# Patient Record
Sex: MALE | Race: OTHER | NOT HISPANIC OR LATINO
[De-identification: names, ages, dates, MRNs, and addresses within clinical notes are randomized per-mention and may not be internally consistent; named-entity substitution may affect disease eponyms.]

---

## 2017-02-08 PROBLEM — Z00.00 ENCOUNTER FOR PREVENTIVE HEALTH EXAMINATION: Status: ACTIVE | Noted: 2017-02-08

## 2017-02-23 ENCOUNTER — APPOINTMENT (OUTPATIENT)
Dept: PULMONOLOGY | Facility: CLINIC | Age: 62
End: 2017-02-23

## 2017-02-23 VITALS
SYSTOLIC BLOOD PRESSURE: 118 MMHG | DIASTOLIC BLOOD PRESSURE: 80 MMHG | BODY MASS INDEX: 20.49 KG/M2 | OXYGEN SATURATION: 96 % | WEIGHT: 123 LBS | HEIGHT: 65 IN | HEART RATE: 76 BPM

## 2017-02-24 ENCOUNTER — RECORD ABSTRACTING (OUTPATIENT)
Age: 62
End: 2017-02-24

## 2017-02-25 LAB
ALBUMIN SERPL ELPH-MCNC: 4.6 G/DL
ALP BLD-CCNC: 70 U/L
ALT SERPL-CCNC: 21 U/L
ANION GAP SERPL CALC-SCNC: 12 MMOL/L
AST SERPL-CCNC: 18 U/L
BASOPHILS # BLD AUTO: 0.04 K/UL
BASOPHILS NFR BLD AUTO: 0.4 %
BILIRUB SERPL-MCNC: 0.3 MG/DL
BUN SERPL-MCNC: 16 MG/DL
CALCIUM SERPL-MCNC: 9.8 MG/DL
CHLORIDE SERPL-SCNC: 104 MMOL/L
CHOLEST SERPL-MCNC: 244 MG/DL
CHOLEST/HDLC SERPL: 4.3 RATIO
CO2 SERPL-SCNC: 25 MMOL/L
CREAT SERPL-MCNC: 0.69 MG/DL
EOSINOPHIL # BLD AUTO: 0.14 K/UL
EOSINOPHIL NFR BLD AUTO: 1.6 %
GLUCOSE SERPL-MCNC: 80 MG/DL
HCT VFR BLD CALC: 46.6 %
HDLC SERPL-MCNC: 57 MG/DL
HGB BLD-MCNC: 15.4 G/DL
IMM GRANULOCYTES NFR BLD AUTO: 0.1 %
LDLC SERPL CALC-MCNC: 162 MG/DL
LYMPHOCYTES # BLD AUTO: 3.07 K/UL
LYMPHOCYTES NFR BLD AUTO: 34.1 %
MAN DIFF?: NORMAL
MCHC RBC-ENTMCNC: 31.8 PG
MCHC RBC-ENTMCNC: 33 GM/DL
MCV RBC AUTO: 96.3 FL
MONOCYTES # BLD AUTO: 0.85 K/UL
MONOCYTES NFR BLD AUTO: 9.4 %
NEUTROPHILS # BLD AUTO: 4.89 K/UL
NEUTROPHILS NFR BLD AUTO: 54.4 %
PLATELET # BLD AUTO: 191 K/UL
POTASSIUM SERPL-SCNC: 4.7 MMOL/L
PROT SERPL-MCNC: 7 G/DL
RBC # BLD: 4.84 M/UL
RBC # FLD: 13.2 %
SODIUM SERPL-SCNC: 141 MMOL/L
TRIGL SERPL-MCNC: 126 MG/DL
WBC # FLD AUTO: 9 K/UL

## 2017-02-27 ENCOUNTER — FORM ENCOUNTER (OUTPATIENT)
Age: 62
End: 2017-02-27

## 2017-02-28 ENCOUNTER — OUTPATIENT (OUTPATIENT)
Dept: OUTPATIENT SERVICES | Facility: HOSPITAL | Age: 62
LOS: 1 days | End: 2017-02-28
Payer: COMMERCIAL

## 2017-02-28 ENCOUNTER — APPOINTMENT (OUTPATIENT)
Dept: CT IMAGING | Facility: CLINIC | Age: 62
End: 2017-02-28

## 2017-02-28 DIAGNOSIS — Z00.8 ENCOUNTER FOR OTHER GENERAL EXAMINATION: ICD-10-CM

## 2017-02-28 PROCEDURE — 71250 CT THORAX DX C-: CPT

## 2017-12-01 ENCOUNTER — APPOINTMENT (OUTPATIENT)
Dept: PULMONOLOGY | Facility: CLINIC | Age: 62
End: 2017-12-01
Payer: COMMERCIAL

## 2017-12-01 VITALS
HEIGHT: 65 IN | DIASTOLIC BLOOD PRESSURE: 90 MMHG | BODY MASS INDEX: 20.33 KG/M2 | WEIGHT: 122 LBS | SYSTOLIC BLOOD PRESSURE: 114 MMHG | TEMPERATURE: 97.4 F | OXYGEN SATURATION: 98 % | HEART RATE: 77 BPM

## 2017-12-01 DIAGNOSIS — R07.9 CHEST PAIN, UNSPECIFIED: ICD-10-CM

## 2017-12-01 DIAGNOSIS — R05 COUGH: ICD-10-CM

## 2017-12-01 DIAGNOSIS — R04.2 HEMOPTYSIS: ICD-10-CM

## 2017-12-01 PROCEDURE — 36415 COLL VENOUS BLD VENIPUNCTURE: CPT

## 2017-12-01 PROCEDURE — 99215 OFFICE O/P EST HI 40 MIN: CPT | Mod: 25

## 2017-12-02 LAB
ALBUMIN SERPL ELPH-MCNC: 4.2 G/DL
ALP BLD-CCNC: 67 U/L
ALT SERPL-CCNC: 24 U/L
ANION GAP SERPL CALC-SCNC: 14 MMOL/L
AST SERPL-CCNC: 26 U/L
BASOPHILS # BLD AUTO: 0.04 K/UL
BASOPHILS NFR BLD AUTO: 0.4 %
BILIRUB SERPL-MCNC: 0.5 MG/DL
BUN SERPL-MCNC: 24 MG/DL
CALCIUM SERPL-MCNC: 9.5 MG/DL
CHLORIDE SERPL-SCNC: 104 MMOL/L
CO2 SERPL-SCNC: 22 MMOL/L
CREAT SERPL-MCNC: 0.7 MG/DL
EOSINOPHIL # BLD AUTO: 0.07 K/UL
EOSINOPHIL NFR BLD AUTO: 0.7 %
GLUCOSE SERPL-MCNC: 76 MG/DL
HCT VFR BLD CALC: 45.2 %
HGB BLD-MCNC: 15.3 G/DL
IMM GRANULOCYTES NFR BLD AUTO: 0.2 %
LYMPHOCYTES # BLD AUTO: 2.24 K/UL
LYMPHOCYTES NFR BLD AUTO: 23 %
MAN DIFF?: NORMAL
MCHC RBC-ENTMCNC: 32.8 PG
MCHC RBC-ENTMCNC: 33.8 GM/DL
MCV RBC AUTO: 96.8 FL
MONOCYTES # BLD AUTO: 0.82 K/UL
MONOCYTES NFR BLD AUTO: 8.4 %
NEUTROPHILS # BLD AUTO: 6.57 K/UL
NEUTROPHILS NFR BLD AUTO: 67.3 %
PLATELET # BLD AUTO: 204 K/UL
POTASSIUM SERPL-SCNC: 4.6 MMOL/L
PROT SERPL-MCNC: 6.6 G/DL
RBC # BLD: 4.67 M/UL
RBC # FLD: 14.2 %
SODIUM SERPL-SCNC: 140 MMOL/L
WBC # FLD AUTO: 9.76 K/UL

## 2017-12-21 ENCOUNTER — CHART COPY (OUTPATIENT)
Age: 62
End: 2017-12-21

## 2019-07-11 ENCOUNTER — NON-APPOINTMENT (OUTPATIENT)
Age: 64
End: 2019-07-11

## 2019-07-11 ENCOUNTER — APPOINTMENT (OUTPATIENT)
Dept: OPHTHALMOLOGY | Facility: CLINIC | Age: 64
End: 2019-07-11
Payer: COMMERCIAL

## 2019-07-11 PROBLEM — Z00.00 ENCOUNTER FOR PREVENTIVE HEALTH EXAMINATION: Noted: 2019-07-11

## 2019-07-11 PROCEDURE — 92004 COMPRE OPH EXAM NEW PT 1/>: CPT

## 2019-09-01 ENCOUNTER — INPATIENT (INPATIENT)
Facility: HOSPITAL | Age: 64
LOS: 14 days | Discharge: HOME | DRG: 826 | End: 2019-09-16
Attending: STUDENT IN AN ORGANIZED HEALTH CARE EDUCATION/TRAINING PROGRAM | Admitting: STUDENT IN AN ORGANIZED HEALTH CARE EDUCATION/TRAINING PROGRAM
Payer: COMMERCIAL

## 2019-09-01 VITALS
OXYGEN SATURATION: 94 % | SYSTOLIC BLOOD PRESSURE: 116 MMHG | RESPIRATION RATE: 18 BRPM | HEIGHT: 67 IN | WEIGHT: 125 LBS | DIASTOLIC BLOOD PRESSURE: 84 MMHG | TEMPERATURE: 98 F | HEART RATE: 98 BPM

## 2019-09-01 DIAGNOSIS — G93.89 OTHER SPECIFIED DISORDERS OF BRAIN: ICD-10-CM

## 2019-09-01 LAB
ALBUMIN SERPL ELPH-MCNC: 4.1 G/DL — SIGNIFICANT CHANGE UP (ref 3.3–5)
ALP SERPL-CCNC: 61 U/L — SIGNIFICANT CHANGE UP (ref 40–120)
ALT FLD-CCNC: 22 U/L — SIGNIFICANT CHANGE UP (ref 10–45)
ANION GAP SERPL CALC-SCNC: 11 MMOL/L — SIGNIFICANT CHANGE UP (ref 5–17)
AST SERPL-CCNC: 19 U/L — SIGNIFICANT CHANGE UP (ref 10–40)
BASOPHILS # BLD AUTO: 0.1 K/UL — SIGNIFICANT CHANGE UP (ref 0–0.2)
BASOPHILS NFR BLD AUTO: 0.7 % — SIGNIFICANT CHANGE UP (ref 0–2)
BILIRUB SERPL-MCNC: 0.7 MG/DL — SIGNIFICANT CHANGE UP (ref 0.2–1.2)
BUN SERPL-MCNC: 13 MG/DL — SIGNIFICANT CHANGE UP (ref 7–23)
CALCIUM SERPL-MCNC: 9.7 MG/DL — SIGNIFICANT CHANGE UP (ref 8.4–10.5)
CHLORIDE SERPL-SCNC: 106 MMOL/L — SIGNIFICANT CHANGE UP (ref 96–108)
CO2 SERPL-SCNC: 24 MMOL/L — SIGNIFICANT CHANGE UP (ref 22–31)
CREAT SERPL-MCNC: 0.66 MG/DL — SIGNIFICANT CHANGE UP (ref 0.5–1.3)
EOSINOPHIL # BLD AUTO: 0.1 K/UL — SIGNIFICANT CHANGE UP (ref 0–0.5)
EOSINOPHIL NFR BLD AUTO: 1.4 % — SIGNIFICANT CHANGE UP (ref 0–6)
GLUCOSE SERPL-MCNC: 101 MG/DL — HIGH (ref 70–99)
HCT VFR BLD CALC: 47 % — SIGNIFICANT CHANGE UP (ref 39–50)
HGB BLD-MCNC: 14.9 G/DL — SIGNIFICANT CHANGE UP (ref 13–17)
LYMPHOCYTES # BLD AUTO: 1.8 K/UL — SIGNIFICANT CHANGE UP (ref 1–3.3)
LYMPHOCYTES # BLD AUTO: 23.8 % — SIGNIFICANT CHANGE UP (ref 13–44)
MAGNESIUM SERPL-MCNC: 1.8 MG/DL — SIGNIFICANT CHANGE UP (ref 1.6–2.6)
MCHC RBC-ENTMCNC: 30.7 PG — SIGNIFICANT CHANGE UP (ref 27–34)
MCHC RBC-ENTMCNC: 31.8 GM/DL — LOW (ref 32–36)
MCV RBC AUTO: 96.4 FL — SIGNIFICANT CHANGE UP (ref 80–100)
MONOCYTES # BLD AUTO: 0.6 K/UL — SIGNIFICANT CHANGE UP (ref 0–0.9)
MONOCYTES NFR BLD AUTO: 8.3 % — SIGNIFICANT CHANGE UP (ref 2–14)
NEUTROPHILS # BLD AUTO: 5 K/UL — SIGNIFICANT CHANGE UP (ref 1.8–7.4)
NEUTROPHILS NFR BLD AUTO: 65.8 % — SIGNIFICANT CHANGE UP (ref 43–77)
PHOSPHATE SERPL-MCNC: 2.8 MG/DL — SIGNIFICANT CHANGE UP (ref 2.5–4.5)
PLATELET # BLD AUTO: 148 K/UL — LOW (ref 150–400)
POTASSIUM SERPL-MCNC: 4.2 MMOL/L — SIGNIFICANT CHANGE UP (ref 3.5–5.3)
POTASSIUM SERPL-SCNC: 4.2 MMOL/L — SIGNIFICANT CHANGE UP (ref 3.5–5.3)
PROT SERPL-MCNC: 6.5 G/DL — SIGNIFICANT CHANGE UP (ref 6–8.3)
RBC # BLD: 4.87 M/UL — SIGNIFICANT CHANGE UP (ref 4.2–5.8)
RBC # FLD: 11.5 % — SIGNIFICANT CHANGE UP (ref 10.3–14.5)
SODIUM SERPL-SCNC: 141 MMOL/L — SIGNIFICANT CHANGE UP (ref 135–145)
WBC # BLD: 7.6 K/UL — SIGNIFICANT CHANGE UP (ref 3.8–10.5)
WBC # FLD AUTO: 7.6 K/UL — SIGNIFICANT CHANGE UP (ref 3.8–10.5)

## 2019-09-01 PROCEDURE — 71260 CT THORAX DX C+: CPT | Mod: 26

## 2019-09-01 PROCEDURE — 99284 EMERGENCY DEPT VISIT MOD MDM: CPT

## 2019-09-01 PROCEDURE — 74177 CT ABD & PELVIS W/CONTRAST: CPT | Mod: 26

## 2019-09-01 PROCEDURE — 70496 CT ANGIOGRAPHY HEAD: CPT | Mod: 26

## 2019-09-01 PROCEDURE — 70498 CT ANGIOGRAPHY NECK: CPT | Mod: 26

## 2019-09-01 PROCEDURE — 93010 ELECTROCARDIOGRAM REPORT: CPT | Mod: NC

## 2019-09-01 RX ORDER — LEVETIRACETAM 250 MG/1
750 TABLET, FILM COATED ORAL
Refills: 0 | Status: DISCONTINUED | OUTPATIENT
Start: 2019-09-01 | End: 2019-09-05

## 2019-09-01 RX ORDER — LEVETIRACETAM 250 MG/1
1000 TABLET, FILM COATED ORAL ONCE
Refills: 0 | Status: COMPLETED | OUTPATIENT
Start: 2019-09-01 | End: 2019-09-01

## 2019-09-01 RX ORDER — SODIUM CHLORIDE 9 MG/ML
1000 INJECTION INTRAMUSCULAR; INTRAVENOUS; SUBCUTANEOUS ONCE
Refills: 0 | Status: COMPLETED | OUTPATIENT
Start: 2019-09-01 | End: 2019-09-01

## 2019-09-01 RX ORDER — SODIUM CHLORIDE 9 MG/ML
1000 INJECTION INTRAMUSCULAR; INTRAVENOUS; SUBCUTANEOUS
Refills: 0 | Status: DISCONTINUED | OUTPATIENT
Start: 2019-09-01 | End: 2019-09-05

## 2019-09-01 RX ORDER — ACETAMINOPHEN 500 MG
650 TABLET ORAL EVERY 6 HOURS
Refills: 0 | Status: DISCONTINUED | OUTPATIENT
Start: 2019-09-01 | End: 2019-09-05

## 2019-09-01 RX ADMIN — SODIUM CHLORIDE 500 MILLILITER(S): 9 INJECTION INTRAMUSCULAR; INTRAVENOUS; SUBCUTANEOUS at 16:06

## 2019-09-01 RX ADMIN — LEVETIRACETAM 400 MILLIGRAM(S): 250 TABLET, FILM COATED ORAL at 15:13

## 2019-09-01 NOTE — ED ADULT TRIAGE NOTE - CHIEF COMPLAINT QUOTE
returned from [place] 08/31/19 c/o ams x 2wks, sp fall yesterday  possible seizures blank staring per family

## 2019-09-01 NOTE — ED ADULT NURSE REASSESSMENT NOTE - NS ED NURSE REASSESS COMMENT FT1
Pt received bed assignment. Patient at CT, family aware. Report given to RNFarzana HARRISON. Pt stable for transport. Chart given to charge desk. Will cont to monitor.

## 2019-09-01 NOTE — ED PROVIDER NOTE - ATTENDING CONTRIBUTION TO CARE
64M, no sig pmh, presents with intermittent episodes of AMS over last few weeks. Pt with "staring" episodes, last 5-10 seconds, pt unresponsive during. Sx began s/p pt's father passing away, pt went ot [place] for  3.5 weeks ago. Also reports generalized weakness, poor appetite. Of note, pt also had fall while in [place], fell backwards onto elbows, witnessed, did not strike head, denies residual pain. Denies cp, sob, abd pian, n/v/d, numbness, weakness.    PE: Flattened affect but pt in NAD, NCAT, MMM, Trachea midline, Normal conjunctiva, lungs CTAB, S1/S2 RRR, Normal perfusion, 2+ radial pulses bilat, Abdomen Soft, NTND, No rebound/guarding, No LE edema, No deformity of extremities, No rashes,  No focal motor or sensory deficits. CN II-XII intact. Visual fields intact. EOMI, PERRLA. Facial sensation equal bilat. Smile and eye closure equal bilat. Hearing intact bilat. Palate elevation equal, tongue protrusion midline. Lateral head rotation equal bilat. 5/5 strength UE and LE bilat. Sensation grossly intact. No pronator drift. Normal finger to nose. Negative Romberg. Normal gait.     Neuro exam unremarkable. Pt with flattened affect, depressed mood. Sx most likely 2/2 emotional distress, however, consider acute cns pathology/seizure activity, anemia, metabolic disturbance. Obtain labs, ct head, ekg, re-eval. - Brown Collins MD

## 2019-09-01 NOTE — CONSULT NOTE ADULT - SUBJECTIVE AND OBJECTIVE BOX
63yo male presenting to ED for episodes of AMS. Daughter reports that pt has episodes of unresponsiveness "staring " lasting 5-10s,  delayed responses which began 2 weeks ago. Pt father passed away several weeks ago, went to in [place] x 3.5 weeks for , returned yesterday. + generalized weakness, poor appetite, quit smoking since death of father.  Pt fell lifting 25lbs, landed on elbows , no strike to head. does no complain of any pain      Imaging:HCT: 3.4 x 3.3 cm cystic mass w/in L frontal lobe w/ edema and midline shift.    Exam:  AOx3, FC, PERRL, EOMI, mild R facial   5/5 throughout, no drift  SILT  no clonus    --Anticoagulation: no    =====================  PAST MEDICAL HISTORY   No pertinent past medical history    PAST SURGICAL HISTORY   No significant past surgical history        MEDICATIONS:  Antibiotics:    Neuro:    Other:      SOCIAL HISTORY:   Occupation:   Marital Status:     FAMILY HISTORY:      ROS: Negative except per HPI    LABS:                          14.9   7.6   )-----------( 148      ( 01 Sep 2019 09:49 )             47.0     -    141  |  106  |  13  ----------------------------<  101<H>  4.2   |  24  |  0.66    Ca    9.7      01 Sep 2019 09:49  Phos  2.8     -  Mg     1.8     -    TPro  6.5  /  Alb  4.1  /  TBili  0.7  /  DBili  x   /  AST  19  /  ALT  22  /  AlkPhos  61

## 2019-09-01 NOTE — ED ADULT NURSE NOTE - OBJECTIVE STATEMENT
64 year old male patient presents ambulatory to ED with daughter for AMS x 2 weeks. Per patient's daughter, patient returned from [place] last night and had episode of unsteady gait while carrying luggage and fell down, witnessed by nephew. Patient was in [place] x 3 weeks, and during trip 64 year old male patient presents ambulatory to ED with daughter for AMS x 2 weeks. Per patient's daughter, patient returned from [place] last night and had episode of unsteady gait while carrying luggage and fell down, witnessed by nephew. No LOC at the time and patient denies pain or discomfort from fall. Patient was in [place] x 3 weeks, and during trip patient became "slow to respond" and had episodes of "blank stares." Patient currently A&Ox3, with some slow responses, but patient also hard of hearing. Patient reporting intermittent HAs and dizziness, denies currently. Patient denies current CP, SOB, palpitations, abd pain, n/v/d, fever, chills. Patient ambulatory independently to bathroom with steady gait. Patient and family aware of plan of care, VSS.

## 2019-09-01 NOTE — H&P ADULT - ASSESSMENT
65 y/o male  w/ no PMH pw staring episodes and “delayed responsiveness” for 2 weeks. . Long time smoker recently quit after fathers death 2 weeks ago. w/  3.4 x 3.3 cm cystic mass w/in L frontal lobe w/ edema and midline shift on ct head  ? mets  ? infectious  onc eval  id eval  neuro eval  -Hold steroids until after MRI  -CT contrast C/A/P  -Q4 neurochecks  -MRI brain   -Keppra prophylaxis for now  discussed w/ daughter / pt   iv fluids

## 2019-09-01 NOTE — ED PROVIDER NOTE - OBJECTIVE STATEMENT
63yo male  presenting to ED for episodes of AMS. Daughter reports that pt has episodes of unresponsiveness "staring " lasting 5-10s,  delayed responses which began 2 weeks ago. + generalized weakness, poor appetite. Pt in [place] for 3.5 weeks, returned yesterday. Pt fell lifting 25lbs, landed on elbows , no strike to head. does no complain of any pain.  Denies LOC, muscle jerking, tremor, CP, SOB, n/v/d/c, 65yo male  presenting to ED for episodes of AMS. Daughter reports that pt has episodes of unresponsiveness "staring " lasting 5-10s,  delayed responses which began 2 weeks ago. + generalized weakness, poor appetite, quit smoking since death of father. Pt father passed away several weeks ago, went to in [place] x 3.5 weeks for , returned yesterday. Pt fell lifting 25lbs, landed on elbows , no strike to head. does no complain of any pain.  Denies LOC, muscle jerking, tremor, CP, SOB, n/v/d/c, 63yo male presenting to ED for episodes of AMS. Daughter reports that pt has episodes of unresponsiveness "staring " lasting 5-10s,  delayed responses which began 2 weeks ago. Pt father passed away several weeks ago, went to in [place] x 3.5 weeks for , returned yesterday. + generalized weakness, poor appetite, quit smoking since death of father.  Pt fell lifting 25lbs, landed on elbows , no strike to head. does no complain of any pain.  Denies LOC, muscle jerking, tremor, CP, SOB, n/v/d/c,

## 2019-09-01 NOTE — ED PROVIDER NOTE - CLINICAL SUMMARY MEDICAL DECISION MAKING FREE TEXT BOX
Impression: R/O seizure vs depression  Plan: CT head, Labs Impression: 65yo male presenting with AMS. Now found to have cystic mass within left frontal lobe  Plan: CT head, Labs, metastatic work up.

## 2019-09-01 NOTE — H&P ADULT - NSHPLABSRESULTS_GEN_ALL_CORE
14.9   7.6   )-----------( 148      ( 01 Sep 2019 09:49 )             47.0       09-01    141  |  106  |  13  ----------------------------<  101<H>  4.2   |  24  |  0.66    Ca    9.7      01 Sep 2019 09:49  Phos  2.8     09-01  Mg     1.8     09-01    TPro  6.5  /  Alb  4.1  /  TBili  0.7  /  DBili  x   /  AST  19  /  ALT  22  /  AlkPhos  61  09-01                      Lactate Trend            CAPILLARY BLOOD GLUCOSE

## 2019-09-01 NOTE — CONSULT NOTE ADULT - ASSESSMENT
Gabrielle Andrews  64M w/ no PMH pw staring episodes and “delayed responsiveness” for 2 weeks. No n/v, headache, vision changes, speech problems. No hx of cancer. Long time smoker recently quit after fathers death 2 weeks ago. HCT: 3.4 x 3.3 cm cystic mass w/in L frontal lobe w/ edema and midline shift. Exam: slight R lower facial otherwise intact. No AC.   -medicine for Mets work up  -Hold steroids until after MRI  -CT contrast C/A/P  -Q4 neurochecks  -MRI wwo brain, please also do fMRI   -Keppra

## 2019-09-01 NOTE — H&P ADULT - HISTORY OF PRESENT ILLNESS
63yo male presenting to ED for episodes of AMS.   Daughter reports that pt has episodes of unresponsiveness "staring " for several seconds   delayed responses which began 2 weeks ago. Pt father passed away several weeks ago, went to in [place] for , returned yesterday. + generalized weakness, poor appetite, quit smoking since death of father  .  Pt fell lifting 25lbs, landed on elbows , no head trauma . does not complain of any pain.  Denies LOC, muscle jerking, tremor, CP, SOB, n/v/d/c,

## 2019-09-02 LAB
HCV AB S/CO SERPL IA: 0.17 S/CO — SIGNIFICANT CHANGE UP (ref 0–0.99)
HCV AB SERPL-IMP: SIGNIFICANT CHANGE UP

## 2019-09-02 PROCEDURE — 70553 MRI BRAIN STEM W/O & W/DYE: CPT | Mod: 26

## 2019-09-02 PROCEDURE — 99254 IP/OBS CNSLTJ NEW/EST MOD 60: CPT | Mod: GC

## 2019-09-02 PROCEDURE — 99223 1ST HOSP IP/OBS HIGH 75: CPT

## 2019-09-02 RX ORDER — DEXAMETHASONE 0.5 MG/5ML
4 ELIXIR ORAL EVERY 6 HOURS
Refills: 0 | Status: DISCONTINUED | OUTPATIENT
Start: 2019-09-02 | End: 2019-09-05

## 2019-09-02 RX ADMIN — Medication 4 MILLIGRAM(S): at 17:42

## 2019-09-02 RX ADMIN — LEVETIRACETAM 750 MILLIGRAM(S): 250 TABLET, FILM COATED ORAL at 04:33

## 2019-09-02 RX ADMIN — LEVETIRACETAM 750 MILLIGRAM(S): 250 TABLET, FILM COATED ORAL at 17:43

## 2019-09-02 RX ADMIN — Medication 4 MILLIGRAM(S): at 23:04

## 2019-09-02 RX ADMIN — SODIUM CHLORIDE 100 MILLILITER(S): 9 INJECTION INTRAMUSCULAR; INTRAVENOUS; SUBCUTANEOUS at 05:07

## 2019-09-02 NOTE — CONSULT NOTE ADULT - SUBJECTIVE AND OBJECTIVE BOX
Admitting Diagnosis:  Other conditions of brain (G93.89): OTHER SPECIFIED DISORDERS OF BRAIN      HPI:  This is a 64y year old Male with the below past medical history who presents with the chief complaint of ams and staring.  Had been in [place] for his father's .  about 1 month of flat affect, felt to be in mourning.  Has not been eating well and weak.  Ct head showed left frontal mass so admitted.  No convulsions.  No HA.  main finding per family is personality change.          Past Medical History:  No pertinent past medical history ([id number])      Past Surgical History:  No significant past surgical history ([id number])      Social History:  No toxic habits    Family History:  FAMILY HISTORY:  No pertinent family history in first degree relatives      Allergies:  No Known Allergies      ROS:  Constitutional: Patient offers no complaints of fevers or significant weight loss  Ears, Nose, Mouth and Throat: The patient presents with no abnormalities of the head, ears, eyes, nose or throat  Skin: Patient offers no concerns of new rashes or lesions  Respiratory: The patient presents with no abnormalities of the respiratory tract  Cardiovascular: The patient presents with no cardiac abnormalities  Gastrointestinal: The patient presents with no abnormalities of the GI system  Genitourinary: The patient presents with no dysuria, hematuria or frequent urination  Neurological: See HPI  Endocrine: Patient offers no complaints of excessive thirst, urination, or heat/cold intolerance    Advanced care planning reviewed and noted in the chart.    Medications:  acetaminophen   Tablet .. 650 milliGRAM(s) Oral every 6 hours PRN  levETIRAcetam 750 milliGRAM(s) Oral two times a day  sodium chloride 0.9%. 1000 milliLiter(s) IV Continuous <Continuous>      Labs:  CBC Full  -  ( 01 Sep 2019 09:49 )  WBC Count : 7.6 K/uL  RBC Count : 4.87 M/uL  Hemoglobin : 14.9 g/dL  Hematocrit : 47.0 %  Platelet Count - Automated : 148 K/uL  Mean Cell Volume : 96.4 fl  Mean Cell Hemoglobin : 30.7 pg  Mean Cell Hemoglobin Concentration : 31.8 gm/dL  Auto Neutrophil # : 5.0 K/uL  Auto Lymphocyte # : 1.8 K/uL  Auto Monocyte # : 0.6 K/uL  Auto Eosinophil # : 0.1 K/uL  Auto Basophil # : 0.1 K/uL  Auto Neutrophil % : 65.8 %  Auto Lymphocyte % : 23.8 %  Auto Monocyte % : 8.3 %  Auto Eosinophil % : 1.4 %  Auto Basophil % : 0.7 %        141  |  106  |  13  ----------------------------<  101<H>  4.2   |  24  |  0.66    Ca    9.7      01 Sep 2019 09:49  Phos  2.8       Mg     1.8         TPro  6.5  /  Alb  4.1  /  TBili  0.7  /  DBili  x   /  AST  19  /  ALT  22  /  AlkPhos  61      CAPILLARY BLOOD GLUCOSE        LIVER FUNCTIONS - ( 01 Sep 2019 09:49 )  Alb: 4.1 g/dL / Pro: 6.5 g/dL / ALK PHOS: 61 U/L / ALT: 22 U/L / AST: 19 U/L / GGT: x                 Vitals:  Vital Signs Last 24 Hrs  T(C): 36.3 (02 Sep 2019 07:45), Max: 36.3 (01 Sep 2019 15:11)  T(F): 97.3 (02 Sep 2019 07:45), Max: 97.4 (01 Sep 2019 15:11)  HR: 50 (02 Sep 2019 07:50) (50 - 59)  BP: 148/82 (02 Sep 2019 07:45) (106/71 - 165/82)  BP(mean): --  RR: 18 (02 Sep 2019 07:45) (16 - 18)  SpO2: 99% (02 Sep 2019 07:45) (95% - 99%)    NEUROLOGICAL EXAM:    Mental status: Awake, alert, and in no apparent distress. Oriented to person, place.  said . knew president roni.   Language function is normal. Recent memory, digit span and concentration were normal.    Cranial Nerves: Pupils were equal, round, reactive to light. Extraocular movements were intact. Visual field were full. Fundoscopic exam was deferred. Facial sensation was intact to light touch. There was no facial asymmetry. The palate was upgoing symmetrically and tongue was midline. Hearing acuity was intact to finger rub AU. Shoulder shrug was full bilaterally    Motor exam: Bulk and tone were normal. Strength was 5/5 in all four extremities. Fine finger movements were symmetric and normal. There was no pronator drift    Reflexes: 2+ in the bilateral upper extremities. 2+ in the bilateral lower extremities. Toes were downgoing bilaterally.     Sensation: Intact to light touch, temperature, vibration and proprioception.     Coordination: Finger-nose-finger and heel-to-shin was without dysmetria.     Gait: deferred    Imaging:      EXAM:  CT ANGIO BRAIN (W)AW IC                          EXAM:  CT ANGIO NECK (W)AW IC                          EXAM:  CT BRAIN                            PROCEDURE DATE:  2019            INTERPRETATION:  CLINICAL INFORMATION: Intermittent altered mental   status. Imbalance.     TECHNIQUE: Noncontrast axial CT images were acquired through the head.   Contrast enhanced axial CT images were acquired from the aortic arch to   the vertex of the calvarium, during the angiographic phase. Additional   post-contrast axial CT images through the head were obtained.   Three-dimensional maximum intensity projection reformats were generated   from the angiographic images.  70 cc of Omnipaque-300 mg/ml were   administered intravenously, without immediate complication.    COMPARISON: None.    FINDINGS:     CT BRAIN:    A 3.4 x 3.3 cm cystic mass within the left frontal lobe with large area   of surrounding edema and causing effacement of the bilateral lateral   ventricles and rightward midline shift. There appears to be a solid   component of the lesion anteriorly. There is effacement of the   interpeduncular cistern. No hydrocephalus.    No areas of abnormal enhancement are identified.    The visualized paranasal sinuses are clear. The mastoid air cells and   middle ear cavities are clear.    The soft tissues of the scalp are unremarkable. The calvarium is intact.    CT ANGIOGRAPHY NECK:    Three-vessel aortic arch. Atherosclerotic calcifications of the aorta   without evidence of dissection or hemodynamically significant stenosis.   The origins of the great vessels are unremarkable.     The common carotid arteries are normal in caliber without significant   stenosis.     The carotid bifurcations are unremarkable. The internal carotid arteries   are normal in caliber without significant stenosis.     Co-dominant vertebral arteries. The vertebral arteries are normal in   caliber without significant stenosis.     The visualized neck and upper chest are unremarkable.    Visualized osseous structures are unremarkable.    CT ANGIOGRAPHY BRAIN:    There is no evidence for significant stenosis, major vessel occlusion, or   aneurysm about the [place] of [place].    There is no evidence for significant stenosis, major vessel occlusion, or   aneurysm involving the vertebrobasilar system.    No enlarged vascular lesions or clusters of abnormal vessels are noted to   suggest an arterial venous malformation.    Visualized portions of the superficial and deep venous systems are   unremarkable.      IMPRESSION:     CT brain: Predominantly cystic mass within the left frontal lobe with   large area of surrounding edema causing leftward midline shift. There   appears to be a hyperdense solid component of the mass anteriorly which   showsvascularity. There is effacement of the interpeduncular cistern. No   hydrocephalus.    CT angiography neck: No evidence of hemodynamically significant stenosis   by NASCET criteria. No evidence of vascular dissection.    CT angiography brain: No major vessel occlusion or proximal stenosis by   NASCET criteria. No evidence of aneurysm or other vascular malformation.                MAIRLYN WALKER M.D., RADIOLOGIST RESIDENT  This document has been electronically signed.  ISRAEL DORANTES M.D., ATTENDING RADIOLOGIST  This document has been electronically signed. Sep  1 2019  2:03PM

## 2019-09-02 NOTE — PROGRESS NOTE ADULT - SUBJECTIVE AND OBJECTIVE BOX
CHIEF COMPLAINT:Patient is a 64y old  Male who presents with a chief complaint of   	        PAST MEDICAL & SURGICAL HISTORY:  No pertinent past medical history  No significant past surgical history          REVIEW OF SYSTEMS:  flat affect  no cp or sob  no vomiting  dec appetite  no chills  no fevers    Medications:  MEDICATIONS  (STANDING):  levETIRAcetam 750 milliGRAM(s) Oral two times a day  sodium chloride 0.9%. 1000 milliLiter(s) (100 mL/Hr) IV Continuous <Continuous>    MEDICATIONS  (PRN):  acetaminophen   Tablet .. 650 milliGRAM(s) Oral every 6 hours PRN Moderate Pain (4 - 6)    	    PHYSICAL EXAM:  T(C): 36.3 (09-02-19 @ 07:45), Max: 36.3 (09-01-19 @ 15:11)  HR: 50 (09-02-19 @ 07:50) (50 - 59)  BP: 148/82 (09-02-19 @ 07:45) (106/71 - 165/82)  RR: 18 (09-02-19 @ 07:45) (16 - 18)  SpO2: 99% (09-02-19 @ 07:45) (95% - 99%)  Wt(kg): --  I&O's Summary    01 Sep 2019 07:01  -  02 Sep 2019 07:00  --------------------------------------------------------  IN: 1436 mL / OUT: 0 mL / NET: 1436 mL    02 Sep 2019 07:01  -  02 Sep 2019 11:32  --------------------------------------------------------  IN: 120 mL / OUT: 0 mL / NET: 120 mL        Appearance: Normal	  HEENT:   Normal oral mucosa, PERRL, EOMI	  Lymphatic: No lymphadenopathy  Cardiovascular: Normal S1 S2, No JVD, No murmurs, No edema  Respiratory: Lungs clear to auscultation	  Psychiatry: A & O x 3,   Gastrointestinal:  Soft, Non-tender, + BS	  Skin: No rashes, No ecchymoses, No cyanosis	  Neurologic: Non-focal/motor equal b/l sensory intact  Extremities: Normal range of motion, No clubbing, cyanosis or edema  Vascular: Peripheral pulses palpable 2+ bilaterally    TELEMETRY: 	    ECG:  	  RADIOLOGY:  OTHER: 	  	  LABS:	 	    CARDIAC MARKERS:                                14.9   7.6   )-----------( 148      ( 01 Sep 2019 09:49 )             47.0     09-01    141  |  106  |  13  ----------------------------<  101<H>  4.2   |  24  |  0.66    Ca    9.7      01 Sep 2019 09:49  Phos  2.8     09-01  Mg     1.8     09-01    TPro  6.5  /  Alb  4.1  /  TBili  0.7  /  DBili  x   /  AST  19  /  ALT  22  /  AlkPhos  61  09-01    proBNP:   Lipid Profile:   HgA1c:   TSH:

## 2019-09-02 NOTE — CONSULT NOTE ADULT - ASSESSMENT
65 y/o man with change in MS.  Brain lesion noted on imaging.  Preliminary results of CT c/ap are negative.  Will follow up on final results as well as on MRI of brain.  will check ldh, spep, ipep.  will need tissue bx  NSGY following  Case d/w Dr. Wilburn

## 2019-09-02 NOTE — PROGRESS NOTE ADULT - SUBJECTIVE AND OBJECTIVE BOX
Patient seen and examined at bedside.    --Anticoagulation--    T(C): 36.3 (09-02-19 @ 07:45), Max: 36.3 (09-01-19 @ 17:35)  HR: 50 (09-02-19 @ 07:50) (50 - 59)  BP: 148/82 (09-02-19 @ 07:45) (106/71 - 165/82)  RR: 18 (09-02-19 @ 07:45) (18 - 18)  SpO2: 99% (09-02-19 @ 07:45) (95% - 99%)  Wt(kg): --    Exam:  AOx3, FC, PERRL, EOMI, mild R facial   5/5 throughout, no drift  SILT  no clonus    --Anticoagulation: no

## 2019-09-02 NOTE — CONSULT NOTE ADULT - SUBJECTIVE AND OBJECTIVE BOX
Patient is a 64y old  Male who presents with a chief complaint of     HPI:  63yo male presenting to ED for episodes of AMS.   Daughter reports that pt has episodes of unresponsiveness "staring " for several seconds   delayed responses which began 2 weeks ago. Pt father passed away several weeks ago, went to in [place] for , returned yesterday. + generalized weakness, poor appetite, quit smoking since death of father  .  Pt fell lifting 25lbs, landed on elbows , no head trauma . does not complain of any pain.  Denies LOC, muscle jerking, tremor, CP, SOB, n/v/d/c, (01 Sep 2019 16:29)    Above reviewed.   ID consulted for workup and antibiotic management     PAST MEDICAL & SURGICAL HISTORY:  No pertinent past medical history  No significant past surgical history      Allergies  No Known Allergies        ANTIMICROBIALS:      MEDICATIONS  (STANDING):        OTHER MEDS: MEDICATIONS  (STANDING):  acetaminophen   Tablet .. 650 every 6 hours PRN  levETIRAcetam 750 two times a day      SOCIAL HISTORY:       FAMILY HISTORY:  No pertinent family history in first degree relatives      REVIEW OF SYSTEMS  [  ] ROS unobtainable because:    [ X ] All other systems negative except as noted below:	    Constitutional:  [ ] fever [ ] chills  [ ] weight loss  [ X] weakness  Skin:  [ ] rash [ ] phlebitis	  Eyes: [ ] icterus [ ] pain  [ ] discharge	  ENMT: [ ] sore throat  [ ] thrush [ ] ulcers [ ] exudates  Respiratory: [ ] dyspnea [ ] hemoptysis [ ] cough [ ] sputum	  Cardiovascular:  [ ] chest pain [ ] palpitations [ ] edema	  Gastrointestinal:  [ ] nausea [ ] vomiting [ ] diarrhea [ ] constipation [X ] decreased appetite	  Genitourinary:  [ ] dysuria [ ] frequency [ ] hematuria [ ] discharge [ ] flank pain  [ ] incontinence  Musculoskeletal:  [ ] myalgias [ ] arthralgias [ ] arthritis  [ ] back pain  Neurological:  [ ] headache [ ] seizures  [X ] confusion/altered mental status  Psychiatric:  [ ] anxiety [ ] depression	  Hematology/Lymphatics:  [ ] lymphadenopathy  Endocrine:  [ ] adrenal [ ] thyroid  Allergic/Immunologic:	 [ ] transplant [ ] seasonal    Vital Signs Last 24 Hrs  T(F): 97.3 (19 @ 07:45), Max: 98.1 (19 @ 08:39)    Vital Signs Last 24 Hrs  HR: 54 (19 @ 04:52) (52 - 98)  BP: 148/82 (19 @ 07:45) (106/71 - 165/82)  RR: 18 (19 @ 07:45)  SpO2: 99% (19 @ 07:45) (94% - 99%)  Wt(kg): --    PHYSICAL EXAM:  General: non-toxic  HEAD/EYES: anicteric, PERRL  ENT:  supple  Cardiovascular:   S1, S2  Respiratory:  clear bilaterally  GI:  soft, non-tender, normal bowel sounds  :  no CVA tenderness   Musculoskeletal:  no synovitis  Neurologic:  grossly non-focal  Skin:  no rash  Lymph: no lymphadenopathy  Psychiatric:  appropriate affect  Vascular:  no phlebitis          WBC Count: 7.6 K/uL ( @ 09:49)                            14.9   7.6   )-----------( 148      ( 01 Sep 2019 09:49 )             47.0           141  |  106  |  13  ----------------------------<  101<H>  4.2   |  24  |  0.66    Ca    9.7      01 Sep 2019 09:49  Phos  2.8       Mg     1.8         TPro  6.5  /  Alb  4.1  /  TBili  0.7  /  DBili  x   /  AST  19  /  ALT  22  /  AlkPhos  61        Creatinine Trend: 0.66<--        MICROBIOLOGY:      RADIOLOGY:  CT Head No Cont (19 @ 10:27)   IMPRESSION:     CT brain: Predominantly cystic mass within the left frontal lobe with   large area of surrounding edema causing leftward midline shift. There   appears to be a hyperdense solid component of the mass anteriorly which   shows vascularity. There is effacement of the interpeduncular cistern. No   hydrocephalus.    CT angiography neck: No evidence of hemodynamically significant stenosis   by NASCET criteria. No evidence of vascular dissection.    CT angiography brain: No major vessel occlusion or proximal stenosis by   NASCET criteria. No evidence of aneurysm or other vascular malformation.    < end of copied text > Patient is a 64y old  Male who presents with a chief complaint of     HPI:  63yo male presenting to ED for episodes of AMS.   Daughter reports that pt has episodes of unresponsiveness "staring " for several seconds   delayed responses which began 2 weeks ago. Pt father passed away several weeks ago, went to in [place] for , returned yesterday. + generalized weakness, poor appetite, quit smoking since death of father  .  Pt fell lifting 25lbs, landed on elbows , no head trauma . does not complain of any pain.  Denies LOC, muscle jerking, tremor, CP, SOB, n/v/d/c, (01 Sep 2019 16:29)    Above reviewed. Patient father  from colon ca at end of july and went to [facility] to grieve with family. There he started feeling generalized weakness and decreased PO intake. Was taking longer to answer question. When he returned last week family noticed the same issues and he fell once. He denies f/chill/HA/blurry vision or changes in vision/CP/SOB/N/V/D/C/urinary complaints   ID consulted for workup and antibiotic management     PAST MEDICAL & SURGICAL HISTORY:  No pertinent past medical history  No significant past surgical history      Allergies  No Known Allergies        ANTIMICROBIALS:      MEDICATIONS  (STANDING):        OTHER MEDS: MEDICATIONS  (STANDING):  acetaminophen   Tablet .. 650 every 6 hours PRN  levETIRAcetam 750 two times a day      SOCIAL HISTORY:     Smoked for 50 years   denies etoh or drug use     FAMILY HISTORY:  Father had colon and prostate ca      REVIEW OF SYSTEMS  [  ] ROS unobtainable because:    [ X ] All other systems negative except as noted below:	    Constitutional:  [ ] fever [ ] chills  [ ] weight loss  [ X] weakness  Skin:  [ ] rash [ ] phlebitis	  Eyes: [ ] icterus [ ] pain  [ ] discharge	  ENMT: [ ] sore throat  [ ] thrush [ ] ulcers [ ] exudates  Respiratory: [ ] dyspnea [ ] hemoptysis [ ] cough [ ] sputum	  Cardiovascular:  [ ] chest pain [ ] palpitations [ ] edema	  Gastrointestinal:  [ ] nausea [ ] vomiting [ ] diarrhea [ ] constipation [X ] decreased appetite	  Genitourinary:  [ ] dysuria [ ] frequency [ ] hematuria [ ] discharge [ ] flank pain  [ ] incontinence  Musculoskeletal:  [ ] myalgias [ ] arthralgias [ ] arthritis  [ ] back pain  Neurological:  [ ] headache [ ] seizures  [X ] confusion/altered mental status  Psychiatric:  [ ] anxiety [ ] depression	  Hematology/Lymphatics:  [ ] lymphadenopathy  Endocrine:  [ ] adrenal [ ] thyroid  Allergic/Immunologic:	 [ ] transplant [ ] seasonal    Vital Signs Last 24 Hrs  T(F): 97.3 (19 @ 07:45), Max: 98.1 (19 @ 08:39)    Vital Signs Last 24 Hrs  HR: 54 (19 @ 04:52) (52 - 98)  BP: 148/82 (19 @ 07:45) (106/71 - 165/82)  RR: 18 (19 @ 07:45)  SpO2: 99% (19 @ 07:45) (94% - 99%)  Wt(kg): --    PHYSICAL EXAM:  General: non-toxic  HEAD/EYES: anicteric, PERRL  ENT:  supple, no oral lesions  Cardiovascular:   S1, S2  Respiratory:  decreased breath sounds bilaterally  GI:  soft, non-tender, normal bowel sounds  :  no CVA tenderness   Musculoskeletal:  no synovitis  Neurologic:  alert and oriented x3 but slow speech, small facial droop rest of CN nerve exam intact   Skin:  no rash  Lymph: no lymphadenopathy  Psychiatric:  flat affect  Vascular:  no phlebitis          WBC Count: 7.6 K/uL ( @ 09:49)                            14.9   7.6   )-----------( 148      ( 01 Sep 2019 09:49 )             47.0           141  |  106  |  13  ----------------------------<  101<H>  4.2   |  24  |  0.66    Ca    9.7      01 Sep 2019 09:49  Phos  2.8       Mg     1.8         TPro  6.5  /  Alb  4.1  /  TBili  0.7  /  DBili  x   /  AST  19  /  ALT  22  /  AlkPhos  61        Creatinine Trend: 0.66<--        MICROBIOLOGY:      RADIOLOGY:  CT Head No Cont (19 @ 10:27)   IMPRESSION:     CT brain: Predominantly cystic mass within the left frontal lobe with   large area of surrounding edema causing leftward midline shift. There   appears to be a hyperdense solid component of the mass anteriorly which   shows vascularity. There is effacement of the interpeduncular cistern. No   hydrocephalus.    CT angiography neck: No evidence of hemodynamically significant stenosis   by NASCET criteria. No evidence of vascular dissection.    CT angiography brain: No major vessel occlusion or proximal stenosis by   NASCET criteria. No evidence of aneurysm or other vascular malformation.    < end of copied text >      < from: CT Chest w/ IV Cont (19 @ 16:45) >  IMPRESSION:     No lesion or adenopathy in the chest. No adenopathy in the abdomen or   pelvis.     Indeterminate 1.2 cm hypoattenuating right hepatic lobe lesion.    Indeterminate bilateral renal cystic lesions, which may reflect   hemorrhagic/proteinaceous cysts. Further evaluation with abdominal   ultrasound is recommended.    1.6 cm sclerotic right acetabular lesion.    < end of copied text > Patient is a 64y old  Male who presents with a chief complaint of     HPI:  63yo male presenting to ED for episodes of AMS.   Daughter reports that pt has episodes of unresponsiveness "staring " for several seconds   delayed responses which began 2 weeks ago. Pt father passed away several weeks ago, went to in [place] for , returned yesterday. + generalized weakness, poor appetite, quit smoking since death of father  .  Pt fell lifting 25lbs, landed on elbows , no head trauma . does not complain of any pain.  Denies LOC, muscle jerking, tremor, CP, SOB, n/v/d/c, (01 Sep 2019 16:29)    Above reviewed. Patient father  from colon ca at end of july and went to [facility] to grieve with family. There he started feeling generalized weakness and decreased PO intake. Was taking longer to answer question. When he returned last week family noticed the same issues and he fell once. He denies f/chill/HA/blurry vision or changes in vision/CP/SOB/N/V/D/C/urinary complaints   ID consulted for workup and antibiotic management     PAST MEDICAL & SURGICAL HISTORY:  No pertinent past medical history  No significant past surgical history    Allergies  No Known Allergies    ANTIMICROBIALS:  Off    OTHER MEDS: MEDICATIONS  (STANDING):  acetaminophen   Tablet .. 650 every 6 hours PRN  levETIRAcetam 750 two times a day    SOCIAL HISTORY:     Smoked for 50 years   denies etoh or drug use     FAMILY HISTORY:  Father had colon and prostate ca    REVIEW OF SYSTEMS  [  ] ROS unobtainable because:    [ X ] All other systems negative except as noted below:	    Constitutional:  [ ] fever [ ] chills  [ ] weight loss  [ X] weakness  Skin:  [ ] rash [ ] phlebitis	  Eyes: [ ] icterus [ ] pain  [ ] discharge	  ENMT: [ ] sore throat  [ ] thrush [ ] ulcers [ ] exudates  Respiratory: [ ] dyspnea [ ] hemoptysis [ ] cough [ ] sputum	  Cardiovascular:  [ ] chest pain [ ] palpitations [ ] edema	  Gastrointestinal:  [ ] nausea [ ] vomiting [ ] diarrhea [ ] constipation [X ] decreased appetite	  Genitourinary:  [ ] dysuria [ ] frequency [ ] hematuria [ ] discharge [ ] flank pain  [ ] incontinence  Musculoskeletal:  [ ] myalgias [ ] arthralgias [ ] arthritis  [ ] back pain  Neurological:  [ ] headache [ ] seizures  [X ] confusion/altered mental status  Psychiatric:  [ ] anxiety [ ] depression	  Hematology/Lymphatics:  [ ] lymphadenopathy  Endocrine:  [ ] adrenal [ ] thyroid  Allergic/Immunologic:	 [ ] transplant [ ] seasonal    Vital Signs Last 24 Hrs  T(F): 97.3 (19 @ 07:45), Max: 98.1 (19 @ 08:39)    Vital Signs Last 24 Hrs  HR: 54 (19 @ 04:52) (52 - 98)  BP: 148/82 (19 @ 07:45) (106/71 - 165/82)  RR: 18 (19 @ 07:45)  SpO2: 99% (19 @ 07:45) (94% - 99%)    PHYSICAL EXAM:  General: non-toxic  HEAD/EYES: anicteric, PERRL  ENT:  supple, no oral lesions  Cardiovascular:   S1, S2  Respiratory:  decreased breath sounds bilaterally  GI:  soft, non-tender, normal bowel sounds  :  no CVA tenderness   Musculoskeletal:  no synovitis  Neurologic:  alert and oriented x3 but slow speech, small facial droop rest of CN nerve exam intact   Skin:  no rash  Lymph: no lymphadenopathy  Psychiatric:  flat affect  Vascular:  no phlebitis    Labs:    WBC Count: 7.6 K/uL ( @ 09:49)                          14.9   7.6   )-----------( 148      ( 01 Sep 2019 09:49 )             47.0         141  |  106  |  13  ----------------------------<  101<H>  4.2   |  24  |  0.66    Ca    9.7      01 Sep 2019 09:49  Phos  2.8       Mg     1.8         TPro  6.5  /  Alb  4.1  /  TBili  0.7  /  DBili  x   /  AST  19  /  ALT  22  /  AlkPhos  61      Creatinine Trend: 0.66<--    MICROBIOLOGY:    RADIOLOGY:  CT Head No Cont (19 @ 10:27)   IMPRESSION:     CT brain: Predominantly cystic mass within the left frontal lobe with   large area of surrounding edema causing leftward midline shift. There   appears to be a hyperdense solid component of the mass anteriorly which   shows vascularity. There is effacement of the interpeduncular cistern. No   hydrocephalus.    CT angiography neck: No evidence of hemodynamically significant stenosis   by NASCET criteria. No evidence of vascular dissection.    CT angiography brain: No major vessel occlusion or proximal stenosis by   NASCET criteria. No evidence of aneurysm or other vascular malformation.    < from: CT Chest w/ IV Cont (19 @ 16:45) >  IMPRESSION:     No lesion or adenopathy in the chest. No adenopathy in the abdomen or   pelvis.     Indeterminate 1.2 cm hypoattenuating right hepatic lobe lesion.    Indeterminate bilateral renal cystic lesions, which may reflect   hemorrhagic/proteinaceous cysts. Further evaluation with abdominal   ultrasound is recommended.    1.6 cm sclerotic right acetabular lesion.

## 2019-09-02 NOTE — PROGRESS NOTE ADULT - ASSESSMENT
Jhonyhyacinth Jesagustin  64M w/ no PMH pw staring episodes and “delayed responsiveness” for 2 weeks. No n/v, headache, vision changes, speech problems. No hx of cancer. Long time smoker recently quit after fathers death 2 weeks ago. HCT: 3.4 x 3.3 cm cystic mass w/in L frontal lobe w/ edema and midline shift. Exam: slight R lower facial otherwise intact. No AC.  -Pre op for Thursday.    -Mets work up, onc eval  -Hold steroids  -CT contrast C/A/P done shows hepatic lesion, renal lesions, and acetabular lesion.   -Q4 neurochecks  -MRI wwo brain done, shows L cystic enhancing lesion causing significant edema   -Keppra JhonyjohnGabrielle phillips  64M w/ no PMH pw staring episodes and “delayed responsiveness” for 2 weeks. No n/v, headache, vision changes, speech problems. No hx of cancer. Long time smoker recently quit after fathers death 2 weeks ago. HCT: 3.4 x 3.3 cm cystic mass w/in L frontal lobe w/ edema and midline shift. Exam: slight R lower facial otherwise intact. No AC.  -Pre op for Thursday.    -Mets work up, onc eval  -Start dex 4q6  -CT contrast C/A/P done shows hepatic lesion, renal lesions, and acetabular lesion.   -Q4 neurochecks  -MRI wwo brain done, shows L cystic enhancing lesion causing significant edema   -Keppra

## 2019-09-02 NOTE — CONSULT NOTE ADULT - ASSESSMENT
65yo male presenting to ED for episodes of AMS.  Afebrile, allie, normotensive   No leukocytosis or abnormal diff  creatinine and LFTs normal   CT head Predominantly cystic mass within the left frontal lobe with large area of surrounding edema causing leftward midline shift. There appears to be a hyperdense solid component of the mass anteriorly which shows vascularity. There is effacement of the interpeduncular cistern. No hydrocephalus.    ------INCOMPLETE NOTE-TO BE DISCUSSED WITH ATTENDING- RECOMMENDATIONS TO FOLLOW--- 63yo male presenting to ED for episodes of AMS.  Afebrile, allie, normotensive   No leukocytosis or abnormal diff  creatinine and LFTs normal   CT head Predominantly cystic mass within the left frontal lobe with large area of surrounding edema causing leftward midline shift. There appears to be a hyperdense solid component of the mass anteriorly which shows vascularity. There is effacement of the interpeduncular cistern. No hydrocephalus.  CT findings concerning as there is an acetabular lesion, liver lesion and renal lesions.   No obvious sign of infection   Suspect this is more likely to be malignant     Overall, 64M with brain mass, liver lesion, cancer hx in family, weakness   Recommendations:   -Monitor off antibiotics   -send 2 sets of blood cultures with 8-10mL blood per bottle  -MRI brain w/wo contrast   -Send HIV screen  -Send QuantiFeron   -Neurosurgery possible biopsy later this week->send for pathology, bacterial culture, fungal culture, AFB smear and culture     Discussed with NP  Will continue to follow     Gabriel Liu DO  Pager [phone]   ID fellow, PGY 5  After 5pm/weekends call [phone]

## 2019-09-02 NOTE — PROGRESS NOTE ADULT - ASSESSMENT
63 y/o male  w/ no PMH pw staring episodes and “delayed responsiveness” for 2 weeks. . Long time smoker recently quit after fathers death 2 weeks ago. w/  3.4 x 3.3 cm cystic mass w/in L frontal lobe w/ edema and midline shift on ct head  ? mets  ? infectious less likely  onc eval noted  onc w/u undergoing   id eval noted  neuro eval noted  -Hold steroids until after MRI  -CT contrast C/A/P pending final results   -Q4 neurochecks  -MRI brain today  -Keppra prophylaxis for now  discussed w/ daughter / pt /onc/neuro  iv fluids

## 2019-09-02 NOTE — CONSULT NOTE ADULT - SUBJECTIVE AND OBJECTIVE BOX
MARCOS KOO  MRN-[id number]    Patient is a 64y old  Male who presents with a chief complaint of     HPI:  65yo male presenting to ED for episodes of AMS.   Daughter reports that pt has episodes of unresponsiveness "staring " for several seconds   delayed responses which began 2 weeks ago. Pt father passed away several weeks ago, went to in [place] for , returned over weekend. + generalized weakness, poor appetite, quit smoking since death of father  .  Pt fell lifting 25lbs, landed on elbows , no head trauma . does not complain of any pain.  Denies LOC, muscle jerking, tremor, CP, SOB, n/v/d/c, (01 Sep 2019 16:29)  Brain imaging revealed mass      PAST MEDICAL & SURGICAL HISTORY:  No pertinent past medical history  No significant past surgical history    Current Meds  MEDICATIONS  (STANDING):  levETIRAcetam 750 milliGRAM(s) Oral two times a day  sodium chloride 0.9%. 1000 milliLiter(s) (100 mL/Hr) IV Continuous <Continuous>    MEDICATIONS  (PRN):  acetaminophen   Tablet .. 650 milliGRAM(s) Oral every 6 hours PRN Moderate Pain (4 - 6)      Allergies    No Known Allergies    Social History   No tob.  No etoh    FAMILY HISTORY:  No pertinent family history in first degree relatives        REVIEW OF SYSTEMS    patient not answering questions in helpful way.  Answeres, as above as obtained from daughter.  no pain. No HA or dizziness. no f/c/s. + decrease in appetite.  no n/v/d. no cp or palp . no cough or sob      Vitals  Vital Signs Last 24 Hrs  T(C): 36.3 (02 Sep 2019 07:45), Max: 36.3 (01 Sep 2019 15:11)  T(F): 97.3 (02 Sep 2019 07:45), Max: 97.4 (01 Sep 2019 15:11)  HR: 50 (02 Sep 2019 07:50) (50 - 59)  BP: 148/82 (02 Sep 2019 07:45) (106/71 - 165/82)  BP(mean): --  RR: 18 (02 Sep 2019 07:45) (16 - 18)  SpO2: 99% (02 Sep 2019 07:45) (95% - 99%)    Physical Exam    Constitutional: NAD    Eyes: PERRLA EOMI, anicteric sclera    Heent :No oral sores, no pharyngeal injection. moist mucosa.    Neck: supple, no jvd, no LAD    Respiratory: CTA b/l     Cardiovascular: s1s2, no m/g/r    Gastrointestinal: soft, nt, nd, + BS    Extremities: no c/c/e    Neurological:A&O x 3 moves all ext.    Skin: no rash on exposed skin    Lymph Nodes: no lymphadenopathy.    Lab  CBC Full  -  ( 01 Sep 2019 09:49 )  WBC Count : 7.6 K/uL  RBC Count : 4.87 M/uL  Hemoglobin : 14.9 g/dL  Hematocrit : 47.0 %  Platelet Count - Automated : 148 K/uL  Mean Cell Volume : 96.4 fl  Mean Cell Hemoglobin : 30.7 pg  Mean Cell Hemoglobin Concentration : 31.8 gm/dL  Auto Neutrophil # : 5.0 K/uL  Auto Lymphocyte # : 1.8 K/uL  Auto Monocyte # : 0.6 K/uL  Auto Eosinophil # : 0.1 K/uL  Auto Basophil # : 0.1 K/uL  Auto Neutrophil % : 65.8 %  Auto Lymphocyte % : 23.8 %  Auto Monocyte % : 8.3 %  Auto Eosinophil % : 1.4 %  Auto Basophil % : 0.7 %        141  |  106  |  13  ----------------------------<  101<H>  4.2   |  24  |  0.66    Ca    9.7      01 Sep 2019 09:49  Phos  2.8       Mg     1.8         TPro  6.5  /  Alb  4.1  /  TBili  0.7  /  DBili  x   /  AST  19  /  ALT  22  /  AlkPhos  61          Rad:    Assessment/Plan

## 2019-09-02 NOTE — CONSULT NOTE ADULT - ASSESSMENT
This is a 64y Male, here with 1 month of personality change, found the have left frontal cystic mass with edema.    MRI brain done results pending.    CT C/A/P neg.    Concern would be for primary brain tumor, eg GBM    Plan:  - follow up radiology read  - hold steroids per neurosurgery in case is lymphoma  - suspect will need biopsy +/- resection.  defer to neurosurgery  - case discussed with family at bedside  - will follow

## 2019-09-03 ENCOUNTER — TRANSCRIPTION ENCOUNTER (OUTPATIENT)
Age: 64
End: 2019-09-03

## 2019-09-03 PROCEDURE — 99232 SBSQ HOSP IP/OBS MODERATE 35: CPT

## 2019-09-03 RX ORDER — BACLOFEN 100 %
20 POWDER (GRAM) MISCELLANEOUS ONCE
Refills: 0 | Status: COMPLETED | OUTPATIENT
Start: 2019-09-03 | End: 2019-09-03

## 2019-09-03 RX ORDER — METOCLOPRAMIDE HCL 10 MG
20 TABLET ORAL ONCE
Refills: 0 | Status: DISCONTINUED | OUTPATIENT
Start: 2019-09-03 | End: 2019-09-03

## 2019-09-03 RX ORDER — METOCLOPRAMIDE HCL 10 MG
20 TABLET ORAL ONCE
Refills: 0 | Status: COMPLETED | OUTPATIENT
Start: 2019-09-03 | End: 2019-09-03

## 2019-09-03 RX ADMIN — Medication 4 MILLIGRAM(S): at 05:38

## 2019-09-03 RX ADMIN — SODIUM CHLORIDE 100 MILLILITER(S): 9 INJECTION INTRAMUSCULAR; INTRAVENOUS; SUBCUTANEOUS at 08:16

## 2019-09-03 RX ADMIN — Medication 4 MILLIGRAM(S): at 12:51

## 2019-09-03 RX ADMIN — Medication 650 MILLIGRAM(S): at 17:54

## 2019-09-03 RX ADMIN — Medication 650 MILLIGRAM(S): at 14:37

## 2019-09-03 RX ADMIN — Medication 4 MILLIGRAM(S): at 17:17

## 2019-09-03 RX ADMIN — Medication 108 MILLIGRAM(S): at 20:19

## 2019-09-03 RX ADMIN — Medication 20 MILLIGRAM(S): at 22:49

## 2019-09-03 RX ADMIN — LEVETIRACETAM 750 MILLIGRAM(S): 250 TABLET, FILM COATED ORAL at 17:17

## 2019-09-03 RX ADMIN — Medication 4 MILLIGRAM(S): at 22:50

## 2019-09-03 RX ADMIN — LEVETIRACETAM 750 MILLIGRAM(S): 250 TABLET, FILM COATED ORAL at 05:37

## 2019-09-03 NOTE — CHART NOTE - NSCHARTNOTEFT_GEN_A_CORE
MARCOS KOO    Notified by RN patient with persist of hiccup's since noon time. v/s stable.       Interventions taken   Reglan with poor response and start Baclofen.  cont assess and monitor.  f/u with am team.                      Tadeo Hsu ANP-BC  Spectralink #[id number]

## 2019-09-03 NOTE — PROGRESS NOTE ADULT - SUBJECTIVE AND OBJECTIVE BOX
CHIEF COMPLAINT:Patient is a 64y old  Male who presents with a chief complaint of AMS (03 Sep 2019 11:12)    	        PAST MEDICAL & SURGICAL HISTORY:  No pertinent past medical history  No significant past surgical history          REVIEW OF SYSTEMS:  CONSTITUTIONAL: flat affect   EYES: No eye pain, visual disturbances, or discharge  NECK: No pain or stiffness  RESPIRATORY: No cough, wheezing, chills or hemoptysis; No Shortness of Breath  CARDIOVASCULAR: No chest pain, palpitations, passing out, dizziness, or leg swelling  GASTROINTESTINAL: No abdominal or epigastric pain. No nausea, vomiting, or hematemesis; No diarrhea or constipation. No melena or hematochezia.  GENITOURINARY: No dysuria, frequency, hematuria, or incontinence  NEUROLOGICAL: No headaches,  MUSCULOSKELETAL: No joint pain or swelling; No muscle, back, or extremity pain    Medications:  MEDICATIONS  (STANDING):  dexamethasone  Injectable 4 milliGRAM(s) IV Push every 6 hours  levETIRAcetam 750 milliGRAM(s) Oral two times a day  sodium chloride 0.9%. 1000 milliLiter(s) (100 mL/Hr) IV Continuous <Continuous>    MEDICATIONS  (PRN):  acetaminophen   Tablet .. 650 milliGRAM(s) Oral every 6 hours PRN Moderate Pain (4 - 6)    	    PHYSICAL EXAM:  T(C): 36.3 (09-03-19 @ 12:05), Max: 36.5 (09-03-19 @ 04:51)  HR: 60 (09-03-19 @ 12:05) (55 - 60)  BP: 113/54 (09-03-19 @ 12:05) (99/66 - 142/84)  RR: 18 (09-03-19 @ 12:05) (17 - 18)  SpO2: 97% (09-03-19 @ 12:05) (94% - 97%)  Wt(kg): --  I&O's Summary    02 Sep 2019 07:01  -  03 Sep 2019 07:00  --------------------------------------------------------  IN: 1610 mL / OUT: 550 mL / NET: 1060 mL    03 Sep 2019 07:01  -  03 Sep 2019 13:49  --------------------------------------------------------  IN: 820 mL / OUT: 1000 mL / NET: -180 mL        Appearance: Normal	  HEENT:   Normal oral mucosa, PERRL, EOMI	  Lymphatic: No lymphadenopathy  Cardiovascular: Normal S1 S2, No JVD, No murmurs, No edema  Respiratory: Lungs clear to auscultation	  Psychiatry: A & O x 3, Mood & affect appropriate  Gastrointestinal:  Soft, Non-tender, + BS	  Skin: No rashes, No ecchymoses, No cyanosis	  Neurologic: Non-focal/sensory intact  / motor equal b/l dtr normal  Extremities: Normal range of motion, No clubbing, cyanosis or edema  Vascular: Peripheral pulses palpable 2+ bilaterally    TELEMETRY: 	    ECG:  	  RADIOLOGY:  OTHER: 	  	  LABS:	 	    CARDIAC MARKERS:                  proBNP:   Lipid Profile:   HgA1c:   TSH:

## 2019-09-03 NOTE — PROGRESS NOTE ADULT - SUBJECTIVE AND OBJECTIVE BOX
HPI:  65yo male presenting to ED for episodes of AMS.   Daughter reports that pt has episodes of unresponsiveness "staring " for several seconds   delayed responses which began 2 weeks ago. Pt father passed away several weeks ago, went to in [place] for , returned yesterday. + generalized weakness, poor appetite, quit smoking since death of father  .  Pt fell lifting 25lbs, landed on elbows , no head trauma . does not complain of any pain.  Denies LOC, muscle jerking, tremor, CP, SOB, n/v/d/c, (01 Sep 2019 16:29)    PAST MEDICAL & SURGICAL HISTORY:  No pertinent past medical history  No significant past surgical history    Medications:  acetaminophen   Tablet .. 650 milliGRAM(s) Oral every 6 hours PRN Moderate Pain (4 - 6)  dexamethasone  Injectable 4 milliGRAM(s) IV Push every 6 hours  levETIRAcetam 750 milliGRAM(s) Oral two times a day  sodium chloride 0.9%. 1000 milliLiter(s) IV Continuous <Continuous>    Labs:            Radiology:     < from: MR Head w/wo IV Cont (19 @ 13:15) >  Left frontal heterogeneous lesion with cystic and solid   components in surrounding vasogenic edema. Lesion is solitary.There is   diffusion positivity around the periphery of the lesion. There is some   susceptibility seen in association. Findings suggestive of a glioblastoma   versus a metastasis. No diffusion restriction within the cystic portion   of the lesion tosuggest an abscess.      < end of copied text >    < from: CT Abdomen and Pelvis w/ IV Cont (19 @ 17:07) >  No lesion or adenopathy in the chest. No adenopathy in the abdomen or   pelvis.     Indeterminate 1.2 cm hypoattenuating right hepatic lobe lesion.    Indeterminate bilateral renal cystic lesions, which may reflect   hemorrhagic/proteinaceous cysts. Further evaluation with abdominal   ultrasound is recommended.    1.6 cm sclerotic right acetabular lesion.              < end of copied text >        ROS:  Patient comfortable without distress  No SOB or chest pain  No palpitation  No abdominal pain, diarrhaea or constipation  No weakness of extremities  No skin changes or swelling of legs    Vital Signs Last 24 Hrs  T(C): 36.5 (03 Sep 2019 04:51), Max: 36.5 (03 Sep 2019 04:51)  T(F): 97.7 (03 Sep 2019 04:51), Max: 97.7 (03 Sep 2019 04:51)  HR: 59 (03 Sep 2019 04:51) (55 - 59)  BP: 99/66 (03 Sep 2019 04:51) (99/66 - 142/84)  BP(mean): --  RR: 17 (03 Sep 2019 04:51) (17 - 17)  SpO2: 96% (03 Sep 2019 04:51) (94% - 96%)    Physical exam:  Patient alert and oriented  No distress  CVS: S1, S2 regular or murmur  Chest: bilateral breath sound without rales  Abdomen: soft, not tender, no organomegaly or masses  No focal neuro deficit  No edema      Assessment and Plan: 65yo male was admitted for episodes of AMS.  CT's and MRI noted  PAST MEDICAL & SURGICAL HISTORY:  No pertinent past medical history  No significant past surgical history    Medications:  acetaminophen   Tablet .. 650 milliGRAM(s) Oral every 6 hours PRN Moderate Pain (4 - 6)  dexamethasone  Injectable 4 milliGRAM(s) IV Push every 6 hours  levETIRAcetam 750 milliGRAM(s) Oral two times a day  sodium chloride 0.9%. 1000 milliLiter(s) IV Continuous <Continuous>    Labs:    Complete Blood Count + Automated Diff (09.01.19 @ 09:49)    WBC Count: 7.6 K/uL    RBC Count: 4.87 M/uL    Hemoglobin: 14.9 g/dL    Hematocrit: 47.0 %    Mean Cell Volume: 96.4 fl    Mean Cell Hemoglobin: 30.7 pg    Mean Cell Hemoglobin Conc: 31.8 gm/dL    Red Cell Distrib Width: 11.5 %    Platelet Count - Automated: 148 K/uL    Auto Neutrophil #: 5.0 K/uL    Auto Lymphocyte #: 1.8 K/uL    Auto Monocyte #: 0.6 K/uL    Auto Eosinophil #: 0.1 K/uL    Auto Basophil #: 0.1 K/uL    Auto Neutrophil %: 65.8: Differential percentages must be correlated with absolute numbers for  clinical significance. %    Auto Lymphocyte %: 23.8 %    Auto Monocyte %: 8.3 %    Auto Eosinophil %: 1.4 %    Auto Basophil %: 0.7 %    Comprehensive Metabolic Panel (09.01.19 @ 09:49)    Sodium, Serum: 141 mmol/L    Potassium, Serum: 4.2 mmol/L    Chloride, Serum: 106 mmol/L    Carbon Dioxide, Serum: 24 mmol/L    Anion Gap, Serum: 11 mmol/L    Blood Urea Nitrogen, Serum: 13 mg/dL    Creatinine, Serum: 0.66 mg/dL    Glucose, Serum: 101 mg/dL    Calcium, Total Serum: 9.7 mg/dL    Protein Total, Serum: 6.5 g/dL    Albumin, Serum: 4.1 g/dL    Bilirubin Total, Serum: 0.7 mg/dL    Alkaline Phosphatase, Serum: 61 U/L    Aspartate Aminotransferase (AST/SGOT): 19 U/L    Alanine Aminotransferase (ALT/SGPT): 22 U/L    eGFR if Non : 101: Interpretative comment  The units for eGFR are mL/min/1.73M2 (normalized body surface area). The  eGFR is calculated from a serum creatinine using the CKD-EPI equation.  Other variables required for calculation are race, age and sex. Among  patients with chronic kidney disease (CKD), the eGFR is useful in  determining the stage of disease according to KDOQI CKD classification.  All eGFR results are reported numerically with the following  interpretation.          GFR                    With                 Without     (ml/min/1.73 m2)    Kidney Damage       Kidney Damage        >= 90                    Stage 1                     Normal        60-89                    Stage 2                     Decreased GFR        30-59     Stage 3                     Stage 3        15-29                    Stage 4                     Stage 4        < 15                      Stage 5                     Stage 5  Each stage of CKD assumes that the associated GFR level has been in  effect for at least 3 months. Determination of stages one and two (with  eGFR > 59 ml/min/m2) requires estimation of kidney damage for at least 3  months as defined by structural or functional abnormalities.  Limitations: All estimates of GFR will be less accurate for patients at  extremes of muscle mass (including but not limited to frail elderly,  critically ill, or cancer patients), those with unusual diets, and those  with conditions associated with reduced secretion or extrarenal  elimination of creatinine. The eGFR equation is not recommended for use  in patients with unstable creatinine levels. mL/min/1.73M2    eGFR if African American: 118 mL/min/1.73M2            Radiology:     < from: MR Head w/wo IV Cont (09.02.19 @ 13:15) >  Left frontal heterogeneous lesion with cystic and solid   components in surrounding vasogenic edema. Lesion is solitary.There is   diffusion positivity around the periphery of the lesion. There is some   susceptibility seen in association. Findings suggestive of a glioblastoma   versus a metastasis. No diffusion restriction within the cystic portion   of the lesion tosuggest an abscess.      < end of copied text >    < from: CT Abdomen and Pelvis w/ IV Cont (09.01.19 @ 17:07) >  No lesion or adenopathy in the chest. No adenopathy in the abdomen or   pelvis.     Indeterminate 1.2 cm hypoattenuating right hepatic lobe lesion.    Indeterminate bilateral renal cystic lesions, which may reflect   hemorrhagic/proteinaceous cysts. Further evaluation with abdominal   ultrasound is recommended.    1.6 cm sclerotic right acetabular lesion.              < end of copied text >        ROS:  Patient comfortable without distress  At present MS seems OK, family at bedside  No SOB or chest pain  No palpitation  No abdominal pain, diarrhaea or constipation  No weakness of extremities  No skin changes or swelling of legs    Vital Signs Last 24 Hrs  T(C): 36.5 (03 Sep 2019 04:51), Max: 36.5 (03 Sep 2019 04:51)  T(F): 97.7 (03 Sep 2019 04:51), Max: 97.7 (03 Sep 2019 04:51)  HR: 59 (03 Sep 2019 04:51) (55 - 59)  BP: 99/66 (03 Sep 2019 04:51) (99/66 - 142/84)  BP(mean): --  RR: 17 (03 Sep 2019 04:51) (17 - 17)  SpO2: 96% (03 Sep 2019 04:51) (94% - 96%)    Physical exam:  Patient alert   No distress  CVS: S1, S2 regular or murmur  Chest: bilateral breath sound without rales  Abdomen: soft, not tender, no organomegaly or masses  No focal neuro deficit  No edema      Assessment and Plan:

## 2019-09-03 NOTE — DISCHARGE NOTE NURSING/CASE MANAGEMENT/SOCIAL WORK - NSDCPEEMAIL_GEN_ALL_CORE
[facility] email [email or web address]

## 2019-09-03 NOTE — DISCHARGE NOTE NURSING/CASE MANAGEMENT/SOCIAL WORK - PATIENT PORTAL LINK FT
You can access the FollowMyHealth Patient Portal offered by [facility] by registering at the following website: [email or web address]. By joining [facility] FollowMyHealth portal, you will also be able to view your health information using other applications (apps) compatible with our system.

## 2019-09-03 NOTE — PROGRESS NOTE ADULT - ASSESSMENT
For bx 63yo male was admitted for episodes of AMS.  CT's and MRI noted. Diff dx is GBM vs Mets. Other etiologies less likely.  CT chest and abdomen noted, no obvious primary.  Seen by NS and plan is possible bx/+/- recection on thursday  Patient is on steroids and Keppra.  Patient's family had lot of questions which were answered. I told them that we have to wait for NS procedure and pathology to discuss prognosis and management options etc

## 2019-09-03 NOTE — PROGRESS NOTE ADULT - SUBJECTIVE AND OBJECTIVE BOX
CC: F/U for malignancy    Saw/spoke to patient. No fevers, no chills. No new complaints. Mental status improving.    Allergies  No Known Allergies    ANTIMICROBIALS:  Off    PE:    Vital Signs Last 24 Hrs  T(C): 36.3 (03 Sep 2019 12:05), Max: 36.5 (03 Sep 2019 04:51)  T(F): 97.3 (03 Sep 2019 12:05), Max: 97.7 (03 Sep 2019 04:51)  HR: 60 (03 Sep 2019 12:05) (55 - 60)  BP: 113/54 (03 Sep 2019 12:05) (99/66 - 142/84)  RR: 18 (03 Sep 2019 12:05) (17 - 18)  SpO2: 97% (03 Sep 2019 12:05) (94% - 97%)    Gen: AOx3, NAD, non-toxic, pleasant  CV: S1+S2 normal, nontachycardic  Resp: Clear bilat, no resp distress, no crackles/wheezes  Abd: Soft, nontender, +BS  Ext: No LE edema, no wounds    LABS:    No new available    MICROBIOLOGY:    No new complaints    RADIOLOGY:    9/2 MR:    IMPRESSION: Left frontal heterogeneous lesion with cystic and solid   components in surrounding vasogenic edema. Lesion is solitary. There is   diffusion positivity around the periphery of the lesion. There is some   susceptibility seen in association. Findings suggestive of a glioblastoma   versus a metastasis. No diffusion restriction within the cystic portion   of the lesion to suggest an abscess.

## 2019-09-03 NOTE — PROGRESS NOTE ADULT - ASSESSMENT
63yo male presenting to ED for episodes of AMS.  No fever, no leukocytosis  No focal symptoms to suggest infection  Has AMS, with slow response to questions  CT's with liver and acetabular lesion; CTH with cystic brain lesions with midline shift  MR confirms suspicion for glioblastoma vs met  AMS likely 2/2 underlying malignancy; low suspicion for infection presently  Overall, Brain mass, AMS, suspected malignancy  - Continue off abx  - Ordered HIV, quant gold  - Workup for malignancy per primary team/neurosurgery  - Monitor mental status, monitor for any signs developing infection    Mikie Dhillon MD  Pager [phone]  After 5pm and on weekends call [phone]

## 2019-09-03 NOTE — PROGRESS NOTE ADULT - SUBJECTIVE AND OBJECTIVE BOX
Admitting Diagnosis:  Other conditions of brain (G93.89): OTHER SPECIFIED DISORDERS OF BRAIN      HPI:  This is a 64y year old Male with the below past medical history who presents with the chief complaint of ams and staring.  Had been in [place] for his father's .  about 1 month of flat affect, felt to be in mourning.  Has not been eating well and weak.  Ct head showed left frontal mass so admitted.  No convulsions.  No HA.  main finding per family is personality change.     ct C-A-P showed hepatic lesion and acetabular lesion  Ct angio head and neck - no abnormalities  mri brain - solitary left frontal lesion w/ vasogenic edema and + ARNULFO enhancing.     patient with son bedside, no complaints and no interest in talking     Past Medical History:  No pertinent past medical history ([id number])      Past Surgical History:  No significant past surgical history ([id number])      Social History:  No toxic habits    Family History:  FAMILY HISTORY:  No pertinent family history in first degree relatives      Allergies:  No Known Allergies      ROS:  Constitutional: Patient offers no complaints of fevers or significant weight loss  Ears, Nose, Mouth and Throat: The patient presents with no abnormalities of the head, ears, eyes, nose or throat  Skin: Patient offers no concerns of new rashes or lesions  Respiratory: The patient presents with no abnormalities of the respiratory tract  Cardiovascular: The patient presents with no cardiac abnormalities  Gastrointestinal: The patient presents with no abnormalities of the GI system  Genitourinary: The patient presents with no dysuria, hematuria or frequent urination  Neurological: See HPI  Endocrine: Patient offers no complaints of excessive thirst, urination, or heat/cold intolerance    Advanced care planning reviewed and noted in the chart.    Medications:  acetaminophen   Tablet .. 650 milliGRAM(s) Oral every 6 hours PRN  dexamethasone  Injectable 4 milliGRAM(s) IV Push every 6 hours  levETIRAcetam 750 milliGRAM(s) Oral two times a day  sodium chloride 0.9%. 1000 milliLiter(s) IV Continuous <Continuous>      Labs:          CAPILLARY BLOOD GLUCOSE                  Vitals:  Vital Signs Last 24 Hrs  T(C): 36.5 (03 Sep 2019 04:51), Max: 36.5 (03 Sep 2019 04:51)  T(F): 97.7 (03 Sep 2019 04:51), Max: 97.7 (03 Sep 2019 04:51)  HR: 59 (03 Sep 2019 04:51) (55 - 59)  BP: 99/66 (03 Sep 2019 04:51) (99/66 - 142/84)  BP(mean): --  RR: 17 (03 Sep 2019 04:51) (17 - 17)  SpO2: 96% (03 Sep 2019 04:51) (94% - 96%)    NEUROLOGICAL EXAM:    Mental status: Awake, alert, and in no apparent distress. sitting in bedside chair, Oriented to person, place.     Language function is normal. Recent memory, digit span and concentration were normal.    Cranial Nerves: Pupils were equal, round, reactive to light. Extraocular movements were intact. Visual field were full. Facial sensation was intact to light touch. There was no facial asymmetry. The palate was upgoing symmetrically and tongue was midline. Hearing acuity was intact to finger rub AU. Shoulder shrug was full bilaterally    Motor exam: Bulk and tone were normal. Strength was 5/5 in all four extremities. Fine finger movements were symmetric and normal. There was no pronator drift    Reflexes: 2+ in the bilateral upper extremities. 2+ in the bilateral lower extremities. Toes were downgoing bilaterally.     Sensation: Intact to light touch, temperature, vibration and proprioception.     Coordination: Finger-nose-finger and heel-to-shin was without dysmetria.     Gait: deferred    Imaging:      EXAM:  CT ANGIO BRAIN (W)AW IC                          EXAM:  CT ANGIO NECK (W)AW IC                          EXAM:  CT BRAIN                            PROCEDURE DATE:  2019            INTERPRETATION:  CLINICAL INFORMATION: Intermittent altered mental   status. Imbalance.     TECHNIQUE: Noncontrast axial CT images were acquired through the head.   Contrast enhanced axial CT images were acquired from the aortic arch to   the vertex of the calvarium, during the angiographic phase. Additional   post-contrast axial CT images through the head were obtained.   Three-dimensional maximum intensity projection reformats were generated   from the angiographic images.  70 cc of Omnipaque-300 mg/ml were   administered intravenously, without immediate complication.    COMPARISON: None.    FINDINGS:     CT BRAIN:    A 3.4 x 3.3 cm cystic mass within the left frontal lobe with large area   of surrounding edema and causing effacement of the bilateral lateral   ventricles and rightward midline shift. There appears to be a solid   component of the lesion anteriorly. There is effacement of the   interpeduncular cistern. No hydrocephalus.    No areas of abnormal enhancement are identified.    The visualized paranasal sinuses are clear. The mastoid air cells and   middle ear cavities are clear.    The soft tissues of the scalp are unremarkable. The calvarium is intact.    CT ANGIOGRAPHY NECK:    Three-vessel aortic arch. Atherosclerotic calcifications of the aorta   without evidence of dissection or hemodynamically significant stenosis.   The origins of the great vessels are unremarkable.     The common carotid arteries are normal in caliber without significant   stenosis.     The carotid bifurcations are unremarkable. The internal carotid arteries   are normal in caliber without significant stenosis.     Co-dominant vertebral arteries. The vertebral arteries are normal in   caliber without significant stenosis.     The visualized neck and upper chest are unremarkable.    Visualized osseous structures are unremarkable.    CT ANGIOGRAPHY BRAIN:    There is no evidence for significant stenosis, major vessel occlusion, or   aneurysm about the [place].    There is no evidence for significant stenosis, major vessel occlusion, or   aneurysm involving the vertebrobasilar system.    No enlarged vascular lesions or clusters of abnormal vessels are noted to   suggest an arterial venous malformation.    Visualized portions of the superficial and deep venous systems are   unremarkable.      IMPRESSION:     CT brain: Predominantly cystic mass within the left frontal lobe with   large area of surrounding edema causing leftward midline shift. There   appears to be a hyperdense solid component of the mass anteriorly which   showsvascularity. There is effacement of the interpeduncular cistern. No   hydrocephalus.    CT angiography neck: No evidence of hemodynamically significant stenosis   by NASCET criteria. No evidence of vascular dissection.    CT angiography brain: No major vessel occlusion or proximal stenosis by   NASCET criteria. No evidence of aneurysm or other vascular malformation.                MARILYN WALKER M.D., RADIOLOGIST RESIDENT  This document has been electronically signed.  ISRAEL DORANTES M.D., ATTENDING RADIOLOGIST  This document has been electronically signed. Sep  1 2019  2:03PM      < from: MR Head w/wo IV Cont (19 @ 13:15) >  EXAM:  MR BRAIN WAW IC                            PROCEDURE DATE:  2019            INTERPRETATION:  INDICATIONS:  preop    TECHNIQUE:  Multiplanar imaging was performed using T1 weighted, T2   weighted and FLAIR sequences.  Diffusion weightedand SWI images were   also obtained.  Following intravenous gadolinium, multiplanar T1 weighted   images were performed. 6.5 cc Gadavist were administered. 1 cc were   discarded.      COMPARISON EXAMINATION: Study is read and compared with CT CTA 2019    FINDINGS:    VENTRICLES AND SULCI: Again appreciated is the mass effect on the left   frontal horn of the lateral ventricle as well as the third ventricle.  INTRA-AXIAL:  Evidence of the left frontal enhancing mass lesion that is   mixed signal with evidence of fluid and solid components. In addition   evidence of foci of susceptibility within the lesion which may represent   component of hemorrhage versus mineralization. Surrounding vasogenic   edema is appreciated. Evidence of a left frontal lesion measuring 7 cm AP   x 4 cm transverse by x 4.7 cm craniocaudal dimension. Periphery of the   lesion has a diffusion diffusion positivity with restricted diffusion on   the ADC map.  EXTRA-AXIAL:  No mass or collection.  VISUALIZED SINUSES:  Normal.  VISUALIZED MASTOIDS:  Clear.  CALVARIUM:  Normal.  CAROTID FLOW VOIDS:  Present.  MISCELLANEOUS:  None.    IMPRESSION: Left frontal heterogeneous lesion with cystic and solid   components in surrounding vasogenic edema. Lesion is solitary.There is   diffusion positivity around the periphery of the lesion. There is some   susceptibility seen in association. Findings suggestive of a glioblastoma   versus a metastasis. No diffusion restriction within the cystic portion   of the lesion tosuggest an abscess.                    MICHELE CHO M.D., ATTENDING RADIOLOGIST  This document has been electronically signed. Sep  3 2019  8:51AM                < end of copied text >

## 2019-09-03 NOTE — PROGRESS NOTE ADULT - ASSESSMENT
65 y/o male  w/ no PMH pw staring episodes and “delayed responsiveness” for 2 weeks. . Long time smoker recently quit after fathers death 2 weeks ago. w/  3.4 x 3.3 cm cystic mass w/in L frontal lobe w/ edema and midline shift on ct head  ? mets  ? infectious less likely  onc eval noted  onc w/u undergoing   id eval noted  neuro eval noted  steroids started as per neurosx  -CT contrast C/A/P noted  -Q4 neurochecks  -Keppra prophylaxis for now  discussed w/ daughter / pt /onc/neuro  bx/ sx on thursday

## 2019-09-03 NOTE — DISCHARGE NOTE NURSING/CASE MANAGEMENT/SOCIAL WORK - NSDCPEWEB_GEN_ALL_CORE
[place] website --- [email or web address] for Tobacco Control website --- [email or web address]

## 2019-09-03 NOTE — PROGRESS NOTE ADULT - ASSESSMENT
This is a 64y Male, here with 1 month of personality change, found the have left frontal solitary heterogenous cystic mass with edema, also hepatic lesion and acetabular lesion    Concern would be for primary brain tumor, eg GBM vs metastatic lesion  exam nonfocal, minimal effort or verbal output    Plan:  - steroids started per NS as well as keppra for seizure prophylaxis  - suspect will need biopsy +/- resection.  defer to neurosurgery - planning for thursday this week  - case discussed with patient and son bedside  - will follow

## 2019-09-04 ENCOUNTER — TRANSCRIPTION ENCOUNTER (OUTPATIENT)
Age: 64
End: 2019-09-04

## 2019-09-04 LAB
ANION GAP SERPL CALC-SCNC: 12 MMOL/L — SIGNIFICANT CHANGE UP (ref 5–17)
APTT BLD: 25.6 SEC — LOW (ref 27.5–36.3)
BLD GP AB SCN SERPL QL: NEGATIVE — SIGNIFICANT CHANGE UP
BUN SERPL-MCNC: 16 MG/DL — SIGNIFICANT CHANGE UP (ref 7–23)
CALCIUM SERPL-MCNC: 10 MG/DL — SIGNIFICANT CHANGE UP (ref 8.4–10.5)
CHLORIDE SERPL-SCNC: 106 MMOL/L — SIGNIFICANT CHANGE UP (ref 96–108)
CO2 SERPL-SCNC: 23 MMOL/L — SIGNIFICANT CHANGE UP (ref 22–31)
CREAT SERPL-MCNC: 0.58 MG/DL — SIGNIFICANT CHANGE UP (ref 0.5–1.3)
GLUCOSE SERPL-MCNC: 108 MG/DL — HIGH (ref 70–99)
HCT VFR BLD CALC: 43.8 % — SIGNIFICANT CHANGE UP (ref 39–50)
HGB BLD-MCNC: 14.8 G/DL — SIGNIFICANT CHANGE UP (ref 13–17)
HIV 1+2 AB+HIV1 P24 AG SERPL QL IA: SIGNIFICANT CHANGE UP
INR BLD: 0.99 RATIO — SIGNIFICANT CHANGE UP (ref 0.88–1.16)
MCHC RBC-ENTMCNC: 32.1 PG — SIGNIFICANT CHANGE UP (ref 27–34)
MCHC RBC-ENTMCNC: 33.8 GM/DL — SIGNIFICANT CHANGE UP (ref 32–36)
MCV RBC AUTO: 95 FL — SIGNIFICANT CHANGE UP (ref 80–100)
PLATELET # BLD AUTO: 147 K/UL — LOW (ref 150–400)
POTASSIUM SERPL-MCNC: 4.1 MMOL/L — SIGNIFICANT CHANGE UP (ref 3.5–5.3)
POTASSIUM SERPL-SCNC: 4.1 MMOL/L — SIGNIFICANT CHANGE UP (ref 3.5–5.3)
PROTHROM AB SERPL-ACNC: 11.3 SEC — SIGNIFICANT CHANGE UP (ref 10–12.9)
RBC # BLD: 4.62 M/UL — SIGNIFICANT CHANGE UP (ref 4.2–5.8)
RBC # FLD: 11.5 % — SIGNIFICANT CHANGE UP (ref 10.3–14.5)
RH IG SCN BLD-IMP: POSITIVE — SIGNIFICANT CHANGE UP
RH IG SCN BLD-IMP: POSITIVE — SIGNIFICANT CHANGE UP
SODIUM SERPL-SCNC: 141 MMOL/L — SIGNIFICANT CHANGE UP (ref 135–145)
WBC # BLD: 14 K/UL — HIGH (ref 3.8–10.5)
WBC # FLD AUTO: 14 K/UL — HIGH (ref 3.8–10.5)

## 2019-09-04 PROCEDURE — 99232 SBSQ HOSP IP/OBS MODERATE 35: CPT

## 2019-09-04 RX ORDER — BACLOFEN 100 %
20 POWDER (GRAM) MISCELLANEOUS ONCE
Refills: 0 | Status: COMPLETED | OUTPATIENT
Start: 2019-09-04 | End: 2019-09-04

## 2019-09-04 RX ORDER — METOCLOPRAMIDE HCL 10 MG
20 TABLET ORAL ONCE
Refills: 0 | Status: COMPLETED | OUTPATIENT
Start: 2019-09-04 | End: 2019-09-04

## 2019-09-04 RX ORDER — MAGNESIUM SULFATE 500 MG/ML
2 VIAL (ML) INJECTION ONCE
Refills: 0 | Status: COMPLETED | OUTPATIENT
Start: 2019-09-04 | End: 2019-09-04

## 2019-09-04 RX ORDER — GABAPENTIN 400 MG/1
400 CAPSULE ORAL ONCE
Refills: 0 | Status: COMPLETED | OUTPATIENT
Start: 2019-09-04 | End: 2019-09-04

## 2019-09-04 RX ADMIN — Medication 4 MILLIGRAM(S): at 18:49

## 2019-09-04 RX ADMIN — Medication 20 MILLIGRAM(S): at 06:18

## 2019-09-04 RX ADMIN — LEVETIRACETAM 750 MILLIGRAM(S): 250 TABLET, FILM COATED ORAL at 06:18

## 2019-09-04 RX ADMIN — LEVETIRACETAM 750 MILLIGRAM(S): 250 TABLET, FILM COATED ORAL at 18:49

## 2019-09-04 RX ADMIN — Medication 50 GRAM(S): at 21:23

## 2019-09-04 RX ADMIN — Medication 4 MILLIGRAM(S): at 11:26

## 2019-09-04 RX ADMIN — Medication 108 MILLIGRAM(S): at 21:23

## 2019-09-04 RX ADMIN — Medication 4 MILLIGRAM(S): at 06:18

## 2019-09-04 NOTE — PROGRESS NOTE ADULT - SUBJECTIVE AND OBJECTIVE BOX
HPI:  63yo male presenting to ED for episodes of AMS.   Daughter reports that pt has episodes of unresponsiveness "staring " for several seconds   delayed responses which began 2 weeks ago. Pt father passed away several weeks ago, went to in [place] for , returned yesterday. + generalized weakness, poor appetite, quit smoking since death of father  .  Pt fell lifting 25lbs, landed on elbows , no head trauma . does not complain of any pain.  Denies LOC, muscle jerking, tremor, CP, SOB, n/v/d/c, (01 Sep 2019 16:29)    PAST MEDICAL & SURGICAL HISTORY:  No pertinent past medical history  No significant past surgical history    Medications:  acetaminophen   Tablet .. 650 milliGRAM(s) Oral every 6 hours PRN Moderate Pain (4 - 6)  dexamethasone  Injectable 4 milliGRAM(s) IV Push every 6 hours  levETIRAcetam 750 milliGRAM(s) Oral two times a day  sodium chloride 0.9%. 1000 milliLiter(s) IV Continuous <Continuous>    Labs:  CBC Full  -  ( 04 Sep 2019 06:59 )  WBC Count : 14.0 K/uL  Hemoglobin : 14.8 g/dL  Hematocrit : 43.8 %  Platelet Count - Automated : 147 K/uL  Mean Cell Volume : 95.0 fl  Mean Cell Hemoglobin : 32.1 pg  Mean Cell Hemoglobin Concentration : 33.8 gm/dL  Auto Neutrophil # : x  Auto Lymphocyte # : x  Auto Monocyte # : x  Auto Eosinophil # : x  Auto Basophil # : x  Auto Neutrophil % : x  Auto Lymphocyte % : x  Auto Monocyte % : x  Auto Eosinophil % : x  Auto Basophil % : x    09-04    141  |  106  |  16  ----------------------------<  108<H>  4.1   |  23  |  0.58    Ca    10.0      04 Sep 2019 06:59        Radiology:             ROS:  Patient comfortable without distress  No SOB or chest pain  No palpitation  No abdominal pain, diarrhaea or constipation  No weakness of extremities  No skin changes or swelling of legs    Vital Signs Last 24 Hrs  T(C): 36.3 (04 Sep 2019 05:00), Max: 36.5 (03 Sep 2019 21:24)  T(F): 97.3 (04 Sep 2019 05:00), Max: 97.7 (03 Sep 2019 21:24)  HR: 58 (04 Sep 2019 05:00) (58 - 60)  BP: 116/71 (04 Sep 2019 05:00) (113/54 - 121/72)  BP(mean): --  RR: 18 (04 Sep 2019 05:00) (18 - 18)  SpO2: 97% (04 Sep 2019 05:00) (97% - 98%)    Physical exam:  Patient alert and oriented  No distress  CVS: S1, S2 regular or murmur  Chest: bilateral breath sound without rales  Abdomen: soft, not tender, no organomegaly or masses  No focal neuro deficit  No edema      Assessment and Plan: Patient was admitted with change in behaviour. He also had history of tripping at airport while coming back from vacation  Daughter at bedside, does not want father to know anything except cyst in brain at present and she is quite distressed about likely dx.     PAST MEDICAL & SURGICAL HISTORY:  No pertinent past medical history  No significant past surgical history    Medications:  acetaminophen   Tablet .. 650 milliGRAM(s) Oral every 6 hours PRN Moderate Pain (4 - 6)  dexamethasone  Injectable 4 milliGRAM(s) IV Push every 6 hours  levETIRAcetam 750 milliGRAM(s) Oral two times a day  sodium chloride 0.9%. 1000 milliLiter(s) IV Continuous <Continuous>    Labs:  CBC Full  -  ( 04 Sep 2019 06:59 )  WBC Count : 14.0 K/uL  Hemoglobin : 14.8 g/dL  Hematocrit : 43.8 %  Platelet Count - Automated : 147 K/uL  Mean Cell Volume : 95.0 fl  Mean Cell Hemoglobin : 32.1 pg  Mean Cell Hemoglobin Concentration : 33.8 gm/dL  Auto Neutrophil # : x  Auto Lymphocyte # : x  Auto Monocyte # : x  Auto Eosinophil # : x  Auto Basophil # : x  Auto Neutrophil % : x  Auto Lymphocyte % : x  Auto Monocyte % : x  Auto Eosinophil % : x  Auto Basophil % : x    09-04    141  |  106  |  16  ----------------------------<  108<H>  4.1   |  23  |  0.58    Ca    10.0      04 Sep 2019 06:59        Radiology:             ROS:  Patient comfortable without distress  No SOB or chest pain  No palpitation  No abdominal pain, diarrhaea or constipation  No weakness of extremities, has been walking  No skin changes or swelling of legs    Vital Signs Last 24 Hrs  T(C): 36.3 (04 Sep 2019 05:00), Max: 36.5 (03 Sep 2019 21:24)  T(F): 97.3 (04 Sep 2019 05:00), Max: 97.7 (03 Sep 2019 21:24)  HR: 58 (04 Sep 2019 05:00) (58 - 60)  BP: 116/71 (04 Sep 2019 05:00) (113/54 - 121/72)  BP(mean): --  RR: 18 (04 Sep 2019 05:00) (18 - 18)  SpO2: 97% (04 Sep 2019 05:00) (97% - 98%)    Physical exam:  Patient alert and oriented  No distress  CVS: S1, S2 regular or murmur  Chest: bilateral breath sound without rales  Abdomen: soft, not tender, no organomegaly or masses  No focal neuro deficit as per neuro except slight facial asymmetry  No edema      Assessment and Plan:

## 2019-09-04 NOTE — CONSULT NOTE ADULT - ASSESSMENT
64 year old male former smoker with no significant PMHx presents with AMS, brain imaging with mass.     1. Brain mass, suspected malignancy, ? mets   CT head: 3.4 x 3.3 cm cystic mass w/in L frontal lobe w/ edema and midline shift.  MRI wwo brain: L cystic enhancing lesion causing significant edema  CT chest/abd noted, ? mets   hem/onc, neurosurg, neuro f/u   Patient is on steroids and Keppra.  plan for brain biopsy Thursday with poss resection.   cv stable, no cardiac hx, no recent cp/sob/palpitations/snycope, EKG with no evidence of ischemia   pt. optimized from procedure and can proceed with low cv risk.      dvt ppx

## 2019-09-04 NOTE — PROGRESS NOTE ADULT - SUBJECTIVE AND OBJECTIVE BOX
Admitting Diagnosis:  Other conditions of brain (G93.89): OTHER SPECIFIED DISORDERS OF BRAIN      HPI:  This is a 64y year old Male with the below past medical history who presents with the chief complaint of ams and staring.  Had been in [place] for his father's .  about 1 month of flat affect, felt to be in mourning.  Has not been eating well and weak.  Ct head showed left frontal mass so admitted.  No convulsions.  No HA.  main finding per family is personality change.     ct C-A-P showed hepatic lesion and acetabular lesion  Ct angio head and neck - no abnormalities  mri brain - solitary left frontal lesion w/ vasogenic edema and + ARNULFO enhancing.     pt says feels stronger, felix says he has been up walking around    Past Medical History:  No pertinent past medical history ([id number])      Past Surgical History:  No significant past surgical history ([id number])      Social History:  No toxic habits    Family History:  FAMILY HISTORY:  No pertinent family history in first degree relatives      Allergies:  No Known Allergies      ROS:  Constitutional: Patient offers no complaints of fevers or significant weight loss  Ears, Nose, Mouth and Throat: The patient presents with no abnormalities of the head, ears, eyes, nose or throat  Skin: Patient offers no concerns of new rashes or lesions  Respiratory: The patient presents with no abnormalities of the respiratory tract  Cardiovascular: The patient presents with no cardiac abnormalities  Gastrointestinal: The patient presents with no abnormalities of the GI system  Genitourinary: The patient presents with no dysuria, hematuria or frequent urination  Neurological: See HPI  Endocrine: Patient offers no complaints of excessive thirst, urination, or heat/cold intolerance    Advanced care planning reviewed and noted in the chart.      Medications:  acetaminophen   Tablet .. 650 milliGRAM(s) Oral every 6 hours PRN  dexamethasone  Injectable 4 milliGRAM(s) IV Push every 6 hours  levETIRAcetam 750 milliGRAM(s) Oral two times a day  sodium chloride 0.9%. 1000 milliLiter(s) IV Continuous <Continuous>      Labs:  CBC Full  -  ( 04 Sep 2019 06:59 )  WBC Count : 14.0 K/uL  RBC Count : 4.62 M/uL  Hemoglobin : 14.8 g/dL  Hematocrit : 43.8 %  Platelet Count - Automated : 147 K/uL  Mean Cell Volume : 95.0 fl  Mean Cell Hemoglobin : 32.1 pg  Mean Cell Hemoglobin Concentration : 33.8 gm/dL  Auto Neutrophil # : x  Auto Lymphocyte # : x  Auto Monocyte # : x  Auto Eosinophil # : x  Auto Basophil # : x  Auto Neutrophil % : x  Auto Lymphocyte % : x  Auto Monocyte % : x  Auto Eosinophil % : x  Auto Basophil % : x    09-04    141  |  106  |  16  ----------------------------<  108<H>  4.1   |  23  |  0.58    Ca    10.0      04 Sep 2019 06:59      CAPILLARY BLOOD GLUCOSE          PT/INR - ( 04 Sep 2019 06:59 )   PT: 11.3 sec;   INR: 0.99 ratio         PTT - ( 04 Sep 2019 06:59 )  PTT:25.6 sec        Vitals:  Vital Signs Last 24 Hrs  T(C): 36.3 (04 Sep 2019 05:00), Max: 36.5 (03 Sep 2019 21:24)  T(F): 97.3 (04 Sep 2019 05:00), Max: 97.7 (03 Sep 2019 21:24)  HR: 58 (04 Sep 2019 05:00) (58 - 60)  BP: 116/71 (04 Sep 2019 05:00) (113/54 - 121/72)  BP(mean): --  RR: 18 (04 Sep 2019 05:00) (18 - 18)  SpO2: 97% (04 Sep 2019 05:00) (97% - 98%)      NEUROLOGICAL EXAM:    Mental status: Awake, alert, and in no apparent distress. in bed Oriented to person, knew hosp but name of hospital, year 20..., knew president, able to follow simple, more issues with complex commands    Language function is affected  Recent memory, digit span and concentration difficult to assess    Cranial Nerves: Pupils were equal, round, reactive to light. Extraocular movements were intact. Visual field were full. Facial sensation was intact to light touch. There was no facial asymmetry. The palate was upgoing symmetrically and tongue was midline. Hearing acuity was intact to finger rub AU. Shoulder shrug was full bilaterally    Motor exam: Bulk and tone were normal. Strength was 5/5 in all four extremities. Fine finger movements were symmetric and normal. There was no pronator drift    Reflexes: 2+ in the bilateral upper extremities. 2+ in the bilateral lower extremities. Toes were downgoing bilaterally.     Sensation: Intact to light touch, temperature, vibration and proprioception.     Coordination: Finger-nose-finger and heel-to-shin was without dysmetria.     Gait: deferred    Imaging:      EXAM:  CT ANGIO BRAIN (W)AW IC                          EXAM:  CT ANGIO NECK (W)AW IC                          EXAM:  CT BRAIN                            PROCEDURE DATE:  2019            INTERPRETATION:  CLINICAL INFORMATION: Intermittent altered mental   status. Imbalance.     TECHNIQUE: Noncontrast axial CT images were acquired through the head.   Contrast enhanced axial CT images were acquired from the aortic arch to   the vertex of the calvarium, during the angiographic phase. Additional   post-contrast axial CT images through the head were obtained.   Three-dimensional maximum intensity projection reformats were generated   from the angiographic images.  70 cc of Omnipaque-300 mg/ml were   administered intravenously, without immediate complication.    COMPARISON: None.    FINDINGS:     CT BRAIN:    A 3.4 x 3.3 cm cystic mass within the left frontal lobe with large area   of surrounding edema and causing effacement of the bilateral lateral   ventricles and rightward midline shift. There appears to be a solid   component of the lesion anteriorly. There is effacement of the   interpeduncular cistern. No hydrocephalus.    No areas of abnormal enhancement are identified.    The visualized paranasal sinuses are clear. The mastoid air cells and   middle ear cavities are clear.    The soft tissues of the scalp are unremarkable. The calvarium is intact.    CT ANGIOGRAPHY NECK:    Three-vessel aortic arch. Atherosclerotic calcifications of the aorta   without evidence of dissection or hemodynamically significant stenosis.   The origins of the great vessels are unremarkable.     The common carotid arteries are normal in caliber without significant   stenosis.     The carotid bifurcations are unremarkable. The internal carotid arteries   are normal in caliber without significant stenosis.     Co-dominant vertebral arteries. The vertebral arteries are normal in   caliber without significant stenosis.     The visualized neck and upper chest are unremarkable.    Visualized osseous structures are unremarkable.    CT ANGIOGRAPHY BRAIN:    There is no evidence for significant stenosis, major vessel occlusion, or   aneurysm about the [place] of [place].    There is no evidence for significant stenosis, major vessel occlusion, or   aneurysm involving the vertebrobasilar system.    No enlarged vascular lesions or clusters of abnormal vessels are noted to   suggest an arterial venous malformation.    Visualized portions of the superficial and deep venous systems are   unremarkable.      IMPRESSION:     CT brain: Predominantly cystic mass within the left frontal lobe with   large area of surrounding edema causing leftward midline shift. There   appears to be a hyperdense solid component of the mass anteriorly which   showsvascularity. There is effacement of the interpeduncular cistern. No   hydrocephalus.    CT angiography neck: No evidence of hemodynamically significant stenosis   by NASCET criteria. No evidence of vascular dissection.    CT angiography brain: No major vessel occlusion or proximal stenosis by   NASCET criteria. No evidence of aneurysm or other vascular malformation.                MARILYN WALKER M.D., RADIOLOGIST RESIDENT  This document has been electronically signed.  ISRAEL DORANTES M.D., ATTENDING RADIOLOGIST  This document has been electronically signed. Sep  1 2019  2:03PM      < from: MR Head w/wo IV Cont (19 @ 13:15) >  EXAM:  MR BRAIN WAW IC                            PROCEDURE DATE:  2019            INTERPRETATION:  INDICATIONS:  preop    TECHNIQUE:  Multiplanar imaging was performed using T1 weighted, T2   weighted and FLAIR sequences.  Diffusion weightedand SWI images were   also obtained.  Following intravenous gadolinium, multiplanar T1 weighted   images were performed. 6.5 cc Gadavist were administered. 1 cc were   discarded.      COMPARISON EXAMINATION: Study is read and compared with CT CTA 2019    FINDINGS:    VENTRICLES AND SULCI: Again appreciated is the mass effect on the left   frontal horn of the lateral ventricle as well as the third ventricle.  INTRA-AXIAL:  Evidence of the left frontal enhancing mass lesion that is   mixed signal with evidence of fluid and solid components. In addition   evidence of foci of susceptibility within the lesion which may represent   component of hemorrhage versus mineralization. Surrounding vasogenic   edema is appreciated. Evidence of a left frontal lesion measuring 7 cm AP   x 4 cm transverse by x 4.7 cm craniocaudal dimension. Periphery of the   lesion has a diffusion diffusion positivity with restricted diffusion on   the ADC map.  EXTRA-AXIAL:  No mass or collection.  VISUALIZED SINUSES:  Normal.  VISUALIZED MASTOIDS:  Clear.  CALVARIUM:  Normal.  CAROTID FLOW VOIDS:  Present.  MISCELLANEOUS:  None.    IMPRESSION: Left frontal heterogeneous lesion with cystic and solid   components in surrounding vasogenic edema. Lesion is solitary.There is   diffusion positivity around the periphery of the lesion. There is some   susceptibility seen in association. Findings suggestive of a glioblastoma   versus a metastasis. No diffusion restriction within the cystic portion   of the lesion tosuggest an abscess.                    MICHELE CHO M.D., ATTENDING RADIOLOGIST  This document has been electronically signed. Sep  3 2019  8:51AM                < end of copied text >

## 2019-09-04 NOTE — CONSULT NOTE ADULT - SUBJECTIVE AND OBJECTIVE BOX
CARDIOLOGY CONSULT - Dr. Wilson     CHIEF COMPLAINT: cardiac clearance     HPI: 63yo male with no sign PMHx presented to ED for AMS. As per daughter, symptoms started 2 weeks ago. Daughter reports pt. has episodes of unresponsiveness, "staring" for several seconds. Imaging revealing brain mass and liver lesion, ? mets. Pt. being seen today for cardiac clearance for upcoming brain biopsy, poss resection. Pt. also c/o generalized weakness, poor appetite. Pt. has no significant cardiac history, denies history of MI, arrythmia, murmur. Social history of smoking, recently quit. Pt. denies recent cp/sob/ivllalobos, LE edema, palpitations or syncope.          PAST MEDICAL & SURGICAL HISTORY:  No pertinent past medical history  No significant past surgical history        PREVIOUS DIAGNOSTIC TESTING:    [ ] Echocardiogram:   [ ]  Catheterization:   [ ] Stress Test:  	    MEDICATIONS:  MEDICATIONS  (STANDING):  dexamethasone  Injectable 4 milliGRAM(s) IV Push every 6 hours  levETIRAcetam 750 milliGRAM(s) Oral two times a day  sodium chloride 0.9%. 1000 milliLiter(s) (100 mL/Hr) IV Continuous <Continuous>      FAMILY HISTORY:  No pertinent family history in first degree relatives      SOCIAL HISTORY:    [ ] Non-smoker  [ x] former Smoker  [ ] Alcohol    Allergies    No Known Allergies    Intolerances    	    REVIEW OF SYSTEMS:  CONSTITUTIONAL: No fever, weight loss, or fatigue  EYES: No eye pain, visual disturbances, or discharge  ENMT:  No difficulty hearing, tinnitus, vertigo; No sinus or throat pain  NECK: No pain or stiffness  RESPIRATORY: No cough, wheezing, chills or hemoptysis; No Shortness of Breath  CARDIOVASCULAR: No chest pain, palpitations, passing out, dizziness, or leg swelling  GASTROINTESTINAL: No abdominal or epigastric pain. No nausea, vomiting, or hematemesis; No diarrhea or constipation. No melena or hematochezia.  GENITOURINARY: No dysuria, frequency, hematuria, or incontinence  NEUROLOGICAL: No headaches, memory loss, loss of strength, numbness, or tremors  SKIN: No itching, burning, rashes, or lesions   	    [x] All others negative	  [ ] Unable to obtain    PHYSICAL EXAM:  T(C): 36.2 (09-04-19 @ 11:26), Max: 36.5 (09-03-19 @ 21:24)  HR: 60 (09-04-19 @ 11:26) (58 - 60)  BP: 120/72 (09-04-19 @ 11:26) (116/71 - 121/72)  RR: 18 (09-04-19 @ 11:26) (18 - 18)  SpO2: 97% (09-04-19 @ 11:26) (97% - 98%)  Wt(kg): --  I&O's Summary    03 Sep 2019 07:01  -  04 Sep 2019 07:00  --------------------------------------------------------  IN: 820 mL / OUT: 1000 mL / NET: -180 mL      Appearance: Normal	  Psychiatry: A & O x 3, Mood & affect appropriate  HEENT:   Normal oral mucosa, PERRL, EOMI	  Lymphatic: No lymphadenopathy  Cardiovascular: Normal S1 S2,RRR, No JVD, No murmurs  Respiratory: Lungs clear to auscultation	  Gastrointestinal:  Soft, Non-tender, + BS	  Skin: No rashes, No ecchymoses, No cyanosis	  Neurologic: Non-focal  Extremities: Normal range of motion, No clubbing, cyanosis or edema  Vascular: Peripheral pulses palpable 2+ bilaterally    TELEMETRY: 	    ECG:  	  RADIOLOGY:  OTHER: 	  	  LABS:	 	    CARDIAC MARKERS:                                  14.8   14.0  )-----------( 147      ( 04 Sep 2019 06:59 )             43.8     09-04    141  |  106  |  16  ----------------------------<  108<H>  4.1   |  23  |  0.58    Ca    10.0      04 Sep 2019 06:59      PT/INR - ( 04 Sep 2019 06:59 )   PT: 11.3 sec;   INR: 0.99 ratio         PTT - ( 04 Sep 2019 06:59 )  PTT:25.6 sec  proBNP:   Lipid Profile:   HgA1c:   TSH: CARDIOLOGY CONSULT - Dr. Wilson     CHIEF COMPLAINT: cardiac clearance     HPI: 65yo male with no sign PMHx presented to ED for AMS, generalized weakness, poor appetite & mood changes, As per family, symptoms started 2 weeks ago. Reports pt. has episodes of unresponsiveness, "staring" for several seconds. Imaging revealing brain mass and liver lesion, ? mets. Pt. being seen today for cardiac clearance for upcoming brain biopsy, poss resection. Pt. has no significant cardiac history, denies history of MI, arrythmia, murmur, denies any cardiac procedures or recent testing. As per son, the patients father has history of TAVR and stroke. Social history of smoking, recently quit. Pt. denies recent cp/sob/villalobos, LE edema, palpitations or syncope.          PAST MEDICAL & SURGICAL HISTORY:  No pertinent past medical history  No significant past surgical history        PREVIOUS DIAGNOSTIC TESTING:    [ ] Echocardiogram:   [ ]  Catheterization:   [ ] Stress Test:  	    MEDICATIONS:  MEDICATIONS  (STANDING):  dexamethasone  Injectable 4 milliGRAM(s) IV Push every 6 hours  levETIRAcetam 750 milliGRAM(s) Oral two times a day  sodium chloride 0.9%. 1000 milliLiter(s) (100 mL/Hr) IV Continuous <Continuous>      FAMILY HISTORY:  No pertinent family history in first degree relatives      SOCIAL HISTORY:    [ ] Non-smoker  [ x] former Smoker  [ ] Alcohol    Allergies    No Known Allergies    Intolerances    	    REVIEW OF SYSTEMS:  CONSTITUTIONAL: No fever, weight loss, or fatigue  EYES: No eye pain, visual disturbances, or discharge  ENMT:  No difficulty hearing, tinnitus, vertigo; No sinus or throat pain  NECK: No pain or stiffness  RESPIRATORY: No cough, wheezing, chills or hemoptysis; No Shortness of Breath  CARDIOVASCULAR: No chest pain, palpitations, passing out, dizziness, or leg swelling  GASTROINTESTINAL: No abdominal or epigastric pain. No nausea, vomiting, or hematemesis; No diarrhea or constipation. No melena or hematochezia.  GENITOURINARY: No dysuria, frequency, hematuria, or incontinence  NEUROLOGICAL: No headaches, memory loss, +loss of strength, numbness, or tremors  SKIN: No itching, burning, rashes, or lesions   	    [x] All others negative	  [ ] Unable to obtain    PHYSICAL EXAM:  T(C): 36.2 (09-04-19 @ 11:26), Max: 36.5 (09-03-19 @ 21:24)  HR: 60 (09-04-19 @ 11:26) (58 - 60)  BP: 120/72 (09-04-19 @ 11:26) (116/71 - 121/72)  RR: 18 (09-04-19 @ 11:26) (18 - 18)  SpO2: 97% (09-04-19 @ 11:26) (97% - 98%)  Wt(kg): --  I&O's Summary    03 Sep 2019 07:01  -  04 Sep 2019 07:00  --------------------------------------------------------  IN: 820 mL / OUT: 1000 mL / NET: -180 mL      Appearance: Normal	  Psychiatry: A & O x 3, Mood & affect appropriate  HEENT:   Normal oral mucosa, PERRL, EOMI	  Lymphatic: No lymphadenopathy  Cardiovascular: Normal S1 S2,RRR, No JVD, No murmurs  Respiratory: Lungs clear to auscultation	  Gastrointestinal:  Soft, Non-tender, + BS	  Skin: No rashes, No ecchymoses, No cyanosis	  Neurologic: Non-focal  Extremities: Normal range of motion, No clubbing, cyanosis or edema  Vascular: Peripheral pulses palpable 2+ bilaterally    TELEMETRY: SB 50, no evidence of ischemia 	    ECG:  	  RADIOLOGY:   OTHER: 	  < from: CT Chest w/ IV Cont (09.01.19 @ 16:45) >    IMPRESSION:     No lesion or adenopathy in the chest. No adenopathy in the abdomen or   pelvis.     Indeterminate 1.2 cm hypoattenuating right hepatic lobe lesion.    Indeterminate bilateral renal cystic lesions, which may reflect   hemorrhagic/proteinaceous cysts. Further evaluation with abdominal   ultrasound is recommended.    1.6 cm sclerotic right acetabular lesion.    < end of copied text >    < from: CT Angio Head w/ IV Cont (09.01.19 @ 10:41) >  IMPRESSION:     CT brain: Predominantly cystic mass within the left frontal lobe with   large area of surrounding edema causing leftward midline shift. There   appears to be a hyperdense solid component of the mass anteriorly which   showsvascularity. There is effacement of the interpeduncular cistern. No   hydrocephalus.    CT angiography neck: No evidence of hemodynamically significant stenosis   by NASCET criteria. No evidence of vascular dissection.    CT angiography brain: No major vessel occlusion or proximal stenosis by   NASCET criteria. No evidence of aneurysm or other vascular malformation.    < end of copied text >    	  LABS:	 	    CARDIAC MARKERS:                                  14.8   14.0  )-----------( 147      ( 04 Sep 2019 06:59 )             43.8     09-04    141  |  106  |  16  ----------------------------<  108<H>  4.1   |  23  |  0.58    Ca    10.0      04 Sep 2019 06:59      PT/INR - ( 04 Sep 2019 06:59 )   PT: 11.3 sec;   INR: 0.99 ratio         PTT - ( 04 Sep 2019 06:59 )  PTT:25.6 sec  proBNP:   Lipid Profile:   HgA1c:   TSH:

## 2019-09-04 NOTE — PROGRESS NOTE ADULT - ASSESSMENT
Gabrielle Andrews  64M w/ no PMH pw staring episodes and “delayed responsiveness” for 2 weeks. No n/v, headache, vision changes, speech problems. No hx of cancer. Long time smoker recently quit after fathers death 2 weeks ago. HCT: 3.4 x 3.3 cm cystic mass w/in L frontal lobe w/ edema and midline shift. Exam: slight R lower facial otherwise intact. No AC.  -Pre op for Thursday.  Need clearance of the OR. Pre op labs  -Mets work up, onc eval  -Start dex 4q6  -CT contrast C/A/P done shows hepatic lesion, renal lesions, and acetabular lesion.   -Q4 neurochecks  -MRI wwo brain done, shows L cystic enhancing lesion causing significant edema   -Keppra

## 2019-09-04 NOTE — PROGRESS NOTE ADULT - ASSESSMENT
63yo male was admitted for episodes of AMS.  CT's and MRI noted. Diff dx is GBM vs Mets. Other etiologies less likely.  CT chest and abdomen noted, no obvious primary.  Seen by NS and plan is bx/+/- resection on thursday  Patient is on steroids and Keppra.  Patient's daughter was at bedside but spoke to me separately, she needed comforting.  Stated that she is an RN and knows likely possibilities.   For now father only should know that there is a cyst in brain and understands that once we have dx and need to plan management, he will need to know more

## 2019-09-04 NOTE — PROGRESS NOTE ADULT - ASSESSMENT
65yo male presenting to ED for episodes of AMS.  No fever, no leukocytosis  No focal symptoms to suggest infection  Has AMS, with slow response to questions  CT's with liver and acetabular lesion; CTH with cystic brain lesions with midline shift  MR confirms suspicion for glioblastoma vs met  AMS likely 2/2 underlying malignancy; low suspicion for infection presently  Suspect leukocytosis 2/2 steroids; monitor  Overall, Brain mass, AMS, suspected malignancy  - Continue off abx  - F/U quant gold  - Workup for malignancy per primary team/neurosurgery  - Trend WBC    Mikie Dhillon MD  Pager [phone]  After 5pm and on weekends call [phone]

## 2019-09-04 NOTE — PROGRESS NOTE ADULT - ASSESSMENT
65 y/o male  w/ no PMH pw staring episodes and “delayed responsiveness” for 2 weeks. . Long time smoker recently quit after fathers death 2 weeks ago. w/  3.4 x 3.3 cm cystic mass w/in L frontal lobe w/ edema and midline shift on ct head  ? mets  ? infectious less likely  onc eval noted  onc w/u unrevealing   id eval noted  neuro eval noted  steroids as per neurosx  -CT contrast C/A/P noted  -Q4 neurochecks  -Keppra prophylaxis for now  discussed w/ daughter / pt /onc/neuro  bx/ sx on thursday 63 y/o male  w/ no PMH pw staring episodes and “delayed responsiveness” for 2 weeks. . Long time smoker recently quit after fathers death 2 weeks ago. w/  3.4 x 3.3 cm cystic mass w/in L frontal lobe w/ edema and midline shift on ct head  ? mets  ? infectious less likely  onc eval noted  onc w/u unrevealing   id eval noted  neuro eval noted  steroids as per neurosx  -CT contrast C/A/P noted  -Q4 neurochecks  -Keppra prophylaxis for now  discussed w/ daughter / pt /onc/neuro  bx/ sx on thursday  medically optimized for sx

## 2019-09-04 NOTE — PROGRESS NOTE ADULT - ASSESSMENT
This is a 64y Male, here with 1 month of personality change, found the have left frontal solitary heterogenous cystic mass with edema, also hepatic lesion and acetabular lesion    Concern would be for primary brain tumor, eg GBM vs metastatic lesion  exam nonfocal, minimal effort or verbal output    Plan:  - steroids started per NS as well as keppra for seizure prophylaxis  - suspect will need biopsy +/- resection.    -appears bx for 9/5  - case discussed with patient and then seperately with tiffany, she did not want pt to know that this is anything more than a cyst, explained that is not an issue now, but once have pathology will need pt to be part of decision making, daughter at that point said thank you and walked away  - will follow

## 2019-09-04 NOTE — PROGRESS NOTE ADULT - SUBJECTIVE AND OBJECTIVE BOX
CC: F/U for Leukocytosis    Saw/spoke to patient. No fevers, no chills. No new complaints. Mental status improved.     Allergies  No Known Allergies    ANTIMICROBIALS:  Off    PE:    Vital Signs Last 24 Hrs  T(C): 36.2 (04 Sep 2019 11:26), Max: 36.5 (03 Sep 2019 21:24)  T(F): 97.2 (04 Sep 2019 11:26), Max: 97.7 (03 Sep 2019 21:24)  HR: 60 (04 Sep 2019 11:26) (58 - 60)  BP: 120/72 (04 Sep 2019 11:26) (116/71 - 121/72)  RR: 18 (04 Sep 2019 11:26) (18 - 18)  SpO2: 97% (04 Sep 2019 11:26) (97% - 98%)    Gen: AOx3, NAD, non-toxic, pleasant  CV: S1+S2 normal, nontachycardic  Resp: Clear bilat, no resp distress, no crackles/wheezes  Abd: Soft, nontender, +BS  Ext: No LE edema, no wounds    LABS:                        14.8   14.0  )-----------( 147      ( 04 Sep 2019 06:59 )             43.8     09-04    141  |  106  |  16  ----------------------------<  108<H>  4.1   |  23  |  0.58    Ca    10.0      04 Sep 2019 06:59    MICROBIOLOGY:    .Blood  09-03-19   No growth to date.    RADIOLOGY:    9/1 CT:    IMPRESSION:     No lesion or adenopathy in the chest. No adenopathy in the abdomen or   pelvis.     Indeterminate 1.2 cm hypoattenuating right hepatic lobe lesion.    Indeterminate bilateral renal cystic lesions, which may reflect   hemorrhagic/proteinaceous cysts. Further evaluation with abdominal   ultrasound is recommended.    1.6 cm sclerotic right acetabular lesion.

## 2019-09-04 NOTE — PROGRESS NOTE ADULT - SUBJECTIVE AND OBJECTIVE BOX
CHIEF COMPLAINT:Patient is a 64y old  Male who presents with a chief complaint of Altered mental status (03 Sep 2019 12:01)    	        PAST MEDICAL & SURGICAL HISTORY:  No pertinent past medical history  No significant past surgical history          REVIEW OF SYSTEMS:  CONSTITUTIONAL: flat affect   EYES: No eye pain, visual disturbances, or discharge  NECK: No pain or stiffness  RESPIRATORY: No cough, wheezing, chills or hemoptysis; No Shortness of Breath  CARDIOVASCULAR: No chest pain, palpitations, passing out, dizziness, or leg swelling  GASTROINTESTINAL: No abdominal or epigastric pain. No nausea, vomiting, or hematemesis; No diarrhea or constipation. No melena or hematochezia.  GENITOURINARY: No dysuria, frequency, hematuria, or incontinence  NEUROLOGICAL: No headaches, memory loss, loss of strength, numbness, or tremors      Medications:  MEDICATIONS  (STANDING):  dexamethasone  Injectable 4 milliGRAM(s) IV Push every 6 hours  levETIRAcetam 750 milliGRAM(s) Oral two times a day  sodium chloride 0.9%. 1000 milliLiter(s) (100 mL/Hr) IV Continuous <Continuous>    MEDICATIONS  (PRN):  acetaminophen   Tablet .. 650 milliGRAM(s) Oral every 6 hours PRN Moderate Pain (4 - 6)    	    PHYSICAL EXAM:  T(C): 36.3 (09-04-19 @ 05:00), Max: 36.5 (09-03-19 @ 21:24)  HR: 58 (09-04-19 @ 05:00) (58 - 60)  BP: 116/71 (09-04-19 @ 05:00) (113/54 - 121/72)  RR: 18 (09-04-19 @ 05:00) (18 - 18)  SpO2: 97% (09-04-19 @ 05:00) (97% - 98%)  Wt(kg): --  I&O's Summary    03 Sep 2019 07:01  -  04 Sep 2019 07:00  --------------------------------------------------------  IN: 820 mL / OUT: 1000 mL / NET: -180 mL        Appearance: Normal	  HEENT:   Normal oral mucosa, PERRL, EOMI	  Lymphatic: No lymphadenopathy  Cardiovascular: Normal S1 S2, No JVD, No murmurs, No edema  Respiratory: Lungs clear to auscultation	  Psychiatry: A & O x 3  Gastrointestinal:  Soft, Non-tender, + BS	  Skin: No rashes, No ecchymoses, No cyanosis	  Neurologic: Non-focal/motor equal b/l   sensory intact   Extremities: Normal range of motion, No clubbing, cyanosis or edema  Vascular: Peripheral pulses palpable 2+ bilaterally    TELEMETRY: 	    ECG:  	  RADIOLOGY:  OTHER: 	  	  LABS:	 	    CARDIAC MARKERS:                                14.8   14.0  )-----------( 147      ( 04 Sep 2019 06:59 )             43.8     09-04    141  |  106  |  16  ----------------------------<  108<H>  4.1   |  23  |  0.58    Ca    10.0      04 Sep 2019 06:59      proBNP:   Lipid Profile:   HgA1c:   TSH:

## 2019-09-05 ENCOUNTER — RESULT REVIEW (OUTPATIENT)
Age: 64
End: 2019-09-05

## 2019-09-05 ENCOUNTER — APPOINTMENT (OUTPATIENT)
Dept: NEUROSURGERY | Facility: HOSPITAL | Age: 64
End: 2019-09-05

## 2019-09-05 ENCOUNTER — APPOINTMENT (OUTPATIENT)
Dept: OPHTHALMOLOGY | Facility: CLINIC | Age: 64
End: 2019-09-05

## 2019-09-05 LAB
ALBUMIN SERPL ELPH-MCNC: 3.9 G/DL — SIGNIFICANT CHANGE UP (ref 3.3–5)
ALP SERPL-CCNC: 56 U/L — SIGNIFICANT CHANGE UP (ref 40–120)
ALT FLD-CCNC: 24 U/L — SIGNIFICANT CHANGE UP (ref 10–45)
ANION GAP SERPL CALC-SCNC: 12 MMOL/L — SIGNIFICANT CHANGE UP (ref 5–17)
APTT BLD: 22.1 SEC — LOW (ref 27.5–36.3)
APTT BLD: 25.1 SEC — LOW (ref 27.5–36.3)
AST SERPL-CCNC: 16 U/L — SIGNIFICANT CHANGE UP (ref 10–40)
BASOPHILS # BLD AUTO: 0.01 K/UL — SIGNIFICANT CHANGE UP (ref 0–0.2)
BASOPHILS NFR BLD AUTO: 0.1 % — SIGNIFICANT CHANGE UP (ref 0–2)
BILIRUB SERPL-MCNC: 0.4 MG/DL — SIGNIFICANT CHANGE UP (ref 0.2–1.2)
BUN SERPL-MCNC: 20 MG/DL — SIGNIFICANT CHANGE UP (ref 7–23)
CALCIUM SERPL-MCNC: 10 MG/DL — SIGNIFICANT CHANGE UP (ref 8.4–10.5)
CHLORIDE SERPL-SCNC: 102 MMOL/L — SIGNIFICANT CHANGE UP (ref 96–108)
CO2 SERPL-SCNC: 25 MMOL/L — SIGNIFICANT CHANGE UP (ref 22–31)
CREAT SERPL-MCNC: 0.67 MG/DL — SIGNIFICANT CHANGE UP (ref 0.5–1.3)
EOSINOPHIL # BLD AUTO: 0 K/UL — SIGNIFICANT CHANGE UP (ref 0–0.5)
EOSINOPHIL NFR BLD AUTO: 0 % — SIGNIFICANT CHANGE UP (ref 0–6)
GAS PNL BLDA: SIGNIFICANT CHANGE UP
GLUCOSE BLDC GLUCOMTR-MCNC: 108 MG/DL — HIGH (ref 70–99)
GLUCOSE SERPL-MCNC: 94 MG/DL — SIGNIFICANT CHANGE UP (ref 70–99)
HCT VFR BLD CALC: 40.3 % — SIGNIFICANT CHANGE UP (ref 39–50)
HCT VFR BLD CALC: 42.7 % — SIGNIFICANT CHANGE UP (ref 39–50)
HGB BLD-MCNC: 13.5 G/DL — SIGNIFICANT CHANGE UP (ref 13–17)
HGB BLD-MCNC: 14.4 G/DL — SIGNIFICANT CHANGE UP (ref 13–17)
IMM GRANULOCYTES NFR BLD AUTO: 0.5 % — SIGNIFICANT CHANGE UP (ref 0–1.5)
INR BLD: 1.02 RATIO — SIGNIFICANT CHANGE UP (ref 0.88–1.16)
INR BLD: 1.06 RATIO — SIGNIFICANT CHANGE UP (ref 0.88–1.16)
LYMPHOCYTES # BLD AUTO: 1.27 K/UL — SIGNIFICANT CHANGE UP (ref 1–3.3)
LYMPHOCYTES # BLD AUTO: 7.6 % — LOW (ref 13–44)
MAGNESIUM SERPL-MCNC: 2 MG/DL — SIGNIFICANT CHANGE UP (ref 1.6–2.6)
MCHC RBC-ENTMCNC: 31.3 PG — SIGNIFICANT CHANGE UP (ref 27–34)
MCHC RBC-ENTMCNC: 32 PG — SIGNIFICANT CHANGE UP (ref 27–34)
MCHC RBC-ENTMCNC: 33.5 GM/DL — SIGNIFICANT CHANGE UP (ref 32–36)
MCHC RBC-ENTMCNC: 33.7 GM/DL — SIGNIFICANT CHANGE UP (ref 32–36)
MCV RBC AUTO: 92.8 FL — SIGNIFICANT CHANGE UP (ref 80–100)
MCV RBC AUTO: 95.4 FL — SIGNIFICANT CHANGE UP (ref 80–100)
MONOCYTES # BLD AUTO: 0.86 K/UL — SIGNIFICANT CHANGE UP (ref 0–0.9)
MONOCYTES NFR BLD AUTO: 5.1 % — SIGNIFICANT CHANGE UP (ref 2–14)
NEUTROPHILS # BLD AUTO: 14.59 K/UL — HIGH (ref 1.8–7.4)
NEUTROPHILS NFR BLD AUTO: 86.7 % — HIGH (ref 43–77)
PHOSPHATE SERPL-MCNC: 3.9 MG/DL — SIGNIFICANT CHANGE UP (ref 2.5–4.5)
PLATELET # BLD AUTO: 134 K/UL — LOW (ref 150–400)
PLATELET # BLD AUTO: 147 K/UL — LOW (ref 150–400)
POTASSIUM SERPL-MCNC: 4 MMOL/L — SIGNIFICANT CHANGE UP (ref 3.5–5.3)
POTASSIUM SERPL-SCNC: 4 MMOL/L — SIGNIFICANT CHANGE UP (ref 3.5–5.3)
PROT SERPL-MCNC: 6 G/DL — SIGNIFICANT CHANGE UP (ref 6–8.3)
PROTHROM AB SERPL-ACNC: 11.5 SEC — SIGNIFICANT CHANGE UP (ref 10–13.1)
PROTHROM AB SERPL-ACNC: 12.1 SEC — SIGNIFICANT CHANGE UP (ref 10–12.9)
RBC # BLD: 4.22 M/UL — SIGNIFICANT CHANGE UP (ref 4.2–5.8)
RBC # BLD: 4.6 M/UL — SIGNIFICANT CHANGE UP (ref 4.2–5.8)
RBC # FLD: 11.8 % — SIGNIFICANT CHANGE UP (ref 10.3–14.5)
RBC # FLD: 12.3 % — SIGNIFICANT CHANGE UP (ref 10.3–14.5)
SODIUM SERPL-SCNC: 139 MMOL/L — SIGNIFICANT CHANGE UP (ref 135–145)
WBC # BLD: 16.7 K/UL — HIGH (ref 3.8–10.5)
WBC # BLD: 16.82 K/UL — HIGH (ref 3.8–10.5)
WBC # FLD AUTO: 16.7 K/UL — HIGH (ref 3.8–10.5)
WBC # FLD AUTO: 16.82 K/UL — HIGH (ref 3.8–10.5)

## 2019-09-05 PROCEDURE — 88342 IMHCHEM/IMCYTCHM 1ST ANTB: CPT | Mod: 26,59

## 2019-09-05 PROCEDURE — 88307 TISSUE EXAM BY PATHOLOGIST: CPT | Mod: 26

## 2019-09-05 PROCEDURE — 99291 CRITICAL CARE FIRST HOUR: CPT

## 2019-09-05 PROCEDURE — 61510 CRNEC TREPH EXC BRN TUM STTL: CPT

## 2019-09-05 PROCEDURE — 99232 SBSQ HOSP IP/OBS MODERATE 35: CPT

## 2019-09-05 PROCEDURE — 88360 TUMOR IMMUNOHISTOCHEM/MANUAL: CPT | Mod: 26

## 2019-09-05 PROCEDURE — 88331 PATH CONSLTJ SURG 1 BLK 1SPC: CPT | Mod: 26

## 2019-09-05 PROCEDURE — 61781 SCAN PROC CRANIAL INTRA: CPT

## 2019-09-05 PROCEDURE — 88341 IMHCHEM/IMCYTCHM EA ADD ANTB: CPT | Mod: 26,59

## 2019-09-05 PROCEDURE — 70450 CT HEAD/BRAIN W/O DYE: CPT | Mod: 26,76

## 2019-09-05 RX ORDER — PROPOFOL 10 MG/ML
30 INJECTION, EMULSION INTRAVENOUS
Qty: 500 | Refills: 0 | Status: DISCONTINUED | OUTPATIENT
Start: 2019-09-05 | End: 2019-09-07

## 2019-09-05 RX ORDER — DEXTROSE MONOHYDRATE, SODIUM CHLORIDE, AND POTASSIUM CHLORIDE 50; .745; 4.5 G/1000ML; G/1000ML; G/1000ML
1000 INJECTION, SOLUTION INTRAVENOUS
Refills: 0 | Status: DISCONTINUED | OUTPATIENT
Start: 2019-09-05 | End: 2019-09-07

## 2019-09-05 RX ORDER — LEVETIRACETAM 250 MG/1
1000 TABLET, FILM COATED ORAL EVERY 12 HOURS
Refills: 0 | Status: DISCONTINUED | OUTPATIENT
Start: 2019-09-05 | End: 2019-09-10

## 2019-09-05 RX ORDER — DEXAMETHASONE 0.5 MG/5ML
4 ELIXIR ORAL EVERY 6 HOURS
Refills: 0 | Status: DISCONTINUED | OUTPATIENT
Start: 2019-09-05 | End: 2019-09-05

## 2019-09-05 RX ORDER — CHLORHEXIDINE GLUCONATE 213 G/1000ML
1 SOLUTION TOPICAL
Refills: 0 | Status: DISCONTINUED | OUTPATIENT
Start: 2019-09-05 | End: 2019-09-11

## 2019-09-05 RX ORDER — LACOSAMIDE 50 MG/1
100 TABLET ORAL EVERY 12 HOURS
Refills: 0 | Status: DISCONTINUED | OUTPATIENT
Start: 2019-09-05 | End: 2019-09-07

## 2019-09-05 RX ORDER — METOCLOPRAMIDE HCL 10 MG
10 TABLET ORAL ONCE
Refills: 0 | Status: COMPLETED | OUTPATIENT
Start: 2019-09-05 | End: 2019-09-05

## 2019-09-05 RX ORDER — DOCUSATE SODIUM 100 MG
100 CAPSULE ORAL THREE TIMES A DAY
Refills: 0 | Status: DISCONTINUED | OUTPATIENT
Start: 2019-09-05 | End: 2019-09-06

## 2019-09-05 RX ORDER — LEVETIRACETAM 250 MG/1
1000 TABLET, FILM COATED ORAL
Refills: 0 | Status: DISCONTINUED | OUTPATIENT
Start: 2019-09-05 | End: 2019-09-05

## 2019-09-05 RX ORDER — LACOSAMIDE 50 MG/1
200 TABLET ORAL ONCE
Refills: 0 | Status: DISCONTINUED | OUTPATIENT
Start: 2019-09-05 | End: 2019-09-05

## 2019-09-05 RX ORDER — ACETAMINOPHEN 500 MG
650 TABLET ORAL EVERY 6 HOURS
Refills: 0 | Status: DISCONTINUED | OUTPATIENT
Start: 2019-09-05 | End: 2019-09-07

## 2019-09-05 RX ORDER — PANTOPRAZOLE SODIUM 20 MG/1
40 TABLET, DELAYED RELEASE ORAL DAILY
Refills: 0 | Status: DISCONTINUED | OUTPATIENT
Start: 2019-09-05 | End: 2019-09-09

## 2019-09-05 RX ORDER — METOCLOPRAMIDE HCL 10 MG
10 TABLET ORAL ONCE
Refills: 0 | Status: DISCONTINUED | OUTPATIENT
Start: 2019-09-05 | End: 2019-09-05

## 2019-09-05 RX ORDER — CHLORHEXIDINE GLUCONATE 213 G/1000ML
15 SOLUTION TOPICAL EVERY 12 HOURS
Refills: 0 | Status: DISCONTINUED | OUTPATIENT
Start: 2019-09-05 | End: 2019-09-08

## 2019-09-05 RX ORDER — SODIUM CHLORIDE 9 MG/ML
1000 INJECTION INTRAMUSCULAR; INTRAVENOUS; SUBCUTANEOUS ONCE
Refills: 0 | Status: COMPLETED | OUTPATIENT
Start: 2019-09-05 | End: 2019-09-05

## 2019-09-05 RX ORDER — DEXAMETHASONE 0.5 MG/5ML
4 ELIXIR ORAL EVERY 6 HOURS
Refills: 0 | Status: DISCONTINUED | OUTPATIENT
Start: 2019-09-05 | End: 2019-09-10

## 2019-09-05 RX ADMIN — Medication 4 MILLIGRAM(S): at 11:17

## 2019-09-05 RX ADMIN — Medication 4 MILLIGRAM(S): at 01:15

## 2019-09-05 RX ADMIN — LEVETIRACETAM 750 MILLIGRAM(S): 250 TABLET, FILM COATED ORAL at 06:47

## 2019-09-05 RX ADMIN — Medication 10 MILLIGRAM(S): at 09:23

## 2019-09-05 RX ADMIN — Medication 4 MILLIGRAM(S): at 06:46

## 2019-09-05 RX ADMIN — Medication 2 MILLIGRAM(S): at 21:30

## 2019-09-05 RX ADMIN — DEXTROSE MONOHYDRATE, SODIUM CHLORIDE, AND POTASSIUM CHLORIDE 75 MILLILITER(S): 50; .745; 4.5 INJECTION, SOLUTION INTRAVENOUS at 22:56

## 2019-09-05 RX ADMIN — Medication 4 MILLIGRAM(S): at 21:35

## 2019-09-05 RX ADMIN — LACOSAMIDE 140 MILLIGRAM(S): 50 TABLET ORAL at 22:30

## 2019-09-05 RX ADMIN — Medication 4 MILLIGRAM(S): at 23:56

## 2019-09-05 RX ADMIN — SODIUM CHLORIDE 2000 MILLILITER(S): 9 INJECTION INTRAMUSCULAR; INTRAVENOUS; SUBCUTANEOUS at 22:59

## 2019-09-05 NOTE — PROGRESS NOTE ADULT - SUBJECTIVE AND OBJECTIVE BOX
CHIEF COMPLAINT:Patient is a 64y old  Male who presents with a chief complaint of Behaviour change (04 Sep 2019 10:00)    	        PAST MEDICAL & SURGICAL HISTORY:  No pertinent past medical history  No significant past surgical history          REVIEW OF SYSTEMS:  CONSTITUTIONAL: flat affect  EYES: No eye pain, visual disturbances, or discharge  NECK: No pain or stiffness  RESPIRATORY: No cough, wheezing, chills or hemoptysis; No Shortness of Breath  CARDIOVASCULAR: No chest pain, palpitations, passing out, dizziness, or leg swelling  GASTROINTESTINAL: No abdominal or epigastric pain. No nausea, vomiting, or hematemesis; No diarrhea or constipation. No melena or hematochezia.  GENITOURINARY: No dysuria, frequency, hematuria, or incontinence  NEUROLOGICAL: No headaches  MUSCULOSKELETAL: No joint pain or swelling; No muscle, back, or extremity pain    Medications:  MEDICATIONS  (STANDING):  dexamethasone  Injectable 4 milliGRAM(s) IV Push every 6 hours  levETIRAcetam 750 milliGRAM(s) Oral two times a day  sodium chloride 0.9%. 1000 milliLiter(s) (100 mL/Hr) IV Continuous <Continuous>    MEDICATIONS  (PRN):  acetaminophen   Tablet .. 650 milliGRAM(s) Oral every 6 hours PRN Moderate Pain (4 - 6)    	    PHYSICAL EXAM:  T(C): 36.6 (09-05-19 @ 11:22), Max: 36.6 (09-05-19 @ 11:17)  HR: 47 (09-05-19 @ 11:22) (47 - 61)  BP: 131/75 (09-05-19 @ 11:22) (107/64 - 141/83)  RR: 18 (09-05-19 @ 11:22) (16 - 18)  SpO2: 98% (09-05-19 @ 11:22) (96% - 98%)  Wt(kg): --  I&O's Summary    04 Sep 2019 07:01  -  05 Sep 2019 07:00  --------------------------------------------------------  IN: 880 mL / OUT: 600 mL / NET: 280 mL        Appearance: Normal	  HEENT:   Normal oral mucosa, PERRL, EOMI	  Lymphatic: No lymphadenopathy  Cardiovascular: Normal S1 S2, No JVD, No murmurs, No edema  Respiratory: Lungs clear to auscultation	  Psychiatry: A & O   Gastrointestinal:  Soft, Non-tender, + BS	  Skin: No rashes, No ecchymoses, No cyanosis	  Neurologic: Non-focal  Extremities: Normal range of motion, No clubbing, cyanosis or edema  Vascular: Peripheral pulses palpable 2+ bilaterally    TELEMETRY: 	    ECG:  	  RADIOLOGY:  OTHER: 	  	  LABS:	 	    CARDIAC MARKERS:                                14.4   16.82 )-----------( 147      ( 05 Sep 2019 08:35 )             42.7     09-05    139  |  102  |  20  ----------------------------<  94  4.0   |  25  |  0.67    Ca    10.0      05 Sep 2019 06:36  Phos  3.9     09-05  Mg     2.0     09-05    TPro  6.0  /  Alb  3.9  /  TBili  0.4  /  DBili  x   /  AST  16  /  ALT  24  /  AlkPhos  56  09-05    proBNP:   Lipid Profile:   HgA1c:   TSH:

## 2019-09-05 NOTE — PROGRESS NOTE ADULT - ASSESSMENT
63yo male presenting to ED for episodes of AMS.  No fever, no leukocytosis  No focal symptoms to suggest infection  Has AMS, with slow response to questions  CT's with liver and acetabular lesion; CTH with cystic brain lesions with midline shift  MR confirms suspicion for glioblastoma vs met  AMS likely 2/2 underlying malignancy; low suspicion for infection presently  Suspect leukocytosis 2/2 steroids; monitor  Overall, Brain mass, AMS, suspected malignancy  - Continue off abx  - F/U quant gold  - Workup for malignancy per primary team/neurosurgery--for OR today for biopsy  - Trend WBC    Mikie Dhillon MD  Pager [phone]  After 5pm and on weekends call [phone]

## 2019-09-05 NOTE — PROGRESS NOTE ADULT - SUBJECTIVE AND OBJECTIVE BOX
CC: F/U for Leukocytosis    Saw/spoke to patient. No fevers, no chills. No new complaints. Unchanged. Patient well appearing. For OR today.    Allergies  No Known Allergies    ANTIMICROBIALS:  Off    PE:    Vital Signs Last 24 Hrs  T(C): 36.6 (05 Sep 2019 11:22), Max: 36.6 (05 Sep 2019 11:17)  T(F): 97.8 (05 Sep 2019 11:17), Max: 97.8 (05 Sep 2019 11:17)  HR: 47 (05 Sep 2019 11:22) (47 - 61)  BP: 131/75 (05 Sep 2019 11:22) (107/64 - 141/83)  RR: 18 (05 Sep 2019 11:22) (16 - 18)  SpO2: 98% (05 Sep 2019 11:22) (96% - 98%)    Gen: AOx3, NAD, non-toxic, pleasant  CV: S1+S2 normal, nontachycardic  Resp: Clear bilat, no resp distress, no crackles/wheezes  Abd: Soft, nontender, +BS  Ext: No LE edema, no wounds    LABS:                        14.4   16.82 )-----------( 147      ( 05 Sep 2019 08:35 )             42.7     09-05    139  |  102  |  20  ----------------------------<  94  4.0   |  25  |  0.67    Ca    10.0      05 Sep 2019 06:36  Phos  3.9     09-05  Mg     2.0     09-05    TPro  6.0  /  Alb  3.9  /  TBili  0.4  /  DBili  x   /  AST  16  /  ALT  24  /  AlkPhos  56  09-05    MICROBIOLOGY:    .Blood  09-03-19   No growth to date.     RADIOLOGY:    9/2 MR:    IMPRESSION: Left frontal heterogeneous lesion with cystic and solid   components in surrounding vasogenic edema. Lesion is solitary. There is   diffusion positivity around the periphery of the lesion. There is some   susceptibility seen in association. Findings suggestive of a glioblastoma   versus a metastasis. No diffusion restriction within the cystic portion   of the lesion to suggest an abscess.

## 2019-09-05 NOTE — PROGRESS NOTE ADULT - ASSESSMENT
65 y/o male  w/ no PMH pw staring episodes and “delayed responsiveness” for 2 weeks. . Long time smoker recently quit after fathers death 2 weeks ago. w/  3.4 x 3.3 cm cystic mass w/in L frontal lobe w/ edema and midline shift on ct head  ? mets  onc eval noted  onc w/u unrevealing   id eval noted  neuro eval noted  steroids as per neurosx  -CT contrast C/A/P noted  -Q4 neurochecks  -Keppra prophylaxis for now  discussed w/ daughter / pt /onc/neuro  bx/ sx today   medically optimized for sx

## 2019-09-05 NOTE — PROGRESS NOTE ADULT - SUBJECTIVE AND OBJECTIVE BOX
Patient seen and examined at bedside.    --Anticoagulation--    T(C): 36.5 (09-04-19 @ 20:19), Max: 36.5 (09-04-19 @ 20:19)  HR: 61 (09-04-19 @ 20:19) (55 - 61)  BP: 121/75 (09-04-19 @ 20:19) (120/72 - 132/80)  RR: 16 (09-04-19 @ 20:19) (16 - 18)  SpO2: 96% (09-04-19 @ 20:19) (96% - 98%)  Wt(kg): --    Exam:  AOx3, FC, PERRL, EOMI, no facial   5/5 throughout, mild R facial  SILT  no clonus

## 2019-09-05 NOTE — PROGRESS NOTE ADULT - SUBJECTIVE AND OBJECTIVE BOX
CARDIOLOGY FOLLOW UP - Dr. Wilson    CC  no cp or sob   no acute events       PHYSICAL EXAM:  T(C): 36.3 (09-05-19 @ 06:45), Max: 36.5 (09-04-19 @ 20:19)  HR: 55 (09-05-19 @ 06:45) (55 - 61)  BP: 107/64 (09-05-19 @ 06:45) (107/64 - 132/80)  RR: 16 (09-05-19 @ 06:45) (16 - 18)  SpO2: 97% (09-05-19 @ 06:45) (96% - 98%)  Wt(kg): --  I&O's Summary    04 Sep 2019 07:01  -  05 Sep 2019 07:00  --------------------------------------------------------  IN: 880 mL / OUT: 600 mL / NET: 280 mL        family refuses assessment         MEDICATIONS  (STANDING):  dexamethasone  Injectable 4 milliGRAM(s) IV Push every 6 hours  levETIRAcetam 750 milliGRAM(s) Oral two times a day  sodium chloride 0.9%. 1000 milliLiter(s) (100 mL/Hr) IV Continuous <Continuous>      TELEMETRY: 	    ECG:  	  RADIOLOGY:   DIAGNOSTIC TESTING:  [ ] Echocardiogram:  [ ]  Catheterization:  [ ] Stress Test:    OTHER: 	    LABS:	 	                            14.4   16.82 )-----------( 147      ( 05 Sep 2019 08:35 )             42.7     09-05    139  |  102  |  20  ----------------------------<  94  4.0   |  25  |  0.67    Ca    10.0      05 Sep 2019 06:36  Phos  3.9     09-05  Mg     2.0     09-05    TPro  6.0  /  Alb  3.9  /  TBili  0.4  /  DBili  x   /  AST  16  /  ALT  24  /  AlkPhos  56  09-05    PT/INR - ( 05 Sep 2019 09:24 )   PT: 11.5 sec;   INR: 1.02 ratio         PTT - ( 05 Sep 2019 09:24 )  PTT:25.1 sec

## 2019-09-05 NOTE — PROGRESS NOTE ADULT - ASSESSMENT
64 year old male former smoker with no significant PMHx presents with AMS, brain imaging with mass.     1. Brain mass, suspected malignancy, ? mets   CT head: 3.4 x 3.3 cm cystic mass w/in L frontal lobe w/ edema and midline shift.  MRI wwo brain: L cystic enhancing lesion causing significant edema  CT chest/abd noted, ? mets   hem/onc, neurosurg, neuro f/u   Patient is on steroids and Keppra.  plan for brain biopsy with poss resection. ; per Daughter she does not want patient to know he is going for procedure; med/neuro  to f/u  cv stable, no cardiac hx, no recent cp/sob/palpitations/snycope, EKG with no evidence of ischemia   pt. optimized from procedure and can proceed with low cv risk.      dvt ppx

## 2019-09-05 NOTE — PROGRESS NOTE ADULT - ASSESSMENT
64M w/ no PMH pw staring episodes and “delayed responsiveness” for 2 weeks. No n/v, headache, vision changes, speech problems. No hx of cancer. Long time smoker recently quit after fathers death 2 weeks ago. HCT: 3.4 x 3.3 cm cystic mass w/in L frontal lobe w/ edema and midline shift. Exam: slight R lower facial otherwise intact. No AC.  - OR today  - Continue dex 4q6  - CT CAP multiple mets   - Keppra 750 BID

## 2019-09-05 NOTE — PROGRESS NOTE ADULT - SUBJECTIVE AND OBJECTIVE BOX
incomplete 63yo male presenting to ED for episodes of AMS.  Daughter reports that pt has episodes of unresponsiveness "staring " for several seconds   delayed responses which began 2 weeks ago. Pt father passed away several weeks ago, went to in [place] for , returned yesterday. + generalized weakness, poor appetite, quit smoking since death of father  found to have brain mass  went for crani for tumor resection - prelim metastatic adenocarcinomas      Post op not following commands, went for CT showed post op heme, return to OR with good hemostasis, another post op CT stable   had GTC seizure post op, got Keppra,  8 mg ativan total and Vimpat     *** HIGH RISK OF DVT PRESENT ON ADMISSION metastatic cancer ***    VITALS:  Recent Vitals Reviewed   LABS:  Recent Labs Reviewed  MEDICATIONS: All Recent Medications Reviewed   IMAGING:   Recent imaging studies were reviewed.    EXAMINATION:  General:  intubated   HEENT:  MMM  Neuro:  intubated, not opening eyes, upward gaze, bedside having GTC seizure  Cards:  s1, s2 RRR  Respiratory:  lungs clear b/l   Abdomen:  soft  Extremities:  no edema  Skin:  warm/dry    [place]  Et Tube

## 2019-09-05 NOTE — PROGRESS NOTE ADULT - ASSESSMENT
ASSESSMENT/PLAN:  went for crani for tumor resection - prelim metastatic adenocarcinomas    seizure post op     NEURO:    Neurochecks q1 hrs,  CT Head in the AM,  Decadron Taper   Siezure - s/p Ativan 8mg, Keppra 1 g BID, loaded with Vimpat and continuing daily   f/u final path   Propofol overnight until VEEG is hooked up   Activity: [] mobilize as tolerated [x] Bedrest [] PT [] OT [] PMNR    PULM: full vent support 2/2 intracranial hemorrhage and seizure      CV:  SBP goal 100 - 160    RENAL:  Fluids:  NS @ 75    GI:    Diet: NPO   GI prophylaxis [] not indicated [x] PPI as vented   Bowel regimen [x] colace [x] senna [] other:    ENDO:   Goal euglycemia (-180)    HEME/ONC:  VTE prophylaxis: [] SCDs [] chemoprophylaxis [x] hold chemoprophylaxis due to: fresh post op   [x] high risk of DVT/PE on admission due to:  2/2 metastatic cancer     ID:  afebrile     SOCIAL/FAMILY:  [x] awaiting [] updated at bedside [] family meeting    CODE STATUS:  [x] Full Code [] DNR [] DNI [] Palliative/Comfort Care    DISPOSITION:  [x] ICU [] Stroke Unit [] Floor [] EMU [] RCU [] PCU    [x] Patient is at high risk of neurologic deterioration/death due to:  seziures, hemorrhage, herniation       Time spent: 45 critical care minutes    Contact: [phone]

## 2019-09-06 LAB
ANION GAP SERPL CALC-SCNC: 10 MMOL/L — SIGNIFICANT CHANGE UP (ref 5–17)
ANION GAP SERPL CALC-SCNC: 11 MMOL/L — SIGNIFICANT CHANGE UP (ref 5–17)
BUN SERPL-MCNC: 13 MG/DL — SIGNIFICANT CHANGE UP (ref 7–23)
BUN SERPL-MCNC: 13 MG/DL — SIGNIFICANT CHANGE UP (ref 7–23)
CALCIUM SERPL-MCNC: 8.8 MG/DL — SIGNIFICANT CHANGE UP (ref 8.4–10.5)
CALCIUM SERPL-MCNC: 9.3 MG/DL — SIGNIFICANT CHANGE UP (ref 8.4–10.5)
CHLORIDE SERPL-SCNC: 105 MMOL/L — SIGNIFICANT CHANGE UP (ref 96–108)
CHLORIDE SERPL-SCNC: 110 MMOL/L — HIGH (ref 96–108)
CO2 SERPL-SCNC: 20 MMOL/L — LOW (ref 22–31)
CO2 SERPL-SCNC: 25 MMOL/L — SIGNIFICANT CHANGE UP (ref 22–31)
CREAT SERPL-MCNC: 0.48 MG/DL — LOW (ref 0.5–1.3)
CREAT SERPL-MCNC: 0.57 MG/DL — SIGNIFICANT CHANGE UP (ref 0.5–1.3)
GLUCOSE SERPL-MCNC: 119 MG/DL — HIGH (ref 70–99)
GLUCOSE SERPL-MCNC: 128 MG/DL — HIGH (ref 70–99)
HCT VFR BLD CALC: 40.9 % — SIGNIFICANT CHANGE UP (ref 39–50)
HGB BLD-MCNC: 13.8 G/DL — SIGNIFICANT CHANGE UP (ref 13–17)
MAGNESIUM SERPL-MCNC: 2 MG/DL — SIGNIFICANT CHANGE UP (ref 1.6–2.6)
MAGNESIUM SERPL-MCNC: 2 MG/DL — SIGNIFICANT CHANGE UP (ref 1.6–2.6)
MCHC RBC-ENTMCNC: 32.3 PG — SIGNIFICANT CHANGE UP (ref 27–34)
MCHC RBC-ENTMCNC: 33.7 GM/DL — SIGNIFICANT CHANGE UP (ref 32–36)
MCV RBC AUTO: 95.9 FL — SIGNIFICANT CHANGE UP (ref 80–100)
PHOSPHATE SERPL-MCNC: 2.7 MG/DL — SIGNIFICANT CHANGE UP (ref 2.5–4.5)
PHOSPHATE SERPL-MCNC: 3.8 MG/DL — SIGNIFICANT CHANGE UP (ref 2.5–4.5)
PLATELET # BLD AUTO: 109 K/UL — LOW (ref 150–400)
POTASSIUM SERPL-MCNC: 4 MMOL/L — SIGNIFICANT CHANGE UP (ref 3.5–5.3)
POTASSIUM SERPL-MCNC: 4.1 MMOL/L — SIGNIFICANT CHANGE UP (ref 3.5–5.3)
POTASSIUM SERPL-SCNC: 4 MMOL/L — SIGNIFICANT CHANGE UP (ref 3.5–5.3)
POTASSIUM SERPL-SCNC: 4.1 MMOL/L — SIGNIFICANT CHANGE UP (ref 3.5–5.3)
RBC # BLD: 4.26 M/UL — SIGNIFICANT CHANGE UP (ref 4.2–5.8)
RBC # FLD: 11.7 % — SIGNIFICANT CHANGE UP (ref 10.3–14.5)
SODIUM SERPL-SCNC: 140 MMOL/L — SIGNIFICANT CHANGE UP (ref 135–145)
SODIUM SERPL-SCNC: 141 MMOL/L — SIGNIFICANT CHANGE UP (ref 135–145)
WBC # BLD: 14 K/UL — HIGH (ref 3.8–10.5)
WBC # FLD AUTO: 14 K/UL — HIGH (ref 3.8–10.5)

## 2019-09-06 PROCEDURE — 99291 CRITICAL CARE FIRST HOUR: CPT

## 2019-09-06 PROCEDURE — 93010 ELECTROCARDIOGRAM REPORT: CPT

## 2019-09-06 PROCEDURE — 70450 CT HEAD/BRAIN W/O DYE: CPT | Mod: 26

## 2019-09-06 PROCEDURE — 71045 X-RAY EXAM CHEST 1 VIEW: CPT | Mod: 26,76

## 2019-09-06 PROCEDURE — 99292 CRITICAL CARE ADDL 30 MIN: CPT

## 2019-09-06 PROCEDURE — 95816 EEG AWAKE AND DROWSY: CPT | Mod: 26

## 2019-09-06 PROCEDURE — 95951: CPT | Mod: 26

## 2019-09-06 PROCEDURE — 99232 SBSQ HOSP IP/OBS MODERATE 35: CPT

## 2019-09-06 PROCEDURE — 93970 EXTREMITY STUDY: CPT | Mod: 26

## 2019-09-06 RX ORDER — DOCUSATE SODIUM 100 MG
100 CAPSULE ORAL
Refills: 0 | Status: DISCONTINUED | OUTPATIENT
Start: 2019-09-06 | End: 2019-09-16

## 2019-09-06 RX ORDER — FENTANYL CITRATE 50 UG/ML
25 INJECTION INTRAVENOUS
Refills: 0 | Status: DISCONTINUED | OUTPATIENT
Start: 2019-09-06 | End: 2019-09-08

## 2019-09-06 RX ADMIN — FENTANYL CITRATE 25 MICROGRAM(S): 50 INJECTION INTRAVENOUS at 10:00

## 2019-09-06 RX ADMIN — FENTANYL CITRATE 25 MICROGRAM(S): 50 INJECTION INTRAVENOUS at 10:15

## 2019-09-06 RX ADMIN — LACOSAMIDE 120 MILLIGRAM(S): 50 TABLET ORAL at 05:38

## 2019-09-06 RX ADMIN — CHLORHEXIDINE GLUCONATE 1 APPLICATION(S): 213 SOLUTION TOPICAL at 22:03

## 2019-09-06 RX ADMIN — Medication 4 MILLIGRAM(S): at 05:38

## 2019-09-06 RX ADMIN — FENTANYL CITRATE 25 MICROGRAM(S): 50 INJECTION INTRAVENOUS at 17:00

## 2019-09-06 RX ADMIN — Medication 4 MILLIGRAM(S): at 12:42

## 2019-09-06 RX ADMIN — PROPOFOL 9.4 MICROGRAM(S)/KG/MIN: 10 INJECTION, EMULSION INTRAVENOUS at 12:42

## 2019-09-06 RX ADMIN — LACOSAMIDE 120 MILLIGRAM(S): 50 TABLET ORAL at 18:33

## 2019-09-06 RX ADMIN — Medication 4 MILLIGRAM(S): at 17:15

## 2019-09-06 RX ADMIN — LEVETIRACETAM 400 MILLIGRAM(S): 250 TABLET, FILM COATED ORAL at 17:15

## 2019-09-06 RX ADMIN — CHLORHEXIDINE GLUCONATE 15 MILLILITER(S): 213 SOLUTION TOPICAL at 17:15

## 2019-09-06 RX ADMIN — DEXTROSE MONOHYDRATE, SODIUM CHLORIDE, AND POTASSIUM CHLORIDE 75 MILLILITER(S): 50; .745; 4.5 INJECTION, SOLUTION INTRAVENOUS at 12:43

## 2019-09-06 RX ADMIN — Medication 4 MILLIGRAM(S): at 23:59

## 2019-09-06 RX ADMIN — FENTANYL CITRATE 25 MICROGRAM(S): 50 INJECTION INTRAVENOUS at 20:55

## 2019-09-06 RX ADMIN — PROPOFOL 9.4 MICROGRAM(S)/KG/MIN: 10 INJECTION, EMULSION INTRAVENOUS at 01:30

## 2019-09-06 RX ADMIN — Medication 100 MILLIGRAM(S): at 18:32

## 2019-09-06 RX ADMIN — PANTOPRAZOLE SODIUM 40 MILLIGRAM(S): 20 TABLET, DELAYED RELEASE ORAL at 12:43

## 2019-09-06 RX ADMIN — FENTANYL CITRATE 25 MICROGRAM(S): 50 INJECTION INTRAVENOUS at 17:10

## 2019-09-06 RX ADMIN — CHLORHEXIDINE GLUCONATE 15 MILLILITER(S): 213 SOLUTION TOPICAL at 05:38

## 2019-09-06 RX ADMIN — LEVETIRACETAM 400 MILLIGRAM(S): 250 TABLET, FILM COATED ORAL at 05:38

## 2019-09-06 RX ADMIN — FENTANYL CITRATE 25 MICROGRAM(S): 50 INJECTION INTRAVENOUS at 21:10

## 2019-09-06 NOTE — PROGRESS NOTE ADULT - SUBJECTIVE AND OBJECTIVE BOX
65yo male presenting to ED for episodes of AMS.  Daughter reports that pt has episodes of unresponsiveness "staring " for several seconds   delayed responses which began 2 weeks ago. Pt father passed away several weeks ago, went to in [place] for , returned yesterday. + generalized weakness, poor appetite, quit smoking since death of father  found to have brain mass  went for crani for tumor resection - prelim metastatic adenocarcinomas      Post op not following commands, went for CT showed post op heme, return to OR with good hemostasis, another post op CT stable   had GTC seizure post op, got Keppra,  8 mg ativan total and Vimpat     *** HIGH RISK OF DVT PRESENT ON ADMISSION metastatic cancer ***    VITALS:  Recent Vitals Reviewed   LABS:  Recent Labs Reviewed  MEDICATIONS: All Recent Medications Reviewed   IMAGING:   Recent imaging studies were reviewed.    EXAMINATION:  General:  intubated   HEENT:  MMM  Neuro:  intubated, not opening eyes, upward gaze, bedside having GTC seizure  Cards:  s1, s2 RRR  Respiratory:  lungs clear b/l   Abdomen:  soft  Extremities:  no edema  Skin:  warm/dry    [place]  Et Tube 65yo male presenting to ED for episodes of AMS.  Daughter reports that pt has episodes of unresponsiveness "staring " for several seconds   delayed responses which began 2 weeks ago. Pt father passed away several weeks ago, went to in [place] for , returned yesterday. + generalized weakness, poor appetite, quit smoking since death of father  found to have brain mass  went for crani for tumor resection - prelim metastatic adenocarcinomas      Post op not following commands, went for CT showed post op heme, return to OR with good hemostasis, another post op CT stable   had GTC seizure post op, got Keppra,  8 mg ativan total and Vimpat     *** HIGH RISK OF DVT PRESENT ON ADMISSION metastatic cancer ***    VITALS:  Recent Vitals Reviewed   LABS:  Recent Labs Reviewed  MEDICATIONS: All Recent Medications Reviewed   IMAGING:   Recent imaging studies were reviewed.    EXAMINATION:  General:  intubated   HEENT:  MMM  Neuro:  intubated, not opening eyes,   Mental status: Intubated  CN  Motor:   Cards:  s1, s2 RRR  Respiratory:  lungs clear b/l   Abdomen:  soft  Extremities:  no edema  Skin:  warm/dry    [place] 63yo male presenting to ED for episodes of AMS.  Daughter reports that pt has episodes of unresponsiveness "staring " for several seconds   delayed responses which began 2 weeks ago. Pt father passed away several weeks ago, went to in [place] for , returned yesterday. + generalized weakness, poor appetite, quit smoking since death of father. Found to have brain mass.  Went for crani for tumor resection - prelim metastatic adenocarcinomas      Post op not following commands, went for CT showed post op heme, return to OR with good hemostasis, another post op CT stable   had GTC seizure post op, got Keppra,  8 mg ativan total and Vimpat     *** HIGH RISK OF DVT PRESENT ON ADMISSION metastatic cancer ***    VITALS:  Recent Vitals Reviewed   LABS:  Recent Labs Reviewed  MEDICATIONS: All Recent Medications Reviewed   IMAGING:   Recent imaging studies were reviewed.    EXAMINATION:  General:  intubated   HEENT:  MMM  Neuro:  intubated, not opening eyes,   Mental status: Intubated  CN  Motor:   Cards:  s1, s2 RRR  Respiratory:  lungs clear b/l   Abdomen:  soft  Extremities:  no edema  Skin:  warm/dry    [place] 63yo male presenting to ED for episodes of AMS.  Daughter reports that pt has episodes of unresponsiveness "staring " for several seconds   delayed responses which began 2 weeks ago. Pt father passed away several weeks ago, went to in [place] for , returned yesterday. + generalized weakness, poor appetite, quit smoking since death of father. Found to have brain mass.  Went for crani for tumor resection - prelim metastatic adenocarcinomas      Post op not following commands, went for CT showed post op heme, return to OR with good hemostasis, another post op CT stable   had GTC seizure post op, got Keppra,  8 mg ativan total and Vimpat     *** HIGH RISK OF DVT PRESENT ON ADMISSION metastatic cancer ***    VITALS:  Recent Vitals Reviewed   LABS:  Recent Labs Reviewed  MEDICATIONS: All Recent Medications Reviewed   IMAGING:   Recent imaging studies were reviewed.    EXAMINATION:  General:  intubated   HEENT:  MMM  Mental status: Intubated, no eye opening.   CN: Pupils 5mm and reactive  Motor: Localizing UEs, withdrawing on LEs. Intermittent rigors/ myoclonus.   Cards:  s1, s2 RRR  Respiratory:  lungs clear b/l   Abdomen:  soft  Extremities:  no edema  Skin:  warm/dry  [place]

## 2019-09-06 NOTE — DIETITIAN INITIAL EVALUATION ADULT. - PHYSICAL APPEARANCE
Pt unable to provide consent for Nutrition Focused Physical Assessment due to present medical condition. Per visual assessment, pt with apparent moderate body fat depletion to orbitals; moderate muscle mass depletion to temples, leg/thigh, bony prominences to knee.

## 2019-09-06 NOTE — PROGRESS NOTE ADULT - SUBJECTIVE AND OBJECTIVE BOX
SUBJECTIVE:     pt went to OR.  Post op not following commands.  CT found hematoma.  back to OR for hemostasis.  post op with GTC sz.   given ativan.  now intubated sedated    Medications:  MEDICATIONS  (STANDING):  chlorhexidine 0.12% Liquid 15 milliLiter(s) Oral Mucosa every 12 hours  chlorhexidine 4% Liquid 1 Application(s) Topical <User Schedule>  dexamethasone  Injectable 4 milliGRAM(s) IV Push every 6 hours  docusate sodium 100 milliGRAM(s) Oral three times a day  lacosamide IVPB 100 milliGRAM(s) IV Intermittent every 12 hours  levETIRAcetam  IVPB 1000 milliGRAM(s) IV Intermittent every 12 hours  pantoprazole  Injectable 40 milliGRAM(s) IV Push daily  propofol Infusion 30 MICROgram(s)/kG/Min (9.396 mL/Hr) IV Continuous <Continuous>  sodium chloride 0.9% with potassium chloride 20 mEq/L 1000 milliLiter(s) (75 mL/Hr) IV Continuous <Continuous>    MEDICATIONS  (PRN):  acetaminophen   Tablet .. 650 milliGRAM(s) Oral every 6 hours PRN Temp greater or equal to 38C (100.4F), Mild Pain (1 - 3)  fentaNYL    Injectable 25 MICROGram(s) IV Push every 1 hour PRN comfort      Labs:  CBC Full  -  ( 05 Sep 2019 19:44 )  WBC Count : 16.7 K/uL  RBC Count : 4.22 M/uL  Hemoglobin : 13.5 g/dL  Hematocrit : 40.3 %  Platelet Count - Automated : 134 K/uL  Mean Cell Volume : 95.4 fl  Mean Cell Hemoglobin : 32.0 pg  Mean Cell Hemoglobin Concentration : 33.5 gm/dL  Auto Neutrophil # : x  Auto Lymphocyte # : x  Auto Monocyte # : x  Auto Eosinophil # : x  Auto Basophil # : x  Auto Neutrophil % : x  Auto Lymphocyte % : x  Auto Monocyte % : x  Auto Eosinophil % : x  Auto Basophil % : x    09-06    141  |  110<H>  |  13  ----------------------------<  119<H>  4.0   |  20<L>  |  0.57    Ca    8.8      06 Sep 2019 02:34  Phos  3.8     09-06  Mg     2.0     09-06    TPro  6.0  /  Alb  3.9  /  TBili  0.4  /  DBili  x   /  AST  16  /  ALT  24  /  AlkPhos  56  09-05    CAPILLARY BLOOD GLUCOSE      POCT Blood Glucose.: 108 mg/dL (05 Sep 2019 23:51)    LIVER FUNCTIONS - ( 05 Sep 2019 06:36 )  Alb: 3.9 g/dL / Pro: 6.0 g/dL / ALK PHOS: 56 U/L / ALT: 24 U/L / AST: 16 U/L / GGT: x           PT/INR - ( 05 Sep 2019 19:26 )   PT: 12.1 sec;   INR: 1.06 ratio         PTT - ( 05 Sep 2019 19:26 )  PTT:22.1 sec      Vitals:  Vital Signs Last 24 Hrs  T(C): 36.1 (06 Sep 2019 07:00), Max: 36.6 (05 Sep 2019 11:17)  T(F): 97 (06 Sep 2019 07:00), Max: 97.8 (05 Sep 2019 11:17)  HR: 73 (06 Sep 2019 08:30) (47 - 92)  BP: 149/81 (06 Sep 2019 08:15) (108/81 - 149/81)  BP(mean): 100 (06 Sep 2019 08:15) (88 - 107)  RR: 20 (06 Sep 2019 08:30) (14 - 21)  SpO2: 100% (06 Sep 2019 08:30) (98% - 100%)        NEUROLOGICAL EXAM:    Neurologic Exam:    Mental Status: Eyes closed, follows no commands, no attempt at communication. Does not attend to the examiner. Does not arouse to verbal, tactile or noxious stimuli. Intubated, mechanically ventilated    Cranial Nerves: Pupils reactive bilaterally, gaze straigtht. No blink to threat bilaterally. No gross facial asymmetry.      Motor: Bulk was normal. Tone was low. No spontaneous or purposeful movements x 4 extremities.     Reflexes: Absent upper extremities. Absent lower extremities. Toes were mute bilaterally.     Sensory: No response to tactile or noxious stimuli.     Coord: Unable to assess    Gait: Unable to assess    Imaging:      EXAM:  CT ANGIO BRAIN (W)AW IC                          EXAM:  CT ANGIO NECK (W)AW IC                          EXAM:  CT BRAIN                            PROCEDURE DATE:  09/01/2019            INTERPRETATION:  CLINICAL INFORMATION: Intermittent altered mental   status. Imbalance.     TECHNIQUE: Noncontrast axial CT images were acquired through the head.   Contrast enhanced axial CT images were acquired from the aortic arch to   the vertex of the calvarium, during the angiographic phase. Additional   post-contrast axial CT images through the head were obtained.   Three-dimensional maximum intensity projection reformats were generated   from the angiographic images.  70 cc of Omnipaque-300 mg/ml were   administered intravenously, without immediate complication.    COMPARISON: None.    FINDINGS:     CT BRAIN:    A 3.4 x 3.3 cm cystic mass within the left frontal lobe with large area   of surrounding edema and causing effacement of the bilateral lateral   ventricles and rightward midline shift. There appears to be a solid   component of the lesion anteriorly. There is effacement of the   interpeduncular cistern. No hydrocephalus.    No areas of abnormal enhancement are identified.    The visualized paranasal sinuses are clear. The mastoid air cells and   middle ear cavities are clear.    The soft tissues of the scalp are unremarkable. The calvarium is intact.    CT ANGIOGRAPHY NECK:    Three-vessel aortic arch. Atherosclerotic calcifications of the aorta   without evidence of dissection or hemodynamically significant stenosis.   The origins of the great vessels are unremarkable.     The common carotid arteries are normal in caliber without significant   stenosis.     The carotid bifurcations are unremarkable. The internal carotid arteries   are normal in caliber without significant stenosis.     Co-dominant vertebral arteries. The vertebral arteries are normal in   caliber without significant stenosis.     The visualized neck and upper chest are unremarkable.    Visualized osseous structures are unremarkable.    CT ANGIOGRAPHY BRAIN:    There is no evidence for significant stenosis, major vessel occlusion, or   aneurysm about the [place].    There is no evidence for significant stenosis, major vessel occlusion, or   aneurysm involving the vertebrobasilar system.    No enlarged vascular lesions or clusters of abnormal vessels are noted to   suggest an arterial venous malformation.    Visualized portions of the superficial and deep venous systems are   unremarkable.      IMPRESSION:     CT brain: Predominantly cystic mass within the left frontal lobe with   large area of surrounding edema causing leftward midline shift. There   appears to be a hyperdense solid component of the mass anteriorly which   showsvascularity. There is effacement of the interpeduncular cistern. No   hydrocephalus.    CT angiography neck: No evidence of hemodynamically significant stenosis   by NASCET criteria. No evidence of vascular dissection.    CT angiography brain: No major vessel occlusion or proximal stenosis by   NASCET criteria. No evidence of aneurysm or other vascular malformation.                MARILYN WALKER M.D., RADIOLOGIST RESIDENT  This document has been electronically signed.  ISRAEL DORANTES M.D., ATTENDING RADIOLOGIST  This document has been electronically signed. Sep  1 2019  2:03PM      < from: MR Head w/wo IV Cont (09.02.19 @ 13:15) >  EXAM:  MR BRAIN WAW IC                            PROCEDURE DATE:  09/02/2019            INTERPRETATION:  INDICATIONS:  preop    TECHNIQUE:  Multiplanar imaging was performed using T1 weighted, T2   weighted and FLAIR sequences.  Diffusion weightedand SWI images were   also obtained.  Following intravenous gadolinium, multiplanar T1 weighted   images were performed. 6.5 cc Gadavist were administered. 1 cc were   discarded.      COMPARISON EXAMINATION: Study is read and compared with CT CTA 9/1/2019    FINDINGS:    VENTRICLES AND SULCI: Again appreciated is the mass effect on the left   frontal horn of the lateral ventricle as well as the third ventricle.  INTRA-AXIAL:  Evidence of the left frontal enhancing mass lesion that is   mixed signal with evidence of fluid and solid components. In addition   evidence of foci of susceptibility within the lesion which may represent   component of hemorrhage versus mineralization. Surrounding vasogenic   edema is appreciated. Evidence of a left frontal lesion measuring 7 cm AP   x 4 cm transverse by x 4.7 cm craniocaudal dimension. Periphery of the   lesion has a diffusion diffusion positivity with restricted diffusion on   the ADC map.  EXTRA-AXIAL:  No mass or collection.  VISUALIZED SINUSES:  Normal.  VISUALIZED MASTOIDS:  Clear.  CALVARIUM:  Normal.  CAROTID FLOW VOIDS:  Present.  MISCELLANEOUS:  None.    IMPRESSION: Left frontal heterogeneous lesion with cystic and solid   components in surrounding vasogenic edema. Lesion is solitary.There is   diffusion positivity around the periphery of the lesion. There is some   susceptibility seen in association. Findings suggestive of a glioblastoma   versus a metastasis. No diffusion restriction within the cystic portion   of the lesion tosuggest an abscess.                    MICHELE CHO M.D., ATTENDING RADIOLOGIST  This document has been electronically signed. Sep  3 2019  8:51AM                < end of copied text >

## 2019-09-06 NOTE — OCCUPATIONAL THERAPY INITIAL EVALUATION ADULT - GENERAL OBSERVATIONS, REHAB EVAL
Patient received sitting in bedside chair, +tele, BP cuff, pulse ox, +IVL, sequentials donned, NGT, condom catheter

## 2019-09-06 NOTE — PROGRESS NOTE ADULT - ASSESSMENT
POD#1 L crani tumor resection, frozen mets  post op heme    repeat CTH in am  MRI pending  cont keppra  f/u EEG read  cont decadron for cerebral edema  surgical drain per neurosurgery  -150  intubated, pressure support as tolerated  tube feeding  start chemoppx if CTH stable

## 2019-09-06 NOTE — PHARMACOTHERAPY INTERVENTION NOTE - COMMENTS
Confirmed home medications with daughter at beside, updated in Outpatient Medication Review.    Per daughter at beside, patient does not take any prescription medications at home. He occasionally takes Excedrin or Tylenol over the counter for pain or headaches. Patient does not take any vitamins or herbal supplements.    Sharon Mejía, PharmD  PGY1 Pharmacy Practice Resident  [phone]

## 2019-09-06 NOTE — PROGRESS NOTE ADULT - ASSESSMENT
65 y/o male  w/ no PMH pw staring episodes and “delayed responsiveness” for 2 weeks. . Long time smoker recently quit after fathers death 2 weeks ago. w/  3.4 x 3.3 cm cystic mass w/in L frontal lobe w/ edema and midline shift on ct head  s/p craniotomy   w/ prelim adenoca  post op hemmorhage w/ return to or  post op seizure  steroids as per neurosx   neurochecks  -Keppra a s per nscu  prognosisi guarded

## 2019-09-06 NOTE — DIETITIAN INITIAL EVALUATION ADULT. - ETIOLOGY
decreased PO intake x 2 weeks PTA with associated increased energy needs 2/2 neurosurgical intervention, catabolic illness

## 2019-09-06 NOTE — PROGRESS NOTE ADULT - ATTENDING COMMENTS
Agree with above NP note.  events noted  vent management/care per nsicu  monitor heart rate  BP parameters per nsicu  steroids/aed per icu

## 2019-09-06 NOTE — EEG REPORT - NS EEG TEXT BOX
[facility]   COMPREHENSIVE EPILEPSY CENTER   REPORT OF ROUTINE VIDEO EEG     [facility]: [address] [place] [address] , [address], [place] [address], Ph#: [phone]  [facility]: [id number] [address] [place] [address], Ph#: [phone]  [facility]: [address] [place] [address]    Patient Name: MARCOS KOO  Age and : 64y ([birth year])  MRN #: [id number]  Location: [place] [address]  Referring Physician: Scott Wilburn    Study Date: 19    _____________________________________________________________  TECHNICAL INFORMATION    Placement and Labeling of Electrodes:  The EEG was performed utilizing 20 channels referential EEG connections (coronal over temporal over parasagittal montage) using all standard 10-20 electrode placements with EKG.  Recording was at a sampling rate of 256 samples per second per channel.  Time synchronized digital video recording was done simultaneously with EEG recording.  A low light infrared camera was used for low light recording.  Dmitriy and seizure detection algorithms were utilized.    _____________________________________________________________  HISTORY    Patient is a 64y old  Male who presents with a chief complaint of AMS (06 Sep 2019 09:35)      PERTINENT MEDICATION:  MEDICATIONS  (STANDING):  chlorhexidine 0.12% Liquid 15 milliLiter(s) Oral Mucosa every 12 hours  chlorhexidine 4% Liquid 1 Application(s) Topical <User Schedule>  dexamethasone  Injectable 4 milliGRAM(s) IV Push every 6 hours  docusate sodium Liquid 100 milliGRAM(s) Oral two times a day  lacosamide IVPB 100 milliGRAM(s) IV Intermittent every 12 hours  levETIRAcetam  IVPB 1000 milliGRAM(s) IV Intermittent every 12 hours  pantoprazole  Injectable 40 milliGRAM(s) IV Push daily  propofol Infusion 30 MICROgram(s)/kG/Min (9.396 mL/Hr) IV Continuous <Continuous>  sodium chloride 0.9% with potassium chloride 20 mEq/L 1000 milliLiter(s) (75 mL/Hr) IV Continuous <Continuous>    _____________________________________________________________  STUDY INTERPRETATION    Findings: The background was continuous, spontaneously variable and reactive. No posterior dominant rhythm seen.    Background Slowing:  Diffuse theta and polymorphic delta slowing.    Focal Slowing:   Continuous polymorphic delta slowing in the left hemisphere.     Sleep Background:  Stage II sleep transients were not recorded.    Other Non-Epileptiform Findings:  Breach effect in left frontocentral region characterized by higher amplitude, sharply contoured waves and fast activities.    Interictal Epileptiform Activity:   Continuous fluctuating 0.5-1.5 Hz left central (C3/Cz) lateralized periodic discharges with rhythmicity (LPD+R).    Events:  Clinical events: None recorded.  Seizures: None recorded.    Activation Procedures:   Hyperventilation was not performed.    Photic stimulation was not performed.     Artifacts:  Intermittent myogenic and movement artifacts were noted.    ECG:  The heart rate on single channel ECG was predominantly between 60-70 BPM.    _____________________________________________________________  EEG SUMMARY/CLASSIFICATION    Abnormal EEG in a sedated patient.  - Continuous fluctuating 0.5-1.5 Hz left central (C3/Cz) lateralized periodic discharges with rhythmicity (LPD+R).  - Continuous polymorphic delta slowing in the left hemisphere.   - Moderate to severe generalized slowing.  - Breach effect in left frontocentral region characterized by higher amplitude, sharply contoured waves and fast activities.    _____________________________________________________________  EEG IMPRESSION/CLINICAL CORRELATE    Abnormal EEG study.  1. Potential epileptogenic focus in the left central region.  2. Structural abnormality in the left hemisphere.  3. Moderate to severe nonspecific diffuse or multifocal cerebral dysfunction.   4. No seizure seen.  5. Skull defect in the left frontocentral region.    _____________________________________________________________    Lindsay Potts MD  Attending Physician, [facility]

## 2019-09-06 NOTE — DIETITIAN INITIAL EVALUATION ADULT. - REASON INDICATOR FOR ASSESSMENT
Nutrition Assessment warranted for length of stay.  Information obtained from: RN, comprehensive chart review, interdisciplinary medical rounds, family (daughter at bedside).

## 2019-09-06 NOTE — OCCUPATIONAL THERAPY INITIAL EVALUATION ADULT - PERTINENT HX OF CURRENT PROBLEM, REHAB EVAL
65yo M present to ED for episodes of AMS. Daughter reports that pt has episodes of unresponsiveness "staring " for several seconds, delayed responses which began 2 weeks ago. Pt father passed away several weeks ago, went to [place] for , returned yesterday. + generalized weakness, poor appetite. Pt fell lifting 25lbs, landed on elbows , no head trauma . does not complain of any pain.

## 2019-09-06 NOTE — DIETITIAN INITIAL EVALUATION ADULT. - ENERGY NEEDS
Ht: 67"  Wt: 123.4 lbs  BMI: 19.3 kg/m2   IBW: 148 lbs (+/-10%)     83 % IBW  Edema: no edema noted         Skin: left head JO drain, left head crani

## 2019-09-06 NOTE — DIETITIAN INITIAL EVALUATION ADULT. - ADD RECOMMEND
1) Defer initiation of EN vs PO to medical team. If PO diet initiated, consider no therapeutic restrictions; defer consistency/texture to medical team, SLP. 2) If EN initiated, monitor GI tolerance/propofol infusion. RD to remain available to adjust EN formulary, volume/rate PRN. 3) Monitor wt trends, skin integrity, bowel regularity, hydration status, nutrition related labs.

## 2019-09-06 NOTE — OCCUPATIONAL THERAPY INITIAL EVALUATION ADULT - PLANNED THERAPY INTERVENTIONS, OT EVAL
strengthening/ADL retraining/balance training/bed mobility training/transfer training/cognitive, visual perceptual/neuromuscular re-education/ROM

## 2019-09-06 NOTE — PROGRESS NOTE ADULT - ASSESSMENT
ASSESSMENT/PLAN:  went for crani for tumor resection - prelim metastatic adenocarcinomas    seizure post op     NEURO:    Neurochecks q1 hrs,  CT Head in the AM,  Decadron Taper   Seizure - s/p Ativan 8mg, Keppra 1 g BID, loaded with Vimpat and continuing daily   f/u final path   Propofol overnight until VEEG connected  Activity: [] mobilize as tolerated [x] Bedrest [] PT [] OT [] PMNR    PULM: full vent support 2/2 intracranial hemorrhage and seizure      CV:  SBP goal 100 - 160    RENAL:  Fluids:  NS @ 75    GI:    Diet: NPO   GI prophylaxis [] not indicated [x] PPI as vented   Bowel regimen [x] colace [x] senna [] other:    ENDO:   Goal euglycemia (-180)    HEME/ONC:  VTE prophylaxis: [] SCDs [] chemoprophylaxis [x] hold chemoprophylaxis due to: fresh post op   [x] high risk of DVT/PE on admission due to:  2/2 metastatic cancer     ID:  afebrile     SOCIAL/FAMILY:  [x] awaiting [] updated at bedside [] family meeting    CODE STATUS:  [x] Full Code [] DNR [] DNI [] Palliative/Comfort Care    DISPOSITION:  [x] ICU [] Stroke Unit [] Floor [] EMU [] RCU [] PCU    [x] Patient is at high risk of neurologic deterioration/death due to:  seziures, hemorrhage, herniation       Time spent: 45 critical care minutes    Contact: [phone] ASSESSMENT/PLAN:  S/P crani for tumor resection - prelim metastatic adenocarcinomas . seizure post op     NEURO:    Neurochecks q1 hr  CT head this am- stable, mild improvement  Decadron Tapered per neuroosurgery  Seizures vs myoclonus- s/p Ativan 8mg, Keppra 1 g BID, loaded with Vimpat  MRI  on 9/7  f/u final path  VEEG today  Fentanyl for pain control  Activity: [] mobilize as tolerated [x] Bedrest [] PT [] OT [] PMNR    PULM: full vent support 2/2 intracranial hemorrhage and seizure    CXR. ABG  PST     CV:  SBP goal 100 - 150  Bradycardia to 54 -61.   Check EKG.     RENAL:  Fluids:  NS @ 75  Cr wnl  Monitor BMPs    GI:    Diet:  OG tube, start tube fingers. Jevity  GI prophylaxis [] not indicated [x] PPI as vented   Bowel regimen [x] colace [x] senna [] other:    ENDO:   Goal euglycemia (-180)    HEME/ONC:  VTE prophylaxis: [] SCDs [] chemoprophylaxis [x] hold chemoprophylaxis due to: fresh post op and pending CT head in am 9/7.   [x] high risk of DVT/PE on admission due to:  2/2 metastatic cancer.  Will need metastatic w/u and onc consult on this admission  LE dopplers  DVT  Mild leukocytosis WBC 16, likely reactive vs malignancy related. Monitor     ID:  Afebrile     SOCIAL/FAMILY:  [] awaiting [x] updated at bedside [] family meeting    CODE STATUS:  [x] Full Code [] DNR [] DNI [] Palliative/Comfort Care    DISPOSITION:  [x] ICU [] Stroke Unit [] Floor [] EMU [] RCU [] PCU    [x] Patient is at high risk of neurologic deterioration/death due to:  seziures, hemorrhage, herniation       Time spent:     Contact: [phone] ASSESSMENT/PLAN:  S/P crani for tumor resection - prelim metastatic adenocarcinomas . seizure post op     NEURO:    Neurochecks q1 hr  CT head this am- stable, mild improvement  Decadron Taper per neuroosurgery  Seizures vs myoclonus- s/p Ativan 8mg, Keppra 1 g BID, loaded with Vimpat  MRI  on 9/7  f/u final path  VEEG today. Pending  Fentanyl for pain control  Activity: [] mobilize as tolerated [x] Bedrest [] PT [] OT [] PMNR    PULM: full vent support 2/2 intracranial hemorrhage and seizure    CXR. ABG  PST     CV:  SBP goal 100 - 150  Bradycardia to 54 -61.   Check EKG.     RENAL:  Fluids:  NS @ 75  Cr wnl  Monitor BMPs    GI:    Diet:  OG tube, start tube feeds, Jevity  GI prophylaxis [] not indicated [x] PPI as vented   Bowel regimen [x] colace [x] senna [] other:    ENDO:   Goal euglycemia (-180)    HEME/ONC:  VTE prophylaxis: [] SCDs [] chemoprophylaxis [x] hold chemoprophylaxis due to: fresh post op and pending CT head in am 9/7.   [x] high risk of DVT/PE on admission due to:  2/2 metastatic cancer.  Will need metastatic w/u and onc consult on this admission  LE dopplers pending  Mild leukocytosis WBC 16, likely reactive vs malignancy related. Monitor     ID:  Afebrile     SOCIAL/FAMILY:  [] awaiting [x] updated at bedside [] family meeting    CODE STATUS:  [x] Full Code [] DNR [] DNI [] Palliative/Comfort Care    DISPOSITION:  [x] ICU [] Stroke Unit [] Floor [] EMU [] RCU [] PCU    [x] Patient is at high risk of neurologic deterioration/death due to:  seizures, hemorrhage, herniation       Time spent:     Contact: [phone] ASSESSMENT/PLAN:  S/P crani for tumor resection - prelim metastatic adenocarcinomas . seizure post op     NEURO:    Neurochecks q1 hr  CT head this am- stable, mild improvement  Decadron Taper per neurosurgery  Seizures vs myoclonus- s/p Ativan 8mg, Keppra 1 g BID, loaded with Vimpat  MRI on 9/7  f/u final path  VEEG today. Pending  Fentanyl for pain control  Activity: [] mobilize as tolerated [x] Bedrest [] PT [] OT [] PMNR    PULM: full vent support 2/2 intracranial hemorrhage and seizure    CXR. ABG  PST     CV:  SBP goal 100 - 150  Bradycardia to 54 -61.   Check EKG.     RENAL:  Fluids:  NS @ 75  Cr wnl  Monitor BMPs    GI:    Diet:  OG tube, start tube feeds, Jevity  GI prophylaxis [] not indicated [x] PPI as vented   Bowel regimen [x] colace [x] senna [] other:    ENDO:   Goal euglycemia (-180)    HEME/ONC:  VTE prophylaxis: [] SCDs [] chemoprophylaxis [x] hold chemoprophylaxis due to: fresh post op and pending CT head in am 9/7.   [x] high risk of DVT/PE on admission due to:  2/2 metastatic cancer.  Will need metastatic w/u and onc consult on this admission  LE dopplers pending  Mild leukocytosis WBC 16, likely reactive vs malignancy related. Monitor     ID:  Afebrile     SOCIAL/FAMILY:  [] awaiting [x] updated at bedside [] family meeting    CODE STATUS:  [x] Full Code [] DNR [] DNI [] Palliative/Comfort Care    DISPOSITION:  [x] ICU [] Stroke Unit [] Floor [] EMU [] RCU [] PCU    [x] Patient is at high risk of neurologic deterioration/death due to:  seizures, hemorrhage, herniation       Time spent:     Contact: [phone]

## 2019-09-06 NOTE — OCCUPATIONAL THERAPY INITIAL EVALUATION ADULT - LIVES WITH, PROFILE
alone/pt lives in a basement apt, +elevator. No steps to enter. (I) ADLs and functional transfers. No DME/AD

## 2019-09-06 NOTE — OCCUPATIONAL THERAPY INITIAL EVALUATION ADULT - PRECAUTIONS/LIMITATIONS, REHAB EVAL
9/5 - s/p OR for resection/biopsy of brain mass, post op hemorrhage w/ return to or. Intubated 9/5/fall precautions

## 2019-09-06 NOTE — PROGRESS NOTE ADULT - SUBJECTIVE AND OBJECTIVE BOX
s/p L crani tumor resection, remains intubated  vEEG in progress    Vitals/labs/meds imaging reviewed  intubated, no EO, pupils 3mm R b/l, STACEYE LC, BLE WD

## 2019-09-06 NOTE — PROGRESS NOTE ADULT - SUBJECTIVE AND OBJECTIVE BOX
CARDIOLOGY FOLLOW UP - Dr. Wilson    CC intubated sedated      PHYSICAL EXAM:  T(C): 37.4 (09-06-19 @ 11:00), Max: 37.4 (09-06-19 @ 11:00)  HR: 57 (09-06-19 @ 12:00) (53 - 92)  BP: 146/81 (09-06-19 @ 12:00) (108/81 - 163/82)  RR: 17 (09-06-19 @ 12:00) (14 - 21)  SpO2: 100% (09-06-19 @ 12:00) (100% - 100%)  Wt(kg): --  I&O's Summary    05 Sep 2019 07:01  -  06 Sep 2019 07:00  --------------------------------------------------------  IN: 2107 mL / OUT: 680 mL / NET: 1427 mL    06 Sep 2019 07:01  -  06 Sep 2019 12:22  --------------------------------------------------------  IN: 390.7 mL / OUT: 1 mL / NET: 389.7 mL        Appearance:  intubated sedated	  Cardiovascular: Normal S1 S2,RRR   Respiratory: Lungs clear to auscultation	  Gastrointestinal:  Soft, Non-tender, + BS	  Extremities: Normal range of motion, No clubbing, cyanosis or edema        MEDICATIONS  (STANDING):  chlorhexidine 0.12% Liquid 15 milliLiter(s) Oral Mucosa every 12 hours  chlorhexidine 4% Liquid 1 Application(s) Topical <User Schedule>  dexamethasone  Injectable 4 milliGRAM(s) IV Push every 6 hours  docusate sodium 100 milliGRAM(s) Oral three times a day  lacosamide IVPB 100 milliGRAM(s) IV Intermittent every 12 hours  levETIRAcetam  IVPB 1000 milliGRAM(s) IV Intermittent every 12 hours  pantoprazole  Injectable 40 milliGRAM(s) IV Push daily  propofol Infusion 30 MICROgram(s)/kG/Min (9.396 mL/Hr) IV Continuous <Continuous>  sodium chloride 0.9% with potassium chloride 20 mEq/L 1000 milliLiter(s) (75 mL/Hr) IV Continuous <Continuous>      TELEMETRY: SB- nsr hr 55- 60s   ECG:  	  RADIOLOGY:   DIAGNOSTIC TESTING:  [ ] Echocardiogram:  [ ]  Catheterization:  [ ] Stress Test:    OTHER: 	    LABS:	 	                            13.5   16.7  )-----------( 134      ( 05 Sep 2019 19:44 )             40.3     09-06    141  |  110<H>  |  13  ----------------------------<  119<H>  4.0   |  20<L>  |  0.57    Ca    8.8      06 Sep 2019 02:34  Phos  3.8     09-06  Mg     2.0     09-06    TPro  6.0  /  Alb  3.9  /  TBili  0.4  /  DBili  x   /  AST  16  /  ALT  24  /  AlkPhos  56  09-05    PT/INR - ( 05 Sep 2019 19:26 )   PT: 12.1 sec;   INR: 1.06 ratio         PTT - ( 05 Sep 2019 19:26 )  PTT:22.1 sec

## 2019-09-06 NOTE — OCCUPATIONAL THERAPY INITIAL EVALUATION ADULT - ADDITIONAL COMMENTS
CT Head 9/6- Interval increase in size of left parasagittal parenchymal hemorrhages as described. Similar extensive left frontal vasogenic edema extending across the corpus callosum. Evaluation for residual neoplasm is limited. Rightward midline shift of 9 mm is similar. Mass effect upon the frontal horns is similar. Basal cisterns are still visualized. No hydrocephalus.  MR Head 9/2- Left frontal heterogeneous lesion with cystic and solid components in surrounding vasogenic edema. Lesion is solitary. There is diffusion positivity around the periphery of the lesion. There is some susceptibility seen in association. Findings suggestive of a glioblastoma versus a metastasis. No diffusion restriction within the cystic portion of the lesion to suggest an abscess. CT Head 9/6- Interval increase in size of left parasagittal parenchymal hemorrhages as described. Similar extensive left frontal vasogenic edema extending across the corpus callosum. Evaluation for residual neoplasm is limited. Rightward midline shift of 9 mm is similar. Mass effect upon the frontal horns is similar. Basal cisterns are still visualized. No hydrocephalus.    CT Head 9/7-   MR Head 9/2- Left frontal heterogeneous lesion with cystic and solid components in surrounding vasogenic edema. Lesion is solitary. There is diffusion positivity around the periphery of the lesion. There is some susceptibility seen in association. Findings suggestive of a glioblastoma versus a metastasis. No diffusion restriction within the cystic portion of the lesion to suggest an abscess.

## 2019-09-06 NOTE — OCCUPATIONAL THERAPY INITIAL EVALUATION ADULT - ADL RETRAINING, OT EVAL
Patient will dress lower body with minimal assistance, AE as needed in 4 weeks. Patient will dress upper body with minimal assistance in 4 weeks

## 2019-09-06 NOTE — PROGRESS NOTE ADULT - SUBJECTIVE AND OBJECTIVE BOX
CC: F/U for Leukocytosis    Saw/spoke to patient. Patient s/p biopsy. Remains intubated in NSICU. Possible post op seizure vs myoclonus.    Allergies  No Known Allergies    ANTIMICROBIALS:  Off    PE:    Vital Signs Last 24 Hrs  T(C): 37.8 (06 Sep 2019 15:00), Max: 37.8 (06 Sep 2019 15:00)  T(F): 100 (06 Sep 2019 15:00), Max: 100 (06 Sep 2019 15:00)  HR: 75 (06 Sep 2019 15:00) (53 - 92)  BP: 150/109 (06 Sep 2019 15:00) (108/81 - 163/82)  BP(mean): 120 (06 Sep 2019 15:00) (88 - 120)  RR: 19 (06 Sep 2019 15:00) (14 - 21)  SpO2: 100% (06 Sep 2019 15:00) (100% - 100%)    Gen: AOx0, poorly responsive, intubated  CV: S1+S2 normal, nontachycardic  Resp: Clear bilat, no resp distress, no crackles/wheezes  Abd: Soft, nontender, +BS  Ext: No LE edema, no wounds    LABS:                        13.5   16.7  )-----------( 134      ( 05 Sep 2019 19:44 )             40.3     09-06    141  |  110<H>  |  13  ----------------------------<  119<H>  4.0   |  20<L>  |  0.57    Ca    8.8      06 Sep 2019 02:34  Phos  3.8     09-06  Mg     2.0     09-06    TPro  6.0  /  Alb  3.9  /  TBili  0.4  /  DBili  x   /  AST  16  /  ALT  24  /  AlkPhos  56  09-05    MICROBIOLOGY:    .Blood  09-03-19   No growth to date.     RADIOLOGY:    8/6 CTH:    IMPRESSION:    Interval increase in size of left parasagittal parenchymal hemorrhages as   described.    Similar extensive left frontal vasogenic edema extending across the   corpus callosum.    Evaluation for residual neoplasm is limited.    Rightward midline shift of 9 mm is similar. Mass effect upon the frontal   horns is similar. Basal cisterns are still visualized. No hydrocephalus.

## 2019-09-06 NOTE — PROGRESS NOTE ADULT - ASSESSMENT
64 year old male former smoker with no significant PMHx presents with AMS, brain imaging with mass.     1. Brain mass, suspected malignancy, ? mets   CT head: 3.4 x 3.3 cm cystic mass w/in L frontal lobe w/ edema and midline shift.  MRI wwo brain: L cystic enhancing lesion causing significant edema  CT chest/abd noted, ? mets   hem/onc, neurosurg, neuro f/u   9/5 - s/p OR for resection/biopsy.   w/ prelim adenoca; post op hemmorhage w/ return to or  post op seizure; keppra, vimpat, steroids as per neurosx, NISCU  bradycardia noted postop, pending ekg   care per NISCU     dvt ppx

## 2019-09-06 NOTE — PROGRESS NOTE ADULT - ASSESSMENT
This is a 64y Male, here with 1 month of personality change, found the have left frontal solitary heterogenous cystic mass with edema, also hepatic lesion and acetabular lesion    Concern would be for primary brain tumor, eg GBM vs metastatic lesion      9/5 - went to OR for resection/biopsy.  Post op not following commands.  CT head found hematoma.  back to OR for hemostasis.  post op with GTC sz.   given ativan.  now intubated sedated      Plan:  - keppra and vimpat on board  - taper down sedation as tolerated  - if not responding would check VEEG.  However received high dose ativan so may take more time to arouse  - steroids per NS  - follow up biopsy results  - appreciate NSCU care  - will follow    pt potentially critically ill and unstable.  total time 35 min reviewing chart, imaging, pt examination

## 2019-09-06 NOTE — DIETITIAN INITIAL EVALUATION ADULT. - FEEDING SKILL
Independent at baseline; currently requires total assistance. minimal assistance/Independent at baseline; currently requires total assistance.

## 2019-09-06 NOTE — OCCUPATIONAL THERAPY INITIAL EVALUATION ADULT - DIAGNOSIS, OT EVAL
Patient with deficits in ADL status and functional mobility due to impaired balance, strength, ROM, coordination, and cognition

## 2019-09-06 NOTE — PROGRESS NOTE ADULT - SUBJECTIVE AND OBJECTIVE BOX
CHIEF COMPLAINT:Patient is a 64y old  Male who presents with a chief complaint of Behaviour change (04 Sep 2019 10:00)    	        PAST MEDICAL & SURGICAL HISTORY:  No pertinent past medical history  No significant past surgical history          REVIEW OF SYSTEMS:  on vent  sedated  not following commands as per neurosx team      Medications:  MEDICATIONS  (STANDING):  chlorhexidine 0.12% Liquid 15 milliLiter(s) Oral Mucosa every 12 hours  chlorhexidine 4% Liquid 1 Application(s) Topical <User Schedule>  dexamethasone  Injectable 4 milliGRAM(s) IV Push every 6 hours  docusate sodium 100 milliGRAM(s) Oral three times a day  lacosamide IVPB 100 milliGRAM(s) IV Intermittent every 12 hours  levETIRAcetam  IVPB 1000 milliGRAM(s) IV Intermittent every 12 hours  pantoprazole  Injectable 40 milliGRAM(s) IV Push daily  propofol Infusion 30 MICROgram(s)/kG/Min (9.396 mL/Hr) IV Continuous <Continuous>  sodium chloride 0.9% with potassium chloride 20 mEq/L 1000 milliLiter(s) (75 mL/Hr) IV Continuous <Continuous>    MEDICATIONS  (PRN):  acetaminophen   Tablet .. 650 milliGRAM(s) Oral every 6 hours PRN Temp greater or equal to 38C (100.4F), Mild Pain (1 - 3)  fentaNYL    Injectable 25 MICROGram(s) IV Push every 1 hour PRN comfort    	    PHYSICAL EXAM:  T(C): 36.1 (09-06-19 @ 07:00), Max: 36.6 (09-05-19 @ 11:17)  HR: 73 (09-06-19 @ 08:30) (47 - 92)  BP: 149/81 (09-06-19 @ 08:15) (108/81 - 149/81)  RR: 20 (09-06-19 @ 08:30) (14 - 21)  SpO2: 100% (09-06-19 @ 08:30) (98% - 100%)  Wt(kg): --  I&O's Summary    05 Sep 2019 07:01  -  06 Sep 2019 07:00  --------------------------------------------------------  IN: 2016.3 mL / OUT: 680 mL / NET: 1336.3 mL        Appearance: Normal	  incision scalp  heart rr  lungs dec bs  abd soft   ext no edema      TELEMETRY: 	    ECG:  	  RADIOLOGY:  OTHER: 	  	  LABS:	 	    CARDIAC MARKERS:                                13.5   16.7  )-----------( 134      ( 05 Sep 2019 19:44 )             40.3     09-06    141  |  110<H>  |  13  ----------------------------<  119<H>  4.0   |  20<L>  |  0.57    Ca    8.8      06 Sep 2019 02:34  Phos  3.8     09-06  Mg     2.0     09-06    TPro  6.0  /  Alb  3.9  /  TBili  0.4  /  DBili  x   /  AST  16  /  ALT  24  /  AlkPhos  56  09-05    proBNP:   Lipid Profile:   HgA1c:   TSH:

## 2019-09-06 NOTE — CHART NOTE - NSCHARTNOTEFT_GEN_A_CORE
Upon Nutritional Assessment by the Registered Dietitian your patient was determined to meet criteria / has evidence of the following diagnosis/diagnoses:          [ ]  Mild Protein Calorie Malnutrition        [ ]  Moderate Protein Calorie Malnutrition        [x] Severe Protein Calorie Malnutrition        [ ] Unspecified Protein Calorie Malnutrition        [ ] Underweight / BMI <19        [ ] Morbid Obesity / BMI > 40      Findings as based on:  [x] Comprehensive nutrition assessment diet recall ("very poor PO intake x 2 weeks PTA") with increased energy needs s/p crani for tumor resection, ? mets adenocarcin.   [x] Nutrition Focused Physical Exam: visual assessment: moderate body fat depletion to orbitals; moderate muscle mass depletion to temples, leg/thigh, bony prominences to knee.  [ ] Other:       Nutrition Plan/Recommendations:    1) If EN initiated, recommend: Jevity 1.2 at goal rate 80ml/hr x 18 hrs. To provide (at goal): 1440ml, 1728kcal and 80g protein. Based on dosing wt 55.9kg, provides: 31kcal/kg and 1.4g protein/kg.  2) Defer initiation of EN vs PO to medical team. If PO diet initiated, consider no therapeutic restrictions; defer consistency/texture to medical team, SLP.   3) If EN initiated, monitor GI tolerance/propofol infusion. RD to remain available to adjust EN formulary, volume/rate PRN.   4) Monitor wt trends, skin integrity, bowel regularity, hydration status, nutrition related labs.        PROVIDER Section:     By signing this assessment you are acknowledging and agree with the diagnosis/diagnoses assigned by the Registered Dietitian    Comments:

## 2019-09-06 NOTE — DIETITIAN INITIAL EVALUATION ADULT. - SIGNS/SYMPTOMS
diet recall ("very poor PO intake x 2 weeks PTA"), s/p crani for tumor resection, ? mets adenocarcin very poor PO intake x 2 weeks PTA, s/p crani for tumor resection, ? mets adenocarcin, mod depletions

## 2019-09-06 NOTE — DIETITIAN INITIAL EVALUATION ADULT. - OTHER INFO
Pt is a 63 yo F with no PMH. Presented with staring episodes and "delayed responsiveness" x 2 weeks. Long time smoker, recently quit after fathers death 2 weeks ago. Pt noted with cystic mass within L frontal lobe with edema and midline shift on CT of head. Pt now s/p craniotomy for tumor resection. Noted with prelim metastatic adenocarcinomas. Pt is a 63 yo F with no PMH. Presented with staring episodes and "delayed responsiveness" x 2 weeks. Long time smoker, recently quit after fathers death 2 weeks ago. Pt noted with cystic mass within L frontal lobe with edema and midline shift on CT of head. Pt now s/p craniotomy for tumor resection. Noted with prelim metastatic adenocarcinomas. Post-op, pt not following commands, went for CT which indicated post op heme. Returned to OR with good hemostasis. Pt currently intubated, sedated. Noted with GTC seizure.     INFORMATION PTA    ·Diet PTA: Per discussion with daughter at bedside, reports pt with very poor PO intake x 2 weeks PTA. At baseline, fair appetite. As daughter appeared emotionally distraught over current medical condition of father, RD did not probe for further details on food preferences or additional details on diet. To note, pt noted with death of father x 2 weeks PTA.     ·Nutrition Supplements PTA: Per daughter, denies prior hx of vitamin/supplement intake PTA.     ·Food Allergies: No known food allergies noted.     ·Weight History PTA: Per daughter, UBW: 120-125 lbs. States pt "may have lost a couple of pounds" within last 2 weeks in context of decreased PO intake. Dosing wt (9/1): 123.4 lbs. Appears consistent with reported UBW. Per daughter, pt has "always been tall and thin".     ·Other Subjective Information: Daughter denies prior hx of N/V/C/diarrhea. Denies prior hx of intolerance to chewing/swallowing.     INFORMATION THIS ADMISSION    ·Last BM: (9/4): x 1    ·Other Subjective Information: Pt currently NPO. Off propofol at this time.

## 2019-09-06 NOTE — PROGRESS NOTE ADULT - ASSESSMENT
65yo male presenting to ED for episodes of AMS.  No fever, no leukocytosis  CT's with liver and acetabular lesion; CTH with cystic brain lesions with midline shift  MR confirms suspicion for glioblastoma vs met  AMS likely 2/2 underlying malignancy  S/p craniotomy to tumor 9/5  ? seizure episode  Still appears unlikely infection  Overall, Brain mass, AMS, suspected malignancy  - Continue off abx  - F/U quant gold  - Wound care and seizure care per neurosurgery team  - Trend WBC    Mikie Dhillon MD  Pager [phone]  After 5pm and on weekends call [phone]

## 2019-09-07 LAB
ANION GAP SERPL CALC-SCNC: 8 MMOL/L — SIGNIFICANT CHANGE UP (ref 5–17)
BUN SERPL-MCNC: 20 MG/DL — SIGNIFICANT CHANGE UP (ref 7–23)
CALCIUM SERPL-MCNC: 9.3 MG/DL — SIGNIFICANT CHANGE UP (ref 8.4–10.5)
CHLORIDE SERPL-SCNC: 105 MMOL/L — SIGNIFICANT CHANGE UP (ref 96–108)
CO2 SERPL-SCNC: 26 MMOL/L — SIGNIFICANT CHANGE UP (ref 22–31)
CREAT SERPL-MCNC: 0.47 MG/DL — LOW (ref 0.5–1.3)
GLUCOSE SERPL-MCNC: 134 MG/DL — HIGH (ref 70–99)
HCT VFR BLD CALC: 39.8 % — SIGNIFICANT CHANGE UP (ref 39–50)
HGB BLD-MCNC: 12.9 G/DL — LOW (ref 13–17)
MAGNESIUM SERPL-MCNC: 2.1 MG/DL — SIGNIFICANT CHANGE UP (ref 1.6–2.6)
MCHC RBC-ENTMCNC: 31.8 PG — SIGNIFICANT CHANGE UP (ref 27–34)
MCHC RBC-ENTMCNC: 32.5 GM/DL — SIGNIFICANT CHANGE UP (ref 32–36)
MCV RBC AUTO: 97.9 FL — SIGNIFICANT CHANGE UP (ref 80–100)
PHOSPHATE SERPL-MCNC: 2.6 MG/DL — SIGNIFICANT CHANGE UP (ref 2.5–4.5)
PLATELET # BLD AUTO: 107 K/UL — LOW (ref 150–400)
POTASSIUM SERPL-MCNC: 4.4 MMOL/L — SIGNIFICANT CHANGE UP (ref 3.5–5.3)
POTASSIUM SERPL-SCNC: 4.4 MMOL/L — SIGNIFICANT CHANGE UP (ref 3.5–5.3)
RBC # BLD: 4.07 M/UL — LOW (ref 4.2–5.8)
RBC # FLD: 11.6 % — SIGNIFICANT CHANGE UP (ref 10.3–14.5)
SODIUM SERPL-SCNC: 139 MMOL/L — SIGNIFICANT CHANGE UP (ref 135–145)
WBC # BLD: 14.2 K/UL — HIGH (ref 3.8–10.5)
WBC # FLD AUTO: 14.2 K/UL — HIGH (ref 3.8–10.5)

## 2019-09-07 PROCEDURE — 70553 MRI BRAIN STEM W/O & W/DYE: CPT | Mod: 26

## 2019-09-07 PROCEDURE — 99292 CRITICAL CARE ADDL 30 MIN: CPT

## 2019-09-07 PROCEDURE — 99291 CRITICAL CARE FIRST HOUR: CPT

## 2019-09-07 PROCEDURE — 71045 X-RAY EXAM CHEST 1 VIEW: CPT | Mod: 26

## 2019-09-07 PROCEDURE — 95951: CPT | Mod: 26

## 2019-09-07 RX ORDER — POLYETHYLENE GLYCOL 3350 17 G/17G
17 POWDER, FOR SOLUTION ORAL
Refills: 0 | Status: DISCONTINUED | OUTPATIENT
Start: 2019-09-07 | End: 2019-09-16

## 2019-09-07 RX ORDER — ACETAMINOPHEN 500 MG
650 TABLET ORAL EVERY 6 HOURS
Refills: 0 | Status: DISCONTINUED | OUTPATIENT
Start: 2019-09-07 | End: 2019-09-16

## 2019-09-07 RX ORDER — SENNA PLUS 8.6 MG/1
20 TABLET ORAL AT BEDTIME
Refills: 0 | Status: DISCONTINUED | OUTPATIENT
Start: 2019-09-07 | End: 2019-09-14

## 2019-09-07 RX ORDER — HYDRALAZINE HCL 50 MG
10 TABLET ORAL ONCE
Refills: 0 | Status: COMPLETED | OUTPATIENT
Start: 2019-09-07 | End: 2019-09-07

## 2019-09-07 RX ADMIN — FENTANYL CITRATE 25 MICROGRAM(S): 50 INJECTION INTRAVENOUS at 20:05

## 2019-09-07 RX ADMIN — FENTANYL CITRATE 25 MICROGRAM(S): 50 INJECTION INTRAVENOUS at 16:01

## 2019-09-07 RX ADMIN — Medication 650 MILLIGRAM(S): at 13:37

## 2019-09-07 RX ADMIN — LACOSAMIDE 120 MILLIGRAM(S): 50 TABLET ORAL at 05:59

## 2019-09-07 RX ADMIN — Medication 4 MILLIGRAM(S): at 11:02

## 2019-09-07 RX ADMIN — FENTANYL CITRATE 25 MICROGRAM(S): 50 INJECTION INTRAVENOUS at 05:45

## 2019-09-07 RX ADMIN — DEXTROSE MONOHYDRATE, SODIUM CHLORIDE, AND POTASSIUM CHLORIDE 75 MILLILITER(S): 50; .745; 4.5 INJECTION, SOLUTION INTRAVENOUS at 11:03

## 2019-09-07 RX ADMIN — SENNA PLUS 20 MILLILITER(S): 8.6 TABLET ORAL at 23:39

## 2019-09-07 RX ADMIN — FENTANYL CITRATE 25 MICROGRAM(S): 50 INJECTION INTRAVENOUS at 17:45

## 2019-09-07 RX ADMIN — CHLORHEXIDINE GLUCONATE 15 MILLILITER(S): 213 SOLUTION TOPICAL at 17:33

## 2019-09-07 RX ADMIN — LEVETIRACETAM 400 MILLIGRAM(S): 250 TABLET, FILM COATED ORAL at 17:34

## 2019-09-07 RX ADMIN — Medication 100 MILLIGRAM(S): at 05:07

## 2019-09-07 RX ADMIN — Medication 10 MILLIGRAM(S): at 00:24

## 2019-09-07 RX ADMIN — CHLORHEXIDINE GLUCONATE 15 MILLILITER(S): 213 SOLUTION TOPICAL at 05:07

## 2019-09-07 RX ADMIN — CHLORHEXIDINE GLUCONATE 1 APPLICATION(S): 213 SOLUTION TOPICAL at 23:39

## 2019-09-07 RX ADMIN — DEXTROSE MONOHYDRATE, SODIUM CHLORIDE, AND POTASSIUM CHLORIDE 75 MILLILITER(S): 50; .745; 4.5 INJECTION, SOLUTION INTRAVENOUS at 05:59

## 2019-09-07 RX ADMIN — Medication 650 MILLIGRAM(S): at 13:07

## 2019-09-07 RX ADMIN — PANTOPRAZOLE SODIUM 40 MILLIGRAM(S): 20 TABLET, DELAYED RELEASE ORAL at 11:02

## 2019-09-07 RX ADMIN — LEVETIRACETAM 400 MILLIGRAM(S): 250 TABLET, FILM COATED ORAL at 05:07

## 2019-09-07 RX ADMIN — FENTANYL CITRATE 25 MICROGRAM(S): 50 INJECTION INTRAVENOUS at 06:00

## 2019-09-07 RX ADMIN — Medication 4 MILLIGRAM(S): at 23:38

## 2019-09-07 RX ADMIN — FENTANYL CITRATE 25 MICROGRAM(S): 50 INJECTION INTRAVENOUS at 22:20

## 2019-09-07 RX ADMIN — Medication 4 MILLIGRAM(S): at 17:33

## 2019-09-07 RX ADMIN — FENTANYL CITRATE 25 MICROGRAM(S): 50 INJECTION INTRAVENOUS at 16:16

## 2019-09-07 RX ADMIN — Medication 100 MILLIGRAM(S): at 17:34

## 2019-09-07 RX ADMIN — Medication 4 MILLIGRAM(S): at 05:07

## 2019-09-07 RX ADMIN — POLYETHYLENE GLYCOL 3350 17 GRAM(S): 17 POWDER, FOR SOLUTION ORAL at 17:34

## 2019-09-07 RX ADMIN — FENTANYL CITRATE 25 MICROGRAM(S): 50 INJECTION INTRAVENOUS at 14:15

## 2019-09-07 RX ADMIN — FENTANYL CITRATE 25 MICROGRAM(S): 50 INJECTION INTRAVENOUS at 17:30

## 2019-09-07 RX ADMIN — FENTANYL CITRATE 25 MICROGRAM(S): 50 INJECTION INTRAVENOUS at 14:00

## 2019-09-07 NOTE — PROGRESS NOTE ADULT - ASSESSMENT
This is a 64y Male, here with 1 month of personality change, found the have left frontal solitary heterogenous cystic mass with edema, also hepatic lesion and acetabular lesion    Concern would be for primary brain tumor, eg GBM vs metastatic lesion      9/5 - went to OR for resection/biopsy.  Post op not following commands.  CT head found hematoma.  back to OR for hemostasis.  post op with GTC sz.   given ativan.  now intubated sedated      Plan:  - keppra and vimpat on board  - taper down sedation as tolerated  - veeg noted above  - steroids per NS  - follow up biopsy results  - plan is now off sedation to see if awaken in order to extubate  - will follow    pt potentially life threatening condition, pt has not shown neurological improvement despite being off sedation in last 24 hours.  total time 35 min reviewing chart, imaging, pt examination d/w pt family and RN

## 2019-09-07 NOTE — PROGRESS NOTE ADULT - SUBJECTIVE AND OBJECTIVE BOX
CC: pt remains intubated and sedated, daughter at the bedside    TELEMETRY: nsr    PHYSICAL EXAM:    T(C): 37.4 (09-07-19 @ 07:00), Max: 37.8 (09-06-19 @ 15:00)  HR: 76 (09-07-19 @ 10:00) (55 - 120)  BP: 123/74 (09-07-19 @ 10:00) (122/77 - 166/95)  RR: 16 (09-07-19 @ 10:00) (13 - 26)  SpO2: 97% (09-07-19 @ 10:00) (93% - 100%)  Wt(kg): --  I&O's Summary    06 Sep 2019 07:01  -  07 Sep 2019 07:00  --------------------------------------------------------  IN: 2575.7 mL / OUT: 3466 mL / NET: -890.3 mL    07 Sep 2019 07:01  -  07 Sep 2019 10:34  --------------------------------------------------------  IN: 433 mL / OUT: 0 mL / NET: 433 mL        Appearance: intubated, sedated  Cardiovascular: Normal S1 S2,RRR, No JVD, No murmurs  Respiratory: Lungs clear to auscultation	  Gastrointestinal:  Soft, Non-tender, + BS	  Extremities: Normal range of motion, No clubbing, cyanosis or edema  Vascular: Peripheral pulses palpable 2+ bilaterally     LABS:	 	                          13.8   14.0  )-----------( 109      ( 06 Sep 2019 21:34 )             40.9     09-06    140  |  105  |  13  ----------------------------<  128<H>  4.1   |  25  |  0.48<L>    Ca    9.3      06 Sep 2019 21:34  Phos  2.7     09-06  Mg     2.0     09-06        PT/INR - ( 05 Sep 2019 19:26 )   PT: 12.1 sec;   INR: 1.06 ratio         PTT - ( 05 Sep 2019 19:26 )  PTT:22.1 sec    CARDIAC MARKERS:      MEDICATIONS  (STANDING):  chlorhexidine 0.12% Liquid 15 milliLiter(s) Oral Mucosa every 12 hours  chlorhexidine 4% Liquid 1 Application(s) Topical <User Schedule>  dexamethasone  Injectable 4 milliGRAM(s) IV Push every 6 hours  docusate sodium Liquid 100 milliGRAM(s) Oral two times a day  lacosamide IVPB 100 milliGRAM(s) IV Intermittent every 12 hours  levETIRAcetam  IVPB 1000 milliGRAM(s) IV Intermittent every 12 hours  pantoprazole  Injectable 40 milliGRAM(s) IV Push daily  propofol Infusion 30 MICROgram(s)/kG/Min (9.396 mL/Hr) IV Continuous <Continuous>  sodium chloride 0.9% with potassium chloride 20 mEq/L 1000 milliLiter(s) (75 mL/Hr) IV Continuous <Continuous>

## 2019-09-07 NOTE — EEG REPORT - NS EEG TEXT BOX
[facility]   COMPREHENSIVE EPILEPSY CENTER   REPORT OF CONTINUOUS VIDEO EEG     [facility]: [address] [place] [address] [address], [place] [address], Ph#: [phone]  [facility]: [id number], [place] [address], Ph#: [phone]  [facility]: [address] [place] [address]    Patient Name: MARCOS KOO  Age and : 64y ([birth year])  MRN #: [id number]  Location: [place] [address]  Referring Physician: Scott Wilburn    Start Time/Date:  on   End Time/Data: 08:00 on      _____________________________________________________________  TECHNICAL INFORMATION    Placement and Labeling of Electrodes:  The EEG was performed utilizing 20 channels referential EEG connections (coronal over temporal over parasagittal montage) using all standard 10-20 electrode placements with EKG.  Recording was at a sampling rate of 256 samples per second per channel.  Time synchronized digital video recording was done simultaneously with EEG recording.  A low light infrared camera was used for low light recording.  Dmitriy and seizure detection algorithms were utilized.    _____________________________________________________________  HISTORY    Patient is a 64y old  Male who presents with a chief complaint of AMS (06 Sep 2019 09:35)      PERTINENT MEDICATION:  lacosamide IVPB 100 milliGRAM(s) IV Intermittent every 12 hours  levETIRAcetam  IVPB 1000 milliGRAM(s) IV Intermittent every 12 hours  propofol Infusion 30 MICROgram(s)/kG/Min (9.396 mL/Hr) IV Continuous <Continuous>    _____________________________________________________________  STUDY INTERPRETATION    Findings: The background was continuous, spontaneously variable and reactive. No posterior dominant rhythm seen.    Background Slowing:  Diffuse theta and polymorphic delta slowing.    Focal Slowing:   Continuous polymorphic delta slowing in the left hemisphere.     Sleep Background:  Stage II sleep transients were not recorded.    Other Non-Epileptiform Findings:  Breach effect in left frontocentral region characterized by higher amplitude, sharply contoured waves and fast activities.    Interictal Epileptiform Activity:   Near continuous, poorly organized left central (C3/Cz) lateralized periodic discharges with rhythmicity (LPD+R) occurring approximately every 2-4s. With stimulation, however, LPD+R transitioned to long runs at 1-1.5 Hz that were much better organized.    Events:  Clinical events: None recorded.  Seizures: None recorded.    Activation Procedures:   Hyperventilation was not performed.    Photic stimulation was not performed.     Artifacts:  Intermittent myogenic and movement artifacts were noted.    ECG:  The heart rate on single channel ECG was predominantly between 60-70 BPM.    _____________________________________________________________  EEG SUMMARY/CLASSIFICATION    Abnormal EEG in a sedated patient.  - Near continuous, poorly organized left central (C3/Cz) lateralized periodic discharges with rhythmicity (LPD+R) occurring approximately every 2-4s. With stimulation, however, LPD+R transitioned to long runs at 1-1.5 Hz that were much better organized.  - Continuous polymorphic delta slowing in the left hemisphere.   - Moderate to severe generalized slowing.  - Breach effect in left frontocentral region characterized by higher amplitude, sharply contoured waves and fast activities.    _____________________________________________________________  EEG IMPRESSION/CLINICAL CORRELATE    Abnormal EEG study.  1. Potential epileptogenic focus in the left central region.  2. Structural abnormality in the left hemisphere.  3. Moderate to severe nonspecific diffuse or multifocal cerebral dysfunction.   4. No seizure seen.  5. Skull defect in the left frontocentral region.    _____________________________________________________________    Lindsay Potts MD  Attending Physician, [facility]   COMPREHENSIVE EPILEPSY CENTER   REPORT OF CONTINUOUS VIDEO EEG     [facility]: [address] [place] [address] [address], [place] [address], Ph#: [phone]  [facility]: [id number], [place] [address], Ph#: [phone]  [facility]: [address] [place] [address]    Patient Name: MARCOS KOO  Age and : 64y (53)  MRN #: [id number]  Location: [place] [address]  Referring Physician: Scott Wilburn    Start Time/Date: 16:55 on 19  End Time/Data: 08:00 on 19     _____________________________________________________________  TECHNICAL INFORMATION    Placement and Labeling of Electrodes:  The EEG was performed utilizing 20 channels referential EEG connections (coronal over temporal over parasagittal montage) using all standard 10-20 electrode placements with EKG.  Recording was at a sampling rate of 256 samples per second per channel.  Time synchronized digital video recording was done simultaneously with EEG recording.  A low light infrared camera was used for low light recording.  Dmitriy and seizure detection algorithms were utilized.    _____________________________________________________________  HISTORY    Patient is a 64y old  Male who presents with a chief complaint of AMS (06 Sep 2019 09:35)      PERTINENT MEDICATION:  lacosamide IVPB 100 milliGRAM(s) IV Intermittent every 12 hours  levETIRAcetam  IVPB 1000 milliGRAM(s) IV Intermittent every 12 hours  propofol Infusion 30 MICROgram(s)/kG/Min (9.396 mL/Hr) IV Continuous <Continuous>    _____________________________________________________________  STUDY INTERPRETATION    Findings: The background was continuous, spontaneously variable and reactive. No posterior dominant rhythm seen.    Background Slowing:  Diffuse theta and polymorphic delta slowing.    Focal Slowing:   Continuous polymorphic delta slowing in the left hemisphere.     Sleep Background:  Stage II sleep transients were not recorded.    Other Non-Epileptiform Findings:  Breach effect in left frontocentral region characterized by higher amplitude, sharply contoured waves and fast activities.    Interictal Epileptiform Activity:   Near continuous, poorly organized left central (C3/Cz) lateralized periodic discharges with rhythmicity (LPD+R) occurring approximately every 2-4s. With stimulation, however, LPD+R transitioned to long runs at 1-1.5 Hz that were much better organized.    Events:  Clinical events: None recorded.  Seizures: None recorded.    Activation Procedures:   Hyperventilation was not performed.    Photic stimulation was not performed.     Artifacts:  Intermittent myogenic and movement artifacts were noted.    ECG:  The heart rate on single channel ECG was predominantly between 60-70 BPM.    _____________________________________________________________  EEG SUMMARY/CLASSIFICATION    Abnormal EEG in a sedated patient.  - Near continuous, poorly organized left central (C3/Cz) lateralized periodic discharges with rhythmicity (LPD+R) occurring approximately every 2-4s. With stimulation, however, LPD+R transitioned to long runs at 1-1.5 Hz that were much better organized.  - Continuous polymorphic delta slowing in the left hemisphere.   - Moderate to severe generalized slowing.  - Breach effect in left frontocentral region characterized by higher amplitude, sharply contoured waves and fast activities.    _____________________________________________________________  EEG IMPRESSION/CLINICAL CORRELATE    Abnormal EEG study.  1. Potential epileptogenic focus in the left central region.  2. Structural abnormality in the left hemisphere.  3. Moderate to severe nonspecific diffuse or multifocal cerebral dysfunction.   4. No seizure seen.  5. Skull defect in the left frontocentral region.    _____________________________________________________________    Lindsay Potts MD  Attending Physician, [facility]

## 2019-09-07 NOTE — PROGRESS NOTE ADULT - ASSESSMENT
POD#2 L crani tumor resection, frozen mets  post op heme    MRI pending  continue keppra  continue decadron  Will keep drain one more day

## 2019-09-07 NOTE — PROGRESS NOTE ADULT - SUBJECTIVE AND OBJECTIVE BOX
SUBJECTIVE:     pt went to OR.  Post op not following commands.  CT found hematoma.  back to OR for hemostasis.  post op with GTC sz.   given ativan.  now intubated   off sedation, video eeg performed    Medications:  acetaminophen   Tablet .. 650 milliGRAM(s) Oral every 6 hours PRN  chlorhexidine 0.12% Liquid 15 milliLiter(s) Oral Mucosa every 12 hours  chlorhexidine 4% Liquid 1 Application(s) Topical <User Schedule>  dexamethasone  Injectable 4 milliGRAM(s) IV Push every 6 hours  docusate sodium Liquid 100 milliGRAM(s) Oral two times a day  fentaNYL    Injectable 25 MICROGram(s) IV Push every 1 hour PRN  levETIRAcetam  IVPB 1000 milliGRAM(s) IV Intermittent every 12 hours  pantoprazole  Injectable 40 milliGRAM(s) IV Push daily  polyethylene glycol 3350 17 Gram(s) Oral two times a day  senna Syrup 20 milliLiter(s) Oral at bedtime  sodium chloride 0.9% with potassium chloride 20 mEq/L 1000 milliLiter(s) IV Continuous <Continuous>      Labs:  CBC Full  -  ( 06 Sep 2019 21:34 )  WBC Count : 14.0 K/uL  RBC Count : 4.26 M/uL  Hemoglobin : 13.8 g/dL  Hematocrit : 40.9 %  Platelet Count - Automated : 109 K/uL  Mean Cell Volume : 95.9 fl  Mean Cell Hemoglobin : 32.3 pg  Mean Cell Hemoglobin Concentration : 33.7 gm/dL  Auto Neutrophil # : x  Auto Lymphocyte # : x  Auto Monocyte # : x  Auto Eosinophil # : x  Auto Basophil # : x  Auto Neutrophil % : x  Auto Lymphocyte % : x  Auto Monocyte % : x  Auto Eosinophil % : x  Auto Basophil % : x    09-06    140  |  105  |  13  ----------------------------<  128<H>  4.1   |  25  |  0.48<L>    Ca    9.3      06 Sep 2019 21:34  Phos  2.7     09-06  Mg     2.0     09-06      CAPILLARY BLOOD GLUCOSE          PT/INR - ( 05 Sep 2019 19:26 )   PT: 12.1 sec;   INR: 1.06 ratio         PTT - ( 05 Sep 2019 19:26 )  PTT:22.1 sec        Vitals:  Vital Signs Last 24 Hrs  T(C): 37.9 (07 Sep 2019 11:00), Max: 37.9 (07 Sep 2019 11:00)  T(F): 100.2 (07 Sep 2019 11:00), Max: 100.2 (07 Sep 2019 11:00)  HR: 77 (07 Sep 2019 11:54) (55 - 120)  BP: 130/73 (07 Sep 2019 11:00) (122/77 - 166/95)  BP(mean): 89 (07 Sep 2019 11:00) (86 - 120)  RR: 22 (07 Sep 2019 11:00) (13 - 26)  SpO2: 98% (07 Sep 2019 11:54) (93% - 100%)      NEUROLOGICAL EXAM:    Neurologic Exam:    Mental Status: Eyes closed, follows no commands, no attempt at communication. Does not attend to the examiner. Does not arouse to verbal, tactile or noxious stimuli. Intubated, mechanically ventilated    Cranial Nerves: Pupils reactive bilaterally, gaze straigtht. No blink to threat bilaterally. No gross facial asymmetry.      Motor: Bulk was normal. Tone was low. No spontaneous or purposeful movements x 4 extremities.     Reflexes: Absent upper extremities. Absent lower extremities. Toes wereupgoing bilateral.     Sensory: No response to tactile or noxious stimuli.     Coord: Unable to assess    Gait: Unable to assess    Imaging:      EXAM:  CT ANGIO BRAIN (W)AW IC                          EXAM:  CT ANGIO NECK (W)AW IC                          EXAM:  CT BRAIN                            PROCEDURE DATE:  09/01/2019            INTERPRETATION:  CLINICAL INFORMATION: Intermittent altered mental   status. Imbalance.     TECHNIQUE: Noncontrast axial CT images were acquired through the head.   Contrast enhanced axial CT images were acquired from the aortic arch to   the vertex of the calvarium, during the angiographic phase. Additional   post-contrast axial CT images through the head were obtained.   Three-dimensional maximum intensity projection reformats were generated   from the angiographic images.  70 cc of Omnipaque-300 mg/ml were   administered intravenously, without immediate complication.    COMPARISON: None.    FINDINGS:     CT BRAIN:    A 3.4 x 3.3 cm cystic mass within the left frontal lobe with large area   of surrounding edema and causing effacement of the bilateral lateral   ventricles and rightward midline shift. There appears to be a solid   component of the lesion anteriorly. There is effacement of the   interpeduncular cistern. No hydrocephalus.    No areas of abnormal enhancement are identified.    The visualized paranasal sinuses are clear. The mastoid air cells and   middle ear cavities are clear.    The soft tissues of the scalp are unremarkable. The calvarium is intact.    CT ANGIOGRAPHY NECK:    Three-vessel aortic arch. Atherosclerotic calcifications of the aorta   without evidence of dissection or hemodynamically significant stenosis.   The origins of the great vessels are unremarkable.     The common carotid arteries are normal in caliber without significant   stenosis.     The carotid bifurcations are unremarkable. The internal carotid arteries   are normal in caliber without significant stenosis.     Co-dominant vertebral arteries. The vertebral arteries are normal in   caliber without significant stenosis.     The visualized neck and upper chest are unremarkable.    Visualized osseous structures are unremarkable.    CT ANGIOGRAPHY BRAIN:    There is no evidence for significant stenosis, major vessel occlusion, or   aneurysm about the [place].    There is no evidence for significant stenosis, major vessel occlusion, or   aneurysm involving the vertebrobasilar system.    No enlarged vascular lesions or clusters of abnormal vessels are noted to   suggest an arterial venous malformation.    Visualized portions of the superficial and deep venous systems are   unremarkable.      IMPRESSION:     CT brain: Predominantly cystic mass within the left frontal lobe with   large area of surrounding edema causing leftward midline shift. There   appears to be a hyperdense solid component of the mass anteriorly which   showsvascularity. There is effacement of the interpeduncular cistern. No   hydrocephalus.    CT angiography neck: No evidence of hemodynamically significant stenosis   by NASCET criteria. No evidence of vascular dissection.    CT angiography brain: No major vessel occlusion or proximal stenosis by   NASCET criteria. No evidence of aneurysm or other vascular malformation.                MARILYN WALKER M.D., RADIOLOGIST RESIDENT  This document has been electronically signed.  ISRAEL DORANTES M.D., ATTENDING RADIOLOGIST  This document has been electronically signed. Sep  1 2019  2:03PM      < from: MR Head w/wo IV Cont (09.02.19 @ 13:15) >  EXAM:  MR BRAIN WAW IC                            PROCEDURE DATE:  09/02/2019            INTERPRETATION:  INDICATIONS:  preop    TECHNIQUE:  Multiplanar imaging was performed using T1 weighted, T2   weighted and FLAIR sequences.  Diffusion weightedand SWI images were   also obtained.  Following intravenous gadolinium, multiplanar T1 weighted   images were performed. 6.5 cc Gadavist were administered. 1 cc were   discarded.      COMPARISON EXAMINATION: Study is read and compared with CT CTA 9/1/2019    FINDINGS:    VENTRICLES AND SULCI: Again appreciated is the mass effect on the left   frontal horn of the lateral ventricle as well as the third ventricle.  INTRA-AXIAL:  Evidence of the left frontal enhancing mass lesion that is   mixed signal with evidence of fluid and solid components. In addition   evidence of foci of susceptibility within the lesion which may represent   component of hemorrhage versus mineralization. Surrounding vasogenic   edema is appreciated. Evidence of a left frontal lesion measuring 7 cm AP   x 4 cm transverse by x 4.7 cm craniocaudal dimension. Periphery of the   lesion has a diffusion diffusion positivity with restricted diffusion on   the ADC map.  EXTRA-AXIAL:  No mass or collection.  VISUALIZED SINUSES:  Normal.  VISUALIZED MASTOIDS:  Clear.  CALVARIUM:  Normal.  CAROTID FLOW VOIDS:  Present.  MISCELLANEOUS:  None.    IMPRESSION: Left frontal heterogeneous lesion with cystic and solid   components in surrounding vasogenic edema. Lesion is solitary.There is   diffusion positivity around the periphery of the lesion. There is some   susceptibility seen in association. Findings suggestive of a glioblastoma   versus a metastasis. No diffusion restriction within the cystic portion   of the lesion tosuggest an abscess.                    MICHELE CHO M.D., ATTENDING RADIOLOGIST  This document has been electronically signed. Sep  3 2019  8:51AM                  _____________________________________________________________  EEG IMPRESSION/CLINICAL CORRELATE    Abnormal EEG study.  1. Potential epileptogenic focus in the left central region.  2. Structural abnormality in the left hemisphere.  3. Moderate to severe nonspecific diffuse or multifocal cerebral dysfunction.   4. No seizure seen.  5. Skull defect in the left frontocentral region.    _____________________________________________________________    Lindsay Potts MD  Attending Physician, [facility] Epilepsy [place]

## 2019-09-07 NOTE — PROGRESS NOTE ADULT - ASSESSMENT
64 year old male former smoker with no significant PMHx presents with AMS, brain imaging with mass.     1. Brain mass, suspected malignancy, ? mets   CT head: 3.4 x 3.3 cm cystic mass w/in L frontal lobe w/ edema and midline shift.  MRI wwo brain: L cystic enhancing lesion causing significant edema  CT chest/abd noted, ? mets   hem/onc, neurosurg, neuro f/u   9/5 - s/p OR for resection/biopsy.   w/ prelim adenoca; post op hemmorhage w/ return to or  post op seizure; keppra, vimpat, steroids as per neurosx, NISCU  bradycardia noted postop, now resolved  care per NISCU     dvt ppx     d/w dauughter at the bedside

## 2019-09-07 NOTE — PROGRESS NOTE ADULT - ASSESSMENT
63 y/o male  w/ no PMH pw staring episodes and “delayed responsiveness” for 2 weeks. . Long time smoker recently quit after fathers death 2 weeks ago. w/  3.4 x 3.3 cm cystic mass w/in L frontal lobe w/ edema and midline shift on ct head  s/p craniotomy   w/ prelim adenoca  post op hemmorhage w/ return to or  post op seizure  steroids as per neurosx   neurochecks  -Keppra a s per nscu  off sedatives now  prognosis guarded   discussed w/ daughter

## 2019-09-07 NOTE — PROGRESS NOTE ADULT - SUBJECTIVE AND OBJECTIVE BOX
Patient seen and examined at bedside.    --Anticoagulation--    T(C): 37.5 (09-07-19 @ 19:00), Max: 38 (09-07-19 @ 13:07)  HR: 70 (09-07-19 @ 19:00) (57 - 120)  BP: 121/74 (09-07-19 @ 19:00) (118/76 - 166/95)  RR: 16 (09-07-19 @ 19:00) (12 - 24)  SpO2: 98% (09-07-19 @ 19:00) (93% - 100%)  Wt(kg): --    Exam:  intubated, no EO, pupils 3mm R b/l, BUE LC, BLE WD

## 2019-09-07 NOTE — PROGRESS NOTE ADULT - SUBJECTIVE AND OBJECTIVE BOX
65yo male presenting to ED for episodes of AMS.  Daughter reports that pt has episodes of unresponsiveness "staring " for several seconds   delayed responses which began 2 weeks ago. Pt father passed away several weeks ago, went to in [place] for , returned yesterday. + generalized weakness, poor appetite, quit smoking since death of father. Found to have brain mass.  Went for crani for tumor resection - prelim metastatic adenocarcinomas      Post op not following commands, went for CT showed post op heme, return to OR with good hemostasis, another post op CT stable   had GTC seizure post op, got Keppra,  8 mg ativan total and Vimpat     *** HIGH RISK OF DVT PRESENT ON ADMISSION metastatic cancer ***    VITALS:  Recent Vitals Reviewed   LABS:  Recent Labs Reviewed  MEDICATIONS: All Recent Medications Reviewed   IMAGING:   Recent imaging studies were reviewed.    EXAMINATION:  General:  intubated   HEENT:  MMM  Mental status: Intubated, no eye opening.   CN: Pupils 5mm and reactive  Motor: Localizing UEs, withdrawing on LEs. Intermittent rigors/ myoclonus.   Cards:  s1, s2 RRR  Respiratory:  lungs clear b/l   Abdomen:  soft  Extremities:  no edema  Skin:  warm/dry  Eva 63yo male presenting to ED for episodes of AMS.  Daughter reports that pt has episodes of unresponsiveness "staring " for several seconds   delayed responses which began 2 weeks ago. Pt father passed away several weeks ago, went to in [place] for , returned yesterday. + generalized weakness, poor appetite, quit smoking since death of father. Found to have brain mass.  Went for crani for tumor resection - prelim metastatic adenocarcinomas      Post op not following commands, went for CT showed post op heme, return to OR with good hemostasis, another post op CT stable   had GTC seizure post op, got Keppra,  8 mg ativan total and Vimpat     Overnight events: Tachycardic and hypertensive. Given hydralazine and pain meds. Resolved.     *** HIGH RISK OF DVT PRESENT ON ADMISSION metastatic cancer ***    VITALS:  Recent Vitals Reviewed   LABS:  Recent Labs Reviewed  MEDICATIONS: All Recent Medications Reviewed   IMAGING:   Recent imaging studies were reviewed.    EXAMINATION:  General:  intubated   HEENT:  MMM  Mental status: Intubated, no eye opening.   CN: Pupils 3mm and reactive  Motor: Localizing UEs, withdrawing on LEs.   Cards:  s1, s2 RRR  Respiratory:  lungs clear b/l   Abdomen:  soft  Extremities:  no edema  Skin:  warm/dry  Eva 65yo male presenting to ED for episodes of AMS.  Daughter reports that pt has episodes of unresponsiveness "staring " for several seconds   delayed responses which began 2 weeks ago. Pt father passed away several weeks ago, went to in [place] for , returned yesterday. + generalized weakness, poor appetite, quit smoking since death of father. Found to have brain mass.  Went for crani for tumor resection - prelim metastatic adenocarcinomas      Post op not following commands, went for CT showed post op heme, return to OR with good hemostasis, another post op CT stable   had GTC seizure post op, got Keppra,  8 mg ativan total and Vimpat     Overnight events: Tachycardic and hypertensive. Given hydralazine and pain meds. Resolved.     *** HIGH RISK OF DVT PRESENT ON ADMISSION metastatic cancer ***    VITALS:  Recent Vitals Reviewed   LABS:  Recent Labs Reviewed  MEDICATIONS: All Recent Medications Reviewed   IMAGING:   Recent imaging studies were reviewed.    EXAMINATION:  General:  intubated   HEENT:  MMM  Mental status: Intubated, no eye opening.   CN: Pupils 3mm and reactive  Motor: Localizing UEs, withdrawing on LEs.   Cards:  s1, s2 RRR  Respiratory:  lungs clear b/l   Abdomen:  soft  Extremities:  no edema  Skin:  warm/dry  Eva

## 2019-09-07 NOTE — PROGRESS NOTE ADULT - SUBJECTIVE AND OBJECTIVE BOX
CHIEF COMPLAINT:Patient is a 64y old  Male who presents with a chief complaint of AMS (06 Sep 2019 09:35)    	        PAST MEDICAL & SURGICAL HISTORY:  No pertinent past medical history  No significant past surgical history          REVIEW OF SYSTEMS:  not responsive   on vent     Medications:  MEDICATIONS  (STANDING):  chlorhexidine 0.12% Liquid 15 milliLiter(s) Oral Mucosa every 12 hours  chlorhexidine 4% Liquid 1 Application(s) Topical <User Schedule>  dexamethasone  Injectable 4 milliGRAM(s) IV Push every 6 hours  docusate sodium Liquid 100 milliGRAM(s) Oral two times a day  lacosamide IVPB 100 milliGRAM(s) IV Intermittent every 12 hours  levETIRAcetam  IVPB 1000 milliGRAM(s) IV Intermittent every 12 hours  pantoprazole  Injectable 40 milliGRAM(s) IV Push daily  propofol Infusion 30 MICROgram(s)/kG/Min (9.396 mL/Hr) IV Continuous <Continuous>  sodium chloride 0.9% with potassium chloride 20 mEq/L 1000 milliLiter(s) (75 mL/Hr) IV Continuous <Continuous>    MEDICATIONS  (PRN):  acetaminophen   Tablet .. 650 milliGRAM(s) Oral every 6 hours PRN Temp greater or equal to 38C (100.4F), Mild Pain (1 - 3)  fentaNYL    Injectable 25 MICROGram(s) IV Push every 1 hour PRN comfort    	    PHYSICAL EXAM:  T(C): 37.4 (09-07-19 @ 07:00), Max: 37.8 (09-06-19 @ 15:00)  HR: 88 (09-07-19 @ 09:00) (55 - 120)  BP: 123/73 (09-07-19 @ 09:00) (122/77 - 166/95)  RR: 24 (09-07-19 @ 09:00) (13 - 26)  SpO2: 97% (09-07-19 @ 09:00) (93% - 100%)  Wt(kg): --  I&O's Summary    06 Sep 2019 07:01  -  07 Sep 2019 07:00  --------------------------------------------------------  IN: 2575.7 mL / OUT: 3466 mL / NET: -890.3 mL    07 Sep 2019 07:01  -  07 Sep 2019 10:10  --------------------------------------------------------  IN: 250 mL / OUT: 0 mL / NET: 250 mL        HEENT:   Normal oral mucosa,     Cardiovascular: Normal S1 S2, No JVD, No murmurs, No edema  Respiratory:course b/l 	  Gastrointestinal:  Soft,   Skin: No rashes, No ecchymoses, No cyanosis	  Neurologic: Non-focal  Extremities: no edemna  Vascular: Peripheral pulses palpable     TELEMETRY: 	    ECG:  	  RADIOLOGY:  OTHER: 	  	  LABS:	 	    CARDIAC MARKERS:                                13.8   14.0  )-----------( 109      ( 06 Sep 2019 21:34 )             40.9     09-06    140  |  105  |  13  ----------------------------<  128<H>  4.1   |  25  |  0.48<L>    Ca    9.3      06 Sep 2019 21:34  Phos  2.7     09-06  Mg     2.0     09-06      proBNP:   Lipid Profile:   HgA1c:   TSH:

## 2019-09-07 NOTE — PROGRESS NOTE ADULT - ASSESSMENT
POD#2 L crani tumor resection, frozen mets  post op heme    MRI pending  cont keppra  cont decadron for cerebral edema  surgical drain per neurosurgery  -150  intubated, pressure support as tolerated  VAP bundle  tube feeding  start chemoppx if MRI heme stable

## 2019-09-07 NOTE — PROGRESS NOTE ADULT - ASSESSMENT
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 seizure post op     NEURO:    Neurochecks q1 hr  CT head this am- stable, mild improvement  Decadron Taper per neurosurgery  Seizures vs myoclonus- s/p Ativan 8mg, Keppra 1 g BID, loaded with Vimpat  MRI on 9/7  f/u final path  VEEG today. Pending  Fentanyl for pain control  Activity: [] mobilize as tolerated [x] Bedrest [] PT [] OT [] PMNR    PULM: full vent support 2/2 intracranial hemorrhage and seizure    CXR. ABG  PST     CV:  SBP goal 100 - 150  Bradycardia to 54 -61.   Check EKG.     RENAL:  Fluids:  NS @ 75  Cr wnl  Monitor BMPs    GI:    Diet:  OG tube, start tube feeds, Jevity  GI prophylaxis [] not indicated [x] PPI as vented   Bowel regimen [x] colace [x] senna [] other:    ENDO:   Goal euglycemia (-180)    HEME/ONC:  VTE prophylaxis: [] SCDs [] chemoprophylaxis [x] hold chemoprophylaxis due to: fresh post op and pending CT head in am 9/7.   [x] high risk of DVT/PE on admission due to:  2/2 metastatic cancer.  Will need metastatic w/u and onc consult on this admission  LE dopplers pending  Mild leukocytosis WBC 16, likely reactive vs malignancy related. 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 Seizure post op     NEURO:    Neurochecks q1 hr  CT head 9/6 stable  Decadron Taper per neurosurgery  Seizures vs myoclonus- s/p Ativan 8mg, Keppra 1 g BID, loaded with Vimpat.  EEG showed LPDs  Continue Keppra re: PLEDs  DC vEEG  MRI on 9/7   f/u final path  Fentanyl for pain control  Activity: [] mobilize as tolerated [x] Bedrest [] PT [] OT [] PMNR    PULM: full vent support 2/2 intracranial hemorrhage and seizure    CXR. ABG.  PST     CV:  SBP goal 100 - 150  No longer bradycardia  Tachycardic - likely related to pain    RENAL:  Fluids:  NS @ 75  Cr wnl  Monitor BMPs  Replete phos    GI:    Diet:  Continue tube feeds, Jevity  GI prophylaxis [] not indicated [x] PPI as vented   No BM yester  Bowel regimen [x] colace, add senna [] other:    ENDO:   Goal euglycemia (-180)    HEME/ONC:  VTE prophylaxis: [] SCDs [] chemoprophylaxis [x] hold chemoprophylaxis due to: fresh post op and pending MRI  Lovenox pending stable MRI  [x] high risk of DVT/PE on admission due to:  2/2 metastatic cancer.  Will need metastatic w/u and onc consult on this admission  LE dopplers negative  Mild leukocytosis WBC 16, likely reactive vs malignancy related. 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 Seizure post op     NEURO:    Neurochecks q1 hr  CT head 9/6 stable  Decadron Taper per neurosurgery  Seizures vs myoclonus- s/p Ativan 8mg, Keppra 1 g BID, loaded with Vimpat.  EEG showed LPDs  Continue Keppra re: PLEDs  DC vEEG  MRI on 9/7 pending  f/u final path  Fentanyl for pain control  Activity: [] mobilize as tolerated [x] Bedrest [] PT [] OT [] PMNR    PULM: full vent support 2/2 intracranial hemorrhage and seizure    CXR. ABG.  PST     CV:  SBP goal 100 - 150  No longer bradycardia  Tachycardic - likely related to pain    RENAL:  Fluids:  NS @ 75  Cr wnl  Monitor BMPs  Replete phos    GI:    Diet:  Continue tube feeds, Jevity  GI prophylaxis [] not indicated [x] PPI as vented   No BM yesterday  Bowel regimen [x] colace, add senna [] other:    ENDO:   Goal euglycemia (-180)    HEME/ONC:  VTE prophylaxis: [] SCDs [] chemoprophylaxis [x] hold chemoprophylaxis due to: fresh post op and pending MRI  Lovenox pending stable MRI  [x] high risk of DVT/PE on admission due to:  2/2 metastatic cancer.  Will need metastatic w/u and onc consult on this admission  LE dopplers negative  Mild leukocytosis WBC 16, likely reactive vs malignancy related. 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 Seizure post op     NEURO:    Neurochecks q1 hr  CT head 9/6 stable  Decadron Taper per neurosurgery  Seizures vs myoclonus- s/p Ativan 8mg, Keppra 1 g BID, loaded with Vimpat.  EEG showed LPDs  Continue Keppra re: PLEDs  DC vEEG  MRI on 9/7 pending  f/u final path  Fentanyl for pain control  Activity: [] mobilize as tolerated [x] Bedrest [] PT [] OT [] PMNR    PULM: full vent support 2/2 intracranial hemorrhage and seizure    CXR. ABG.  PST     CV:  SBP goal 100 - 150  No longer bradycardia  Tachycardic - likely related to pain    RENAL:  Fluids:  NS @ 75  Cr wnl  Monitor BMPs  Replete phos    GI:    Diet:  Continue tube feeds, Jevity  GI prophylaxis [] not indicated [x] PPI as vented   No BM yesterday  Bowel regimen [x] colace, add senna [] other:    ENDO:   Goal euglycemia (-180)    HEME/ONC:  VTE prophylaxis: [] SCDs [] chemoprophylaxis [x] hold chemoprophylaxis due to: fresh post op and pending MRI  Lovenox pending stable MRI  [x] high risk of DVT/PE on admission due to:  2/2 metastatic cancer.  Will need metastatic w/u and onc consult on this admission  LE dopplers negative  Mild leukocytosis WBC 16, likely reactive vs malignancy related. Monitor     ID:  Temps  37.8- 38  PRN Tylenol     SOCIAL/FAMILY:  [] awaiting [x] updated at bedside [] family meeting    CODE STATUS:  [x] Full Code [] DNR [] DNI [] Palliative/Comfort Care    DISPOSITION:  [x] ICU [] Stroke Unit [] Floor [] EMU [] RCU [] PCU    [x] Patient is at high risk of neurologic deterioration/death due to:  seizures, hemorrhage, herniation       Contact: [phone]

## 2019-09-08 LAB
ANION GAP SERPL CALC-SCNC: 11 MMOL/L — SIGNIFICANT CHANGE UP (ref 5–17)
BASE EXCESS BLDA CALC-SCNC: 6.1 MMOL/L — HIGH (ref -2–2)
BUN SERPL-MCNC: 20 MG/DL — SIGNIFICANT CHANGE UP (ref 7–23)
CALCIUM SERPL-MCNC: 10.3 MG/DL — SIGNIFICANT CHANGE UP (ref 8.4–10.5)
CHLORIDE SERPL-SCNC: 99 MMOL/L — SIGNIFICANT CHANGE UP (ref 96–108)
CO2 BLDA-SCNC: 31 MMOL/L — HIGH (ref 22–30)
CO2 SERPL-SCNC: 28 MMOL/L — SIGNIFICANT CHANGE UP (ref 22–31)
CREAT SERPL-MCNC: 0.5 MG/DL — SIGNIFICANT CHANGE UP (ref 0.5–1.3)
CULTURE RESULTS: SIGNIFICANT CHANGE UP
CULTURE RESULTS: SIGNIFICANT CHANGE UP
GAS PNL BLDA: SIGNIFICANT CHANGE UP
GLUCOSE SERPL-MCNC: 125 MG/DL — HIGH (ref 70–99)
HCO3 BLDA-SCNC: 30 MMOL/L — HIGH (ref 21–29)
HCT VFR BLD CALC: 43 % — SIGNIFICANT CHANGE UP (ref 39–50)
HGB BLD-MCNC: 14.5 G/DL — SIGNIFICANT CHANGE UP (ref 13–17)
HOROWITZ INDEX BLDA+IHG-RTO: 40 — SIGNIFICANT CHANGE UP
MAGNESIUM SERPL-MCNC: 2.1 MG/DL — SIGNIFICANT CHANGE UP (ref 1.6–2.6)
MCHC RBC-ENTMCNC: 32.3 PG — SIGNIFICANT CHANGE UP (ref 27–34)
MCHC RBC-ENTMCNC: 33.9 GM/DL — SIGNIFICANT CHANGE UP (ref 32–36)
MCV RBC AUTO: 95.4 FL — SIGNIFICANT CHANGE UP (ref 80–100)
PCO2 BLDA: 41 MMHG — SIGNIFICANT CHANGE UP (ref 32–46)
PH BLDA: 7.48 — HIGH (ref 7.35–7.45)
PHOSPHATE SERPL-MCNC: 3.8 MG/DL — SIGNIFICANT CHANGE UP (ref 2.5–4.5)
PLATELET # BLD AUTO: 126 K/UL — LOW (ref 150–400)
PO2 BLDA: 346 MMHG — HIGH (ref 74–108)
POTASSIUM SERPL-MCNC: 4.2 MMOL/L — SIGNIFICANT CHANGE UP (ref 3.5–5.3)
POTASSIUM SERPL-SCNC: 4.2 MMOL/L — SIGNIFICANT CHANGE UP (ref 3.5–5.3)
RBC # BLD: 4.5 M/UL — SIGNIFICANT CHANGE UP (ref 4.2–5.8)
RBC # FLD: 11.6 % — SIGNIFICANT CHANGE UP (ref 10.3–14.5)
SAO2 % BLDA: 100 % — HIGH (ref 92–96)
SODIUM SERPL-SCNC: 138 MMOL/L — SIGNIFICANT CHANGE UP (ref 135–145)
SPECIMEN SOURCE: SIGNIFICANT CHANGE UP
SPECIMEN SOURCE: SIGNIFICANT CHANGE UP
WBC # BLD: 20.5 K/UL — HIGH (ref 3.8–10.5)
WBC # FLD AUTO: 20.5 K/UL — HIGH (ref 3.8–10.5)

## 2019-09-08 PROCEDURE — 99291 CRITICAL CARE FIRST HOUR: CPT

## 2019-09-08 PROCEDURE — 99024 POSTOP FOLLOW-UP VISIT: CPT

## 2019-09-08 PROCEDURE — 71045 X-RAY EXAM CHEST 1 VIEW: CPT | Mod: 26

## 2019-09-08 PROCEDURE — 70450 CT HEAD/BRAIN W/O DYE: CPT | Mod: 26

## 2019-09-08 PROCEDURE — 99292 CRITICAL CARE ADDL 30 MIN: CPT

## 2019-09-08 RX ORDER — ONDANSETRON 8 MG/1
4 TABLET, FILM COATED ORAL ONCE
Refills: 0 | Status: COMPLETED | OUTPATIENT
Start: 2019-09-08 | End: 2019-09-08

## 2019-09-08 RX ORDER — FENTANYL CITRATE 50 UG/ML
12.5 INJECTION INTRAVENOUS ONCE
Refills: 0 | Status: DISCONTINUED | OUTPATIENT
Start: 2019-09-08 | End: 2019-09-08

## 2019-09-08 RX ORDER — TRAMADOL HYDROCHLORIDE 50 MG/1
25 TABLET ORAL EVERY 4 HOURS
Refills: 0 | Status: DISCONTINUED | OUTPATIENT
Start: 2019-09-08 | End: 2019-09-16

## 2019-09-08 RX ORDER — ENOXAPARIN SODIUM 100 MG/ML
30 INJECTION SUBCUTANEOUS
Refills: 0 | Status: DISCONTINUED | OUTPATIENT
Start: 2019-09-08 | End: 2019-09-13

## 2019-09-08 RX ORDER — SODIUM CHLORIDE 9 MG/ML
1000 INJECTION INTRAMUSCULAR; INTRAVENOUS; SUBCUTANEOUS
Refills: 0 | Status: DISCONTINUED | OUTPATIENT
Start: 2019-09-08 | End: 2019-09-09

## 2019-09-08 RX ORDER — HYDRALAZINE HCL 50 MG
5 TABLET ORAL ONCE
Refills: 0 | Status: COMPLETED | OUTPATIENT
Start: 2019-09-08 | End: 2019-09-08

## 2019-09-08 RX ORDER — DEXMEDETOMIDINE HYDROCHLORIDE IN 0.9% SODIUM CHLORIDE 4 UG/ML
0.5 INJECTION INTRAVENOUS
Qty: 200 | Refills: 0 | Status: DISCONTINUED | OUTPATIENT
Start: 2019-09-08 | End: 2019-09-08

## 2019-09-08 RX ORDER — TRAMADOL HYDROCHLORIDE 50 MG/1
50 TABLET ORAL EVERY 4 HOURS
Refills: 0 | Status: DISCONTINUED | OUTPATIENT
Start: 2019-09-08 | End: 2019-09-16

## 2019-09-08 RX ADMIN — FENTANYL CITRATE 25 MICROGRAM(S): 50 INJECTION INTRAVENOUS at 01:35

## 2019-09-08 RX ADMIN — FENTANYL CITRATE 12.5 MICROGRAM(S): 50 INJECTION INTRAVENOUS at 11:45

## 2019-09-08 RX ADMIN — Medication 5 MILLIGRAM(S): at 15:34

## 2019-09-08 RX ADMIN — ONDANSETRON 4 MILLIGRAM(S): 8 TABLET, FILM COATED ORAL at 15:34

## 2019-09-08 RX ADMIN — SODIUM CHLORIDE 50 MILLILITER(S): 9 INJECTION INTRAMUSCULAR; INTRAVENOUS; SUBCUTANEOUS at 23:00

## 2019-09-08 RX ADMIN — LEVETIRACETAM 400 MILLIGRAM(S): 250 TABLET, FILM COATED ORAL at 17:26

## 2019-09-08 RX ADMIN — CHLORHEXIDINE GLUCONATE 15 MILLILITER(S): 213 SOLUTION TOPICAL at 05:05

## 2019-09-08 RX ADMIN — Medication 100 MILLIGRAM(S): at 05:05

## 2019-09-08 RX ADMIN — FENTANYL CITRATE 25 MICROGRAM(S): 50 INJECTION INTRAVENOUS at 01:20

## 2019-09-08 RX ADMIN — CHLORHEXIDINE GLUCONATE 1 APPLICATION(S): 213 SOLUTION TOPICAL at 21:40

## 2019-09-08 RX ADMIN — Medication 4 MILLIGRAM(S): at 17:26

## 2019-09-08 RX ADMIN — DEXMEDETOMIDINE HYDROCHLORIDE IN 0.9% SODIUM CHLORIDE 6.53 MICROGRAM(S)/KG/HR: 4 INJECTION INTRAVENOUS at 05:05

## 2019-09-08 RX ADMIN — PANTOPRAZOLE SODIUM 40 MILLIGRAM(S): 20 TABLET, DELAYED RELEASE ORAL at 12:49

## 2019-09-08 RX ADMIN — LEVETIRACETAM 400 MILLIGRAM(S): 250 TABLET, FILM COATED ORAL at 05:05

## 2019-09-08 RX ADMIN — FENTANYL CITRATE 12.5 MICROGRAM(S): 50 INJECTION INTRAVENOUS at 11:30

## 2019-09-08 RX ADMIN — POLYETHYLENE GLYCOL 3350 17 GRAM(S): 17 POWDER, FOR SOLUTION ORAL at 05:04

## 2019-09-08 RX ADMIN — FENTANYL CITRATE 25 MICROGRAM(S): 50 INJECTION INTRAVENOUS at 04:55

## 2019-09-08 RX ADMIN — DEXMEDETOMIDINE HYDROCHLORIDE IN 0.9% SODIUM CHLORIDE 6.53 MICROGRAM(S)/KG/HR: 4 INJECTION INTRAVENOUS at 08:12

## 2019-09-08 RX ADMIN — Medication 4 MILLIGRAM(S): at 05:04

## 2019-09-08 RX ADMIN — ENOXAPARIN SODIUM 30 MILLIGRAM(S): 100 INJECTION SUBCUTANEOUS at 17:26

## 2019-09-08 RX ADMIN — Medication 5 MILLIGRAM(S): at 08:09

## 2019-09-08 RX ADMIN — Medication 4 MILLIGRAM(S): at 23:36

## 2019-09-08 RX ADMIN — Medication 4 MILLIGRAM(S): at 12:50

## 2019-09-08 RX ADMIN — ONDANSETRON 4 MILLIGRAM(S): 8 TABLET, FILM COATED ORAL at 11:20

## 2019-09-08 RX ADMIN — FENTANYL CITRATE 25 MICROGRAM(S): 50 INJECTION INTRAVENOUS at 04:40

## 2019-09-08 NOTE — AIRWAY REMOVAL NOTE  ADULT & PEDS - ARTIFICAL AIRWAY REMOVAL COMMENTS
Written order for extubation verified. The patient was identified by full name and birth date compared to the identification band. Present during the procedure was Cate MCDONALD.

## 2019-09-08 NOTE — PROGRESS NOTE ADULT - SUBJECTIVE AND OBJECTIVE BOX
CHIEF COMPLAINT:Patient is a 64y old  Male who presents with a chief complaint of AMS (07 Sep 2019 12:14)    	        PAST MEDICAL & SURGICAL HISTORY:  No pertinent past medical history  No significant past surgical history          REVIEW OF SYSTEMS:  on vent  not responsive       Medications:  MEDICATIONS  (STANDING):  chlorhexidine 0.12% Liquid 15 milliLiter(s) Oral Mucosa every 12 hours  chlorhexidine 4% Liquid 1 Application(s) Topical <User Schedule>  dexamethasone  Injectable 4 milliGRAM(s) IV Push every 6 hours  dexmedetomidine Infusion 0.5 MICROgram(s)/kG/Hr (6.525 mL/Hr) IV Continuous <Continuous>  docusate sodium Liquid 100 milliGRAM(s) Oral two times a day  levETIRAcetam  IVPB 1000 milliGRAM(s) IV Intermittent every 12 hours  pantoprazole  Injectable 40 milliGRAM(s) IV Push daily  polyethylene glycol 3350 17 Gram(s) Oral two times a day  senna Syrup 20 milliLiter(s) Oral at bedtime    MEDICATIONS  (PRN):  acetaminophen    Suspension .. 650 milliGRAM(s) Oral every 6 hours PRN Temp greater or equal to 38C (100.4F)  fentaNYL    Injectable 25 MICROGram(s) IV Push every 1 hour PRN comfort    	    PHYSICAL EXAM:  T(C): 37.4 (09-08-19 @ 03:00), Max: 38 (09-07-19 @ 13:07)  HR: 54 (09-08-19 @ 07:00) (54 - 119)  BP: 145/84 (09-08-19 @ 07:00) (118/76 - 171/90)  RR: 16 (09-08-19 @ 07:00) (9 - 24)  SpO2: 100% (09-08-19 @ 07:00) (93% - 100%)  Wt(kg): --  I&O's Summary    07 Sep 2019 07:01  -  08 Sep 2019 07:00  --------------------------------------------------------  IN: 3108.6 mL / OUT: 2690 mL / NET: 418.6 mL        head /bandage in place / drain in place   Cardiovascular: Normal S1 S2, No JVD,   Respiratory:cours b/s b/l  Gastrointestinal:  Soft, Non-tender, + BS	  Skin: No rashes, No ecchymoses, No cyanosis	  Extremities: no edema  Vascular: Peripheral pulses palpable     TELEMETRY: 	    ECG:  	  RADIOLOGY:  OTHER: 	  	  LABS:	 	    CARDIAC MARKERS:                                12.9   14.2  )-----------( 107      ( 07 Sep 2019 21:01 )             39.8     09-07    139  |  105  |  20  ----------------------------<  134<H>  4.4   |  26  |  0.47<L>    Ca    9.3      07 Sep 2019 21:01  Phos  2.6     09-07  Mg     2.1     09-07      proBNP:   Lipid Profile:   HgA1c:   TSH:

## 2019-09-08 NOTE — PROGRESS NOTE ADULT - ASSESSMENT
64 year old male former smoker with no significant PMHx presents with AMS, brain imaging with mass.     1. Brain mass, suspected malignancy, ? mets   CT head: 3.4 x 3.3 cm cystic mass w/in L frontal lobe w/ edema and midline shift.  MRI wwo brain: L cystic enhancing lesion causing significant edema  CT chest/abd noted, ? mets   hem/onc, neurosurg, neuro f/u   9/5 - s/p OR for resection/biopsy.   w/ prelim adenoca; post op hemmorhage w/ return to or  post op seizure; keppra, vimpat, steroids as per neurosx, NISCU  bradycardia noted postop, now resolved  care per NISCU     dvt ppx

## 2019-09-08 NOTE — PROVIDER CONTACT NOTE (OTHER) - ASSESSMENT
Dr. Leandro Gary and Dr May at the bedside during neuro exam. Patient withdraw on B/L upper extremity and withdraw on left lower extremity and triple flexion on the right lower extremity.

## 2019-09-08 NOTE — PROGRESS NOTE ADULT - ASSESSMENT
POD#3 L crani tumor resection, frozen mets    MRI done  continue keppra  continue decadron  Monitor JO

## 2019-09-08 NOTE — PROGRESS NOTE ADULT - ASSESSMENT
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 Seizure post op     NEURO:  L craniotomy and tumor resection POD 3  Neurochecks q1 hr  Decadron Taper per neurosurgery  Seizures vs myoclonus- s/p Ativan 8mg, Keppra 1 g BID, loaded with Vimpat.  EEG showed LPDs- Continue Keppra  MRI on 9/7 stable  f/u final path  Fentanyl for pain control  Activity: [] mobilize as tolerated [x] Bedrest [] PT [] OT [] PMNR    PULM: full vent support 2/2 intracranial hemorrhage and seizure    CXR. ABG.  PST- goal extubation today     CV:  SBP goal 100 - 150  No longer bradycardia  Tachycardic - likely related to pain    RENAL:  Fluids:  NS @ 75  Cr wnl  Monitor BMPs  Replete phos    GI:    Diet:  Continue tube feeds, Jevity  GI prophylaxis [] not indicated [x] PPI as vented   No BM yesterday  Bowel regimen [x] colace, add senna [] other:    ENDO:   Goal euglycemia (-180)    HEME/ONC:  VTE prophylaxis: [] SCDs [] chemoprophylaxis [x] hold chemoprophylaxis due to: fresh post op and pending MRI  Lovenox pending stable MRI  [x] high risk of DVT/PE on admission due to:  2/2 metastatic cancer.  Will need metastatic w/u and onc consult on this admission  LE dopplers negative  Mild leukocytosis WBC 16, likely reactive vs malignancy related. 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 Seizure post op     NEURO:  L craniotomy and tumor resection POD 3  Neurochecks q1 hr  Stat CTH this AM done for exam change- stable  Decadron Taper per neurosurgery  EEG showed LPDs- Continue Keppra  f/u final path  Fentanyl for pain control  Activity: [] mobilize as tolerated [x] Bedrest [] PT [] OT [] PMNR    PULM: respiratory failure due to above  CXR. ABG.  secretions only with mouthing ETT  PST- goal extubation today     CV: HTN  SBP goal 100 - 150  hydralazine prn  echo pending with bradycardia    RENAL:  Cr wnl  Monitor BMPs  Replete lytes  condom cath    GI:    Continue tube feeds, Jevity  GI prophylaxis [] not indicated [x] PPI as vented   No BM   Bowel regimen [x] colace, senna [] other: add miralax    ENDO:   Goal euglycemia (-180)    HEME/ONC:  VTE prophylaxis: [] SCDs [x] chemoprophylaxis   [x] high risk of DVT/PE on admission due to:  2/2 metastatic cancer.  Will need metastatic w/u and onc consult on this admission  LE dopplers negative  Mild leukocytosis WBC 16, likely reactive vs malignancy related. Monitor     ID:  afebile   PRN Tylenol     SOCIAL/FAMILY:  [] awaiting [x] updated at bedside [] family meeting    CODE STATUS:  [x] Full Code [] DNR [] DNI [] Palliative/Comfort Care    DISPOSITION:  [x] ICU [] Stroke Unit [] Floor [] EMU [] RCU [] PCU    [x] Patient is at high risk of neurologic deterioration/death due to:  seizures, hemorrhage, herniation       Contact: [phone]

## 2019-09-08 NOTE — PROGRESS NOTE ADULT - ASSESSMENT
This is a 64y Male, here with 1 month of personality change, found the have left frontal solitary heterogenous cystic mass with edema, also hepatic lesion and acetabular lesion    Concern would be for primary brain tumor, eg GBM vs metastatic lesion      9/5 - went to OR for resection/biopsy.  Post op not following commands.  CT head found hematoma.  back to OR for hemostasis.  post op with GTC sz.   given ativan.  now intubated   off sedation and he has not returned mentation level to where he was when had sedation off in past days as per nursing, ct head unchanged      Plan:  - keppra and vimpat on board  - as slow to awake, may need to reconnect eeg  - steroids per NS  - follow up biopsy results    - will follow    pt potentially life threatening condition, pt has not shown neurological improvement despite being off sedation in last 24 hours.  total time 35 min reviewing chart, imaging, pt examination d/w  RN at bedside

## 2019-09-08 NOTE — PROGRESS NOTE ADULT - SUBJECTIVE AND OBJECTIVE BOX
extubated     Vitals/labs/meds imaging reviewed  no EO, pupils 3mm R b/l, BUE spont but not AG, LLE spont, RLE TF extubated     Vitals/labs/meds imaging reviewed  EO to noxious, oriented x2-3 given choices, pupils 3mm R b/l, BUE AG, LLE spont, RLE WD

## 2019-09-08 NOTE — PROVIDER CONTACT NOTE (OTHER) - SITUATION
Patient had a change Neuro Exam 0700, Patient withdraw on B/L upper extremity and withdraw on left lower extremity and triple flexion on the right lower extremity.

## 2019-09-08 NOTE — PROVIDER CONTACT NOTE (OTHER) - ACTION/TREATMENT ORDERED:
Dr. Leandro Gary and Dr May at the bedside during neuro exam. CT Scan done as ordered. Will continue to monitor.

## 2019-09-08 NOTE — PROGRESS NOTE ADULT - SUBJECTIVE AND OBJECTIVE BOX
Chief complaint:   Patient is a 64y old  Male who presents with a chief complaint of AMS (07 Sep 2019 12:14)    HPI:  63yo male presenting to ED for episodes of AMS.   Daughter reports that pt has episodes of unresponsiveness "staring " for several seconds   delayed responses which began 2 weeks ago. Pt father passed away several weeks ago, went to in [place] for , returned yesterday. + generalized weakness, poor appetite, quit smoking since death of father  .  Pt fell lifting 25lbs, landed on elbows , no head trauma . does not complain of any pain.  Denies LOC, muscle jerking, tremor, CP, SOB, n/v/d/c, (01 Sep 2019 16:29)      Stay Summary:      OVERNIGHT EVENTS:      ROS: [ ]  Unable to assess due to mental status   All other systems negative    -----------------------------------------------------------------------------------------------------------------------------------------------------------------------------------  ICU Vital Signs Last 24 Hrs  T(C): 37.4 (08 Sep 2019 03:00), Max: 38 (07 Sep 2019 13:07)  T(F): 99.3 (08 Sep 2019 03:00), Max: 100.4 (07 Sep 2019 13:07)  HR: 54 (08 Sep 2019 07:00) (54 - 119)  BP: 145/84 (08 Sep 2019 07:00) (118/76 - 171/90)  BP(mean): 100 (08 Sep 2019 07:00) (86 - 114)  ABP: --  ABP(mean): --  RR: 16 (08 Sep 2019 07:00) (9 - 24)  SpO2: 100% (08 Sep 2019 07:00) (96% - 100%)      I&O's Summary    07 Sep 2019 07:01  -  08 Sep 2019 07:00  --------------------------------------------------------  IN: 3108.6 mL / OUT: 2690 mL / NET: 418.6 mL        MEDICATIONS  (STANDING):  chlorhexidine 0.12% Liquid 15 milliLiter(s) Oral Mucosa every 12 hours  chlorhexidine 4% Liquid 1 Application(s) Topical <User Schedule>  dexamethasone  Injectable 4 milliGRAM(s) IV Push every 6 hours  dexmedetomidine Infusion 0.5 MICROgram(s)/kG/Hr (6.525 mL/Hr) IV Continuous <Continuous>  docusate sodium Liquid 100 milliGRAM(s) Oral two times a day  enoxaparin Injectable 30 milliGRAM(s) SubCutaneous <User Schedule>  levETIRAcetam  IVPB 1000 milliGRAM(s) IV Intermittent every 12 hours  pantoprazole  Injectable 40 milliGRAM(s) IV Push daily  polyethylene glycol 3350 17 Gram(s) Oral two times a day  senna Syrup 20 milliLiter(s) Oral at bedtime      RESPIRATORY:  Mode: AC/ CMV (Assist Control/ Continuous Mandatory Ventilation)  RR (machine): 12  TV (machine): 450  FiO2: 40  PEEP: 5  ITime: 1  MAP: 5  PIP: 13        NEUROIMAGING:   Recent imaging studies were reviewed.    LAB RESULTS:                          12.9   14.2  )-----------( 107      ( 07 Sep 2019 21:01 )             39.8               139  |  105  |  20  ----------------------------<  134<H>  4.4   |  26  |  0.47<L>    Ca    9.3      07 Sep 2019 21:01  Phos  2.6       Mg     2.1                       -----------------------------------------------------------------------------------------------------------------------------------------------------------------------------------    PHYSICAL EXAM:  General: Calm, intubated  HEENT: MMM  Neuro:  -Mental status- No acute distress  -CN- PERRL 3mm, EOMI, tongue midline, face symmetric  + corneals/cough/gag  -Motor-  -Sensation-   -Coordination- unable to assess    CV: RRR  Pulm: Clear to auscultation  Abd: Soft, nontender, nondistended  Ext: No edema  Skin: warm, dry Chief complaint:   Patient is a 64y old  Male who presents with a chief complaint of AMS (07 Sep 2019 12:14)    HPI:  63yo male presenting to ED for episodes of AMS.   Daughter reports that pt has episodes of unresponsiveness "staring " for several seconds   delayed responses which began 2 weeks ago. Pt father passed away several weeks ago, went to in [place] for , returned yesterday. + generalized weakness, poor appetite, quit smoking since death of father  .  Pt fell lifting 25lbs, landed on elbows , no head trauma . does not complain of any pain.  Denies LOC, muscle jerking, tremor, CP, SOB, n/v/d/c, (01 Sep 2019 16:29)    OVERNIGHT EVENTS: less motor response this morning, no longer localizing      ROS: [ x]  Unable to assess due to mental status   All other systems negative    -----------------------------------------------------------------------------------------------------------------------------------------------------------------------------------  ICU Vital Signs Last 24 Hrs  T(C): 37.4 (08 Sep 2019 03:00), Max: 38 (07 Sep 2019 13:07)  T(F): 99.3 (08 Sep 2019 03:00), Max: 100.4 (07 Sep 2019 13:07)  HR: 54 (08 Sep 2019 07:00) (54 - 119)  BP: 145/84 (08 Sep 2019 07:00) (118/76 - 171/90)  BP(mean): 100 (08 Sep 2019 07:00) (86 - 114)  ABP: --  ABP(mean): --  RR: 16 (08 Sep 2019 07:00) (9 - 24)  SpO2: 100% (08 Sep 2019 07:00) (96% - 100%)      I&O's Summary    07 Sep 2019 07:01  -  08 Sep 2019 07:00  --------------------------------------------------------  IN: 3108.6 mL / OUT: 2690 mL / NET: 418.6 mL    condom cath      MEDICATIONS  (STANDING):  chlorhexidine 0.12% Liquid 15 milliLiter(s) Oral Mucosa every 12 hours  chlorhexidine 4% Liquid 1 Application(s) Topical <User Schedule>  dexamethasone  Injectable 4 milliGRAM(s) IV Push every 6 hours  dexmedetomidine Infusion 0.5 MICROgram(s)/kG/Hr (6.525 mL/Hr) IV Continuous <Continuous>  docusate sodium Liquid 100 milliGRAM(s) Oral two times a day  enoxaparin Injectable 30 milliGRAM(s) SubCutaneous <User Schedule>  levETIRAcetam  IVPB 1000 milliGRAM(s) IV Intermittent every 12 hours  pantoprazole  Injectable 40 milliGRAM(s) IV Push daily  polyethylene glycol 3350 17 Gram(s) Oral two times a day  senna Syrup 20 milliLiter(s) Oral at bedtime      RESPIRATORY:  Mode: AC/ CMV (Assist Control/ Continuous Mandatory Ventilation)  RR (machine): 12  TV (machine): 450  FiO2: 40  PEEP: 5  ITime: 1  MAP: 5  PIP: 13        NEUROIMAGING:   Recent imaging studies were reviewed.    LAB RESULTS:                          12.9   14.2  )-----------( 107      ( 07 Sep 2019 21:01 )             39.8               139  |  105  |  20  ----------------------------<  134<H>  4.4   |  26  |  0.47<L>    Ca    9.3      07 Sep 2019 21:01  Phos  2.6       Mg     2.1     -07                  -----------------------------------------------------------------------------------------------------------------------------------------------------------------------------------    PHYSICAL EXAM:  General: Calm, intubated  HEENT: MMM, tonguing ETT  Neuro:  -Mental status- No acute distress, no tracking, no following commands  -CN- PERRL 3mm, EOMI, tongue midline, face symmetric  + corneals/cough/gag, blinks to threat  -Motor- moving uppers 2/5 spont to midline  LLE WD  RLE TF    CV: RRR  Pulm: Clear to auscultation  Abd: Soft, nontender, nondistended  Ext: No edema  Skin: warm, dry

## 2019-09-08 NOTE — PROGRESS NOTE ADULT - SUBJECTIVE AND OBJECTIVE BOX
CC: intubated, sedated    TELEMETRY: nsr    PHYSICAL EXAM:    T(C): 37.5 (09-08-19 @ 07:00), Max: 38 (09-07-19 @ 13:07)  HR: 112 (09-08-19 @ 08:37) (54 - 112)  BP: 125/80 (09-08-19 @ 08:15) (118/76 - 171/90)  RR: 10 (09-08-19 @ 08:15) (9 - 23)  SpO2: 100% (09-08-19 @ 08:37) (96% - 100%)  Wt(kg): --  I&O's Summary    07 Sep 2019 07:01  -  08 Sep 2019 07:00  --------------------------------------------------------  IN: 3188.6 mL / OUT: 2690 mL / NET: 498.6 mL    08 Sep 2019 07:01  -  08 Sep 2019 09:22  --------------------------------------------------------  IN: 80 mL / OUT: 0 mL / NET: 80 mL        Appearance: Normal	  Cardiovascular: Normal S1 S2,RRR, No JVD, No murmurs  Respiratory: Lungs clear to auscultation	  Gastrointestinal:  Soft, Non-tender, + BS	  Extremities: Normal range of motion, No clubbing, cyanosis or edema  Vascular: Peripheral pulses palpable 2+ bilaterally     LABS:	 	                          12.9   14.2  )-----------( 107      ( 07 Sep 2019 21:01 )             39.8     09-07    139  |  105  |  20  ----------------------------<  134<H>  4.4   |  26  |  0.47<L>    Ca    9.3      07 Sep 2019 21:01  Phos  2.6     09-07  Mg     2.1     09-07            CARDIAC MARKERS:

## 2019-09-08 NOTE — PROGRESS NOTE ADULT - ASSESSMENT
POD#3 L crani tumor resection, frozen mets  post op heme    cont keppra  cont decadron for cerebral edema  -150  tube feeding  start chemoppx

## 2019-09-08 NOTE — PROGRESS NOTE ADULT - ASSESSMENT
63 y/o male  w/ no PMH pw staring episodes and “delayed responsiveness” for 2 weeks. . Long time smoker recently quit after fathers death 2 weeks ago. w/  3.4 x 3.3 cm cystic mass w/in L frontal lobe w/ edema and midline shift on ct head  s/p craniotomy   w/ prelim adenoca  post op hemmorhage w/ return to or  post op seizure  steroids as per neurosx   neurochecks  -Keppra a s per nscu  off sedatives now  not responsive  prognosis guarded   c/e nsc

## 2019-09-08 NOTE — PROGRESS NOTE ADULT - SUBJECTIVE AND OBJECTIVE BOX
SUBJECTIVE:     pt went to OR.  Post op not following commands.  CT found hematoma.  back to OR for hemostasis.  post op with GTC sz.   given ativan.  now intubated   off sedation since this morning, RN concerned as pt exam has seemed to decline in last 24 hours    Medications:  acetaminophen    Suspension .. 650 milliGRAM(s) Oral every 6 hours PRN  chlorhexidine 0.12% Liquid 15 milliLiter(s) Oral Mucosa every 12 hours  chlorhexidine 4% Liquid 1 Application(s) Topical <User Schedule>  dexamethasone  Injectable 4 milliGRAM(s) IV Push every 6 hours  dexmedetomidine Infusion 0.5 MICROgram(s)/kG/Hr IV Continuous <Continuous>  docusate sodium Liquid 100 milliGRAM(s) Oral two times a day  enoxaparin Injectable 30 milliGRAM(s) SubCutaneous <User Schedule>  fentaNYL    Injectable 25 MICROGram(s) IV Push every 1 hour PRN  levETIRAcetam  IVPB 1000 milliGRAM(s) IV Intermittent every 12 hours  pantoprazole  Injectable 40 milliGRAM(s) IV Push daily  polyethylene glycol 3350 17 Gram(s) Oral two times a day  senna Syrup 20 milliLiter(s) Oral at bedtime      Labs:  CBC Full  -  ( 07 Sep 2019 21:01 )  WBC Count : 14.2 K/uL  RBC Count : 4.07 M/uL  Hemoglobin : 12.9 g/dL  Hematocrit : 39.8 %  Platelet Count - Automated : 107 K/uL  Mean Cell Volume : 97.9 fl  Mean Cell Hemoglobin : 31.8 pg  Mean Cell Hemoglobin Concentration : 32.5 gm/dL  Auto Neutrophil # : x  Auto Lymphocyte # : x  Auto Monocyte # : x  Auto Eosinophil # : x  Auto Basophil # : x  Auto Neutrophil % : x  Auto Lymphocyte % : x  Auto Monocyte % : x  Auto Eosinophil % : x  Auto Basophil % : x    09-07    139  |  105  |  20  ----------------------------<  134<H>  4.4   |  26  |  0.47<L>    Ca    9.3      07 Sep 2019 21:01  Phos  2.6     09-07  Mg     2.1     09-07      CAPILLARY BLOOD GLUCOSE                  Vitals:  Vital Signs Last 24 Hrs  T(C): 37.5 (08 Sep 2019 07:00), Max: 38 (07 Sep 2019 13:07)  T(F): 99.5 (08 Sep 2019 07:00), Max: 100.4 (07 Sep 2019 13:07)  HR: 80 (08 Sep 2019 09:00) (54 - 112)  BP: 147/80 (08 Sep 2019 09:00) (118/76 - 171/90)  BP(mean): 100 (08 Sep 2019 09:00) (86 - 114)  RR: 10 (08 Sep 2019 09:00) (9 - 23)  SpO2: 99% (08 Sep 2019 09:00) (96% - 100%)    NEUROLOGICAL EXAM:    Neurologic Exam:    Mental Status: Eyes closed, follows no commands, no attempt at communication. Does not attend to the examiner. to noxious stimuli he grimaces but no other meaningful interaction Intubated, mechanically ventilated    Cranial Nerves: Pupils reactive bilaterally, no gaze deviation.  blink to threat bilaterally. No gross facial asymmetry.  pos gag    Motor: Bulk was normal. Tone was low. No spontaneous or purposeful movements x 4 extremities. to noxious stimuli, he does not withdraw or show purpuseful movement    Reflexes: Absent upper extremities. Absent lower extremities. Toes wereupgoing bilateral.     Sensory: No response to tactile or noxious stimuli.     Coord: Unable to assess    Gait: Unable to assess    Imaging:      EXAM:  CT ANGIO BRAIN (W)AW IC                          EXAM:  CT ANGIO NECK (W)AW IC                          EXAM:  CT BRAIN                            PROCEDURE DATE:  09/01/2019            INTERPRETATION:  CLINICAL INFORMATION: Intermittent altered mental   status. Imbalance.     TECHNIQUE: Noncontrast axial CT images were acquired through the head.   Contrast enhanced axial CT images were acquired from the aortic arch to   the vertex of the calvarium, during the angiographic phase. Additional   post-contrast axial CT images through the head were obtained.   Three-dimensional maximum intensity projection reformats were generated   from the angiographic images.  70 cc of Omnipaque-300 mg/ml were   administered intravenously, without immediate complication.    COMPARISON: None.    FINDINGS:     CT BRAIN:    A 3.4 x 3.3 cm cystic mass within the left frontal lobe with large area   of surrounding edema and causing effacement of the bilateral lateral   ventricles and rightward midline shift. There appears to be a solid   component of the lesion anteriorly. There is effacement of the   interpeduncular cistern. No hydrocephalus.    No areas of abnormal enhancement are identified.    The visualized paranasal sinuses are clear. The mastoid air cells and   middle ear cavities are clear.    The soft tissues of the scalp are unremarkable. The calvarium is intact.    CT ANGIOGRAPHY NECK:    Three-vessel aortic arch. Atherosclerotic calcifications of the aorta   without evidence of dissection or hemodynamically significant stenosis.   The origins of the great vessels are unremarkable.     The common carotid arteries are normal in caliber without significant   stenosis.     The carotid bifurcations are unremarkable. The internal carotid arteries   are normal in caliber without significant stenosis.     Co-dominant vertebral arteries. The vertebral arteries are normal in   caliber without significant stenosis.     The visualized neck and upper chest are unremarkable.    Visualized osseous structures are unremarkable.    CT ANGIOGRAPHY BRAIN:    There is no evidence for significant stenosis, major vessel occlusion, or   aneurysm about the [place] of [place].    There is no evidence for significant stenosis, major vessel occlusion, or   aneurysm involving the vertebrobasilar system.    No enlarged vascular lesions or clusters of abnormal vessels are noted to   suggest an arterial venous malformation.    Visualized portions of the superficial and deep venous systems are   unremarkable.      IMPRESSION:     CT brain: Predominantly cystic mass within the left frontal lobe with   large area of surrounding edema causing leftward midline shift. There   appears to be a hyperdense solid component of the mass anteriorly which   showsvascularity. There is effacement of the interpeduncular cistern. No   hydrocephalus.    CT angiography neck: No evidence of hemodynamically significant stenosis   by NASCET criteria. No evidence of vascular dissection.    CT angiography brain: No major vessel occlusion or proximal stenosis by   NASCET criteria. No evidence of aneurysm or other vascular malformation.                MARILYN WALKER M.D., RADIOLOGIST RESIDENT  This document has been electronically signed.  ISRAEL DORANTES M.D., ATTENDING RADIOLOGIST  This document has been electronically signed. Sep  1 2019  2:03PM      < from: MR Head w/wo IV Cont (09.02.19 @ 13:15) >  EXAM:  MR BRAIN WAW IC                            PROCEDURE DATE:  09/02/2019            INTERPRETATION:  INDICATIONS:  preop    TECHNIQUE:  Multiplanar imaging was performed using T1 weighted, T2   weighted and FLAIR sequences.  Diffusion weightedand SWI images were   also obtained.  Following intravenous gadolinium, multiplanar T1 weighted   images were performed. 6.5 cc Gadavist were administered. 1 cc were   discarded.      COMPARISON EXAMINATION: Study is read and compared with CT CTA 9/1/2019    FINDINGS:    VENTRICLES AND SULCI: Again appreciated is the mass effect on the left   frontal horn of the lateral ventricle as well as the third ventricle.  INTRA-AXIAL:  Evidence of the left frontal enhancing mass lesion that is   mixed signal with evidence of fluid and solid components. In addition   evidence of foci of susceptibility within the lesion which may represent   component of hemorrhage versus mineralization. Surrounding vasogenic   edema is appreciated. Evidence of a left frontal lesion measuring 7 cm AP   x 4 cm transverse by x 4.7 cm craniocaudal dimension. Periphery of the   lesion has a diffusion diffusion positivity with restricted diffusion on   the ADC map.  EXTRA-AXIAL:  No mass or collection.  VISUALIZED SINUSES:  Normal.  VISUALIZED MASTOIDS:  Clear.  CALVARIUM:  Normal.  CAROTID FLOW VOIDS:  Present.  MISCELLANEOUS:  None.    IMPRESSION: Left frontal heterogeneous lesion with cystic and solid   components in surrounding vasogenic edema. Lesion is solitary.There is   diffusion positivity around the periphery of the lesion. There is some   susceptibility seen in association. Findings suggestive of a glioblastoma   versus a metastasis. No diffusion restriction within the cystic portion   of the lesion tosuggest an abscess.                    MICHELE CHO M.D., ATTENDING RADIOLOGIST  This document has been electronically signed. Sep  3 2019  8:51AM                  _____________________________________________________________  EEG IMPRESSION/CLINICAL CORRELATE    Abnormal EEG study.  1. Potential epileptogenic focus in the left central region.  2. Structural abnormality in the left hemisphere.  3. Moderate to severe nonspecific diffuse or multifocal cerebral dysfunction.   4. No seizure seen.  5. Skull defect in the left frontocentral region.    _____________________________________________________________    Lindsay Potts MD  Attending Physician, [facility]          < from: CT Head No Cont (09.08.19 @ 08:44) >    EXAM:  CT BRAIN                            PROCEDURE DATE:  09/08/2019            INTERPRETATION:  HISTORY: Status post resection of left frontal mass.   Metastases.    Technique: CT of the head was performed without contrast.    Multiple contiguous axial images were acquired from the skullbase to the   vertex without the administration of intravenous contrast.  Coronal and   sagittal reformations were made.    COMPARISON: Prior head CT dated  9/6/2019 at 8:34 AM    FINDINGS:  Redemonstration of low-attenuation change in the left frontal and   parietal lobes extending across the genu of the corpus callosum with   internal hemorrhage and adjacent subarachnoid hemorrhage within the   postoperative bed, similar appearing. Unchanged 0.8 cm right to left   midline shift.     Status post left frontoparietal craniotomy with associated postoperative   changes, including pneumocephalus and surgical skin staples over the   anterior scalp.     The visualized intraorbital compartments, paranasal sinuses and mastoid   complexes appear free of acute disease. Partial visualization of   endotracheal and enteric tubes.    IMPRESSION:  Similar-appearing hemorrhage within the postoperative site with   surrounding low-attenuation change extending across the genu of the   corpus callosum. Similar associated mass effect and shift of the midline   structures.                  EDUARDO WAY M.D., RADIOLOGY RESIDENT  This document has been electronically signed.  CLIVE GILES M.D., ATTENDING RADIOLOGIST  This document has been electronically signed. Sep  8 2019  9:21AM                < end of copied text >

## 2019-09-09 LAB
ANION GAP SERPL CALC-SCNC: 12 MMOL/L — SIGNIFICANT CHANGE UP (ref 5–17)
BUN SERPL-MCNC: 29 MG/DL — HIGH (ref 7–23)
CALCIUM SERPL-MCNC: 9.9 MG/DL — SIGNIFICANT CHANGE UP (ref 8.4–10.5)
CHLORIDE SERPL-SCNC: 102 MMOL/L — SIGNIFICANT CHANGE UP (ref 96–108)
CO2 SERPL-SCNC: 27 MMOL/L — SIGNIFICANT CHANGE UP (ref 22–31)
CREAT SERPL-MCNC: 0.58 MG/DL — SIGNIFICANT CHANGE UP (ref 0.5–1.3)
GLUCOSE SERPL-MCNC: 121 MG/DL — HIGH (ref 70–99)
POTASSIUM SERPL-MCNC: 4.4 MMOL/L — SIGNIFICANT CHANGE UP (ref 3.5–5.3)
POTASSIUM SERPL-SCNC: 4.4 MMOL/L — SIGNIFICANT CHANGE UP (ref 3.5–5.3)
SODIUM SERPL-SCNC: 141 MMOL/L — SIGNIFICANT CHANGE UP (ref 135–145)

## 2019-09-09 PROCEDURE — 99291 CRITICAL CARE FIRST HOUR: CPT

## 2019-09-09 PROCEDURE — 93306 TTE W/DOPPLER COMPLETE: CPT | Mod: 26

## 2019-09-09 PROCEDURE — 99232 SBSQ HOSP IP/OBS MODERATE 35: CPT

## 2019-09-09 RX ADMIN — Medication 650 MILLIGRAM(S): at 03:30

## 2019-09-09 RX ADMIN — Medication 4 MILLIGRAM(S): at 23:49

## 2019-09-09 RX ADMIN — Medication 4 MILLIGRAM(S): at 17:24

## 2019-09-09 RX ADMIN — Medication 650 MILLIGRAM(S): at 03:00

## 2019-09-09 RX ADMIN — TRAMADOL HYDROCHLORIDE 25 MILLIGRAM(S): 50 TABLET ORAL at 07:05

## 2019-09-09 RX ADMIN — CHLORHEXIDINE GLUCONATE 1 APPLICATION(S): 213 SOLUTION TOPICAL at 22:58

## 2019-09-09 RX ADMIN — LEVETIRACETAM 400 MILLIGRAM(S): 250 TABLET, FILM COATED ORAL at 05:18

## 2019-09-09 RX ADMIN — LEVETIRACETAM 400 MILLIGRAM(S): 250 TABLET, FILM COATED ORAL at 17:23

## 2019-09-09 RX ADMIN — Medication 4 MILLIGRAM(S): at 05:18

## 2019-09-09 RX ADMIN — Medication 4 MILLIGRAM(S): at 13:38

## 2019-09-09 RX ADMIN — TRAMADOL HYDROCHLORIDE 25 MILLIGRAM(S): 50 TABLET ORAL at 07:30

## 2019-09-09 RX ADMIN — ENOXAPARIN SODIUM 30 MILLIGRAM(S): 100 INJECTION SUBCUTANEOUS at 17:23

## 2019-09-09 RX ADMIN — PANTOPRAZOLE SODIUM 40 MILLIGRAM(S): 20 TABLET, DELAYED RELEASE ORAL at 13:38

## 2019-09-09 NOTE — PROGRESS NOTE ADULT - SUBJECTIVE AND OBJECTIVE BOX
Chief complaint:   Patient is a 64y old  Male who presents with a chief complaint of AMS (07 Sep 2019 12:14)    HPI:  63yo male presenting to ED for episodes of AMS.   Daughter reports that pt has episodes of unresponsiveness "staring " for several seconds   delayed responses which began 2 weeks ago. Pt father passed away several weeks ago, went to in [place] for , returned yesterday. + generalized weakness, poor appetite, quit smoking since death of father  .  Pt fell lifting 25lbs, landed on elbows , no head trauma . does not complain of any pain.  Denies LOC, muscle jerking, tremor, CP, SOB, n/v/d/c, (01 Sep 2019 16:29      ROS: [ x]  Unable to assess due to mental status   All other systems negative    -----------------------------------------------------------------------------------------------------------------------------------------------------------------------------------  ICU Vital Signs Last 24 Hrs  T(C): 37.4 (09 Sep 2019 07:00), Max: 38 (09 Sep 2019 03:00)  T(F): 99.3 (09 Sep 2019 07:00), Max: 100.4 (09 Sep 2019 03:00)  HR: 72 (09 Sep 2019 08:00) (62 - 102)  BP: 129/80 (09 Sep 2019 08:00) (113/70 - 164/105)  BP(mean): 94 (09 Sep 2019 08:00) (83 - 123)  RR: 21 (09 Sep 2019 08:00) (10 - 22)  SpO2: 95% (09 Sep 2019 08:00) (95% - 100%)          08 Sep 2019 07:01  -  09 Sep 2019 07:00  --------------------------------------------------------  IN:    Enteral Tube Flush: 100 mL    IV PiggyBack: 200 mL    ns in tub fed  [id number]: 480 mL    Oral Fluid: 186 mL    sodium chloride 0.9%.: 400 mL  Total IN: 1366 mL    OUT:    Incontinent per Condom Catheter: 2300 mL  Total OUT: 2300 mL    Total NET: -934 mL      09 Sep 2019 07:  -  09 Sep 2019 09:09  --------------------------------------------------------  IN:    ns in tub fed  [id number]: 160 mL  Total IN: 160 mL    OUT:  Total OUT: 0 mL    Total NET: 160 mL          condom cath      MEDICATIONS  (STANDING):  chlorhexidine 4% Liquid 1 Application(s) Topical <User Schedule>  dexamethasone  Injectable 4 milliGRAM(s) IV Push every 6 hours  docusate sodium Liquid 100 milliGRAM(s) Oral two times a day  enoxaparin Injectable 30 milliGRAM(s) SubCutaneous <User Schedule>  levETIRAcetam  IVPB 1000 milliGRAM(s) IV Intermittent every 12 hours  pantoprazole  Injectable 40 milliGRAM(s) IV Push daily  polyethylene glycol 3350 17 Gram(s) Oral two times a day  senna Syrup 20 milliLiter(s) Oral at bedtime  sodium chloride 0.9%. 1000 milliLiter(s) (50 mL/Hr) IV Continuous <Continuous>    MEDICATIONS  (PRN):  acetaminophen    Suspension .. 650 milliGRAM(s) Oral every 6 hours PRN Temp greater or equal to 38C (100.4F)  traMADol 25 milliGRAM(s) Oral every 4 hours PRN Moderate Pain (4 - 6)  traMADol 50 milliGRAM(s) Oral every 4 hours PRN Severe Pain (7 - 10)        RESPIRATORY:  Mode: AC/ CMV (Assist Control/ Continuous Mandatory Ventilation)  RR (machine): 12  TV (machine): 450  FiO2: 40  PEEP: 5  ITime: 1  MAP: 5  PIP: 13        NEUROIMAGING:   Recent imaging studies were reviewed.    LAB RESULTS:                          14.5   20.5  )-----------( 126      ( 08 Sep 2019 21:02 )             43.0                         12.9   14.2  )-----------( 107      ( 07 Sep 2019 21:01 )             39.8     09-08    138  |  99  |  20  ----------------------------<  125<H>  4.2   |  28  |  0.50    Ca    10.3      08 Sep 2019 21:02  Phos  3.8     09-08  Mg     2.1     -08          09-    139  |  105  |  20  ----------------------------<  134<H>  4.4   |  26  |  0.47<L>    Ca    9.3      07 Sep 2019 21:01  Phos  2.6     09-07  Mg     2.1            CT Head No Cont (19 @ 08:44) >  Similar-appearing hemorrhage within the postoperative site with   surrounding low-attenuation change extending across the genu of the   corpus callosum. Similar associated mass effect and shift of the midline   structures.    >              -----------------------------------------------------------------------------------------------------------------------------------------------------------------------------------    PHYSICAL EXAM:  General: Calm, intubated  HEENT: [employer] ORTEGA ETT  Neuro:  -Mental status- No acute distress, no tracking, no following commands  -CN- PERRL 3mm, EOMI, tongue midline, face symmetric  + corneals/cough/gag, blinks to threat  -Motor- moving uppers 2/5 spont to midline  LLE WD  RLE TF    CV: RRR  Pulm: Clear to auscultation  Abd: Soft, nontender, nondistended  Ext: No edema  Skin: warm, dry Chief complaint:   Patient is a 64y old  Male who presents with a chief complaint of AMS (07 Sep 2019 12:14)    HPI:  65yo male presenting to ED for episodes of AMS.   Daughter reports that pt has episodes of unresponsiveness "staring " for several seconds   delayed responses which began 2 weeks ago. Pt father passed away several weeks ago, went to in [place] for , returned yesterday. + generalized weakness, poor appetite, quit smoking since death of father  .  Pt fell lifting 25lbs, landed on elbows , no head trauma . does not complain of any pain.  Denies LOC, muscle jerking, tremor, CP, SOB, n/v/d/c, (01 Sep 2019 16:29       All other systems negative    -----------------------------------------------------------------------------------------------------------------------------------------------------------------------------------  ICU Vital Signs Last 24 Hrs  T(C): 37.4 (09 Sep 2019 07:00), Max: 38 (09 Sep 2019 03:00)  T(F): 99.3 (09 Sep 2019 07:00), Max: 100.4 (09 Sep 2019 03:00)  HR: 72 (09 Sep 2019 08:00) (62 - 102)  BP: 129/80 (09 Sep 2019 08:00) (113/70 - 164/105)  BP(mean): 94 (09 Sep 2019 08:00) (83 - 123)  RR: 21 (09 Sep 2019 08:00) (10 - 22)  SpO2: 95% (09 Sep 2019 08:00) (95% - 100%)          08 Sep 2019 07:01  -  09 Sep 2019 07:00  --------------------------------------------------------  IN:    Enteral Tube Flush: 100 mL    IV PiggyBack: 200 mL    ns in tub fed  [id number]: 480 mL    Oral Fluid: 186 mL    sodium chloride 0.9%.: 400 mL  Total IN: 1366 mL    OUT:    Incontinent per Condom Catheter: 2300 mL  Total OUT: 2300 mL    Total NET: -934 mL      09 Sep 2019 07:01  -  09 Sep 2019 09:09  --------------------------------------------------------  IN:    ns in tub fed  [id number]: 160 mL  Total IN: 160 mL    OUT:  Total OUT: 0 mL    Total NET: 160 mL          condom cath      MEDICATIONS  (STANDING):  chlorhexidine 4% Liquid 1 Application(s) Topical <User Schedule>  dexamethasone  Injectable 4 milliGRAM(s) IV Push every 6 hours  docusate sodium Liquid 100 milliGRAM(s) Oral two times a day  enoxaparin Injectable 30 milliGRAM(s) SubCutaneous <User Schedule>  levETIRAcetam  IVPB 1000 milliGRAM(s) IV Intermittent every 12 hours  pantoprazole  Injectable 40 milliGRAM(s) IV Push daily  polyethylene glycol 3350 17 Gram(s) Oral two times a day  senna Syrup 20 milliLiter(s) Oral at bedtime  sodium chloride 0.9%. 1000 milliLiter(s) (50 mL/Hr) IV Continuous <Continuous>    MEDICATIONS  (PRN):  acetaminophen    Suspension .. 650 milliGRAM(s) Oral every 6 hours PRN Temp greater or equal to 38C (100.4F)  traMADol 25 milliGRAM(s) Oral every 4 hours PRN Moderate Pain (4 - 6)  traMADol 50 milliGRAM(s) Oral every 4 hours PRN Severe Pain (7 - 10)        RESPIRATORY:  Mode: AC/ CMV (Assist Control/ Continuous Mandatory Ventilation)  RR (machine): 12  TV (machine): 450  FiO2: 40  PEEP: 5  ITime: 1  MAP: 5  PIP: 13        NEUROIMAGING:   Recent imaging studies were reviewed.    LAB RESULTS:                          14.5   20.5  )-----------( 126      ( 08 Sep 2019 21:02 )             43.0                         12.9   14.2  )-----------( 107      ( 07 Sep 2019 21:01 )             39.8     -08    138  |  99  |  20  ----------------------------<  125<H>  4.2   |  28  |  0.50    Ca    10.3      08 Sep 2019 21:02  Phos  3.8     09-08  Mg     2.1     -08          09-07    139  |  105  |  20  ----------------------------<  134<H>  4.4   |  26  |  0.47<L>    Ca    9.3      07 Sep 2019 21:01  Phos  2.6     09-07  Mg     2.1     -07       CT Head No Cont (19 @ 08:44) >  Similar-appearing hemorrhage within the postoperative site with   surrounding low-attenuation change extending across the genu of the   corpus callosum. Similar associated mass effect and shift of the midline   structures.     CT Abdomen and Pelvis w/ IV Cont (19 @ 17:07) >  IMPRESSION:     No lesion or adenopathy in the chest. No adenopathy in the abdomen or   pelvis.     Indeterminate 1.2 cm hypoattenuating right hepatic lobe lesion.    Indeterminate bilateral renal cystic lesions, which may reflect   hemorrhagic/proteinaceous cysts. Further evaluation with abdominal   ultrasound is recommended.    1.6 cm sclerotic right acetabular lesion.         VA Duplex Lower Ext Vein Scan, Bilat (19 @ 16:04) >  No evidence of deep venous thrombosis in either lower extremity.                -----------------------------------------------------------------------------------------------------------------------------------------------------------------------------------    PHYSICAL EXAM:  General: Calm, intubated  HEENT: MMM, tonguing ETT  Neuro:  -Mental status- No acute distress, no tracking, no following commands  -CN- PERRL 3mm, EOMI, tongue midline, face symmetric  + corneals/cough/gag, blinks to threat  -Motor- moving uppers 2/5 spont to midline  LLE WD  RLE TF    CV: RRR  Pulm: Clear to auscultation  Abd: Soft, nontender, nondistended  Ext: No edema  Skin: warm, dry Chief complaint:   Patient is a 64y old  Male who presents with a chief complaint of AMS (07 Sep 2019 12:14)    HPI:  63yo male presenting to ED for episodes of AMS.   Daughter reports that pt has episodes of unresponsiveness "staring " for several seconds   delayed responses which began 2 weeks ago. Pt father passed away several weeks ago, went to in [place] for , returned yesterday. + generalized weakness, poor appetite, quit smoking since death of father  .  Pt fell lifting 25lbs, landed on elbows , no head trauma . does not complain of any pain.  Denies LOC, muscle jerking, tremor, CP, SOB, n/v/d/c, (01 Sep 2019 16:29       All other systems negative    -----------------------------------------------------------------------------------------------------------------------------------------------------------------------------------  ICU Vital Signs Last 24 Hrs  T(C): 37.4 (09 Sep 2019 07:00), Max: 38 (09 Sep 2019 03:00)  T(F): 99.3 (09 Sep 2019 07:00), Max: 100.4 (09 Sep 2019 03:00)  HR: 72 (09 Sep 2019 08:00) (62 - 102)  BP: 129/80 (09 Sep 2019 08:00) (113/70 - 164/105)  BP(mean): 94 (09 Sep 2019 08:00) (83 - 123)  RR: 21 (09 Sep 2019 08:00) (10 - 22)  SpO2: 95% (09 Sep 2019 08:00) (95% - 100%)          08 Sep 2019 07:01  -  09 Sep 2019 07:00  --------------------------------------------------------  IN:    Enteral Tube Flush: 100 mL    IV PiggyBack: 200 mL    ns in tub fed  [id number]: 480 mL    Oral Fluid: 186 mL    sodium chloride 0.9%.: 400 mL  Total IN: 1366 mL    OUT:    Incontinent per Condom Catheter: 2300 mL  Total OUT: 2300 mL    Total NET: -934 mL      09 Sep 2019 07:01  -  09 Sep 2019 09:09  --------------------------------------------------------  IN:    ns in tub fed  [id number]: 160 mL  Total IN: 160 mL    OUT:  Total OUT: 0 mL    Total NET: 160 mL          condom cath      MEDICATIONS  (STANDING):  chlorhexidine 4% Liquid 1 Application(s) Topical <User Schedule>  dexamethasone  Injectable 4 milliGRAM(s) IV Push every 6 hours  docusate sodium Liquid 100 milliGRAM(s) Oral two times a day  enoxaparin Injectable 30 milliGRAM(s) SubCutaneous <User Schedule>  levETIRAcetam  IVPB 1000 milliGRAM(s) IV Intermittent every 12 hours  pantoprazole  Injectable 40 milliGRAM(s) IV Push daily  polyethylene glycol 3350 17 Gram(s) Oral two times a day  senna Syrup 20 milliLiter(s) Oral at bedtime  sodium chloride 0.9%. 1000 milliLiter(s) (50 mL/Hr) IV Continuous <Continuous>    MEDICATIONS  (PRN):  acetaminophen    Suspension .. 650 milliGRAM(s) Oral every 6 hours PRN Temp greater or equal to 38C (100.4F)  traMADol 25 milliGRAM(s) Oral every 4 hours PRN Moderate Pain (4 - 6)  traMADol 50 milliGRAM(s) Oral every 4 hours PRN Severe Pain (7 - 10)      NEUROIMAGING:   Recent imaging studies were reviewed.    LAB RESULTS:                          14.5   20.5  )-----------( 126      ( 08 Sep 2019 21:02 )             43.0                         12.9   14.2  )-----------( 107      ( 07 Sep 2019 21:01 )             39.8         138  |  99  |  20  ----------------------------<  125<H>  4.2   |  28  |  0.50    Ca    10.3      08 Sep 2019 21:02  Phos  3.8     09-08  Mg     2.1         139  |  105  |  20  ----------------------------<  134<H>  4.4   |  26  |  0.47<L>    Ca    9.3      07 Sep 2019 21:01  Phos  2.6     -  Mg     2.1          CT Head No Cont (19 @ 08:44) >  Similar-appearing hemorrhage within the postoperative site with   surrounding low-attenuation change extending across the genu of the   corpus callosum. Similar associated mass effect and shift of the midline   structures.     CT Abdomen and Pelvis w/ IV Cont (19 @ 17:07) >  IMPRESSION:     No lesion or adenopathy in the chest. No adenopathy in the abdomen or   pelvis.     Indeterminate 1.2 cm hypoattenuating right hepatic lobe lesion.    Indeterminate bilateral renal cystic lesions, which may reflect   hemorrhagic/proteinaceous cysts. Further evaluation with abdominal   ultrasound is recommended.    1.6 cm sclerotic right acetabular lesion.         VA Duplex Lower Ext Vein Scan, Bilat (19 @ 16:04) >  No evidence of deep venous thrombosis in either lower extremity.                -----------------------------------------------------------------------------------------------------------------------------------------------------------------------------------    PHYSICAL EXAM:  General: Calm,   HEENT: MMM,   Neuro:  -Mental status- No acute distress,  tracking,  following commands intermittently  -CN- PERRL 3mm, EOMI, tongue midline, face symmetric  -Motor- moving uppers 3/5 spont to midline L>>R  LLE - elevates spontaneously  RLE - withdraws , unable to plantar flex R foot      CV: RRR  Pulm: Clear to auscultation  Abd: Soft, nontender, nondistended  Ext: No edema  Skin: warm, dry

## 2019-09-09 NOTE — PROGRESS NOTE ADULT - ASSESSMENT
64 year old male former smoker with no significant PMHx presents with AMS, brain imaging with mass.     1. Brain mass, suspected malignancy, ? mets   CT head: 3.4 x 3.3 cm cystic mass w/in L frontal lobe w/ edema and midline shift.  MRI wwo brain: L cystic enhancing lesion causing significant edema  CT chest/abd noted, ? mets   hem/onc, neurosurg, neuro f/u   9/5 - s/p OR for resection/biopsy.   w/ prelim adenoca; post op hemmorhage w/ return to or  post op seizure; keppra, vimpat, steroids as per neurosx, NISCU  bradycardia noted postop, now resolved  care per NISCU     remains cv stable, hr improved and stable  bp parameters per nsicu   await echo r/o structural heart disease  dvt ppx

## 2019-09-09 NOTE — PROGRESS NOTE ADULT - SUBJECTIVE AND OBJECTIVE BOX
CC: F/U for Leukocytosis    Saw/spoke to patient. No fevers, no chills. No new complaints. Unchanged. Extubated. NGT.    Allergies  No Known Allergies    ANTIMICROBIALS:  Off    PE:    Vital Signs Last 24 Hrs  T(C): 36.8 (09 Sep 2019 11:00), Max: 38 (09 Sep 2019 03:00)  T(F): 98.2 (09 Sep 2019 11:00), Max: 100.4 (09 Sep 2019 03:00)  HR: 67 (09 Sep 2019 12:00) (65 - 102)  BP: 124/84 (09 Sep 2019 12:00) (122/70 - 164/105)  BP(mean): 96 (09 Sep 2019 12:00) (86 - 123)  RR: 18 (09 Sep 2019 12:00) (11 - 22)  SpO2: 96% (09 Sep 2019 12:00) (92% - 100%)    Gen: AOx3, NAD, non-toxic, fatigued  CV: S1+S2 normal, nontachycardic  Resp: Clear bilat, no resp distress, no crackles/wheezes, extubated  Abd: Soft, nontender, +BS, NGT  Ext: No LE edema, no wounds    LABS:                        14.5   20.5  )-----------( 126      ( 08 Sep 2019 21:02 )             43.0     09-09    141  |  102  |  29<H>  ----------------------------<  121<H>  4.4   |  27  |  0.58    Ca    9.9      09 Sep 2019 14:14  Phos  3.8     09-08  Mg     2.1     09-08    MICROBIOLOGY:    .Blood  09-03-19   No growth at 5 days.     RADIOLOGY:    9/8 CXR:    Impression:    The heart is normal in size. The lungs are clear. Endotracheal tube and   NG tube are in good position. No change in the appearance of the chest   when compared to previous study done September 7, 2019.

## 2019-09-09 NOTE — PROGRESS NOTE ADULT - ASSESSMENT
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 Seizure post op     NEURO:  L craniotomy and tumor resection POD #4  Neurochecks q4 hr  Onc consultation  CT this amstable  Decadron Taper per neurosurgery  EEG showed LPDs- Continue Keppra  f/u final path  Fentanyl for pain control  Activity: [] mobilize as tolerated [x] Bedrest [] PT [] OT [] PMNR    PULM: respiratory failure due to above.      CV: HTN  SBP goal 100 - 150  hydralazine prn  echo pending with bradycardia    RENAL:  Cr wnl  Monitor BMPs  Replete lytes  condom cath    GI:    Continue tube feeds, Jevity  GI prophylaxis [] not indicated [x] PPI as vented   No BM   Bowel regimen [x] colace, senna [] other: add miralax    ENDO:   Goal euglycemia (-180)    HEME/ONC:  VTE prophylaxis: [] SCDs [x] chemoprophylaxis   [x] high risk of DVT/PE on admission due to:  2/2 metastatic cancer.  Will need metastatic w/u and onc consult on this admission  LE dopplers negative  Mild leukocytosis WBC 16, likely reactive vs malignancy related. Monitor     ID:  afebile   PRN Tylenol     SOCIAL/FAMILY:  [] awaiting [x] updated at bedside [] family meeting    CODE STATUS:  [x] Full Code [] DNR [] DNI [] Palliative/Comfort Care    DISPOSITION:  Unit [X] Floor [] EMU [] RCU [] PCU    [x] Patient is at high risk of neurologic deterioration/death due to:  seizures, hemorrhage, herniation     Time spent 35 min  Contact: [phone] ASSESSMENT/PLAN:  S/P crani for tumor resection - prelim metastatic adenocarcinomas . Seizure post op     NEURO:  L craniotomy and tumor resection POD #4  Neurochecks q4 hr  Onc consultation  CT this am stable  Decadron Taper per neurosurgery  EEG showed LPDs- Continue Keppra  f/u final path  Fentanyl for pain control  Activity: [] mobilize as tolerated [x] Bedrest [X] PT [X] OT [] PMNR    PULM: respiratory failure due to above.      CV: HTN  SBP goal 100 - 150  hydralazine prn  echo pending with bradycardia    RENAL:  Cr wnl  D/C IVF  Monitor BMPs  Replete lytes- check BMP at 1400  condom cath    GI:    Continue tube feeds, Jevity- 18 hr feeds   No BM   Bowel regimen [x] colace, senna [] other: add miralax    ENDO:   Goal euglycemia (-180)    HEME/ONC:  VTE prophylaxis: [] SCDs [x] chemoprophylaxis   [x] high risk of DVT/PE on admission due to:  2/2 metastatic cancer.  Will need metastatic w/u and onc consult on this admission  LE dopplers negative  Mild leukocytosis WBC 16, likely reactive vs malignancy related. Monitor     ID:  afebile   PRN Tylenol     SOCIAL/FAMILY:  [] awaiting [x] updated at bedside [] family meeting    CODE STATUS:  [x] Full Code [] DNR [] DNI [] Palliative/Comfort Care    DISPOSITION:  Unit [X] Floor [] EMU [] RCU [] PCU    [x] Patient is at high risk of neurologic deterioration/death due to:  seizures, hemorrhage, herniation     Time spent 35 min  Contact: [phone]

## 2019-09-09 NOTE — PROGRESS NOTE ADULT - SUBJECTIVE AND OBJECTIVE BOX
stable exam    Vitals/labs/meds imaging reviewed  EO to noxious, oriented x2-3 given choices, pupils 3mm R b/l, BUE AG, LLE spont, RLE WD

## 2019-09-09 NOTE — PROGRESS NOTE ADULT - SUBJECTIVE AND OBJECTIVE BOX
CARDIOLOGY FOLLOW UP NOTE - DR. KHALIL    Subjective:    awake, sitting in chair  no chest pain, sob, palpitations  family at bedside    PHYSICAL EXAM:  T(C): 36.8 (09-09-19 @ 11:00), Max: 38 (09-09-19 @ 03:00)  HR: 67 (09-09-19 @ 12:00) (62 - 102)  BP: 124/84 (09-09-19 @ 12:00) (113/70 - 164/105)  RR: 18 (09-09-19 @ 12:00) (11 - 22)  SpO2: 96% (09-09-19 @ 12:00) (92% - 100%)  Wt(kg): --  I&O's Summary    08 Sep 2019 07:01  -  09 Sep 2019 07:00  --------------------------------------------------------  IN: 1366 mL / OUT: 2300 mL / NET: -934 mL    09 Sep 2019 07:01  -  09 Sep 2019 12:10  --------------------------------------------------------  IN: 480 mL / OUT: 0 mL / NET: 480 mL      Daily     Daily     Appearance: Normal	awake, alert, s/p neurosx  Cardiovascular: Normal S1 S2,RRR, No JVD, No murmurs  Respiratory: Lungs clear to auscultation	  Gastrointestinal:  Soft, Non-tender, + BS	  Extremities: Normal range of motion, No clubbing, cyanosis or edema      Home Medications:      MEDICATIONS  (STANDING):  chlorhexidine 4% Liquid 1 Application(s) Topical <User Schedule>  dexamethasone  Injectable 4 milliGRAM(s) IV Push every 6 hours  docusate sodium Liquid 100 milliGRAM(s) Oral two times a day  enoxaparin Injectable 30 milliGRAM(s) SubCutaneous <User Schedule>  levETIRAcetam  IVPB 1000 milliGRAM(s) IV Intermittent every 12 hours  pantoprazole  Injectable 40 milliGRAM(s) IV Push daily  polyethylene glycol 3350 17 Gram(s) Oral two times a day  senna Syrup 20 milliLiter(s) Oral at bedtime  sodium chloride 0.9%. 1000 milliLiter(s) (50 mL/Hr) IV Continuous <Continuous>      TELEMETRY: 	    ECG:  	  RADIOLOGY:   DIAGNOSTIC TESTING:  [ ] Echocardiogram:  [ ] Catheterization:  [ ] Stress Test:    OTHER: 	    LABS:	 	    CARDIAC MARKERS:                                14.5   20.5  )-----------( 126      ( 08 Sep 2019 21:02 )             43.0     09-08    138  |  99  |  20  ----------------------------<  125<H>  4.2   |  28  |  0.50    Ca    10.3      08 Sep 2019 21:02  Phos  3.8     09-08  Mg     2.1     09-08      proBNP:     Lipid Profile:   HgA1c:     Creatinine, Serum: 0.50 mg/dL (09-08-19 @ 21:02)  Creatinine, Serum: 0.47 mg/dL (09-07-19 @ 21:01)  Creatinine, Serum: 0.48 mg/dL (09-06-19 @ 21:34)

## 2019-09-09 NOTE — CHART NOTE - NSCHARTNOTEFT_GEN_A_CORE
Nutrition Follow Up Note  Patient seen for: 1st malnutrition f/u    Chart reviewed, events noted. Adm dx AMS.  S/P crani for tumor resection - prelim metastatic adenocarcinomas. Pt extubated yesterday, has NGT.     Source: chart, team    Diet : no current order    previous order for Jevity 1.2 80cc/hr x 24 hrs, Danactive 2 times daily was D/C      Enteral /Parenteral Nutrition: 24 hr EN intake  480cc,  1580cc      Daily Weight in k.9 (), Weight in k.2 ()  no recent wt    Pertinent Medications: MEDICATIONS  (STANDING):  chlorhexidine 4% Liquid 1 Application(s) Topical <User Schedule>  dexamethasone  Injectable 4 milliGRAM(s) IV Push every 6 hours  docusate sodium Liquid 100 milliGRAM(s) Oral two times a day  enoxaparin Injectable 30 milliGRAM(s) SubCutaneous <User Schedule>  levETIRAcetam  IVPB 1000 milliGRAM(s) IV Intermittent every 12 hours  pantoprazole  Injectable 40 milliGRAM(s) IV Push daily  polyethylene glycol 3350 17 Gram(s) Oral two times a day  senna Syrup 20 milliLiter(s) Oral at bedtime  sodium chloride 0.9%. 1000 milliLiter(s) (50 mL/Hr) IV Continuous <Continuous>    MEDICATIONS  (PRN):  acetaminophen    Suspension .. 650 milliGRAM(s) Oral every 6 hours PRN Temp greater or equal to 38C (100.4F)  traMADol 25 milliGRAM(s) Oral every 4 hours PRN Moderate Pain (4 - 6)  traMADol 50 milliGRAM(s) Oral every 4 hours PRN Severe Pain (7 - 10)     @ 21:02: Na 138, BUN 20, Cr 0.50, <H>, K+ 4.2, Phos 3.8, Mg 2.1, Alk Phos --, ALT/SGPT --, AST/SGOT --, HbA1c --    Finger Sticks:      Skin per nursing documentation: no pressure injuries documented   Edema:    Estimated Needs: based on 55.9 kg  [x ] no change since previous assessment: 7348-1082 kcals, 78-89 gm protein  [ ] recalculated:     Previous Nutrition Diagnosis: acute severe malnutrition  Nutrition Diagnosis is: care plan ongoing, pt has NGT recommend resume EN    New Nutrition Diagnosis: none  Related to:    As evidenced by:      Interventions:     Recommend  1) recommend: Jevity 1.2 at goal rate 80ml/hr x 18 hrs. To provide (at goal): 1440ml, 1728kcal and 80g protein. Based on dosing wt 55.9kg, provides: 31kcal/kg and 1.4g protein/kg.  2) Danactive 2 time daily    Monitoring and Evaluation:     Continue to monitor Nutritional intake, Tolerance to diet prescription, weights, labs, skin integrity    RD remains available upon request and will follow up per protocol Nutrition Follow Up Note  Patient seen for: 1st malnutrition f/u    Chart reviewed, events noted. Adm dx AMS.  S/P crani for tumor resection - prelim metastatic adenocarcinomas. Pt extubated yesterday, has NGT.     Source: chart, team    Diet : no current order    previous order for Jevity 1.2 80cc/hr x 24 hrs, Danactive 2 times daily was D/C      Enteral /Parenteral Nutrition: 24 hr EN intake  480cc,  1580cc    GI:  2 episodes vomiting yesterday per RN, had BM       Daily Weight in k.9 (), Weight in k.2 ()  no recent wt    Pertinent Medications: MEDICATIONS  (STANDING):  chlorhexidine 4% Liquid 1 Application(s) Topical <User Schedule>  dexamethasone  Injectable 4 milliGRAM(s) IV Push every 6 hours  docusate sodium Liquid 100 milliGRAM(s) Oral two times a day  enoxaparin Injectable 30 milliGRAM(s) SubCutaneous <User Schedule>  levETIRAcetam  IVPB 1000 milliGRAM(s) IV Intermittent every 12 hours  pantoprazole  Injectable 40 milliGRAM(s) IV Push daily  polyethylene glycol 3350 17 Gram(s) Oral two times a day  senna Syrup 20 milliLiter(s) Oral at bedtime  sodium chloride 0.9%. 1000 milliLiter(s) (50 mL/Hr) IV Continuous <Continuous>    MEDICATIONS  (PRN):  acetaminophen    Suspension .. 650 milliGRAM(s) Oral every 6 hours PRN Temp greater or equal to 38C (100.4F)  traMADol 25 milliGRAM(s) Oral every 4 hours PRN Moderate Pain (4 - 6)  traMADol 50 milliGRAM(s) Oral every 4 hours PRN Severe Pain (7 - 10)    - @ 21:02: Na 138, BUN 20, Cr 0.50, <H>, K+ 4.2, Phos 3.8, Mg 2.1, Alk Phos --, ALT/SGPT --, AST/SGOT --, HbA1c --    Finger Sticks:      Skin per nursing documentation: no pressure injuries documented   Edema: none    Estimated Needs: based on 55.9 kg  [x ] no change since previous assessment: 9399-7188 kcals, 78-89 gm protein  [ ] recalculated:     Previous Nutrition Diagnosis: acute severe malnutrition  Nutrition Diagnosis is: care plan ongoing, pt has NGT recommend resume EN    New Nutrition Diagnosis: none  Related to:    As evidenced by:      Interventions:     Recommend  1) recommend: Jevity 1.2 at goal rate 80ml/hr x 18 hrs. To provide (at goal): 1440ml, 1728kcal and 80g protein. Based on dosing wt 55.9kg, provides: 31kcal/kg and 1.4g protein/kg.  2) Danactive 2 time daily    Monitoring and Evaluation:     Continue to monitor Nutritional intake, Tolerance to diet prescription, weights, labs, skin integrity    RD remains available upon request and will follow up per protocol

## 2019-09-09 NOTE — PROGRESS NOTE ADULT - ASSESSMENT
63yo male was admitted for episodes of AMS.  CT's and MRI noted. CT chest and abdomen noted  Patient is on steroids and Keppra.  Now is s/p surgery X2 for tumor in left frontal lobe and bleed  Prelim dx is adenocarcinoma, final awaited  Patient's son was at bedside but spoke to me.  I told him at present priority is post of recovery.   Will follow final path.  Will determine need of RT and possible systemic rx after final path and post recovery with reasonable PS and addition w/u as needed

## 2019-09-09 NOTE — PROGRESS NOTE ADULT - ASSESSMENT
This is a 64y Male, here with 1 month of personality change, found the have left frontal solitary heterogenous cystic mass with edema, also hepatic lesion and acetabular lesion    Concern would be for primary brain tumor, eg GBM vs metastatic lesion      9/5 - went to OR for resection/biopsy.  Post op complicated with GTC sz.   now on keppra  Plan:  - continue keppra  - steroids per NS  - bx- adeno, onc to see  -overall much improved mental status in last 24 hours  - will follow    d/w family at bedside

## 2019-09-09 NOTE — PROGRESS NOTE ADULT - SUBJECTIVE AND OBJECTIVE BOX
Patient was admitted with change in behaviour. He also had history of tripping at airport while coming [place] from vacation  Now is s/p surgery X2 for tumor in left frontal lobe and bleed    PAST MEDICAL & SURGICAL HISTORY:  No pertinent past medical history  No significant past surgical history    Medications:  acetaminophen    Suspension .. 650 milliGRAM(s) Oral every 6 hours PRN Temp greater or equal to 38C (100.4F)  chlorhexidine 4% Liquid 1 Application(s) Topical <User Schedule>  dexamethasone  Injectable 4 milliGRAM(s) IV Push every 6 hours  docusate sodium Liquid 100 milliGRAM(s) Oral two times a day  enoxaparin Injectable 30 milliGRAM(s) SubCutaneous <User Schedule>  levETIRAcetam  IVPB 1000 milliGRAM(s) IV Intermittent every 12 hours  pantoprazole  Injectable 40 milliGRAM(s) IV Push daily  polyethylene glycol 3350 17 Gram(s) Oral two times a day  senna Syrup 20 milliLiter(s) Oral at bedtime  sodium chloride 0.9%. 1000 milliLiter(s) IV Continuous <Continuous>  traMADol 25 milliGRAM(s) Oral every 4 hours PRN Moderate Pain (4 - 6)  traMADol 50 milliGRAM(s) Oral every 4 hours PRN Severe Pain (7 - 10)    Labs:  CBC Full  -  ( 08 Sep 2019 21:02 )  WBC Count : 20.5 K/uL  Hemoglobin : 14.5 g/dL  Hematocrit : 43.0 %  Platelet Count - Automated : 126 K/uL  Mean Cell Volume : 95.4 fl  Mean Cell Hemoglobin : 32.3 pg  Mean Cell Hemoglobin Concentration : 33.9 gm/dL  Auto Neutrophil # : x  Auto Lymphocyte # : x  Auto Monocyte # : x  Auto Eosinophil # : x  Auto Basophil # : x  Auto Neutrophil % : x  Auto Lymphocyte % : x  Auto Monocyte % : x  Auto Eosinophil % : x  Auto Basophil % : x    09-08    138  |  99  |  20  ----------------------------<  125<H>  4.2   |  28  |  0.50    Ca    10.3      08 Sep 2019 21:02  Phos  3.8     09-08  Mg     2.1     09-08        Radiology:     < from: CT Head No Cont (09.08.19 @ 08:44) >  Similar-appearing hemorrhage within the postoperative site with   surrounding low-attenuation change extending across the genu of the   corpus callosum. Similar associated mass effect and shift of the midline   structures.      < end of copied text >          ROS:  Patient comfortable without distress  No SOB or chest pain  No palpitation  No abdominal pain, diarrhaea or constipation  Sitting in chair, moves all limbs, answers to family.    Vital Signs Last 24 Hrs  T(C): 37.4 (09 Sep 2019 07:00), Max: 38 (09 Sep 2019 03:00)  T(F): 99.3 (09 Sep 2019 07:00), Max: 100.4 (09 Sep 2019 03:00)  HR: 67 (09 Sep 2019 10:00) (62 - 102)  BP: 127/78 (09 Sep 2019 10:00) (113/70 - 164/105)  BP(mean): 94 (09 Sep 2019 10:00) (83 - 123)  RR: 13 (09 Sep 2019 10:00) (11 - 22)  SpO2: 94% (09 Sep 2019 10:00) (92% - 100%)    Physical exam:  Patient alert, in chair, bandage on surgical scar  No distress  CVS: S1, S2 regular or murmur  Chest: bilateral breath sound without rales  Abdomen: soft, not tender, no organomegaly or masses  See ROS and neurology for CNS    Assessment and Plan:

## 2019-09-09 NOTE — PROGRESS NOTE ADULT - SUBJECTIVE AND OBJECTIVE BOX
SUBJECTIVE:     pt went to OR.  Post op not following commands.  CT found hematoma.  back to OR for hemostasis.  post op with GTC sz.     now extubated, more awake    Medications:  acetaminophen    Suspension .. 650 milliGRAM(s) Oral every 6 hours PRN  chlorhexidine 4% Liquid 1 Application(s) Topical <User Schedule>  dexamethasone  Injectable 4 milliGRAM(s) IV Push every 6 hours  docusate sodium Liquid 100 milliGRAM(s) Oral two times a day  enoxaparin Injectable 30 milliGRAM(s) SubCutaneous <User Schedule>  levETIRAcetam  IVPB 1000 milliGRAM(s) IV Intermittent every 12 hours  pantoprazole  Injectable 40 milliGRAM(s) IV Push daily  polyethylene glycol 3350 17 Gram(s) Oral two times a day  senna Syrup 20 milliLiter(s) Oral at bedtime  sodium chloride 0.9%. 1000 milliLiter(s) IV Continuous <Continuous>  traMADol 25 milliGRAM(s) Oral every 4 hours PRN  traMADol 50 milliGRAM(s) Oral every 4 hours PRN      Labs:  CBC Full  -  ( 08 Sep 2019 21:02 )  WBC Count : 20.5 K/uL  RBC Count : 4.50 M/uL  Hemoglobin : 14.5 g/dL  Hematocrit : 43.0 %  Platelet Count - Automated : 126 K/uL  Mean Cell Volume : 95.4 fl  Mean Cell Hemoglobin : 32.3 pg  Mean Cell Hemoglobin Concentration : 33.9 gm/dL  Auto Neutrophil # : x  Auto Lymphocyte # : x  Auto Monocyte # : x  Auto Eosinophil # : x  Auto Basophil # : x  Auto Neutrophil % : x  Auto Lymphocyte % : x  Auto Monocyte % : x  Auto Eosinophil % : x  Auto Basophil % : x    09-08    138  |  99  |  20  ----------------------------<  125<H>  4.2   |  28  |  0.50    Ca    10.3      08 Sep 2019 21:02  Phos  3.8     09-08  Mg     2.1     09-08      CAPILLARY BLOOD GLUCOSE                  Vitals:  Vital Signs Last 24 Hrs  T(C): 37.4 (09 Sep 2019 07:00), Max: 38 (09 Sep 2019 03:00)  T(F): 99.3 (09 Sep 2019 07:00), Max: 100.4 (09 Sep 2019 03:00)  HR: 72 (09 Sep 2019 08:00) (62 - 102)  BP: 129/80 (09 Sep 2019 08:00) (113/70 - 164/105)  BP(mean): 94 (09 Sep 2019 08:00) (83 - 123)  RR: 21 (09 Sep 2019 08:00) (10 - 22)  SpO2: 95% (09 Sep 2019 08:00) (95% - 100%)  NEUROLOGICAL EXAM:    Neurologic Exam:    Mental Status: awake, alert, says in hospital, follows some commands    Cranial Nerves: Pupils reactive bilaterally, no gaze deviation.  blink to threat bilaterally. No gross facial asymmetry.  pos gag    Motor: Bulk was normal. Tone was low.  rt arm cannot lift the arm but can make a , moves both legs, difficult to do formal exam    Reflexes: Absent upper extremities. Absent lower extremities. Toes were upgoing bilateral.     Sensory: w/d to stimuli    Coord: Unable to assess    Gait: Unable to assess    Imaging:      EXAM:  CT ANGIO BRAIN (W)AW IC                          EXAM:  CT ANGIO NECK (W)AW IC                          EXAM:  CT BRAIN                            PROCEDURE DATE:  09/01/2019            INTERPRETATION:  CLINICAL INFORMATION: Intermittent altered mental   status. Imbalance.     TECHNIQUE: Noncontrast axial CT images were acquired through the head.   Contrast enhanced axial CT images were acquired from the aortic arch to   the vertex of the calvarium, during the angiographic phase. Additional   post-contrast axial CT images through the head were obtained.   Three-dimensional maximum intensity projection reformats were generated   from the angiographic images.  70 cc of Omnipaque-300 mg/ml were   administered intravenously, without immediate complication.    COMPARISON: None.    FINDINGS:     CT BRAIN:    A 3.4 x 3.3 cm cystic mass within the left frontal lobe with large area   of surrounding edema and causing effacement of the bilateral lateral   ventricles and rightward midline shift. There appears to be a solid   component of the lesion anteriorly. There is effacement of the   interpeduncular cistern. No hydrocephalus.    No areas of abnormal enhancement are identified.    The visualized paranasal sinuses are clear. The mastoid air cells and   middle ear cavities are clear.    The soft tissues of the scalp are unremarkable. The calvarium is intact.    CT ANGIOGRAPHY NECK:    Three-vessel aortic arch. Atherosclerotic calcifications of the aorta   without evidence of dissection or hemodynamically significant stenosis.   The origins of the great vessels are unremarkable.     The common carotid arteries are normal in caliber without significant   stenosis.     The carotid bifurcations are unremarkable. The internal carotid arteries   are normal in caliber without significant stenosis.     Co-dominant vertebral arteries. The vertebral arteries are normal in   caliber without significant stenosis.     The visualized neck and upper chest are unremarkable.    Visualized osseous structures are unremarkable.    CT ANGIOGRAPHY BRAIN:    There is no evidence for significant stenosis, major vessel occlusion, or   aneurysm about the [place] of [place].    There is no evidence for significant stenosis, major vessel occlusion, or   aneurysm involving the vertebrobasilar system.    No enlarged vascular lesions or clusters of abnormal vessels are noted to   suggest an arterial venous malformation.    Visualized portions of the superficial and deep venous systems are   unremarkable.      IMPRESSION:     CT brain: Predominantly cystic mass within the left frontal lobe with   large area of surrounding edema causing leftward midline shift. There   appears to be a hyperdense solid component of the mass anteriorly which   showsvascularity. There is effacement of the interpeduncular cistern. No   hydrocephalus.    CT angiography neck: No evidence of hemodynamically significant stenosis   by NASCET criteria. No evidence of vascular dissection.    CT angiography brain: No major vessel occlusion or proximal stenosis by   NASCET criteria. No evidence of aneurysm or other vascular malformation.                MARILYN WALKER M.D., RADIOLOGIST RESIDENT  This document has been electronically signed.  ISRAEL DORANTES M.D., ATTENDING RADIOLOGIST  This document has been electronically signed. Sep  1 2019  2:03PM      < from: MR Head w/wo IV Cont (09.02.19 @ 13:15) >  EXAM:  MR BRAIN WAW IC                            PROCEDURE DATE:  09/02/2019            INTERPRETATION:  INDICATIONS:  preop    TECHNIQUE:  Multiplanar imaging was performed using T1 weighted, T2   weighted and FLAIR sequences.  Diffusion weightedand SWI images were   also obtained.  Following intravenous gadolinium, multiplanar T1 weighted   images were performed. 6.5 cc Gadavist were administered. 1 cc were   discarded.      COMPARISON EXAMINATION: Study is read and compared with CT CTA 9/1/2019    FINDINGS:    VENTRICLES AND SULCI: Again appreciated is the mass effect on the left   frontal horn of the lateral ventricle as well as the third ventricle.  INTRA-AXIAL:  Evidence of the left frontal enhancing mass lesion that is   mixed signal with evidence of fluid and solid components. In addition   evidence of foci of susceptibility within the lesion which may represent   component of hemorrhage versus mineralization. Surrounding vasogenic   edema is appreciated. Evidence of a left frontal lesion measuring 7 cm AP   x 4 cm transverse by x 4.7 cm craniocaudal dimension. Periphery of the   lesion has a diffusion diffusion positivity with restricted diffusion on   the ADC map.  EXTRA-AXIAL:  No mass or collection.  VISUALIZED SINUSES:  Normal.  VISUALIZED MASTOIDS:  Clear.  CALVARIUM:  Normal.  CAROTID FLOW VOIDS:  Present.  MISCELLANEOUS:  None.    IMPRESSION: Left frontal heterogeneous lesion with cystic and solid   components in surrounding vasogenic edema. Lesion is solitary.There is   diffusion positivity around the periphery of the lesion. There is some   susceptibility seen in association. Findings suggestive of a glioblastoma   versus a metastasis. No diffusion restriction within the cystic portion   of the lesion tosuggest an abscess.                    MICHELE CHO M.D., ATTENDING RADIOLOGIST  This document has been electronically signed. Sep  3 2019  8:51AM                  _____________________________________________________________  EEG IMPRESSION/CLINICAL CORRELATE    Abnormal EEG study.  1. Potential epileptogenic focus in the left central region.  2. Structural abnormality in the left hemisphere.  3. Moderate to severe nonspecific diffuse or multifocal cerebral dysfunction.   4. No seizure seen.  5. Skull defect in the left frontocentral region.    _____________________________________________________________    Lindsay Potts MD  Attending Physician, [facility]          < from: CT Head No Cont (09.08.19 @ 08:44) >    EXAM:  CT BRAIN                            PROCEDURE DATE:  09/08/2019            INTERPRETATION:  HISTORY: Status post resection of left frontal mass.   Metastases.    Technique: CT of the head was performed without contrast.    Multiple contiguous axial images were acquired from the skullbase to the   vertex without the administration of intravenous contrast.  Coronal and   sagittal reformations were made.    COMPARISON: Prior head CT dated  9/6/2019 at 8:34 AM    FINDINGS:  Redemonstration of low-attenuation change in the left frontal and   parietal lobes extending across the genu of the corpus callosum with   internal hemorrhage and adjacent subarachnoid hemorrhage within the   postoperative bed, similar appearing. Unchanged 0.8 cm right to left   midline shift.     Status post left frontoparietal craniotomy with associated postoperative   changes, including pneumocephalus and surgical skin staples over the   anterior scalp.     The visualized intraorbital compartments, paranasal sinuses and mastoid   complexes appear free of acute disease. Partial visualization of   endotracheal and enteric tubes.    IMPRESSION:  Similar-appearing hemorrhage within the postoperative site with   surrounding low-attenuation change extending across the genu of the   corpus callosum. Similar associated mass effect and shift of the midline   structures.                  EDUADRO WAY M.D., RADIOLOGY RESIDENT  This document has been electronically signed.  CLIVE GILES M.D., ATTENDING RADIOLOGIST  This document has been electronically signed. Sep  8 2019  9:21AM                < end of copied text >

## 2019-09-09 NOTE — PROGRESS NOTE ADULT - ASSESSMENT
65 yo male presenting to ED for episodes of AMS  No fever, no leukocytosis  CT's with liver and acetabular lesion; CTH with cystic brain lesions with midline shift  MR confirms suspicion for glioblastoma vs met  AMS likely 2/2 underlying malignancy  S/p craniotomy to tumor 9/5  ? seizure episode  WBC elevated but stable; still no signs infection, remains in NSICU  Overall, Brain mass, AMS, suspected malignancy  - Continue off abx  - F/U quant gold, if canceled would resend  - Wound care and seizure care per neurosurgery team  - Trend WBC  - Will sign off. Please call with further questions or change in status.    Mikie Dhillon MD  Pager [phone]  After 5pm and on weekends call [phone]

## 2019-09-09 NOTE — PROGRESS NOTE ADULT - ASSESSMENT
POD#4 L crani tumor resection, frozen mets  post op heme    cont keppra  cont decadron for cerebral edema  -150  tube feeding  start chemoppx POD#4 L crani tumor resection, frozen mets  post op heme    cont keppra  cont decadron for cerebral edema  -150  passed dysphagia, started on puree diet  start chemoppx

## 2019-09-10 LAB
ANION GAP SERPL CALC-SCNC: 15 MMOL/L — SIGNIFICANT CHANGE UP (ref 5–17)
BUN SERPL-MCNC: 37 MG/DL — HIGH (ref 7–23)
CALCIUM SERPL-MCNC: 9.8 MG/DL — SIGNIFICANT CHANGE UP (ref 8.4–10.5)
CHLORIDE SERPL-SCNC: 99 MMOL/L — SIGNIFICANT CHANGE UP (ref 96–108)
CO2 SERPL-SCNC: 25 MMOL/L — SIGNIFICANT CHANGE UP (ref 22–31)
CREAT SERPL-MCNC: 0.52 MG/DL — SIGNIFICANT CHANGE UP (ref 0.5–1.3)
GLUCOSE SERPL-MCNC: 83 MG/DL — SIGNIFICANT CHANGE UP (ref 70–99)
HCT VFR BLD CALC: 42.9 % — SIGNIFICANT CHANGE UP (ref 39–50)
HGB BLD-MCNC: 15 G/DL — SIGNIFICANT CHANGE UP (ref 13–17)
MAGNESIUM SERPL-MCNC: 2.2 MG/DL — SIGNIFICANT CHANGE UP (ref 1.6–2.6)
MCHC RBC-ENTMCNC: 33.1 PG — SIGNIFICANT CHANGE UP (ref 27–34)
MCHC RBC-ENTMCNC: 34.9 GM/DL — SIGNIFICANT CHANGE UP (ref 32–36)
MCV RBC AUTO: 94.9 FL — SIGNIFICANT CHANGE UP (ref 80–100)
PHOSPHATE SERPL-MCNC: 2.8 MG/DL — SIGNIFICANT CHANGE UP (ref 2.5–4.5)
PLATELET # BLD AUTO: 151 K/UL — SIGNIFICANT CHANGE UP (ref 150–400)
POTASSIUM SERPL-MCNC: 4.2 MMOL/L — SIGNIFICANT CHANGE UP (ref 3.5–5.3)
POTASSIUM SERPL-SCNC: 4.2 MMOL/L — SIGNIFICANT CHANGE UP (ref 3.5–5.3)
RBC # BLD: 4.52 M/UL — SIGNIFICANT CHANGE UP (ref 4.2–5.8)
RBC # FLD: 11.6 % — SIGNIFICANT CHANGE UP (ref 10.3–14.5)
SODIUM SERPL-SCNC: 139 MMOL/L — SIGNIFICANT CHANGE UP (ref 135–145)
WBC # BLD: 17.7 K/UL — HIGH (ref 3.8–10.5)
WBC # FLD AUTO: 17.7 K/UL — HIGH (ref 3.8–10.5)

## 2019-09-10 PROCEDURE — 99253 IP/OBS CNSLTJ NEW/EST LOW 45: CPT | Mod: GC

## 2019-09-10 PROCEDURE — 99291 CRITICAL CARE FIRST HOUR: CPT

## 2019-09-10 RX ORDER — DEXAMETHASONE 0.5 MG/5ML
4 ELIXIR ORAL EVERY 8 HOURS
Refills: 0 | Status: DISCONTINUED | OUTPATIENT
Start: 2019-09-10 | End: 2019-09-11

## 2019-09-10 RX ORDER — SODIUM CHLORIDE 9 MG/ML
1000 INJECTION INTRAMUSCULAR; INTRAVENOUS; SUBCUTANEOUS
Refills: 0 | Status: DISCONTINUED | OUTPATIENT
Start: 2019-09-10 | End: 2019-09-10

## 2019-09-10 RX ORDER — METOCLOPRAMIDE HCL 10 MG
10 TABLET ORAL ONCE
Refills: 0 | Status: COMPLETED | OUTPATIENT
Start: 2019-09-10 | End: 2019-09-10

## 2019-09-10 RX ORDER — QUETIAPINE FUMARATE 200 MG/1
12.5 TABLET, FILM COATED ORAL AT BEDTIME
Refills: 0 | Status: DISCONTINUED | OUTPATIENT
Start: 2019-09-10 | End: 2019-09-16

## 2019-09-10 RX ORDER — VALPROIC ACID (AS SODIUM SALT) 250 MG/5ML
250 SOLUTION, ORAL ORAL EVERY 8 HOURS
Refills: 0 | Status: DISCONTINUED | OUTPATIENT
Start: 2019-09-10 | End: 2019-09-11

## 2019-09-10 RX ADMIN — TRAMADOL HYDROCHLORIDE 50 MILLIGRAM(S): 50 TABLET ORAL at 01:38

## 2019-09-10 RX ADMIN — ENOXAPARIN SODIUM 30 MILLIGRAM(S): 100 INJECTION SUBCUTANEOUS at 17:03

## 2019-09-10 RX ADMIN — TRAMADOL HYDROCHLORIDE 50 MILLIGRAM(S): 50 TABLET ORAL at 02:08

## 2019-09-10 RX ADMIN — Medication 10 MILLIGRAM(S): at 22:05

## 2019-09-10 RX ADMIN — CHLORHEXIDINE GLUCONATE 1 APPLICATION(S): 213 SOLUTION TOPICAL at 21:09

## 2019-09-10 RX ADMIN — Medication 25 MILLIGRAM(S): at 17:04

## 2019-09-10 RX ADMIN — QUETIAPINE FUMARATE 12.5 MILLIGRAM(S): 200 TABLET, FILM COATED ORAL at 22:06

## 2019-09-10 RX ADMIN — Medication 4 MILLIGRAM(S): at 06:03

## 2019-09-10 RX ADMIN — Medication 4 MILLIGRAM(S): at 13:29

## 2019-09-10 RX ADMIN — Medication 4 MILLIGRAM(S): at 21:09

## 2019-09-10 RX ADMIN — Medication 25 MILLIGRAM(S): at 09:35

## 2019-09-10 RX ADMIN — Medication 25 MILLIGRAM(S): at 02:12

## 2019-09-10 NOTE — CONSULT NOTE ADULT - ATTENDING COMMENTS
Seen and examined with Fellow. Agree with note.   Patient will need acute rehabilitation when stable.
Patient seen and examined  Films reviewed - left frontal mass with significant cerebral edema and evidence of subfalcine herniation      Plan for surgical resection after workup complete
Patient seen and examined, agree with the above assessment and plan by ERIN Shook  pt with no significant PMH preop for brain biopsy  CV stable  No angina or CHF  ECG sinus, nom ischemic changes  ECHO pending to assist in risk stratification  pt optimized for the OR, can proceed w low CV risk
63yo male presenting to ED for episodes of AMS.  No fever, no leukocytosis  No focal symptoms to suggest infection  Has AMS, with slow response to questions  CT's with liver and acetabular lesion; CTH with cystic brain lesions with midline shift  AMS likely 2/2 underlying malignancy; low suspicion for infection presently  Overall, Brain mass, AMS, suspected malignancy  - Continue off abx  - MR brain as can tolerate  - Check HIV, quant gold  - Workup for malignancy per primary team/neurosurgery  - Monitor mental status, monitor for any signs developing infection  - Case discussed with primary team    Mikie Dhillon MD  Pager [phone]  After 5pm and on weekends call [phone]    I was physically present for the key portions of the evaluation and management service provided. I saw and examined the patient. I agree with the above history, physical, and plan except for any discrepancies which I have documented in “Attending Attestation.” Please refer to “Attending Attestation” for final plan.

## 2019-09-10 NOTE — PHYSICAL THERAPY INITIAL EVALUATION ADULT - GENERAL OBSERVATIONS, REHAB EVAL
Pt rec'd semi-supine in bed in NAD, VSS, +tele monitoring, +BP cuff, +pulse ox monitoring, +IVL, agreeable to PT

## 2019-09-10 NOTE — PROGRESS NOTE ADULT - ASSESSMENT
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 Seizure post op     NEURO:  L craniotomy and tumor resection POD #5  Neurochecks q4 hr  Onc consultation  Decadron Taper per neurosurgery  EEG showed LPDs- Continue Keppra  f/u final path  Tramadol for pain control  Activity: [] mobilize as tolerated [x] Bedrest [X] PT [X] OT [] PMNR    PULM: respiratory failure due to above.      CV: HTN  SBP goal 100 - 150  hydralazine prn  echo pending with bradycardia    RENAL:  Cr wnl  D/C IVF  Monitor BMPs  Replete lytes- check BMP at 1400  condom cath    GI:    Continue tube feeds, Jevity- 18 hr feeds   No BM   Bowel regimen [x] colace, senna [] other: add miralax    ENDO:   Goal euglycemia (-180)    HEME/ONC:  VTE prophylaxis:  [x] chemoprophylaxis   [x] high risk of DVT/PE on admission due to:  2/2 metastatic cancer.  Will need metastatic w/u and onc consult on this admission  LE dopplers negative      ID:  afebile   PRN Tylenol     SOCIAL/FAMILY:   [x] updated at bedside [] family meeting    CODE STATUS:  [x] Full Code     DISPOSITION:  Unit [X] Floor     [x] Patient is at high risk of neurologic deterioration/death due to:  seizures, hemorrhage, herniation     Time spent 35 min  Contact: [phone] ASSESSMENT/PLAN:  S/P crani for tumor resection - prelim metastatic adenocarcinomas . Seizure post op     NEURO:  L craniotomy and tumor resection POD #5  Neurochecks q4 hr  Onc consultation  Decadron Taper per neurosurgery  EEG showed LPDs- Continue Keppra  f/u final path  Tramadol for pain control  Activity: [] mobilize as tolerated [x] Bedrest [X] PT [X] OT [] PMNR    PULM: respiratory failure due to above.      CV: HTN  SBP goal 100 - 150  hydralazine prn  echo pending with bradycardia    RENAL:  Cr wnl  D/C IVF  Monitor BMPs  Replete lytes- check BMP at 1400  condom cath    GI:    Continue tube feeds, Jevity- 18 hr feeds   Last BM - 9/10/19  Bowel regimen [x] colace, senna [] other: add miralax    ENDO:   Goal euglycemia (-180)    HEME/ONC:  VTE prophylaxis:  [x] chemoprophylaxis   [x] high risk of DVT/PE on admission due to:  2/2 metastatic cancer.  Will need metastatic w/u and onc consult on this admission  LE dopplers negative      ID:  afebile   PRN Tylenol     SOCIAL/FAMILY:   [x] updated at bedside [] family meeting    CODE STATUS:  [x] Full Code     DISPOSITION:  Unit [X] Floor     [x] Patient is at high risk of neurologic deterioration/death due to:  seizures, hemorrhage, herniation     Time spent 35 min  Contact: [phone] ASSESSMENT/PLAN:  S/P crani for tumor resection - prelim metastatic adenocarcinomas . Seizure post op     NEURO:  L craniotomy and tumor resection POD #5  Neurochecks q4 hr  Onc consultation  Decadron Taper per neurosurgery  EEG showed LPDs- Continue Valproic acid and check level  Off keppra  f/u final path  Tramadol for pain control  Activity: [] mobilize as tolerated [x] Bedrest [X] PT [X] OT [] PMNR    PULM: respiratory failure due to above.      CV: HTN  SBP goal 100 - 150  hydralazine prn  echo pending with bradycardia    RENAL:  Cr wnl  D/C IVF  Monitor BMPs  Replete lytes- check BMP at 1400  condom cath    GI:    Continue tube feeds, Jevity- 18 hr feeds   Last BM - 9/10/19  Bowel regimen [x] colace, senna [] other: add miralax    ENDO:   Goal euglycemia (-180)    HEME/ONC:  VTE prophylaxis:  [x] chemoprophylaxis   [x] high risk of DVT/PE on admission due to:  2/2 metastatic cancer.  Will need metastatic w/u and onc consult on this admission  LE dopplers negative      ID:  afebile   PRN Tylenol     SOCIAL/FAMILY:   [x] updated at bedside [] family meeting    CODE STATUS:  [x] Full Code     DISPOSITION:  Unit [X] Floor     [x] Patient is at high risk of neurologic deterioration/death due to:  seizures, hemorrhage, herniation     Time spent 35 min  Contact: [phone]

## 2019-09-10 NOTE — PROGRESS NOTE ADULT - ASSESSMENT
64 year old male former smoker with no significant PMHx presents with AMS, brain imaging with mass.     1. Brain mass, suspected malignancy, ? mets   CT head: 3.4 x 3.3 cm cystic mass w/in L frontal lobe w/ edema and midline shift.  MRI wwo brain: L cystic enhancing lesion causing significant edema  CT chest/abd noted, ? mets   hem/onc, neurosurg, neuro f/u   9/5 - s/p OR for resection/biopsy.   w/ prelim adenoca; post op hemmorhage w/ return to or  post op seizure; keppra, vimpat, steroids as per neurosx, NISCU  bradycardia noted postop, now resolved  care per NISCU     remains cv stable, hr improved and stable  bp parameters per nsicu   echo reveals normal LVEF w no sig valvular dyfunction

## 2019-09-10 NOTE — PROGRESS NOTE ADULT - ASSESSMENT
This is a 64y Male, here with 1 month of personality change, found the have left frontal solitary heterogenous cystic mass with edema, also hepatic lesion and acetabular lesion    Concern would be for primary brain tumor, eg GBM vs metastatic lesion      9/5 - went to OR for resection/biopsy.  Post op complicated with GTC sz.   now on keppra  Plan:  - continue keppra for seizure prophylaxis  - steroids per NS  - bx- adeno, onc following planning possible Rtx pending final pathology  -overall much improved mental status in last 2 days  - will follow    there was no family bedside, d/w patient.

## 2019-09-10 NOTE — PHYSICAL THERAPY INITIAL EVALUATION ADULT - PERTINENT HX OF CURRENT PROBLEM, REHAB EVAL
65 yo M present to ED for episodes of AMS. Daughter reports that pt has episodes of unresponsiveness "staring " for several seconds, delayed responses which began 2 weeks ago. Pt father passed away several weeks ago, went to [place] for , returned yesterday. + generalized weakness, poor appetite. Pt fell lifting 25lbs, landed on elbows , no head trauma . does not complain of any pain. CT Head - Interval increase in size of left parasagittal parenchymal hemorrhages as described.

## 2019-09-10 NOTE — PROGRESS NOTE ADULT - ASSESSMENT
POD#5 L crani tumor resection, frozen mets  post op heme    cont keppra  cont decadron for cerebral edema  seroquel at bedtime  -150  passed dysphagia, started on puree diet  chemoppx

## 2019-09-10 NOTE — PROGRESS NOTE ADULT - SUBJECTIVE AND OBJECTIVE BOX
stable exam    Vitals/labs/meds imaging reviewed  EO to noxious, oriented x2-3 given choices, pupils 3mm R b/l, BUE AG, LLE spont, RLE WD stable exam    Vitals/labs/meds imaging reviewed  alert, oriented x2-3 given choices, pupils 3mm R b/l, interm FC simple, BUE AG, LLE spont, RLE WD

## 2019-09-10 NOTE — PROGRESS NOTE ADULT - SUBJECTIVE AND OBJECTIVE BOX
CC: pt awake, alert, no cp/sob, family at the bedside    TELEMETRY: nsr    PHYSICAL EXAM:    T(C): 37 (09-10-19 @ 11:00), Max: 37.3 (09-09-19 @ 15:00)  HR: 68 (09-10-19 @ 11:00) (61 - 70)  BP: 127/81 (09-10-19 @ 11:00) (124/75 - 131/80)  RR: 18 (09-10-19 @ 11:00) (12 - 20)  SpO2: 96% (09-10-19 @ 11:00) (93% - 97%)  Wt(kg): --  I&O's Summary    09 Sep 2019 07:01  -  10 Sep 2019 07:00  --------------------------------------------------------  IN: 560 mL / OUT: 1350 mL / NET: -790 mL    10 Sep 2019 07:01  -  10 Sep 2019 11:27  --------------------------------------------------------  IN: 375 mL / OUT: 0 mL / NET: 375 mL        Appearance: Normal	  Cardiovascular: Normal S1 S2,RRR, No JVD, No murmurs  Respiratory: Lungs clear to auscultation	  Gastrointestinal:  Soft, Non-tender, + BS	  Extremities: Normal range of motion, No clubbing, cyanosis or edema  Vascular: Peripheral pulses palpable 2+ bilaterally     LABS:	 	                          14.5   20.5  )-----------( 126      ( 08 Sep 2019 21:02 )             43.0     09-09    141  |  102  |  29<H>  ----------------------------<  121<H>  4.4   |  27  |  0.58    Ca    9.9      09 Sep 2019 14:14  Phos  3.8     09-08  Mg     2.1     09-08            CARDIAC MARKERS:

## 2019-09-10 NOTE — CONSULT NOTE ADULT - SUBJECTIVE AND OBJECTIVE BOX
63 yo Male with no significant PMH was admitted on  with AMS. As per records, family had reported that patient had episodes of unresponsiveness and staring for several seconds. Family also stated they had noticed delayed responses for two weeks prior to admission. Patient also had a fall while lifting 25 lbs with no head trauma. To note, patient had recently traveled to [place] as his father had passed away from colon ca several weeks ago. CT head on admission showed cystic mass within the left frontal lobe with  large area of surrounding edema causing leftward midline shift. CT chest/abdomen/pelvis showed  b/l renal lesions, hepatic lesion, and a right acetabular lesion. MRI brain showed left frontal lobe cystic lesion with edema. Patient was taken to the OR o  for craniotomy and tumor resection. Post op patient was not following commands. Repeat CT head showed left parenchymal hemorrages and patient was taken back to the OR. Currently patient is being followed by hematology/oncology and is pending pathology report for possible adenocarcinoma.   At baseline patient is an independent community ambulator. He lives with is children      REVIEW OF SYSTEMS  Constitutional - No fever, No weight loss, + fatigue  HEENT - No eye pain, + visual disturbances, No difficulty hearing,  No neck pain  Respiratory - No cough, No wheezing, No shortness of breath  Cardiovascular - No chest pain, No palpitations  Gastrointestinal - No abdominal pain, No nausea, No vomiting, No diarrhea, No constipation  Genitourinary - No dysuria, No frequency, No incontinence  Neurological - No headaches, + memory loss, + loss of strength, No numbness, No tremors  Skin - No itching, No rashes,   Musculoskeletal - No joint pain, No joint swelling, No muscle pain  Psychiatric - No depression, + anxiety    PAST MEDICAL & SURGICAL HISTORY  No pertinent past medical history  No significant past surgical history    SOCIAL HISTORY  Smoking - prior smoker for 50 years  EtOH - social use of etoh  Drugs - Denied    PRIOR FUNCTIONAL HISTORY  Ambulation: independent with no assistive device  ADLs: independent    Previous Home Equipment: denies    HOME ENVIRONMENT:  Type of Home: 1st floor of apartment building  Stairs: 10 outside / 0 inside   Living With: children  Caregiver's availability: yes    Special Needs or Precautions:  Weight bearing status: WBAT    FAMILY HISTORY   No pertinent family history in first degree relatives  Father -  from colon ca    RECENT LABS/IMAGING  CBC Full  -  ( 08 Sep 2019 21:02 )  WBC Count : 20.5 K/uL  RBC Count : 4.50 M/uL  Hemoglobin : 14.5 g/dL  Hematocrit : 43.0 %  Platelet Count - Automated : 126 K/uL  Mean Cell Volume : 95.4 fl  Mean Cell Hemoglobin : 32.3 pg  Mean Cell Hemoglobin Concentration : 33.9 gm/dL  Auto Neutrophil # : x  Auto Lymphocyte # : x  Auto Monocyte # : x  Auto Eosinophil # : x  Auto Basophil # : x  Auto Neutrophil % : x  Auto Lymphocyte % : x  Auto Monocyte % : x  Auto Eosinophil % : x  Auto Basophil % : x        141  |  102  |  29<H>  ----------------------------<  121<H>  4.4   |  27  |  0.58    Ca    9.9      09 Sep 2019 14:14  Phos  3.8       Mg     2.1           IMAGING  < from: CT Head No Cont / CT Angio Head & Neck (19 @ 10:27) >  CT brain: Predominantly cystic mass within the left frontal lobe with large area of surrounding edema causing leftward midline shift. There appears to be a hyperdense solid component of the mass anteriorly which shows vascularity. There is effacement of the interpeduncular cistern. No hydrocephalus.    CT angiography neck: No evidence of hemodynamically significant stenosis by NASCET criteria. No evidence of vascular dissection.    CT angiography brain: No major vessel occlusion or proximal stenosis by NASCET criteria. No evidence of aneurysm or other vascular malformation.    < from: CT Chest/Abdomen/Pelvis w/ IV Cont (19 @ 16:45) >  No lesion or adenopathy in the chest. No adenopathy in the abdomen or pelvis.   Indeterminate 1.2 cm hypoattenuating right hepatic lobe lesion.  Indeterminate bilateral renal cystic lesions, which may reflect hemorrhagic/proteinaceous cysts. Further evaluation with abdominal ultrasound is recommended.  1.6 cm sclerotic right acetabular lesion    < from: MR Head w/wo IV Cont (19 @ 13:15) >  Left frontal heterogeneous lesion with cystic and solid components in surrounding vasogenic edema. Lesion is solitary. There is diffusion positivity around the periphery of the lesion. There is some susceptibility seen in association. Findings suggestive of a glioblastoma   versus a metastasis. No diffusion restriction within the cystic portion f the lesion tosuggest an absces    < from: CT Head No Cont (19 @ 18:21) >  Left frontal craniotomy,  status post resection of a left frontal  mass as detailed on MR, with 5.6 x 3.3 x 4 cm, (AP, TR, CC) left frontal intraparenchymal hemorrhage, with intraventricular extension, with left frontal edema, mass effect, effaced left lateral and third ventricle, with 1 cm rightward midline shift, subfalcine herniation, rightward midbrain displacement with narrowed right and widened left ambient cistern. If symptoms persist consider follow-up head CT or MR if no contraindications    < from: CT Head No Cont (19 @ 09:08) >  Interval increase in size of left parasagittal parenchymal hemorrhages as described.  Similar extensive left frontal vasogenic edema extending across the corpus callosum.  Evaluation for residual neoplasm is limited. Rightward midline shift of 9 mm is similar. Mass effect upon the frontal horns is similar. Basal cisterns are still visualized. No hydrocephalus.    < from: [facility] Duplex Lower Ext Vein Scan, Bilat (19 @ 16:04) >  No evidence of deep venous thrombosis in either lower extremity.    < from: MR Head w/wo IV Cont (19 @ 23:53) >  Redemonstration of a left frontal craniotomy changes with a subjacent surgical cavity within the left frontal lobe status post resection of a mass lesion. Residual hemorrhage and/or tumor within the surgical bed.  Unchanged surrounding T2/FLAIR prolongation within the left frontal white matter extending across the body of the corpus callosum.  Unchanged 8 mm of left-to-right rightward midline shift.    < from: CT Head No Cont (19 @ 08:44) >  Similar-appearing hemorrhage within the postoperative site with surrounding low-attenuation change extending across the genu of the corpus callosum. Similar associated mass effect and shift of the midline structures.      VITALS  T(C): 36.9 (09-10-19 @ 07:00), Max: 37.3 (19 @ 15:00)  HR: 63 (09-10-19 @ 07:00) (61 - 70)  BP: 127/82 (09-10-19 @ 07:00) (124/75 - 131/80)  RR: 14 (09-10-19 @ 07:00) (12 - 20)  SpO2: 93% (09-10-19 @ 07:00) (93% - 97%)  Wt(kg): --    ALLERGIES  No Known Allergies      MEDICATIONS   acetaminophen    Suspension .. 650 milliGRAM(s) Oral every 6 hours PRN  chlorhexidine 4% Liquid 1 Application(s) Topical <User Schedule>  dexamethasone  Injectable 4 milliGRAM(s) IV Push every 8 hours  docusate sodium Liquid 100 milliGRAM(s) Oral two times a day  enoxaparin Injectable 30 milliGRAM(s) SubCutaneous <User Schedule>  polyethylene glycol 3350 17 Gram(s) Oral two times a day  senna Syrup 20 milliLiter(s) Oral at bedtime  traMADol 25 milliGRAM(s) Oral every 4 hours PRN  traMADol 50 milliGRAM(s) Oral every 4 hours PRN  valproate sodium IVPB 250 milliGRAM(s) IV Intermittent every 8 hours      ----------------------------------------------------------------------------------------  PHYSICAL EXAM  Constitutional - NAD, Comfortably sitting in bed, emotional at times, no agitation noted  HEENT - NCAT, EOMI, no gaze deviation  Neck - Supple,   Chest - CTA bilaterally, No wheeze,  Cardiovascular - RRR, S1S2, No murmurs  Abdomen - BS+, Soft, NT ND  Extremities - No C/C/E, No calf tenderness   Neurologic Exam -                    Cognitive - AAO x1-2 (person and hospital) unable to recall month or year     Communication - mild dysarthria     Cranial Nerves - CN 2-12 grossly intact, no facial asymmetry, tongue midline, +tracking      Motor -                     LEFT    UE - ShAB 3/5, EF 4/5, EE 4/5, WE 4/5,  4/5                    RIGHT UE - ShAB 4/5, EF 4/5, EE 4/5, WE 4/5,  4/5                    LEFT    LE - HF 3/5, KE 4-/5, DF 3/5, PF 3/5                    RIGHT LE - HF 3/5, KE 4-/5, DF 3/5, PF 3/5        Sensory - Intact to LT x4 extremties     Reflexes - DTR equal bilaterally     Coordination - FTN poor bilaterally     OculoVestibular - + saccades, No nystagmus  Psychiatric - mood labile     CURRENT FUNCTIONAL STATUS   OT  Bed Mobility: Supine to Sit:     · Level of [place]	received OOB	    Transfer: Sit to Stand:     · Level of [place]	moderate assist (50% patients effort)	  · Physical Assist/Nonphysical Assist	2 person assist; nonverbal cues (demo/gestures); verbal cues	  · Weight-Bearing Restrictions	full weight-bearing	    Transfer: Stand to Sit:     · Level of [place]	moderate assist (50% patients effort)	  · Physical Assist/Nonphysical Assist	1 person assist; nonverbal cues (demo/gestures); verbal cues	  · Weight-Bearing Restrictions	full weight-bearing	    Sit/Stand Transfer Safety Analysis:     · Transfer Safety Concerns Noted	decreased weight-shifting ability	  · Impairments Contributing to Impaired Transfers	impaired balance; decreased ROM; decreased strength; impaired coordination; cognition	    Balance Skills Assessment:     · Sitting Balance: Static	fair minus 	  · Sitting Balance: Dynamic	poor plus 	  · Sit-to-Stand Balance	poor minus 	  · Standing Balance: Static	poor balance 	  · Standing Balance: Dynamic	poor minus 	  · Systems Impairment Contributing to Balance Disturbance	neuromuscular; cognitive	  · Identified Impairments Contributing to Balance Disturbance	impaired coordination; decreased strength; decreased ROM; impaired postural control	    Sensory Examination:     Pain Sensation:   · Left UE	moderate impairment 	  · Right UE	moderate impairment 	  · Left LE	mild impairment 	  · Right LE	absent sensation 	      Visual Assessment:    Visual Assessment:    Visual Assessment:   · Visual Acuity	not tested	  · Visual Tracking	normal	  · Visual Neglect	right; will attend with cues	  · Visual Field Cuts	none	  · Eye Muscle Balance	normal	  · Visual Scanning	WFL 	  · Visual Depth Perception	WFL 	    Fine Motor Coordination:     Fine Motor Coordination:   · Left Hand, Manipulation of Objects	moderate impairment 	  · Right Hand, Manipulation of Objects	moderate impairment 	    Lower Body Dressing Training:     · Level of [place]	maximum assist (25% patients effort)	  · Physical Assist/Nonphysical Assist	1 person assist; nonverbal cues (demo/gestures); verbal cues	    Grooming Training:     · Level of [place]	maximum assist (25% patients effort)	  · Physical Assist/Nonphysical Assist	1 person assist; nonverbal cues (demo/gestures); verbal cues	    Eating/Self-Feeding Training:     · Level of [place]	NGT	    ----------------------------------------------------------------------------------------  ASSESSMENT/PLAN    64y Male with functional deficits after left frontal cystic mass s/p craniotomy    Left frontal cystic mass s/p craniotomy - management as per neurosurgical team, decadron, depacon,   Pain - Tylenol, Tramadol  Constipation - miralax, senna, colace  DVT PPX - SCDs, Lovenox subq  Diet - Pureed  Rehab -   Continue bedside therapy as well as OOB throughout the day with mobilization by staff to maintain cardiopulmonary function and prevention of secondary complications related to debility.     Recommend ACUTE inpatient rehabilitation, when medically cleared by primary team, for the functional deficits consisting of 3 hours of therapy/day & 24 hour RN/daily PMR physician for comorbid medical management. Patient will be able to tolerate 3 hours a day.    Will continue to follow for ongoing rehab needs and recommendations. Functional progress will determine ongoing rehab dispo recommendations, which may change.

## 2019-09-10 NOTE — PROGRESS NOTE ADULT - SUBJECTIVE AND OBJECTIVE BOX
Chief complaint:   Patient is a 64y old  Male who presents with a chief complaint of AMS (07 Sep 2019 12:14)    HPI:  63yo male presenting to ED for episodes of AMS.   Daughter reports that pt has episodes of unresponsiveness "staring " for several seconds   delayed responses which began 2 weeks ago. Pt father passed away several weeks ago, went to in [place] for , returned yesterday. + generalized weakness, poor appetite, quit smoking since death of father  .  Pt fell lifting 25lbs, landed on elbows , no head trauma . does not complain of any pain.  Denies LOC, muscle jerking, tremor, CP, SOB, n/v/d/c, (01 Sep 2019 16:29    REVIEW OF SYSTEMS:   [ X] All ROS addressed below are non-contributory, except:  Neuro: [X ] Headache [ ] Back pain [ ] Numbness [ ] Weakness [ ] Ataxia [ ] Dizziness [ ] Aphasia [ ] Dysarthria [ ] Visual disturbance  Resp: [ ] Shortness of breath/dyspnea, [ ] Orthopnea [ ] Cough  CV: [ ] Chest pain [ ] Palpitation [ ] Lightheadedness [ ] Syncope  Renal: [ ] Thirst [ ] Edema  GI: [ ] Nausea [ ] Emesis [ ] Abdominal pain [ ] Constipation [ ] Diarrhea  Hem: [ ] Hematemesis [ ] bright red blood per rectum  ID: [ ] Fever [ ] Chills [ ] Dysuria  ENT: [ ] Rhinorrhea      -----------------------------------------------------------------------------------------------------------------------------------------------------------------------------------  ICU Vital Signs Last 24 Hrs  T(C): 36.9 (10 Sep 2019 07:00), Max: 37.3 (09 Sep 2019 15:00)  T(F): 98.4 (10 Sep 2019 07:00), Max: 99.2 (09 Sep 2019 15:00)  HR: 63 (10 Sep 2019 07:00) (61 - 70)  BP: 127/82 (10 Sep 2019 07:) (124/75 - 131/80)  BP(mean): 95 (10 Sep 2019 07:) (89 - 96)  RR: 14 (10 Sep 2019 07:) (12 - 20)  SpO2: 93% (10 Sep 2019 07:) (93% - 97%)        09 Sep 2019 07:01  -  10 Sep 2019 07:00  --------------------------------------------------------  IN:    ns in tub fed  [id number]: 560 mL  Total IN: 560 mL    OUT:    Incontinent per Condom Catheter: 1350 mL  Total OUT: 1350 mL    Total NET: -790 mL  condom cath      MEDICATIONS  (STANDING):  chlorhexidine 4% Liquid 1 Application(s) Topical <User Schedule>  dexamethasone  Injectable 4 milliGRAM(s) IV Push every 8 hours  docusate sodium Liquid 100 milliGRAM(s) Oral two times a day  enoxaparin Injectable 30 milliGRAM(s) SubCutaneous <User Schedule>  polyethylene glycol 3350 17 Gram(s) Oral two times a day  senna Syrup 20 milliLiter(s) Oral at bedtime  valproate sodium IVPB 250 milliGRAM(s) IV Intermittent every 8 hours    MEDICATIONS  (PRN):  acetaminophen    Suspension .. 650 milliGRAM(s) Oral every 6 hours PRN Temp greater or equal to 38C (100.4F)  traMADol 25 milliGRAM(s) Oral every 4 hours PRN Moderate Pain (4 - 6)  traMADol 50 milliGRAM(s) Oral every 4 hours PRN Severe Pain (7 - 10)        NEUROIMAGING:   Recent imaging studies were reviewed.    LAB RESULTS:                          14.5   20.5  )-----------( 126      ( 08 Sep 2019 21:02 )             43.0                         12.9   14.2  )-----------( 107      ( 07 Sep 2019 21:01 )             39.8         141  |  102  |  29<H>  ----------------------------<  121<H>  4.4   |  27  |  0.58    Ca    9.9      09 Sep 2019 14:14  Phos  3.8     09-08  Mg     2.1         138  |  99  |  20  ----------------------------<  125<H>  4.2   |  28  |  0.50    Ca    10.3      08 Sep 2019 21:02  Phos  3.8     09-08  Mg     2.1         139  |  105  |  20  ----------------------------<  134<H>  4.4   |  26  |  0.47<L>    Ca    9.3      07 Sep 2019 21:01  Phos  2.6     -07  Mg     2.1     07        CT Head No Cont (19 @ 08:44) >  Similar-appearing hemorrhage within the postoperative site with   surrounding low-attenuation change extending across the genu of the   corpus callosum. Similar associated mass effect and shift of the midline   structures.     CT Abdomen and Pelvis w/ IV Cont (19 @ 17:07) >  IMPRESSION:     No lesion or adenopathy in the chest. No adenopathy in the abdomen or   pelvis.     Indeterminate 1.2 cm hypoattenuating right hepatic lobe lesion.    Indeterminate bilateral renal cystic lesions, which may reflect   hemorrhagic/proteinaceous cysts. Further evaluation with abdominal   ultrasound is recommended.    1.6 cm sclerotic right acetabular lesion.         VA Duplex Lower Ext Vein Scan, Bilat (19 @ 16:04) >  No evidence of deep venous thrombosis in either lower extremity.                -----------------------------------------------------------------------------------------------------------------------------------------------------------------------------------    PHYSICAL EXAM:  General: Calm,   HEENT: MMM,   Neuro:  -Mental status- No acute distress,  tracking,  following commands intermittently  -CN- PERRL 3mm, EOMI, tongue midline, face symmetric  -Motor- moving uppers 3/5 spont to midline L>>R  LLE - elevates spontaneously  RLE - withdraws , unable to plantar flex R foot      CV: RRR  Pulm: Clear to auscultation  Abd: Soft, nontender, nondistended  Ext: No edema  Skin: warm, dry Chief complaint:   Patient is a 64y old  Male who presents with a chief complaint of AMS (07 Sep 2019 12:14)    HPI:  65yo male presenting to ED for episodes of AMS.   Daughter reports that pt has episodes of unresponsiveness "staring " for several seconds   delayed responses which began 2 weeks ago. Pt father passed away several weeks ago, went to in [place] for , returned yesterday. + generalized weakness, poor appetite, quit smoking since death of father  .  Pt fell lifting 25lbs, landed on elbows , no head trauma . does not complain of any pain.  Denies LOC, muscle jerking, tremor, CP, SOB, n/v/d/c, (01 Sep 2019 16:29    REVIEW OF SYSTEMS:   [ X] All ROS addressed below are non-contributory, except:  Neuro: [X ] Headache [ ] Back pain [ ] Numbness [ ] Weakness [ ] Ataxia [ ] Dizziness [ ] Aphasia [ ] Dysarthria [ ] Visual disturbance  Resp: [ ] Shortness of breath/dyspnea, [ ] Orthopnea [ ] Cough  CV: [ ] Chest pain [ ] Palpitation [ ] Lightheadedness [ ] Syncope  Renal: [ ] Thirst [ ] Edema  GI: [ ] Nausea [ ] Emesis [ ] Abdominal pain [ ] Constipation [ ] Diarrhea  Hem: [ ] Hematemesis [ ] bright red blood per rectum  ID: [ ] Fever [ ] Chills [ ] Dysuria  ENT: [ ] Rhinorrhea      -----------------------------------------------------------------------------------------------------------------------------------------------------------------------------------  ICU Vital Signs Last 24 Hrs  T(C): 36.9 (10 Sep 2019 07:00), Max: 37.3 (09 Sep 2019 15:00)  T(F): 98.4 (10 Sep 2019 07:00), Max: 99.2 (09 Sep 2019 15:00)  HR: 63 (10 Sep 2019 07:00) (61 - 70)  BP: 127/82 (10 Sep 2019 07:) (124/75 - 131/80)  BP(mean): 95 (10 Sep 2019 07:) (89 - 96)  RR: 14 (10 Sep 2019 07:) (12 - 20)  SpO2: 93% (10 Sep 2019 07:) (93% - 97%)        09 Sep 2019 07:01  -  10 Sep 2019 07:00  --------------------------------------------------------  IN:    ns in tub fed  [id number]: 560 mL  Total IN: 560 mL    OUT:    Incontinent per Condom Catheter: 1350 mL  Total OUT: 1350 mL    Total NET: -790 mL  condom cath      MEDICATIONS  (STANDING):  chlorhexidine 4% Liquid 1 Application(s) Topical <User Schedule>  dexamethasone  Injectable 4 milliGRAM(s) IV Push every 8 hours  docusate sodium Liquid 100 milliGRAM(s) Oral two times a day  enoxaparin Injectable 30 milliGRAM(s) SubCutaneous <User Schedule>  polyethylene glycol 3350 17 Gram(s) Oral two times a day  senna Syrup 20 milliLiter(s) Oral at bedtime  valproate sodium IVPB 250 milliGRAM(s) IV Intermittent every 8 hours    MEDICATIONS  (PRN):  acetaminophen    Suspension .. 650 milliGRAM(s) Oral every 6 hours PRN Temp greater or equal to 38C (100.4F)  traMADol 25 milliGRAM(s) Oral every 4 hours PRN Moderate Pain (4 - 6)  traMADol 50 milliGRAM(s) Oral every 4 hours PRN Severe Pain (7 - 10)        NEUROIMAGING:   Recent imaging studies were reviewed.    LAB RESULTS:                          14.5   20.5  )-----------( 126      ( 08 Sep 2019 21:02 )             43.0                         12.9   14.2  )-----------( 107      ( 07 Sep 2019 21:01 )             39.8         141  |  102  |  29<H>  ----------------------------<  121<H>  4.4   |  27  |  0.58    Ca    9.9      09 Sep 2019 14:14  Phos  3.8     09-08  Mg     2.1         138  |  99  |  20  ----------------------------<  125<H>  4.2   |  28  |  0.50    Ca    10.3      08 Sep 2019 21:02  Phos  3.8     09-08  Mg     2.1         139  |  105  |  20  ----------------------------<  134<H>  4.4   |  26  |  0.47<L>    Ca    9.3      07 Sep 2019 21:01  Phos  2.6     -07  Mg     2.1     07        CT Head No Cont (19 @ 08:44) >  Similar-appearing hemorrhage within the postoperative site with   surrounding low-attenuation change extending across the genu of the   corpus callosum. Similar associated mass effect and shift of the midline   structures.     CT Abdomen and Pelvis w/ IV Cont (19 @ 17:07) >  IMPRESSION:     No lesion or adenopathy in the chest. No adenopathy in the abdomen or   pelvis.     Indeterminate 1.2 cm hypoattenuating right hepatic lobe lesion.    Indeterminate bilateral renal cystic lesions, which may reflect   hemorrhagic/proteinaceous cysts. Further evaluation with abdominal   ultrasound is recommended.    1.6 cm sclerotic right acetabular lesion.         VA Duplex Lower Ext Vein Scan, Bilat (19 @ 16:04) >  No evidence of deep venous thrombosis in either lower extremity.                -----------------------------------------------------------------------------------------------------------------------------------------------------------------------------------    PHYSICAL EXAM:  General: Calm,   HEENT: MMM,   Neuro:  -Mental status- Oriented X2 No acute distress,  tracking,  following commands intermittently  -CN- PERRL 3mm, EOMI, tongue midline, face symmetric  -Motor- moving uppers 4/5 spont  drift RUE proximal weaker than distal  LLE - elevates spontaneously 4/5 equally yet R leg prox weaker than distal   CV: RRR  Pulm: Clear to auscultation  Abd: Soft, nontender, nondistended  Ext: No edema  Skin: warm, dry

## 2019-09-10 NOTE — PHYSICAL THERAPY INITIAL EVALUATION ADULT - TRANSFER SAFETY CONCERNS NOTED: SIT/STAND, REHAB EVAL
decreased weight-shifting ability/losing balance/decreased sequencing ability/decreased balance during turns/decreased step length

## 2019-09-10 NOTE — PROGRESS NOTE ADULT - SUBJECTIVE AND OBJECTIVE BOX
SUBJECTIVE:     patient had GTC seizure post op after taken back to OR for hematoma found post op from biopsy.    now remains extubated, more awake, emotionally  labile.     Medications:  acetaminophen    Suspension .. 650 milliGRAM(s) Oral every 6 hours PRN  chlorhexidine 4% Liquid 1 Application(s) Topical <User Schedule>  dexamethasone  Injectable 4 milliGRAM(s) IV Push every 8 hours  docusate sodium Liquid 100 milliGRAM(s) Oral two times a day  enoxaparin Injectable 30 milliGRAM(s) SubCutaneous <User Schedule>  polyethylene glycol 3350 17 Gram(s) Oral two times a day  senna Syrup 20 milliLiter(s) Oral at bedtime  traMADol 25 milliGRAM(s) Oral every 4 hours PRN  traMADol 50 milliGRAM(s) Oral every 4 hours PRN  valproate sodium IVPB 250 milliGRAM(s) IV Intermittent every 8 hours      Labs:  CBC Full  -  ( 08 Sep 2019 21:02 )  WBC Count : 20.5 K/uL  RBC Count : 4.50 M/uL  Hemoglobin : 14.5 g/dL  Hematocrit : 43.0 %  Platelet Count - Automated : 126 K/uL  Mean Cell Volume : 95.4 fl  Mean Cell Hemoglobin : 32.3 pg  Mean Cell Hemoglobin Concentration : 33.9 gm/dL  Auto Neutrophil # : x  Auto Lymphocyte # : x  Auto Monocyte # : x  Auto Eosinophil # : x  Auto Basophil # : x  Auto Neutrophil % : x  Auto Lymphocyte % : x  Auto Monocyte % : x  Auto Eosinophil % : x  Auto Basophil % : x    09-09    141  |  102  |  29<H>  ----------------------------<  121<H>  4.4   |  27  |  0.58    Ca    9.9      09 Sep 2019 14:14  Phos  3.8     09-08  Mg     2.1     09-08      CAPILLARY BLOOD GLUCOSE                  Vitals:  Vital Signs Last 24 Hrs  T(C): 36.9 (10 Sep 2019 07:00), Max: 37.3 (09 Sep 2019 15:00)  T(F): 98.4 (10 Sep 2019 07:00), Max: 99.2 (09 Sep 2019 15:00)  HR: 63 (10 Sep 2019 07:00) (61 - 70)  BP: 127/82 (10 Sep 2019 07:00) (124/75 - 131/80)  BP(mean): 95 (10 Sep 2019 07:00) (89 - 96)  RR: 14 (10 Sep 2019 07:00) (12 - 20)  SpO2: 93% (10 Sep 2019 07:00) (93% - 97%)    NEUROLOGICAL EXAM:    Neurologic Exam:    Mental Status: awake, alert, in no acute distress, sitting up in bed. speech mild aphasia, no dysarthria. follows commands, crying, wiping tears.    Cranial Nerves: Pupils reactive bilaterally equal. EOMI no nystagmus, no gaze deviation.  . No gross facial asymmetry. shoulder shrug intact b/l, hearing intact b/l.    Motor: Bulk was normal. Tone was low.  moves b/l arms spontaneously purposefully, moves both legs, full ROM b/l Ue and LE.    Reflexes: Absent upper extremities. Absent lower extremities. Toes were upgoing bilateral.     Sensory: intact ot LT and PP b/l Ue and LE    Coord: Unable to assess    Gait: Unable to assess    Imaging:      EXAM:  CT ANGIO BRAIN (W)AW IC                          EXAM:  CT ANGIO NECK (W)AW IC                          EXAM:  CT BRAIN                            PROCEDURE DATE:  09/01/2019            INTERPRETATION:  CLINICAL INFORMATION: Intermittent altered mental   status. Imbalance.     TECHNIQUE: Noncontrast axial CT images were acquired through the head.   Contrast enhanced axial CT images were acquired from the aortic arch to   the vertex of the calvarium, during the angiographic phase. Additional   post-contrast axial CT images through the head were obtained.   Three-dimensional maximum intensity projection reformats were generated   from the angiographic images.  70 cc of Omnipaque-300 mg/ml were   administered intravenously, without immediate complication.    COMPARISON: None.    FINDINGS:     CT BRAIN:    A 3.4 x 3.3 cm cystic mass within the left frontal lobe with large area   of surrounding edema and causing effacement of the bilateral lateral   ventricles and rightward midline shift. There appears to be a solid   component of the lesion anteriorly. There is effacement of the   interpeduncular cistern. No hydrocephalus.    No areas of abnormal enhancement are identified.    The visualized paranasal sinuses are clear. The mastoid air cells and   middle ear cavities are clear.    The soft tissues of the scalp are unremarkable. The calvarium is intact.    CT ANGIOGRAPHY NECK:    Three-vessel aortic arch. Atherosclerotic calcifications of the aorta   without evidence of dissection or hemodynamically significant stenosis.   The origins of the great vessels are unremarkable.     The common carotid arteries are normal in caliber without significant   stenosis.     The carotid bifurcations are unremarkable. The internal carotid arteries   are normal in caliber without significant stenosis.     Co-dominant vertebral arteries. The vertebral arteries are normal in   caliber without significant stenosis.     The visualized neck and upper chest are unremarkable.    Visualized osseous structures are unremarkable.    CT ANGIOGRAPHY BRAIN:    There is no evidence for significant stenosis, major vessel occlusion, or   aneurysm about the [place] of [place].    There is no evidence for significant stenosis, major vessel occlusion, or   aneurysm involving the vertebrobasilar system.    No enlarged vascular lesions or clusters of abnormal vessels are noted to   suggest an arterial venous malformation.    Visualized portions of the superficial and deep venous systems are   unremarkable.      IMPRESSION:     CT brain: Predominantly cystic mass within the left frontal lobe with   large area of surrounding edema causing leftward midline shift. There   appears to be a hyperdense solid component of the mass anteriorly which   showsvascularity. There is effacement of the interpeduncular cistern. No   hydrocephalus.    CT angiography neck: No evidence of hemodynamically significant stenosis   by NASCET criteria. No evidence of vascular dissection.    CT angiography brain: No major vessel occlusion or proximal stenosis by   NASCET criteria. No evidence of aneurysm or other vascular malformation.                MARILYN WALKER M.D., RADIOLOGIST RESIDENT  This document has been electronically signed.  ISRAEL DORANTES M.D., ATTENDING RADIOLOGIST  This document has been electronically signed. Sep  1 2019  2:03PM      < from: MR Head w/wo IV Cont (09.02.19 @ 13:15) >  EXAM:  MR BRAIN WAW IC                            PROCEDURE DATE:  09/02/2019            INTERPRETATION:  INDICATIONS:  preop    TECHNIQUE:  Multiplanar imaging was performed using T1 weighted, T2   weighted and FLAIR sequences.  Diffusion weightedand SWI images were   also obtained.  Following intravenous gadolinium, multiplanar T1 weighted   images were performed. 6.5 cc Gadavist were administered. 1 cc were   discarded.      COMPARISON EXAMINATION: Study is read and compared with CT CTA 9/1/2019    FINDINGS:    VENTRICLES AND SULCI: Again appreciated is the mass effect on the left   frontal horn of the lateral ventricle as well as the third ventricle.  INTRA-AXIAL:  Evidence of the left frontal enhancing mass lesion that is   mixed signal with evidence of fluid and solid components. In addition   evidence of foci of susceptibility within the lesion which may represent   component of hemorrhage versus mineralization. Surrounding vasogenic   edema is appreciated. Evidence of a left frontal lesion measuring 7 cm AP   x 4 cm transverse by x 4.7 cm craniocaudal dimension. Periphery of the   lesion has a diffusion diffusion positivity with restricted diffusion on   the ADC map.  EXTRA-AXIAL:  No mass or collection.  VISUALIZED SINUSES:  Normal.  VISUALIZED MASTOIDS:  Clear.  CALVARIUM:  Normal.  CAROTID FLOW VOIDS:  Present.  MISCELLANEOUS:  None.    IMPRESSION: Left frontal heterogeneous lesion with cystic and solid   components in surrounding vasogenic edema. Lesion is solitary.There is   diffusion positivity around the periphery of the lesion. There is some   susceptibility seen in association. Findings suggestive of a glioblastoma   versus a metastasis. No diffusion restriction within the cystic portion   of the lesion tosuggest an abscess.                    MICHELE CHO M.D., ATTENDING RADIOLOGIST  This document has been electronically signed. Sep  3 2019  8:51AM                  _____________________________________________________________  EEG IMPRESSION/CLINICAL CORRELATE    Abnormal EEG study.  1. Potential epileptogenic focus in the left central region.  2. Structural abnormality in the left hemisphere.  3. Moderate to severe nonspecific diffuse or multifocal cerebral dysfunction.   4. No seizure seen.  5. Skull defect in the left frontocentral region.    _____________________________________________________________    Lindsay Potts MD  Attending Physician, [facility]          < from: CT Head No Cont (09.08.19 @ 08:44) >    EXAM:  CT BRAIN                            PROCEDURE DATE:  09/08/2019            INTERPRETATION:  HISTORY: Status post resection of left frontal mass.   Metastases.    Technique: CT of the head was performed without contrast.    Multiple contiguous axial images were acquired from the skullbase to the   vertex without the administration of intravenous contrast.  Coronal and   sagittal reformations were made.    COMPARISON: Prior head CT dated  9/6/2019 at 8:34 AM    FINDINGS:  Redemonstration of low-attenuation change in the left frontal and   parietal lobes extending across the genu of the corpus callosum with   internal hemorrhage and adjacent subarachnoid hemorrhage within the   postoperative bed, similar appearing. Unchanged 0.8 cm right to left   midline shift.     Status post left frontoparietal craniotomy with associated postoperative   changes, including pneumocephalus and surgical skin staples over the   anterior scalp.     The visualized intraorbital compartments, paranasal sinuses and mastoid   complexes appear free of acute disease. Partial visualization of   endotracheal and enteric tubes.    IMPRESSION:  Similar-appearing hemorrhage within the postoperative site with   surrounding low-attenuation change extending across the genu of the   corpus callosum. Similar associated mass effect and shift of the midline   structures.                  EDUARDO WAY M.D., RADIOLOGY RESIDENT  This document has been electronically signed.  CLIVE GILES M.D., ATTENDING RADIOLOGIST  This document has been electronically signed. Sep  8 2019  9:21AM                < end of copied text >

## 2019-09-10 NOTE — PROGRESS NOTE ADULT - SUBJECTIVE AND OBJECTIVE BOX
Patient was admitted with change in behaviour. He also had history of tripping at airport while coming [place]  Now is s/p surgery X2 for tumor in left frontal lobe and bleed      PAST MEDICAL & SURGICAL HISTORY:  No pertinent past medical history  No significant past surgical history    Medications:  acetaminophen    Suspension .. 650 milliGRAM(s) Oral every 6 hours PRN Temp greater or equal to 38C (100.4F)  chlorhexidine 4% Liquid 1 Application(s) Topical <User Schedule>  dexamethasone  Injectable 4 milliGRAM(s) IV Push every 8 hours  docusate sodium Liquid 100 milliGRAM(s) Oral two times a day  enoxaparin Injectable 30 milliGRAM(s) SubCutaneous <User Schedule>  polyethylene glycol 3350 17 Gram(s) Oral two times a day  senna Syrup 20 milliLiter(s) Oral at bedtime  traMADol 25 milliGRAM(s) Oral every 4 hours PRN Moderate Pain (4 - 6)  traMADol 50 milliGRAM(s) Oral every 4 hours PRN Severe Pain (7 - 10)  valproate sodium IVPB 250 milliGRAM(s) IV Intermittent every 8 hours    Labs:  CBC Full  -  ( 08 Sep 2019 21:02 )  WBC Count : 20.5 K/uL  Hemoglobin : 14.5 g/dL  Hematocrit : 43.0 %  Platelet Count - Automated : 126 K/uL  Mean Cell Volume : 95.4 fl  Mean Cell Hemoglobin : 32.3 pg  Mean Cell Hemoglobin Concentration : 33.9 gm/dL  Auto Neutrophil # : x  Auto Lymphocyte # : x  Auto Monocyte # : x  Auto Eosinophil # : x  Auto Basophil # : x  Auto Neutrophil % : x  Auto Lymphocyte % : x  Auto Monocyte % : x  Auto Eosinophil % : x  Auto Basophil % : x    09-09    141  |  102  |  29<H>  ----------------------------<  121<H>  4.4   |  27  |  0.58    Ca    9.9      09 Sep 2019 14:14  Phos  3.8     09-08  Mg     2.1     09-08        Radiology:             ROS:  Patient comfortable without distress  No SOB or chest pain  No palpitation  No abdominal pain, diarrhaea or constipation  Moves all extremities and is alert as per family  No skin changes or swelling of legs    Vital Signs Last 24 Hrs  T(C): 37 (10 Sep 2019 11:00), Max: 37.3 (09 Sep 2019 15:00)  T(F): 98.6 (10 Sep 2019 11:00), Max: 99.2 (09 Sep 2019 15:00)  HR: 68 (10 Sep 2019 11:00) (61 - 70)  BP: 127/81 (10 Sep 2019 11:00) (124/75 - 131/80)  BP(mean): 96 (10 Sep 2019 11:00) (89 - 96)  RR: 18 (10 Sep 2019 11:00) (12 - 20)  SpO2: 96% (10 Sep 2019 11:00) (93% - 97%)    Physical exam:  Patient alert   No distress  CVS: S1, S2 regular or murmur  Chest: bilateral breath sound without rales  Abdomen: soft, not tender, no organomegaly or masses  No focal neuro deficit, responds appropriately  No edema      Assessment and Plan:

## 2019-09-11 LAB — VALPROATE SERPL-MCNC: 34 UG/ML — LOW (ref 50–100)

## 2019-09-11 PROCEDURE — 99232 SBSQ HOSP IP/OBS MODERATE 35: CPT

## 2019-09-11 PROCEDURE — 99233 SBSQ HOSP IP/OBS HIGH 50: CPT

## 2019-09-11 RX ORDER — VALPROIC ACID (AS SODIUM SALT) 250 MG/5ML
500 SOLUTION, ORAL ORAL ONCE
Refills: 0 | Status: COMPLETED | OUTPATIENT
Start: 2019-09-11 | End: 2019-09-11

## 2019-09-11 RX ORDER — CHLORHEXIDINE GLUCONATE 213 G/1000ML
1 SOLUTION TOPICAL
Refills: 0 | Status: DISCONTINUED | OUTPATIENT
Start: 2019-09-11 | End: 2019-09-13

## 2019-09-11 RX ORDER — LANOLIN ALCOHOL/MO/W.PET/CERES
5 CREAM (GRAM) TOPICAL
Refills: 0 | Status: DISCONTINUED | OUTPATIENT
Start: 2019-09-11 | End: 2019-09-16

## 2019-09-11 RX ORDER — DEXAMETHASONE 0.5 MG/5ML
3 ELIXIR ORAL EVERY 8 HOURS
Refills: 0 | Status: DISCONTINUED | OUTPATIENT
Start: 2019-09-11 | End: 2019-09-14

## 2019-09-11 RX ORDER — DIVALPROEX SODIUM 500 MG/1
500 TABLET, DELAYED RELEASE ORAL EVERY 8 HOURS
Refills: 0 | Status: DISCONTINUED | OUTPATIENT
Start: 2019-09-11 | End: 2019-09-16

## 2019-09-11 RX ADMIN — Medication 25 MILLIGRAM(S): at 02:37

## 2019-09-11 RX ADMIN — Medication 3 MILLIGRAM(S): at 21:49

## 2019-09-11 RX ADMIN — Medication 4 MILLIGRAM(S): at 05:17

## 2019-09-11 RX ADMIN — ENOXAPARIN SODIUM 30 MILLIGRAM(S): 100 INJECTION SUBCUTANEOUS at 18:31

## 2019-09-11 RX ADMIN — Medication 3 MILLIGRAM(S): at 13:56

## 2019-09-11 RX ADMIN — Medication 25 MILLIGRAM(S): at 10:48

## 2019-09-11 RX ADMIN — DIVALPROEX SODIUM 500 MILLIGRAM(S): 500 TABLET, DELAYED RELEASE ORAL at 13:50

## 2019-09-11 RX ADMIN — Medication 5 MILLIGRAM(S): at 23:57

## 2019-09-11 RX ADMIN — DIVALPROEX SODIUM 500 MILLIGRAM(S): 500 TABLET, DELAYED RELEASE ORAL at 21:50

## 2019-09-11 RX ADMIN — QUETIAPINE FUMARATE 12.5 MILLIGRAM(S): 200 TABLET, FILM COATED ORAL at 21:50

## 2019-09-11 RX ADMIN — Medication 27.5 MILLIGRAM(S): at 15:47

## 2019-09-11 NOTE — PROGRESS NOTE ADULT - ASSESSMENT
64 year old male former smoker with no significant PMHx presents with AMS, brain imaging with mass.     1. Brain mass, suspected malignancy, ? mets   CT head: 3.4 x 3.3 cm cystic mass w/in L frontal lobe w/ edema and midline shift.  MRI wwo brain: L cystic enhancing lesion causing significant edema  CT chest/abd noted, ? mets   hem/onc, neurosurg, neuro f/u   9/5 - s/p OR for resection/biopsy.   w/ prelim adenoca; post op hemmorhage w/ return to or  post op seizure; keppra, vimpat, steroids as per neurosx, NISCU  bradycardia noted postop, now resolved  care per NeurologyNeurosx     remains cv stable, hr improved and stable  bp parameters per neuro/nsx  echo reveals normal LVEF w no sig valvular dyfunction

## 2019-09-11 NOTE — PROGRESS NOTE ADULT - ASSESSMENT
This is a 64y Male, here with 1 month of personality change, found the have left frontal solitary heterogenous cystic mass with edema, also hepatic lesion and acetabular lesion    Concern would be for primary brain tumor, eg GBM vs metastatic lesion      9/5 - went to OR for resection/biopsy.  Post op complicated with GTC sz.   now on keppra  Plan:  - continue keppra for seizure prophylaxis  - steroids per NS  - bx- adeno, onc following planning possible Rtx pending final pathology  -overall much improved mental status in last few days  -now moving to [address] [place] [address]  - will follow    there was no family bedside, d/w patient.

## 2019-09-11 NOTE — PROGRESS NOTE ADULT - ASSESSMENT
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 Seizure post op     NEURO:  L craniotomy and tumor resection POD #6  Neurochecks q4 hr  Onc consultation  Decadron Taper per neurosurgery  EEG showed LPDs- Continue Valproic acid and check level  Off keppra  f/u final path  Tramadol for pain control  Activity: [] mobilize as tolerated [x] Bedrest [X] PT [X] OT [] PMNR    PULM:   Incentive spirometer      CV: Hx of  HTN  SBP goal 100 - 150  echo pending with bradycardia    RENAL:  Cr wnl  D/C IVF  condom cath    GI:    Puree diet  Last BM - 9/10/19  Bowel regimen [x] colace, senna [] other: add miralax    ENDO:   Goal euglycemia (-180)    HEME/ONC:  VTE prophylaxis:  [x] chemoprophylaxis   [x] high risk of DVT/PE on admission due to:  2/2 metastatic cancer.  Will need metastatic w/u and onc consult on this admission  LE dopplers negative      ID:  [email or web address]   PRN Tylenol     SOCIAL/FAMILY:   [x] updated at bedside [] family meeting    CODE STATUS:  [x] Full Code     DISPOSITION:  Unit [X] Floor      Time spent 35 min  Contact: [phone]

## 2019-09-11 NOTE — PROGRESS NOTE ADULT - SUBJECTIVE AND OBJECTIVE BOX
CC: no chest pain, dyspnea    TELEMETRY:     PHYSICAL EXAM:    T(C): 36.7 (09-11-19 @ 09:00), Max: 36.7 (09-10-19 @ 19:00)  HR: 70 (09-11-19 @ 09:00) (57 - 86)  BP: 116/70 (09-11-19 @ 09:00) (112/74 - 124/70)  RR: 18 (09-11-19 @ 09:00) (15 - 24)  SpO2: 98% (09-11-19 @ 09:00) (95% - 100%)  Wt(kg): --  I&O's Summary    10 Sep 2019 07:01  -  11 Sep 2019 07:00  --------------------------------------------------------  IN: 1000 mL / OUT: 400 mL / NET: 600 mL        Appearance: Normal	  Cardiovascular: Normal S1 S2,RRR, No JVD, No murmurs  Respiratory: Lungs clear to auscultation	  Gastrointestinal:  Soft, Non-tender, + BS	  Extremities: Normal range of motion, No clubbing, cyanosis or edema  Vascular: Peripheral pulses palpable 2+ bilaterally     LABS:	 	                          15.0   17.7  )-----------( 151      ( 10 Sep 2019 22:55 )             42.9     09-10    139  |  99  |  37<H>  ----------------------------<  83  4.2   |  25  |  0.52    Ca    9.8      10 Sep 2019 22:55  Phos  2.8     09-10  Mg     2.2     09-10            CARDIAC MARKERS:

## 2019-09-11 NOTE — PROGRESS NOTE ADULT - SUBJECTIVE AND OBJECTIVE BOX
Patient tolerating therapies.    MEDICATIONS  (STANDING):  chlorhexidine 4% Liquid 1 Application(s) Topical <User Schedule>  dexamethasone  Injectable 3 milliGRAM(s) IV Push every 8 hours  diVALproex  milliGRAM(s) Oral every 8 hours  docusate sodium Liquid 100 milliGRAM(s) Oral two times a day  enoxaparin Injectable 30 milliGRAM(s) SubCutaneous <User Schedule>  polyethylene glycol 3350 17 Gram(s) Oral two times a day  QUEtiapine 12.5 milliGRAM(s) Oral at bedtime  senna Syrup 20 milliLiter(s) Oral at bedtime  valproate sodium IVPB 500 milliGRAM(s) IV Intermittent once    MEDICATIONS  (PRN):  acetaminophen    Suspension .. 650 milliGRAM(s) Oral every 6 hours PRN Temp greater or equal to 38C (100.4F)  traMADol 25 milliGRAM(s) Oral every 4 hours PRN Moderate Pain (4 - 6)  traMADol 50 milliGRAM(s) Oral every 4 hours PRN Severe Pain (7 - 10)    Vital Signs Last 24 Hrs  T(C): 36.7 (11 Sep 2019 09:00), Max: 36.7 (10 Sep 2019 19:00)  T(F): 98.1 (11 Sep 2019 09:00), Max: 98.1 (11 Sep 2019 03:00)  HR: 70 (11 Sep 2019 09:00) (57 - 86)  BP: 116/70 (11 Sep 2019 09:00) (112/74 - 124/70)  BP(mean): 84 (11 Sep 2019 07:00) (84 - 92)  RR: 18 (11 Sep 2019 09:00) (15 - 24)  SpO2: 98% (11 Sep 2019 09:00) (95% - 100%)    PHYSICAL EXAM  Constitutional - NAD, Comfortably sitting in bed.  Extremities - No C/C/E, No calf tenderness   Neurologic Exam -                    Cognitive - AAO x2      Communication - mild dysarthria but intelligible.     Motor - LORENZO x 4, 4/5 bl UE and LEASSESSMENT/PLAN    64y Male with functional deficits after left frontal cystic mass s/p craniotomy    - Continue PT/OT  - Continue w/u and tx per heme-onc and neurosx.  - Acute Rehab- can tolerate 3h/d PT/OT/SLP and requires daily physician visits

## 2019-09-11 NOTE — PROGRESS NOTE ADULT - SUBJECTIVE AND OBJECTIVE BOX
SUBJECTIVE:     patient had GTC seizure post op after taken back to OR for hematoma found post op from biopsy.    now remains extubated, more awake, emotionally  labile.   family says better    Medications:  acetaminophen    Suspension .. 650 milliGRAM(s) Oral every 6 hours PRN  chlorhexidine 4% Liquid 1 Application(s) Topical <User Schedule>  dexamethasone  Injectable 3 milliGRAM(s) IV Push every 8 hours  docusate sodium Liquid 100 milliGRAM(s) Oral two times a day  enoxaparin Injectable 30 milliGRAM(s) SubCutaneous <User Schedule>  polyethylene glycol 3350 17 Gram(s) Oral two times a day  QUEtiapine 12.5 milliGRAM(s) Oral at bedtime  senna Syrup 20 milliLiter(s) Oral at bedtime  traMADol 25 milliGRAM(s) Oral every 4 hours PRN  traMADol 50 milliGRAM(s) Oral every 4 hours PRN  valproate sodium IVPB 250 milliGRAM(s) IV Intermittent every 8 hours      Labs:  CBC Full  -  ( 10 Sep 2019 22:55 )  WBC Count : 17.7 K/uL  RBC Count : 4.52 M/uL  Hemoglobin : 15.0 g/dL  Hematocrit : 42.9 %  Platelet Count - Automated : 151 K/uL  Mean Cell Volume : 94.9 fl  Mean Cell Hemoglobin : 33.1 pg  Mean Cell Hemoglobin Concentration : 34.9 gm/dL  Auto Neutrophil # : x  Auto Lymphocyte # : x  Auto Monocyte # : x  Auto Eosinophil # : x  Auto Basophil # : x  Auto Neutrophil % : x  Auto Lymphocyte % : x  Auto Monocyte % : x  Auto Eosinophil % : x  Auto Basophil % : x    09-10    139  |  99  |  37<H>  ----------------------------<  83  4.2   |  25  |  0.52    Ca    9.8      10 Sep 2019 22:55  Phos  2.8     09-10  Mg     2.2     09-10      CAPILLARY BLOOD GLUCOSE                  Vitals:  Vital Signs Last 24 Hrs  T(C): 36.6 (11 Sep 2019 07:00), Max: 37 (10 Sep 2019 11:00)  T(F): 97.9 (11 Sep 2019 07:00), Max: 98.6 (10 Sep 2019 11:00)  HR: 70 (11 Sep 2019 07:00) (57 - 86)  BP: 124/70 (11 Sep 2019 07:00) (111/75 - 127/81)  BP(mean): 84 (11 Sep 2019 07:00) (84 - 96)  RR: 24 (11 Sep 2019 07:00) (15 - 24)  SpO2: 100% (11 Sep 2019 07:00) (95% - 100%)    NEUROLOGICAL EXAM:    Neurologic Exam:    Mental Status: awake, alert, in no acute distress, sitting up in bed. speech mild aphasia, no dysarthria. follows commands, crying, knew in hospital    Cranial Nerves: Pupils reactive bilaterally equal. EOMI no nystagmus, no gaze deviation.  . No gross facial asymmetry. shoulder shrug intact b/l, hearing intact b/l.    Motor: Bulk was normal. Tone was low. moves all ext with decent effort, full ROM b/l Ue and LE.    Reflexes: Absent upper extremities. Absent lower extremities. Toes were upgoing bilateral.     Sensory: intact ot LT and PP b/l Ue and LE    Coord: no gross dysmetria    Gait: Unable to assess    Imaging:      EXAM:  CT ANGIO BRAIN (W)AW IC                          EXAM:  CT ANGIO NECK (W)AW IC                          EXAM:  CT BRAIN                            PROCEDURE DATE:  09/01/2019            INTERPRETATION:  CLINICAL INFORMATION: Intermittent altered mental   status. Imbalance.     TECHNIQUE: Noncontrast axial CT images were acquired through the head.   Contrast enhanced axial CT images were acquired from the aortic arch to   the vertex of the calvarium, during the angiographic phase. Additional   post-contrast axial CT images through the head were obtained.   Three-dimensional maximum intensity projection reformats were generated   from the angiographic images.  70 cc of Omnipaque-300 mg/ml were   administered intravenously, without immediate complication.    COMPARISON: None.    FINDINGS:     CT BRAIN:    A 3.4 x 3.3 cm cystic mass within the left frontal lobe with large area   of surrounding edema and causing effacement of the bilateral lateral   ventricles and rightward midline shift. There appears to be a solid   component of the lesion anteriorly. There is effacement of the   interpeduncular cistern. No hydrocephalus.    No areas of abnormal enhancement are identified.    The visualized paranasal sinuses are clear. The mastoid air cells and   middle ear cavities are clear.    The soft tissues of the scalp are unremarkable. The calvarium is intact.    CT ANGIOGRAPHY NECK:    Three-vessel aortic arch. Atherosclerotic calcifications of the aorta   without evidence of dissection or hemodynamically significant stenosis.   The origins of the great vessels are unremarkable.     The common carotid arteries are normal in caliber without significant   stenosis.     The carotid bifurcations are unremarkable. The internal carotid arteries   are normal in caliber without significant stenosis.     Co-dominant vertebral arteries. The vertebral arteries are normal in   caliber without significant stenosis.     The visualized neck and upper chest are unremarkable.    Visualized osseous structures are unremarkable.    CT ANGIOGRAPHY BRAIN:    There is no evidence for significant stenosis, major vessel occlusion, or   aneurysm about the [place].    There is no evidence for significant stenosis, major vessel occlusion, or   aneurysm involving the vertebrobasilar system.    No enlarged vascular lesions or clusters of abnormal vessels are noted to   suggest an arterial venous malformation.    Visualized portions of the superficial and deep venous systems are   unremarkable.      IMPRESSION:     CT brain: Predominantly cystic mass within the left frontal lobe with   large area of surrounding edema causing leftward midline shift. There   appears to be a hyperdense solid component of the mass anteriorly which   showsvascularity. There is effacement of the interpeduncular cistern. No   hydrocephalus.    CT angiography neck: No evidence of hemodynamically significant stenosis   by NASCET criteria. No evidence of vascular dissection.    CT angiography brain: No major vessel occlusion or proximal stenosis by   NASCET criteria. No evidence of aneurysm or other vascular malformation.                MARILYN WALKER M.D., RADIOLOGIST RESIDENT  This document has been electronically signed.  ISRAEL DORANTES M.D., ATTENDING RADIOLOGIST  This document has been electronically signed. Sep  1 2019  2:03PM      < from: MR Head w/wo IV Cont (09.02.19 @ 13:15) >  EXAM:  MR BRAIN WAW IC                            PROCEDURE DATE:  09/02/2019            INTERPRETATION:  INDICATIONS:  preop    TECHNIQUE:  Multiplanar imaging was performed using T1 weighted, T2   weighted and FLAIR sequences.  Diffusion weightedand SWI images were   also obtained.  Following intravenous gadolinium, multiplanar T1 weighted   images were performed. 6.5 cc Gadavist were administered. 1 cc were   discarded.      COMPARISON EXAMINATION: Study is read and compared with CT CTA 9/1/2019    FINDINGS:    VENTRICLES AND SULCI: Again appreciated is the mass effect on the left   frontal horn of the lateral ventricle as well as the third ventricle.  INTRA-AXIAL:  Evidence of the left frontal enhancing mass lesion that is   mixed signal with evidence of fluid and solid components. In addition   evidence of foci of susceptibility within the lesion which may represent   component of hemorrhage versus mineralization. Surrounding vasogenic   edema is appreciated. Evidence of a left frontal lesion measuring 7 cm AP   x 4 cm transverse by x 4.7 cm craniocaudal dimension. Periphery of the   lesion has a diffusion diffusion positivity with restricted diffusion on   the ADC map.  EXTRA-AXIAL:  No mass or collection.  VISUALIZED SINUSES:  Normal.  VISUALIZED MASTOIDS:  Clear.  CALVARIUM:  Normal.  CAROTID FLOW VOIDS:  Present.  MISCELLANEOUS:  None.    IMPRESSION: Left frontal heterogeneous lesion with cystic and solid   components in surrounding vasogenic edema. Lesion is solitary.There is   diffusion positivity around the periphery of the lesion. There is some   susceptibility seen in association. Findings suggestive of a glioblastoma   versus a metastasis. No diffusion restriction within the cystic portion   of the lesion tosuggest an abscess.                    MICHELE CHO M.D., ATTENDING RADIOLOGIST  This document has been electronically signed. Sep  3 2019  8:51AM                  _____________________________________________________________  EEG IMPRESSION/CLINICAL CORRELATE    Abnormal EEG study.  1. Potential epileptogenic focus in the left central region.  2. Structural abnormality in the left hemisphere.  3. Moderate to severe nonspecific diffuse or multifocal cerebral dysfunction.   4. No seizure seen.  5. Skull defect in the left frontocentral region.    _____________________________________________________________    Lindsay Potts MD  Attending Physician, [facility]          < from: CT Head No Cont (09.08.19 @ 08:44) >    EXAM:  CT BRAIN                            PROCEDURE DATE:  09/08/2019            INTERPRETATION:  HISTORY: Status post resection of left frontal mass.   Metastases.    Technique: CT of the head was performed without contrast.    Multiple contiguous axial images were acquired from the skullbase to the   vertex without the administration of intravenous contrast.  Coronal and   sagittal reformations were made.    COMPARISON: Prior head CT dated  9/6/2019 at 8:34 AM    FINDINGS:  Redemonstration of low-attenuation change in the left frontal and   parietal lobes extending across the genu of the corpus callosum with   internal hemorrhage and adjacent subarachnoid hemorrhage within the   postoperative bed, similar appearing. Unchanged 0.8 cm right to left   midline shift.     Status post left frontoparietal craniotomy with associated postoperative   changes, including pneumocephalus and surgical skin staples over the   anterior scalp.     The visualized intraorbital compartments, paranasal sinuses and mastoid   complexes appear free of acute disease. Partial visualization of   endotracheal and enteric tubes.    IMPRESSION:  Similar-appearing hemorrhage within the postoperative site with   surrounding low-attenuation change extending across the genu of the   corpus callosum. Similar associated mass effect and shift of the midline   structures.                  EDUARDO WAY M.D., RADIOLOGY RESIDENT  This document has been electronically signed.  CLIVE GILES M.D., ATTENDING RADIOLOGIST  This document has been electronically signed. Sep  8 2019  9:21AM                < end of copied text >

## 2019-09-12 LAB
ANION GAP SERPL CALC-SCNC: 13 MMOL/L — SIGNIFICANT CHANGE UP (ref 5–17)
BUN SERPL-MCNC: 23 MG/DL — SIGNIFICANT CHANGE UP (ref 7–23)
CALCIUM SERPL-MCNC: 9.2 MG/DL — SIGNIFICANT CHANGE UP (ref 8.4–10.5)
CHLORIDE SERPL-SCNC: 102 MMOL/L — SIGNIFICANT CHANGE UP (ref 96–108)
CO2 SERPL-SCNC: 24 MMOL/L — SIGNIFICANT CHANGE UP (ref 22–31)
CREAT SERPL-MCNC: 0.49 MG/DL — LOW (ref 0.5–1.3)
GLUCOSE SERPL-MCNC: 98 MG/DL — SIGNIFICANT CHANGE UP (ref 70–99)
HCT VFR BLD CALC: 38.6 % — LOW (ref 39–50)
HGB BLD-MCNC: 13.1 G/DL — SIGNIFICANT CHANGE UP (ref 13–17)
MCHC RBC-ENTMCNC: 31.1 PG — SIGNIFICANT CHANGE UP (ref 27–34)
MCHC RBC-ENTMCNC: 33.9 GM/DL — SIGNIFICANT CHANGE UP (ref 32–36)
MCV RBC AUTO: 91.7 FL — SIGNIFICANT CHANGE UP (ref 80–100)
PLATELET # BLD AUTO: 158 K/UL — SIGNIFICANT CHANGE UP (ref 150–400)
POTASSIUM SERPL-MCNC: 4.3 MMOL/L — SIGNIFICANT CHANGE UP (ref 3.5–5.3)
POTASSIUM SERPL-SCNC: 4.3 MMOL/L — SIGNIFICANT CHANGE UP (ref 3.5–5.3)
RBC # BLD: 4.21 M/UL — SIGNIFICANT CHANGE UP (ref 4.2–5.8)
RBC # FLD: 12.2 % — SIGNIFICANT CHANGE UP (ref 10.3–14.5)
SODIUM SERPL-SCNC: 139 MMOL/L — SIGNIFICANT CHANGE UP (ref 135–145)
VALPROATE SERPL-MCNC: 81 UG/ML — SIGNIFICANT CHANGE UP (ref 50–100)
WBC # BLD: 15.18 K/UL — HIGH (ref 3.8–10.5)
WBC # FLD AUTO: 15.18 K/UL — HIGH (ref 3.8–10.5)

## 2019-09-12 PROCEDURE — 76700 US EXAM ABDOM COMPLETE: CPT | Mod: 26

## 2019-09-12 RX ORDER — CHLORPROMAZINE HCL 10 MG
10 TABLET ORAL EVERY 8 HOURS
Refills: 0 | Status: DISCONTINUED | OUTPATIENT
Start: 2019-09-12 | End: 2019-09-16

## 2019-09-12 RX ADMIN — DIVALPROEX SODIUM 500 MILLIGRAM(S): 500 TABLET, DELAYED RELEASE ORAL at 06:01

## 2019-09-12 RX ADMIN — QUETIAPINE FUMARATE 12.5 MILLIGRAM(S): 200 TABLET, FILM COATED ORAL at 21:14

## 2019-09-12 RX ADMIN — ENOXAPARIN SODIUM 30 MILLIGRAM(S): 100 INJECTION SUBCUTANEOUS at 17:58

## 2019-09-12 RX ADMIN — POLYETHYLENE GLYCOL 3350 17 GRAM(S): 17 POWDER, FOR SOLUTION ORAL at 17:58

## 2019-09-12 RX ADMIN — DIVALPROEX SODIUM 500 MILLIGRAM(S): 500 TABLET, DELAYED RELEASE ORAL at 21:14

## 2019-09-12 RX ADMIN — Medication 5 MILLIGRAM(S): at 21:14

## 2019-09-12 RX ADMIN — Medication 100 MILLIGRAM(S): at 17:57

## 2019-09-12 RX ADMIN — Medication 3 MILLIGRAM(S): at 06:01

## 2019-09-12 RX ADMIN — DIVALPROEX SODIUM 500 MILLIGRAM(S): 500 TABLET, DELAYED RELEASE ORAL at 13:30

## 2019-09-12 RX ADMIN — Medication 3 MILLIGRAM(S): at 21:13

## 2019-09-12 RX ADMIN — Medication 3 MILLIGRAM(S): at 13:30

## 2019-09-12 RX ADMIN — Medication 10 MILLIGRAM(S): at 21:15

## 2019-09-12 NOTE — PROGRESS NOTE ADULT - SUBJECTIVE AND OBJECTIVE BOX
Patient was admitted with change in behaviour. He also had history of tripping at airport while coming [place] from vacation  Now is s/p surgery X2 for tumor in left frontal lobe and bleed      PAST MEDICAL & SURGICAL HISTORY:  No pertinent past medical history  No significant past surgical history    Medications:  acetaminophen    Suspension .. 650 milliGRAM(s) Oral every 6 hours PRN Temp greater or equal to 38C (100.4F)  chlorhexidine 4% Liquid 1 Application(s) Topical <User Schedule>  dexamethasone  Injectable 3 milliGRAM(s) IV Push every 8 hours  diVALproex  milliGRAM(s) Oral every 8 hours  docusate sodium Liquid 100 milliGRAM(s) Oral two times a day  enoxaparin Injectable 30 milliGRAM(s) SubCutaneous <User Schedule>  melatonin 5 milliGRAM(s) Oral <User Schedule>  polyethylene glycol 3350 17 Gram(s) Oral two times a day  QUEtiapine 12.5 milliGRAM(s) Oral at bedtime  senna Syrup 20 milliLiter(s) Oral at bedtime  traMADol 25 milliGRAM(s) Oral every 4 hours PRN Moderate Pain (4 - 6)  traMADol 50 milliGRAM(s) Oral every 4 hours PRN Severe Pain (7 - 10)    Labs:  CBC Full  -  ( 12 Sep 2019 08:58 )  WBC Count : 15.18 K/uL  Hemoglobin : 13.1 g/dL  Hematocrit : 38.6 %  Platelet Count - Automated : 158 K/uL  Mean Cell Volume : 91.7 fl  Mean Cell Hemoglobin : 31.1 pg  Mean Cell Hemoglobin Concentration : 33.9 gm/dL  Auto Neutrophil # : x  Auto Lymphocyte # : x  Auto Monocyte # : x  Auto Eosinophil # : x  Auto Basophil # : x  Auto Neutrophil % : x  Auto Lymphocyte % : x  Auto Monocyte % : x  Auto Eosinophil % : x  Auto Basophil % : x    09-12    139  |  102  |  23  ----------------------------<  98  4.3   |  24  |  0.49<L>    Ca    9.2      12 Sep 2019 06:16  Phos  2.8     09-10  Mg     2.2     09-10        Radiology:             ROS:  Patient comfortable without distress  No SOB or chest pain  No palpitation  No abdominal pain, diarrhaea or constipation  No weakness of extremities, family at bedside  No skin changes or swelling of legs    Vital Signs Last 24 Hrs  T(C): 36.8 (12 Sep 2019 16:25), Max: 37.1 (12 Sep 2019 08:00)  T(F): 98.2 (12 Sep 2019 16:25), Max: 98.7 (12 Sep 2019 08:00)  HR: 79 (12 Sep 2019 16:25) (56 - 79)  BP: 123/82 (12 Sep 2019 16:25) (106/68 - 132/76)  BP(mean): --  RR: 18 (12 Sep 2019 16:25) (17 - 18)  SpO2: 97% (12 Sep 2019 16:25) (96% - 98%)    Physical exam:  Patient alert   No distress, staples on scalp from surgery  CVS: S1, S2 regular or murmur  Chest: bilateral breath sound without rales  Abdomen: soft, not tender, no organomegaly or masses  No focal neuro deficit  No edema      Assessment and Plan:

## 2019-09-12 NOTE — PROGRESS NOTE ADULT - SUBJECTIVE AND OBJECTIVE BOX
CARDIOLOGY FOLLOW UP NOTE - DR. KHALIL    Subjective:    no chest pain, sob, palpitations  improved neuro status     PHYSICAL EXAM:  T(C): 36.8 (09-12-19 @ 11:23), Max: 37.1 (09-12-19 @ 08:00)  HR: 78 (09-12-19 @ 11:23) (56 - 78)  BP: 106/68 (09-12-19 @ 11:23) (106/68 - 132/76)  RR: 18 (09-12-19 @ 11:23) (17 - 18)  SpO2: 97% (09-12-19 @ 11:23) (96% - 98%)  Wt(kg): --  I&O's Summary    11 Sep 2019 07:01  -  12 Sep 2019 07:00  --------------------------------------------------------  IN: 630 mL / OUT: 0 mL / NET: 630 mL    12 Sep 2019 07:01  -  12 Sep 2019 12:44  --------------------------------------------------------  IN: 360 mL / OUT: 0 mL / NET: 360 mL      Daily     Daily     Appearance: Normal	  Cardiovascular: Normal S1 S2,RRR, No JVD, No murmurs  Respiratory: Lungs clear to auscultation	  Gastrointestinal:  Soft, Non-tender, + BS	  Extremities: Normal range of motion, No clubbing, cyanosis or edema      Home Medications:      MEDICATIONS  (STANDING):  chlorhexidine 4% Liquid 1 Application(s) Topical <User Schedule>  dexamethasone  Injectable 3 milliGRAM(s) IV Push every 8 hours  diVALproex  milliGRAM(s) Oral every 8 hours  docusate sodium Liquid 100 milliGRAM(s) Oral two times a day  enoxaparin Injectable 30 milliGRAM(s) SubCutaneous <User Schedule>  melatonin 5 milliGRAM(s) Oral <User Schedule>  polyethylene glycol 3350 17 Gram(s) Oral two times a day  QUEtiapine 12.5 milliGRAM(s) Oral at bedtime  senna Syrup 20 milliLiter(s) Oral at bedtime      TELEMETRY: 	    ECG:  	  RADIOLOGY:   DIAGNOSTIC TESTING:  [ ] Echocardiogram:  [ ] Catheterization:  [ ] Stress Test:    OTHER: 	    LABS:	 	    CARDIAC MARKERS:                                13.1   15.18 )-----------( 158      ( 12 Sep 2019 08:58 )             38.6     09-12    139  |  102  |  23  ----------------------------<  98  4.3   |  24  |  0.49<L>    Ca    9.2      12 Sep 2019 06:16  Phos  2.8     09-10  Mg     2.2     09-10      proBNP:     Lipid Profile:   HgA1c:     Creatinine, Serum: 0.49 mg/dL (09-12-19 @ 06:16)  Creatinine, Serum: 0.52 mg/dL (09-10-19 @ 22:55)  Creatinine, Serum: 0.58 mg/dL (09-09-19 @ 14:14)

## 2019-09-12 NOTE — PROGRESS NOTE ADULT - SUBJECTIVE AND OBJECTIVE BOX
SUBJECTIVE:     patient had GTC seizure post op after taken back to OR for hematoma found post op from biopsy.    now remains extubated, more awake, emotionally  labile.   family says better now in regular floor bed    Medications:  acetaminophen    Suspension .. 650 milliGRAM(s) Oral every 6 hours PRN  chlorhexidine 4% Liquid 1 Application(s) Topical <User Schedule>  dexamethasone  Injectable 3 milliGRAM(s) IV Push every 8 hours  diVALproex  milliGRAM(s) Oral every 8 hours  docusate sodium Liquid 100 milliGRAM(s) Oral two times a day  enoxaparin Injectable 30 milliGRAM(s) SubCutaneous <User Schedule>  melatonin 5 milliGRAM(s) Oral <User Schedule>  polyethylene glycol 3350 17 Gram(s) Oral two times a day  QUEtiapine 12.5 milliGRAM(s) Oral at bedtime  senna Syrup 20 milliLiter(s) Oral at bedtime  traMADol 25 milliGRAM(s) Oral every 4 hours PRN  traMADol 50 milliGRAM(s) Oral every 4 hours PRN      Labs:  CBC Full  -  ( 10 Sep 2019 22:55 )  WBC Count : 17.7 K/uL  RBC Count : 4.52 M/uL  Hemoglobin : 15.0 g/dL  Hematocrit : 42.9 %  Platelet Count - Automated : 151 K/uL  Mean Cell Volume : 94.9 fl  Mean Cell Hemoglobin : 33.1 pg  Mean Cell Hemoglobin Concentration : 34.9 gm/dL  Auto Neutrophil # : x  Auto Lymphocyte # : x  Auto Monocyte # : x  Auto Eosinophil # : x  Auto Basophil # : x  Auto Neutrophil % : x  Auto Lymphocyte % : x  Auto Monocyte % : x  Auto Eosinophil % : x  Auto Basophil % : x    09-12    139  |  102  |  23  ----------------------------<  98  4.3   |  24  |  0.49<L>    Ca    9.2      12 Sep 2019 06:16  Phos  2.8     09-10  Mg     2.2     09-10      CAPILLARY BLOOD GLUCOSE                  Vitals:  Vital Signs Last 24 Hrs  T(C): 36.7 (12 Sep 2019 05:00), Max: 36.9 (11 Sep 2019 19:41)  T(F): 98.1 (12 Sep 2019 05:00), Max: 98.4 (11 Sep 2019 19:41)  HR: 56 (12 Sep 2019 05:00) (56 - 77)  BP: 119/72 (12 Sep 2019 05:00) (116/70 - 132/76)  BP(mean): --  RR: 18 (12 Sep 2019 05:00) (18 - 18)  SpO2: 97% (12 Sep 2019 05:00) (96% - 98%)    NEUROLOGICAL EXAM:    Neurologic Exam:    Mental Status: awake, alert, in no acute distress, sitting up in bed. speech mild aphasia, no dysarthria. follows commands,    Cranial Nerves: Pupils reactive bilaterally equal. EOMI no nystagmus, no gaze deviation.  . No gross facial asymmetry. shoulder shrug intact b/l, hearing intact b/l.    Motor: Bulk was normal. Tone was low. moves all ext with decent effort, full ROM b/l Ue and LE.    Reflexes: Absent upper extremities. Absent lower extremities. Toes were upgoing bilateral.     Sensory: intact ot LT and PP b/l Ue and LE    Coord: no gross dysmetria    Gait: Unable to assess    Imaging:      EXAM:  CT ANGIO BRAIN (W)AW IC                          EXAM:  CT ANGIO NECK (W)AW IC                          EXAM:  CT BRAIN                            PROCEDURE DATE:  09/01/2019            INTERPRETATION:  CLINICAL INFORMATION: Intermittent altered mental   status. Imbalance.     TECHNIQUE: Noncontrast axial CT images were acquired through the head.   Contrast enhanced axial CT images were acquired from the aortic arch to   the vertex of the calvarium, during the angiographic phase. Additional   post-contrast axial CT images through the head were obtained.   Three-dimensional maximum intensity projection reformats were generated   from the angiographic images.  70 cc of Omnipaque-300 mg/ml were   administered intravenously, without immediate complication.    COMPARISON: None.    FINDINGS:     CT BRAIN:    A 3.4 x 3.3 cm cystic mass within the left frontal lobe with large area   of surrounding edema and causing effacement of the bilateral lateral   ventricles and rightward midline shift. There appears to be a solid   component of the lesion anteriorly. There is effacement of the   interpeduncular cistern. No hydrocephalus.    No areas of abnormal enhancement are identified.    The visualized paranasal sinuses are clear. The mastoid air cells and   middle ear cavities are clear.    The soft tissues of the scalp are unremarkable. The calvarium is intact.    CT ANGIOGRAPHY NECK:    Three-vessel aortic arch. Atherosclerotic calcifications of the aorta   without evidence of dissection or hemodynamically significant stenosis.   The origins of the great vessels are unremarkable.     The common carotid arteries are normal in caliber without significant   stenosis.     The carotid bifurcations are unremarkable. The internal carotid arteries   are normal in caliber without significant stenosis.     Co-dominant vertebral arteries. The vertebral arteries are normal in   caliber without significant stenosis.     The visualized neck and upper chest are unremarkable.    Visualized osseous structures are unremarkable.    CT ANGIOGRAPHY BRAIN:    There is no evidence for significant stenosis, major vessel occlusion, or   aneurysm about the [place].    There is no evidence for significant stenosis, major vessel occlusion, or   aneurysm involving the vertebrobasilar system.    No enlarged vascular lesions or clusters of abnormal vessels are noted to   suggest an arterial venous malformation.    Visualized portions of the superficial and deep venous systems are   unremarkable.      IMPRESSION:     CT brain: Predominantly cystic mass within the left frontal lobe with   large area of surrounding edema causing leftward midline shift. There   appears to be a hyperdense solid component of the mass anteriorly which   showsvascularity. There is effacement of the interpeduncular cistern. No   hydrocephalus.    CT angiography neck: No evidence of hemodynamically significant stenosis   by NASCET criteria. No evidence of vascular dissection.    CT angiography brain: No major vessel occlusion or proximal stenosis by   NASCET criteria. No evidence of aneurysm or other vascular malformation.                MARILYN WALKER M.D., RADIOLOGIST RESIDENT  This document has been electronically signed.  ISRAEL DORANTES M.D., ATTENDING RADIOLOGIST  This document has been electronically signed. Sep  1 2019  2:03PM      < from: MR Head w/wo IV Cont (09.02.19 @ 13:15) >  EXAM:  MR BRAIN WAW IC                            PROCEDURE DATE:  09/02/2019            INTERPRETATION:  INDICATIONS:  preop    TECHNIQUE:  Multiplanar imaging was performed using T1 weighted, T2   weighted and FLAIR sequences.  Diffusion weightedand SWI images were   also obtained.  Following intravenous gadolinium, multiplanar T1 weighted   images were performed. 6.5 cc Gadavist were administered. 1 cc were   discarded.      COMPARISON EXAMINATION: Study is read and compared with CT CTA 9/1/2019    FINDINGS:    VENTRICLES AND SULCI: Again appreciated is the mass effect on the left   frontal horn of the lateral ventricle as well as the third ventricle.  INTRA-AXIAL:  Evidence of the left frontal enhancing mass lesion that is   mixed signal with evidence of fluid and solid components. In addition   evidence of foci of susceptibility within the lesion which may represent   component of hemorrhage versus mineralization. Surrounding vasogenic   edema is appreciated. Evidence of a left frontal lesion measuring 7 cm AP   x 4 cm transverse by x 4.7 cm craniocaudal dimension. Periphery of the   lesion has a diffusion diffusion positivity with restricted diffusion on   the ADC map.  EXTRA-AXIAL:  No mass or collection.  VISUALIZED SINUSES:  Normal.  VISUALIZED MASTOIDS:  Clear.  CALVARIUM:  Normal.  CAROTID FLOW VOIDS:  Present.  MISCELLANEOUS:  None.    IMPRESSION: Left frontal heterogeneous lesion with cystic and solid   components in surrounding vasogenic edema. Lesion is solitary.There is   diffusion positivity around the periphery of the lesion. There is some   susceptibility seen in association. Findings suggestive of a glioblastoma   versus a metastasis. No diffusion restriction within the cystic portion   of the lesion tosuggest an abscess.                    MICHELE CHO M.D., ATTENDING RADIOLOGIST  This document has been electronically signed. Sep  3 2019  8:51AM                  _____________________________________________________________  EEG IMPRESSION/CLINICAL CORRELATE    Abnormal EEG study.  1. Potential epileptogenic focus in the left central region.  2. Structural abnormality in the left hemisphere.  3. Moderate to severe nonspecific diffuse or multifocal cerebral dysfunction.   4. No seizure seen.  5. Skull defect in the left frontocentral region.    _____________________________________________________________    Lindsay Potts MD  Attending Physician, [facility]          < from: CT Head No Cont (09.08.19 @ 08:44) >    EXAM:  CT BRAIN                            PROCEDURE DATE:  09/08/2019            INTERPRETATION:  HISTORY: Status post resection of left frontal mass.   Metastases.    Technique: CT of the head was performed without contrast.    Multiple contiguous axial images were acquired from the skullbase to the   vertex without the administration of intravenous contrast.  Coronal and   sagittal reformations were made.    COMPARISON: Prior head CT dated  9/6/2019 at 8:34 AM    FINDINGS:  Redemonstration of low-attenuation change in the left frontal and   parietal lobes extending across the genu of the corpus callosum with   internal hemorrhage and adjacent subarachnoid hemorrhage within the   postoperative bed, similar appearing. Unchanged 0.8 cm right to left   midline shift.     Status post left frontoparietal craniotomy with associated postoperative   changes, including pneumocephalus and surgical skin staples over the   anterior scalp.     The visualized intraorbital compartments, paranasal sinuses and mastoid   complexes appear free of acute disease. Partial visualization of   endotracheal and enteric tubes.    IMPRESSION:  Similar-appearing hemorrhage within the postoperative site with   surrounding low-attenuation change extending across the genu of the   corpus callosum. Similar associated mass effect and shift of the midline   structures.                  EDUARDO WAY M.D., RADIOLOGY RESIDENT  This document has been electronically signed.  CLIVE GILES M.D., ATTENDING RADIOLOGIST  This document has been electronically signed. Sep  8 2019  9:21AM                < end of copied text >

## 2019-09-12 NOTE — PROGRESS NOTE ADULT - ASSESSMENT
This is a 64y Male, here with 1 month of personality change, found the have left frontal solitary heterogenous cystic mass with edema, also hepatic lesion and acetabular lesion    Concern would be for primary brain tumor, eg GBM vs metastatic lesion      9/5 - went to OR for resection/biopsy.  Post op complicated with GTC sz.  now on floor bed  Plan:  - continue depakote for seizure prophylaxis, was changed on 9/10 to depakote by neurosurgery/nscicu, no notation found to explain this change  - steroids per NS  - bx- adeno, onc following planning possible Rtx pending final pathology  -overall much improved mental status in last few days  -no further neurological workup needed  post op care as per NS    there was no family bedside, d/w patient.    reconsult prn

## 2019-09-12 NOTE — PROGRESS NOTE ADULT - SUBJECTIVE AND OBJECTIVE BOX
SUBJECTIVE:   No complaints   OVERNIGHT EVENTS: none    Vital Signs Last 24 Hrs  T(C): 36.8 (12 Sep 2019 16:25), Max: 37.1 (12 Sep 2019 08:00)  T(F): 98.2 (12 Sep 2019 16:25), Max: 98.7 (12 Sep 2019 08:00)  HR: 79 (12 Sep 2019 16:25) (56 - 79)  BP: 123/82 (12 Sep 2019 16:25) (106/68 - 132/76)  BP(mean): --  RR: 18 (12 Sep 2019 16:25) (17 - 18)  SpO2: 97% (12 Sep 2019 16:25) (96% - 98%)    PHYSICAL EXAM:    Constitutional: No Acute Distress     Neurological: Alert Ox 2-3 , Speech slow clear Following Commands, Moving all Extremities 5/5 No drift , Left cranial incision C/D/I     Lungs CTA    CV S1 S2 +     Abdomen soft nontender BS+     LABS:                        13.1   15.18 )-----------( 158      ( 12 Sep 2019 08:58 )             38.6    09-12    139  |  102  |  23  ----------------------------<  98  4.3   |  24  |  0.49<L>    Ca    9.2      12 Sep 2019 06:16  Phos  2.8     09-10  Mg     2.2     09-10          IMAGING:   U/s abdomen- 1.4 cm right hepatic lobe cyst corresponding to the indeterminate liver   finding on CT. Bilateral renal cysts, septated on the left, corresponding to the   indeterminate renal findings.        MEDICATIONS:  acetaminophen    Suspension .. 650 milliGRAM(s) Oral every 6 hours PRN Temp greater or equal to 38C (100.4F)  diVALproex  milliGRAM(s) Oral every 8 hours  melatonin 5 milliGRAM(s) Oral <User Schedule>  QUEtiapine 12.5 milliGRAM(s) Oral at bedtime  traMADol 25 milliGRAM(s) Oral every 4 hours PRN Moderate Pain (4 - 6)  traMADol 50 milliGRAM(s) Oral every 4 hours PRN Severe Pain (7 - 10)  docusate sodium Liquid 100 milliGRAM(s) Oral two times a day  polyethylene glycol 3350 17 Gram(s) Oral two times a day  senna Syrup 20 milliLiter(s) Oral at bedtime  chlorhexidine 4% Liquid 1 Application(s) Topical <User Schedule>  dexamethasone  Injectable 3 milliGRAM(s) IV Push every 8 hours  enoxaparin Injectable 30 milliGRAM(s) SubCutaneous <User Schedule>      DIET:     Assessment:  Please Check When Present   []  GCS  E   V  M     Heart Failure: []Acute, [] acute on chronic , []chronic  Heart Failure:  [] Diastolic (HFpEF), [] Systolic (HFrEF), []Combined (HFpEF and HFrEF), [] RHF, [] Pulm HTN, [] Other    [] ROGERS, [] ATN, [] AIN, [] other  [] CKD1, [] CKD2, [] CKD 3, [] CKD 4, [] CKD 5, []ESRD    Encephalopathy: [] Metabolic, [] Hepatic, [] toxic, [] Neurological, [] Other    Abnormal Nurtitional Status: [] malnurtition (see nutrition note), [ ]underweight: BMI < 19, [] morbid obesity: BMI >40, [] Cachexia    [] Sepsis  [] hypovolemic shock,[] cardiogenic shock, [] hemorrhagic shock, [] neuogenic shock  [] Acute Respiratory Failure  []Cerebral edema, [] Brain compression/ herniation,   [] Functional quadriplegia  [] Acute blood loss anemia

## 2019-09-12 NOTE — PROGRESS NOTE ADULT - SUBJECTIVE AND OBJECTIVE BOX
CHIEF COMPLAINT:Patient is a 64y old  Male who presents with a chief complaint of AMS (12 Sep 2019 08:43)    	        PAST MEDICAL & SURGICAL HISTORY:  No pertinent past medical history  No significant past surgical history          REVIEW OF SYSTEMS:  CONSTITUTIONAL: No fever, weight loss, or fatigue  EYES: No eye pain, visual disturbances, or discharge  NECK: No pain or stiffness  RESPIRATORY: No cough, wheezing, chills or hemoptysis; No Shortness of Breath  CARDIOVASCULAR: No chest pain, palpitations, passing out, dizziness, or leg swelling  GASTROINTESTINAL: No abdominal or epigastric pain. No nausea, vomiting, or hematemesis; No diarrhea or constipation. No melena or hematochezia.  GENITOURINARY: No dysuria, frequency, hematuria, or incontinence  NEUROLOGICAL: No headaches, memory loss, loss of strength, numbness, or tremors  SKIN: No itching, burning, rashes, or lesions   LYMPH Nodes: No enlarged glands  ENDOCRINE: No heat or cold intolerance; No hair loss  MUSCULOSKELETAL: No joint pain or swelling; No muscle, back, or extremity pain    Medications:  MEDICATIONS  (STANDING):  chlorhexidine 4% Liquid 1 Application(s) Topical <User Schedule>  dexamethasone  Injectable 3 milliGRAM(s) IV Push every 8 hours  diVALproex  milliGRAM(s) Oral every 8 hours  docusate sodium Liquid 100 milliGRAM(s) Oral two times a day  enoxaparin Injectable 30 milliGRAM(s) SubCutaneous <User Schedule>  melatonin 5 milliGRAM(s) Oral <User Schedule>  polyethylene glycol 3350 17 Gram(s) Oral two times a day  QUEtiapine 12.5 milliGRAM(s) Oral at bedtime  senna Syrup 20 milliLiter(s) Oral at bedtime    MEDICATIONS  (PRN):  acetaminophen    Suspension .. 650 milliGRAM(s) Oral every 6 hours PRN Temp greater or equal to 38C (100.4F)  traMADol 25 milliGRAM(s) Oral every 4 hours PRN Moderate Pain (4 - 6)  traMADol 50 milliGRAM(s) Oral every 4 hours PRN Severe Pain (7 - 10)    	    PHYSICAL EXAM:  T(C): 36.8 (09-12-19 @ 11:23), Max: 37.1 (09-12-19 @ 08:00)  HR: 78 (09-12-19 @ 11:23) (56 - 78)  BP: 106/68 (09-12-19 @ 11:23) (106/68 - 132/76)  RR: 18 (09-12-19 @ 11:23) (17 - 18)  SpO2: 97% (09-12-19 @ 11:23) (96% - 98%)  Wt(kg): --  I&O's Summary    11 Sep 2019 07:01  -  12 Sep 2019 07:00  --------------------------------------------------------  IN: 630 mL / OUT: 0 mL / NET: 630 mL    12 Sep 2019 07:01  -  12 Sep 2019 12:24  --------------------------------------------------------  IN: 360 mL / OUT: 0 mL / NET: 360 mL        Appearance: Normal	  HEENT:   Normal oral mucosa, PERRL, EOMI	  Lymphatic: No lymphadenopathy  Cardiovascular: Normal S1 S2, No JVD, No murmurs, No edema  Respiratory: Lungs clear to auscultation	  Psychiatry: A & O x 3, Mood & affect appropriate  Gastrointestinal:  Soft, Non-tender, + BS	  Skin: No rashes, No ecchymoses, No cyanosis	  Neurologic: Non-focal  Extremities: Normal range of motion, No clubbing, cyanosis or edema  Vascular: Peripheral pulses palpable 2+ bilaterally    TELEMETRY: 	    ECG:  	  RADIOLOGY:  OTHER: 	  	  LABS:	 	    CARDIAC MARKERS:                                13.1   15.18 )-----------( 158      ( 12 Sep 2019 08:58 )             38.6     09-12    139  |  102  |  23  ----------------------------<  98  4.3   |  24  |  0.49<L>    Ca    9.2      12 Sep 2019 06:16  Phos  2.8     09-10  Mg     2.2     09-10      proBNP:   Lipid Profile:   HgA1c:   TSH: CHIEF COMPLAINT:Patient is a 64y old  Male who presents with a chief complaint of AMS (12 Sep 2019 08:43)    	        PAST MEDICAL & SURGICAL HISTORY:  No pertinent past medical history  No significant past surgical history          REVIEW OF SYSTEMS:  pt awake alert  on floor  no complaints of cp or sob  no n/v  answers questions     Medications:  MEDICATIONS  (STANDING):  chlorhexidine 4% Liquid 1 Application(s) Topical <User Schedule>  dexamethasone  Injectable 3 milliGRAM(s) IV Push every 8 hours  diVALproex  milliGRAM(s) Oral every 8 hours  docusate sodium Liquid 100 milliGRAM(s) Oral two times a day  enoxaparin Injectable 30 milliGRAM(s) SubCutaneous <User Schedule>  melatonin 5 milliGRAM(s) Oral <User Schedule>  polyethylene glycol 3350 17 Gram(s) Oral two times a day  QUEtiapine 12.5 milliGRAM(s) Oral at bedtime  senna Syrup 20 milliLiter(s) Oral at bedtime    MEDICATIONS  (PRN):  acetaminophen    Suspension .. 650 milliGRAM(s) Oral every 6 hours PRN Temp greater or equal to 38C (100.4F)  traMADol 25 milliGRAM(s) Oral every 4 hours PRN Moderate Pain (4 - 6)  traMADol 50 milliGRAM(s) Oral every 4 hours PRN Severe Pain (7 - 10)    	    PHYSICAL EXAM:  T(C): 36.8 (09-12-19 @ 11:23), Max: 37.1 (09-12-19 @ 08:00)  HR: 78 (09-12-19 @ 11:23) (56 - 78)  BP: 106/68 (09-12-19 @ 11:23) (106/68 - 132/76)  RR: 18 (09-12-19 @ 11:23) (17 - 18)  SpO2: 97% (09-12-19 @ 11:23) (96% - 98%)  Wt(kg): --  I&O's Summary    11 Sep 2019 07:01  -  12 Sep 2019 07:00  --------------------------------------------------------  IN: 630 mL / OUT: 0 mL / NET: 630 mL    12 Sep 2019 07:01  -  12 Sep 2019 12:24  --------------------------------------------------------  IN: 360 mL / OUT: 0 mL / NET: 360 mL        Appearance: Normal	  HEENT:  incision on scalp clean  Cardiovascular: Normal S1 S2, No JVD, No murmurs, No edema  Respiratory: Lungs clear to auscultation	  Psychiatry: A & O   Gastrointestinal:  Soft, Non-tender, + BS	  Skin: No rashes, No ecchymoses, No cyanosis	  Neurologic: Non-focal/motor equal b/l  mild r foot drop   Extremities: Normal range of motion, No clubbing, cyanosis or edema  Vascular: Peripheral pulses palpable 2+ bilaterally    TELEMETRY: 	    ECG:  	  RADIOLOGY:  OTHER: 	  	  LABS:	 	    CARDIAC MARKERS:                                13.1   15.18 )-----------( 158      ( 12 Sep 2019 08:58 )             38.6     09-12    139  |  102  |  23  ----------------------------<  98  4.3   |  24  |  0.49<L>    Ca    9.2      12 Sep 2019 06:16  Phos  2.8     09-10  Mg     2.2     09-10      proBNP:   Lipid Profile:   HgA1c:   TSH:

## 2019-09-12 NOTE — PROGRESS NOTE ADULT - ASSESSMENT
64 year old male former smoker with no significant PMHx presents with AMS, brain imaging with mass.     1. Brain mass, suspected malignancy, ? mets   CT head: 3.4 x 3.3 cm cystic mass w/in L frontal lobe w/ edema and midline shift.  MRI wwo brain: L cystic enhancing lesion causing significant edema  CT chest/abd noted, ? mets   hem/onc, neurosurg, neuro f/u   9/5 - s/p OR for resection/biopsy.   w/ prelim adenoca; post op hemmorhage w/ return to or  post op seizure; keppra, vimpat, steroids as per neurosx, NISCU  bradycardia noted postop, now resolved  care per NeurologyNeurosx     remains cv stable  hr improved and stable  bp parameters per neuro/nsx  echo reveals normal LVEF w no sig valvular dysfunction   await path  further treatment per neuro/neurosx/onc    d/w dtr at bedside

## 2019-09-12 NOTE — PROGRESS NOTE ADULT - ASSESSMENT
63yo male presenting to ED for episodes of AMS.daughter   reports that pt has episodes of unresponsiveness "staring " for several seconds   delayed responses which began 2 weeks ago.. + generalized weakness, poor appetite, Long time smoker recently quit after fathers death 2 weeks ago. w/  3.4 x 3.3 cm cystic mass w/in L frontal lobe w/ edema and midline shift on ct head. MRI brain with solitary lesion  Left frontal heterogeneous lesion with cystic and solid components with  surrounding vasogenic edema.  CT C/A/P 1.2 cm hepatic lesion, renal cystic lesions and 1.6 cm R acetabular sclerotic lesion 9/5- s/p Left frontal craniotomy for resection of tumor.intraoperative CT which demonstrated hemorrhage along the superficial aspect and the resection cavity.  The patient was therefore brought back to OR and the flap was reopened, complete hemostasis obtained.Exam improved post op. Post op MRI 9/7     Plan    Neuro stable. On Decadron 3 q8 . Slow taper  (8mm midline shift)  Onc- Called Pathologist Dr Sebastian- Immunostains pending. Metastatic carcinoma- UNknown primary . Await final pathology.  Called Dr Gracia office for further plans [phone]   Vitals stable   PT/OT- acute rehab.If no further work up by onc  and pathology results can be followed outpatient possible d/c   DVT ppx

## 2019-09-12 NOTE — PROGRESS NOTE ADULT - ASSESSMENT
65yo male was admitted for episodes of AMS.  CT's and MRI noted. CT chest and abdomen noted  Patient is on steroids and Keppra.  Now is s/p surgery X2 for tumor in left frontal lobe and bleed  Prelim dx is adenocarcinoma, final awaited  We will determine need of RT and possible systemic rx after final path and post recovery with reasonable PS and addition w/u as needed  Patient is doing much better now and plan seems short rehab. Discussed with family including daughter

## 2019-09-12 NOTE — PROGRESS NOTE ADULT - ASSESSMENT
65 y/o male  w/ no PMH pw staring episodes and “delayed responsiveness” for 2 weeks. . Long time smoker recently quit after fathers death 2 weeks ago. w/  3.4 x 3.3 cm cystic mass w/in L frontal lobe w/ edema and midline shift on ct head  s/p craniotomy   w/ prelim adenoca  post op hemmorhage w/ return to or  post op seizure  steroids as per neurosx   neurochecks  -Keppra a s per nscu  s/p extubation and much improved neuro and mental status  allie resolved  cards f/u noted

## 2019-09-13 RX ORDER — ENOXAPARIN SODIUM 100 MG/ML
40 INJECTION SUBCUTANEOUS AT BEDTIME
Refills: 0 | Status: DISCONTINUED | OUTPATIENT
Start: 2019-09-13 | End: 2019-09-16

## 2019-09-13 RX ADMIN — Medication 3 MILLIGRAM(S): at 17:07

## 2019-09-13 RX ADMIN — Medication 10 MILLIGRAM(S): at 22:01

## 2019-09-13 RX ADMIN — Medication 3 MILLIGRAM(S): at 06:14

## 2019-09-13 RX ADMIN — Medication 3 MILLIGRAM(S): at 22:01

## 2019-09-13 RX ADMIN — QUETIAPINE FUMARATE 12.5 MILLIGRAM(S): 200 TABLET, FILM COATED ORAL at 22:01

## 2019-09-13 RX ADMIN — DIVALPROEX SODIUM 500 MILLIGRAM(S): 500 TABLET, DELAYED RELEASE ORAL at 06:14

## 2019-09-13 RX ADMIN — ENOXAPARIN SODIUM 40 MILLIGRAM(S): 100 INJECTION SUBCUTANEOUS at 22:15

## 2019-09-13 RX ADMIN — DIVALPROEX SODIUM 500 MILLIGRAM(S): 500 TABLET, DELAYED RELEASE ORAL at 17:09

## 2019-09-13 RX ADMIN — Medication 5 MILLIGRAM(S): at 22:00

## 2019-09-13 RX ADMIN — DIVALPROEX SODIUM 500 MILLIGRAM(S): 500 TABLET, DELAYED RELEASE ORAL at 22:01

## 2019-09-13 NOTE — PROGRESS NOTE ADULT - ASSESSMENT
63yo male presenting to ED for episodes of AMS.daughter   reports that pt has episodes of unresponsiveness "staring " for several seconds   delayed responses which began 2 weeks ago.. + generalized weakness, poor appetite, Long time smoker recently quit after fathers death 2 weeks ago. w/  3.4 x 3.3 cm cystic mass w/in L frontal lobe w/ edema and midline shift on ct head. MRI brain with solitary lesion  Left frontal heterogeneous lesion with cystic and solid components with  surrounding vasogenic edema.  CT C/A/P 1.2 cm hepatic lesion, renal cystic lesions and 1.6 cm R acetabular sclerotic lesion 9/5- s/p Left frontal craniotomy for resection of tumor.intraoperative CT which demonstrated hemorrhage along the superficial aspect and the resection cavity.  The patient was therefore brought back to OR and the flap was reopened, complete hemostasis obtained.Exam improved post op. Post op MRI 9/7     Plan    Neuro stable. On Decadron 3 q8 . Slow taper  (8mm midline shift)  Onc- Called Pathologist Dr Sebastian- Immunostains pending. Metastatic carcinoma- UNknown primary . Await final pathology.    Oncology saw last night :  will determine need of RT and possible systemic rx after final path and post recovery with reasonable PS and addition w/u as needed  Vitals stable   PT/OT- acute rehab  DVT ppx

## 2019-09-13 NOTE — PROGRESS NOTE ADULT - ASSESSMENT
64 year old male former smoker with no significant PMHx presents with AMS, brain imaging with mass.     1. Brain mass, suspected malignancy, ? mets   CT head: 3.4 x 3.3 cm cystic mass w/in L frontal lobe w/ edema and midline shift.  MRI wwo brain: L cystic enhancing lesion causing significant edema  CT chest/abd noted, ? mets   hem/onc, neurosurg, neuro f/u   9/5 - s/p OR for resection/biopsy.   w/ prelim adenoca; post op hemmorhage w/ return to or  post op seizure; keppra, vimpat, steroids as per neurosx, NISCU  bradycardia noted postop, now resolved  care per NeurologyNeurosx     remains cv stable  hr improved and stable  bp parameters per neuro/nsx  echo reveals normal LVEF w no sig valvular dysfunction   await path  further treatment per neuro/neurosx/onc

## 2019-09-13 NOTE — PROGRESS NOTE ADULT - SUBJECTIVE AND OBJECTIVE BOX
SUBJECTIVE: Patient seen and examined.  No acute distress.  Patient's niece and daughter Eleazar at bedside.  Patient's headaches are minimal     Vital Signs Last 24 Hrs  T(C): 36.8 (13 Sep 2019 07:11), Max: 36.8 (12 Sep 2019 11:23)  T(F): 98.2 (13 Sep 2019 07:11), Max: 98.2 (12 Sep 2019 11:23)  HR: 61 (13 Sep 2019 07:11) (61 - 79)  BP: 115/62 (13 Sep 2019 07:11) (106/68 - 138/87)  BP(mean): --  RR: 18 (13 Sep 2019 07:11) (18 - 18)  SpO2: 97% (13 Sep 2019 07:11) (95% - 97%)    PHYSICAL EXAM:    Constitutional: No Acute Distress     Neurological: AOx2 name and hospital.  He knows the month but not year , Following Commands, Moving all Extremities  no drift     Motor exam:          Upper extremity                         Delt     Bicep     Tricep    HG                                                 R         5/5        5/5        5/5       5/5                                               L          5/5        5/5        5/5       5/5          Lower extremity                        HF         KF        KE       DF         PF                                                  R        5/5        5/5        5/5        5/5         5/5                                               L         5/5        5/5       5/5       5/5          5/5                                                 Sensation: [x] intact to light touch  [] decreased:     Pulmonary: Clear to Auscultation, No rales, No rhonchi, No wheezes     Cardiovascular: S1, S2, Regular rate and rhythm     Gastrointestinal: Soft, Non-tender, Non-distended     Extremities: No calf tenderness     Incision: c/d/i    LABS:                        13.1   15.18 )-----------( 158      ( 12 Sep 2019 08:58 )             38.6    09-12    139  |  102  |  23  ----------------------------<  98  4.3   |  24  |  0.49<L>    Ca    9.2      12 Sep 2019 06:16        09-12 @ 07:01  -  09-13 @ 07:00  --------------------------------------------------------  IN: 1230 mL / OUT: 0 mL / NET: 1230 mL      MEDICATIONS:  Anticoagulation:   enoxaparin Injectable 40 milliGRAM(s) SubCutaneous at bedtime    Antibiotics:    Endo:  dexamethasone  Injectable 3 milliGRAM(s) IV Push every 8 hours    Neuro:  acetaminophen    Suspension .. 650 milliGRAM(s) Oral every 6 hours PRN Temp greater or equal to 38C (100.4F)  chlorproMAZINE    Tablet 10 milliGRAM(s) Oral every 8 hours PRN hiccups  diVALproex  milliGRAM(s) Oral every 8 hours  melatonin 5 milliGRAM(s) Oral <User Schedule>  QUEtiapine 12.5 milliGRAM(s) Oral at bedtime  traMADol 25 milliGRAM(s) Oral every 4 hours PRN Moderate Pain (4 - 6)  traMADol 50 milliGRAM(s) Oral every 4 hours PRN Severe Pain (7 - 10)    Cardiac:    Pulm:    GI/:  docusate sodium Liquid 100 milliGRAM(s) Oral two times a day  polyethylene glycol 3350 17 Gram(s) Oral two times a day  senna Syrup 20 milliLiter(s) Oral at bedtime    Other:     DIET: regular

## 2019-09-13 NOTE — PROGRESS NOTE ADULT - ASSESSMENT
63 y/o male  w/ no PMH pw staring episodes and “delayed responsiveness” for 2 weeks. . Long time smoker recently quit after fathers death 2 weeks ago. w/  3.4 x 3.3 cm cystic mass w/in L frontal lobe w/ edema and midline shift on ct head  s/p craniotomy   w/ prelim adenoca/await final path   post op hemmorhage w/ return to or  post op seizure  steroids as per neurosx   neurochecks  -Keppra a s per nscu  s/p extubation and much improved neuro and mental status  allie resolved  cards f/u noted   leukocytosis sec to steroids  pt  oob

## 2019-09-13 NOTE — PROGRESS NOTE ADULT - SUBJECTIVE AND OBJECTIVE BOX
CARDIOLOGY FOLLOW UP - Dr. Wilson    CC pt. sleeping  no complaints  daughter at bedside - neuro status continues to improve       PHYSICAL EXAM:  T(C): 36.6 (09-13-19 @ 11:46), Max: 36.8 (09-12-19 @ 16:25)  HR: 75 (09-13-19 @ 11:46) (61 - 79)  BP: 123/76 (09-13-19 @ 11:46) (109/72 - 138/87)  RR: 18 (09-13-19 @ 11:46) (18 - 18)  SpO2: 98% (09-13-19 @ 11:46) (95% - 98%)  Wt(kg): --  I&O's Summary    12 Sep 2019 07:01  -  13 Sep 2019 07:00  --------------------------------------------------------  IN: 1230 mL / OUT: 0 mL / NET: 1230 mL    13 Sep 2019 07:01  -  13 Sep 2019 14:29  --------------------------------------------------------  IN: 480 mL / OUT: 0 mL / NET: 480 mL        Appearance: Normal	  Cardiovascular: Normal S1 S2,RRR, No JVD, No murmurs  Respiratory: Lungs clear to auscultation	  Gastrointestinal:  Soft, Non-tender, + BS	  Extremities: Normal range of motion, No clubbing, cyanosis or edema        MEDICATIONS  (STANDING):  dexamethasone  Injectable 3 milliGRAM(s) IV Push every 8 hours  diVALproex  milliGRAM(s) Oral every 8 hours  docusate sodium Liquid 100 milliGRAM(s) Oral two times a day  enoxaparin Injectable 40 milliGRAM(s) SubCutaneous at bedtime  melatonin 5 milliGRAM(s) Oral <User Schedule>  polyethylene glycol 3350 17 Gram(s) Oral two times a day  QUEtiapine 12.5 milliGRAM(s) Oral at bedtime  senna Syrup 20 milliLiter(s) Oral at bedtime      TELEMETRY: 	    ECG:  	  RADIOLOGY:   DIAGNOSTIC TESTING:  [ ] Echocardiogram:  [ ]  Catheterization:  [ ] Stress Test:    OTHER: 	    LABS:	 	                                13.1   15.18 )-----------( 158      ( 12 Sep 2019 08:58 )             38.6     09-12    139  |  102  |  23  ----------------------------<  98  4.3   |  24  |  0.49<L>    Ca    9.2      12 Sep 2019 06:16

## 2019-09-13 NOTE — PROGRESS NOTE ADULT - SUBJECTIVE AND OBJECTIVE BOX
CHIEF COMPLAINT:Patient is a 64y old  Male who presents with a chief complaint of AMS (12 Sep 2019 17:18)    	        PAST MEDICAL & SURGICAL HISTORY:  No pertinent past medical history  No significant past surgical history          REVIEW OF SYSTEMS:  CONSTITUTIONAL:feels better  EYES: No eye pain, visual disturbances, or discharge  NECK: No pain or stiffness  RESPIRATORY: No cough, wheezing, chills or hemoptysis; No Shortness of Breath  CARDIOVASCULAR: No chest pain, palpitations, passing out, dizziness,   GASTROINTESTINAL: No abdominal or epigastric pain. No nausea, vomiting, or hematemesis;    NEUROLOGICAL: No headaches,   Medications:  MEDICATIONS  (STANDING):  dexamethasone  Injectable 3 milliGRAM(s) IV Push every 8 hours  diVALproex  milliGRAM(s) Oral every 8 hours  docusate sodium Liquid 100 milliGRAM(s) Oral two times a day  enoxaparin Injectable 40 milliGRAM(s) SubCutaneous at bedtime  melatonin 5 milliGRAM(s) Oral <User Schedule>  polyethylene glycol 3350 17 Gram(s) Oral two times a day  QUEtiapine 12.5 milliGRAM(s) Oral at bedtime  senna Syrup 20 milliLiter(s) Oral at bedtime    MEDICATIONS  (PRN):  acetaminophen    Suspension .. 650 milliGRAM(s) Oral every 6 hours PRN Temp greater or equal to 38C (100.4F)  chlorproMAZINE    Tablet 10 milliGRAM(s) Oral every 8 hours PRN hiccups  traMADol 25 milliGRAM(s) Oral every 4 hours PRN Moderate Pain (4 - 6)  traMADol 50 milliGRAM(s) Oral every 4 hours PRN Severe Pain (7 - 10)    	    PHYSICAL EXAM:  T(C): 36.9 (09-13-19 @ 15:36), Max: 36.9 (09-13-19 @ 15:36)  HR: 71 (09-13-19 @ 15:36) (61 - 75)  BP: 131/79 (09-13-19 @ 15:36) (109/72 - 138/87)  RR: 18 (09-13-19 @ 15:36) (18 - 18)  SpO2: 98% (09-13-19 @ 15:36) (95% - 98%)  Wt(kg): --  I&O's Summary    12 Sep 2019 07:01  -  13 Sep 2019 07:00  --------------------------------------------------------  IN: 1230 mL / OUT: 0 mL / NET: 1230 mL    13 Sep 2019 07:01  -  13 Sep 2019 19:25  --------------------------------------------------------  IN: 480 mL / OUT: 0 mL / NET: 480 mL        Appearance: Normal	  HEENT:   Normal oral mucosa, PERRL, EOMI	  incision clean on scalp  Cardiovascular: Normal S1 S2, No JVD,   Respiratory: Lungs clear to auscultation	  Psychiatry: A & O  Gastrointestinal:  Soft, Non-tender, + BS	  Skin: No rashes, No ecchymoses, No cyanosis	  Neurologic: Non-focal/motor equal b/l   Extremities: Normal range of motion, No clubbing, cyanosis or edema  Vascular: Peripheral pulses palpable 2+ bilaterally    TELEMETRY: 	    ECG:  	  RADIOLOGY:  OTHER: 	  	  LABS:	 	    CARDIAC MARKERS:                                13.1   15.18 )-----------( 158      ( 12 Sep 2019 08:58 )             38.6     09-12    139  |  102  |  23  ----------------------------<  98  4.3   |  24  |  0.49<L>    Ca    9.2      12 Sep 2019 06:16      proBNP:   Lipid Profile:   HgA1c:   TSH:

## 2019-09-14 RX ORDER — DEXAMETHASONE 0.5 MG/5ML
2 ELIXIR ORAL EVERY 8 HOURS
Refills: 0 | Status: DISCONTINUED | OUTPATIENT
Start: 2019-09-14 | End: 2019-09-16

## 2019-09-14 RX ORDER — PANTOPRAZOLE SODIUM 20 MG/1
40 TABLET, DELAYED RELEASE ORAL DAILY
Refills: 0 | Status: DISCONTINUED | OUTPATIENT
Start: 2019-09-14 | End: 2019-09-16

## 2019-09-14 RX ORDER — SENNA PLUS 8.6 MG/1
2 TABLET ORAL AT BEDTIME
Refills: 0 | Status: DISCONTINUED | OUTPATIENT
Start: 2019-09-14 | End: 2019-09-16

## 2019-09-14 RX ADMIN — Medication 2 MILLIGRAM(S): at 21:36

## 2019-09-14 RX ADMIN — POLYETHYLENE GLYCOL 3350 17 GRAM(S): 17 POWDER, FOR SOLUTION ORAL at 17:12

## 2019-09-14 RX ADMIN — DIVALPROEX SODIUM 500 MILLIGRAM(S): 500 TABLET, DELAYED RELEASE ORAL at 21:34

## 2019-09-14 RX ADMIN — Medication 100 MILLIGRAM(S): at 17:12

## 2019-09-14 RX ADMIN — PANTOPRAZOLE SODIUM 40 MILLIGRAM(S): 20 TABLET, DELAYED RELEASE ORAL at 11:20

## 2019-09-14 RX ADMIN — ENOXAPARIN SODIUM 40 MILLIGRAM(S): 100 INJECTION SUBCUTANEOUS at 21:42

## 2019-09-14 RX ADMIN — Medication 5 MILLIGRAM(S): at 21:55

## 2019-09-14 RX ADMIN — SENNA PLUS 2 TABLET(S): 8.6 TABLET ORAL at 22:49

## 2019-09-14 RX ADMIN — QUETIAPINE FUMARATE 12.5 MILLIGRAM(S): 200 TABLET, FILM COATED ORAL at 21:35

## 2019-09-14 RX ADMIN — TRAMADOL HYDROCHLORIDE 50 MILLIGRAM(S): 50 TABLET ORAL at 20:27

## 2019-09-14 RX ADMIN — Medication 3 MILLIGRAM(S): at 06:06

## 2019-09-14 RX ADMIN — Medication 2 MILLIGRAM(S): at 13:15

## 2019-09-14 RX ADMIN — DIVALPROEX SODIUM 500 MILLIGRAM(S): 500 TABLET, DELAYED RELEASE ORAL at 06:06

## 2019-09-14 RX ADMIN — DIVALPROEX SODIUM 500 MILLIGRAM(S): 500 TABLET, DELAYED RELEASE ORAL at 13:16

## 2019-09-14 NOTE — PROGRESS NOTE ADULT - SUBJECTIVE AND OBJECTIVE BOX
SUBJECTIVE: Patient seen and examined.  No acute distress. Family by bedside.  Patient's only complaint is hipcups with eating.     Vital Signs Last 24 Hrs  T(C): 36.5 (14 Sep 2019 08:16), Max: 36.9 (13 Sep 2019 15:36)  T(F): 97.7 (14 Sep 2019 08:16), Max: 98.4 (13 Sep 2019 15:36)  HR: 64 (14 Sep 2019 08:16) (60 - 75)  BP: 113/71 (14 Sep 2019 08:16) (109/67 - 149/86)  BP(mean): --  RR: 18 (14 Sep 2019 08:16) (18 - 18)  SpO2: 97% (14 Sep 2019 08:16) (97% - 98%)    PHYSICAL EXAM:    Constitutional: No Acute Distress     Neurological: AOx3, Following Commands, Moving all Extremities     Motor exam:          Upper extremity                         Delt     Bicep     Tricep    HG                                                 R         5/5        5/5        5/5       5/5                                               L          5/5        5/5        5/5       5/5          Lower extremity                        HF         KF        KE       DF         PF                                                  R        5/5        5/5        5/5       5/5         5/5                                               L         5/5        5/5       5/5       5/5          5/5                                                 Sensation: [x] intact to light touch  [] decreased:     Pulmonary: Clear to Auscultation, No rales, No rhonchi, No wheezes     Cardiovascular: S1, S2, Regular rate and rhythm     Gastrointestinal: Soft, Non-tender, Non-distended     Extremities: No calf tenderness     Incision: c/d/i + staples   LABS:           09-13 @ 07:01  -  09-14 @ 07:00  --------------------------------------------------------  IN: 720 mL / OUT: 0 mL / NET: 720 mL      DRAINS:     MEDICATIONS:  Anticoagulation:   enoxaparin Injectable 40 milliGRAM(s) SubCutaneous at bedtime    Antibiotics:    Endo:  dexamethasone  Injectable 2 milliGRAM(s) IV Push every 8 hours    Neuro:  acetaminophen    Suspension .. 650 milliGRAM(s) Oral every 6 hours PRN Temp greater or equal to 38C (100.4F)  chlorproMAZINE    Tablet 10 milliGRAM(s) Oral every 8 hours PRN hiccups  diVALproex  milliGRAM(s) Oral every 8 hours  melatonin 5 milliGRAM(s) Oral <User Schedule>  QUEtiapine 12.5 milliGRAM(s) Oral at bedtime  traMADol 25 milliGRAM(s) Oral every 4 hours PRN Moderate Pain (4 - 6)  traMADol 50 milliGRAM(s) Oral every 4 hours PRN Severe Pain (7 - 10)    Cardiac:    Pulm:    GI/:  docusate sodium Liquid 100 milliGRAM(s) Oral two times a day  pantoprazole  Injectable 40 milliGRAM(s) IV Push daily  polyethylene glycol 3350 17 Gram(s) Oral two times a day  senna Syrup 20 milliLiter(s) Oral at bedtime    Other:     DIET: regular

## 2019-09-14 NOTE — PROGRESS NOTE ADULT - SUBJECTIVE AND OBJECTIVE BOX
CHIEF COMPLAINT:Patient is a 64y old  Male who presents with a chief complaint of AMS (12 Sep 2019 17:18)    	        PAST MEDICAL & SURGICAL HISTORY:  No pertinent past medical history  No significant past surgical history          REVIEW OF SYSTEMS:  CONSTITUTIONAL: feels better  EYES: No eye pain, visual disturbances, or discharge  NECK: No pain or stiffness  RESPIRATORY: No cough, wheezing, chills or hemoptysis; No Shortness of Breath  CARDIOVASCULAR: No chest pain, palpitations, passing out, dizziness, or leg swelling  GASTROINTESTINAL: No abdominal or epigastric pain. No nausea, vomiting, or hematemesis; No diarrhea or constipation. No melena or hematochezia.  GENITOURINARY: No dysuria, frequency, hematuria, or incontinence  NEUROLOGICAL: No headaches,  Medications:  MEDICATIONS  (STANDING):  dexamethasone  Injectable 3 milliGRAM(s) IV Push every 8 hours  diVALproex  milliGRAM(s) Oral every 8 hours  docusate sodium Liquid 100 milliGRAM(s) Oral two times a day  enoxaparin Injectable 40 milliGRAM(s) SubCutaneous at bedtime  melatonin 5 milliGRAM(s) Oral <User Schedule>  polyethylene glycol 3350 17 Gram(s) Oral two times a day  QUEtiapine 12.5 milliGRAM(s) Oral at bedtime  senna Syrup 20 milliLiter(s) Oral at bedtime    MEDICATIONS  (PRN):  acetaminophen    Suspension .. 650 milliGRAM(s) Oral every 6 hours PRN Temp greater or equal to 38C (100.4F)  chlorproMAZINE    Tablet 10 milliGRAM(s) Oral every 8 hours PRN hiccups  traMADol 25 milliGRAM(s) Oral every 4 hours PRN Moderate Pain (4 - 6)  traMADol 50 milliGRAM(s) Oral every 4 hours PRN Severe Pain (7 - 10)    	    PHYSICAL EXAM:  T(C): 36.5 (09-14-19 @ 08:16), Max: 36.9 (09-13-19 @ 15:36)  HR: 64 (09-14-19 @ 08:16) (60 - 75)  BP: 113/71 (09-14-19 @ 08:16) (109/67 - 149/86)  RR: 18 (09-14-19 @ 08:16) (18 - 18)  SpO2: 97% (09-14-19 @ 08:16) (97% - 98%)  Wt(kg): --  I&O's Summary    13 Sep 2019 07:01  -  14 Sep 2019 07:00  --------------------------------------------------------  IN: 720 mL / OUT: 0 mL / NET: 720 mL        Appearance: Normal	  HEENT:   Normal oral mucosa, PERRL, EOMI	  Lymphatic: No lymphadenopathy  Cardiovascular: Normal S1 S2, No JVD, No murmurs, No edema  Respiratory: Lungs clear to auscultation	  Psychiatry: A & O   Gastrointestinal:  Soft, Non-tender, + BS	  Skin: No rashes, No ecchymoses, No cyanosis	  Neurologic: Non-focal/motor equal b/l sensory intact  Extremities: Normal range of motion, No clubbing, cyanosis or edema  Vascular: Peripheral pulses palpable 2+ bilaterally    TELEMETRY: 	    ECG:  	  RADIOLOGY:  OTHER: 	  	  LABS:	 	    CARDIAC MARKERS:                  proBNP:   Lipid Profile:   HgA1c:   TSH:

## 2019-09-14 NOTE — PROGRESS NOTE ADULT - ASSESSMENT
63yo male was admitted for episodes of AMS.  CT's and MRI noted. CT chest and abdomen noted  Patient is on steroids and Keppra.  Now is s/p surgery X2 for tumor in left frontal lobe and bleed  Final path as above, molecular studies pending.    I discussed with patient's daughter (not RN). She was informed of pathology, most likely tumor came from lung, though CT did not show. PET/CT can be done as outpatient.  His neuroendocrine tumor will need input from Radiation oncology though tumor was resected.  In addition he will need systemic treatment with chemo and possibily immuno (PDL).  Even though they have not told the patient details yet, he will need to know for treatment.   Patient's daughter wanted me to discuss details with daughter who is RN too when she is present, will do when I see her

## 2019-09-14 NOTE — PROGRESS NOTE ADULT - SUBJECTIVE AND OBJECTIVE BOX
Patient was admitted with change in behaviour. 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 Immunostains are  positive for CK7, TTF-1 and Neuroendocrine markers  (Synaptophysin, chromogranin, CD56). High Ki-67 proliferation  labeling index (>90%) noted. Immunostains are negative PAX-8,  CK19, CDX2, CK20, HEPAR1, GFAP, ARGINASE, PSA, EGFR, IDH1. ATRX  and p53 (focal) are positive. Immunohistochemical features are  suggestive of primary lung origin if all other sites have been  ruled out.    Molecular studies have been ordered and will be reported as an  addendum      Verified by: Jose Sebastian MD PhD  (Electronic Signature)  Reported on: 09/13/19 17:02 EDT, [address] [place] [address]  _________________________________________________________________    Intraoperative Consultation  1. Left brain tumor  - Brain, frontal lobe, left biopsy -markedly necrotic malignant  neoplasm consistent with metastatic carcinoma  By Dr. TING Dumas  _              PAST MEDICAL & SURGICAL HISTORY:  No pertinent past medical history  No significant past surgical history    Medications:  acetaminophen    Suspension .. 650 milliGRAM(s) Oral every 6 hours PRN Temp greater or equal to 38C (100.4F)  chlorproMAZINE    Tablet 10 milliGRAM(s) Oral every 8 hours PRN hiccups  dexamethasone  Injectable 2 milliGRAM(s) IV Push every 8 hours  diVALproex  milliGRAM(s) Oral every 8 hours  docusate sodium Liquid 100 milliGRAM(s) Oral two times a day  enoxaparin Injectable 40 milliGRAM(s) SubCutaneous at bedtime  melatonin 5 milliGRAM(s) Oral <User Schedule>  pantoprazole  Injectable 40 milliGRAM(s) IV Push daily  polyethylene glycol 3350 17 Gram(s) Oral two times a day  QUEtiapine 12.5 milliGRAM(s) Oral at bedtime  senna Syrup 20 milliLiter(s) Oral at bedtime  traMADol 25 milliGRAM(s) Oral every 4 hours PRN Moderate Pain (4 - 6)  traMADol 50 milliGRAM(s) Oral every 4 hours PRN Severe Pain (7 - 10)    Labs:            Radiology:     < from: CT Abdomen and Pelvis w/ IV Cont (09.01.19 @ 17:07) >  MPRESSION:     No lesion or adenopathy in the chest. No adenopathy in the abdomen or   pelvis.     Indeterminate 1.2 cm hypoattenuating right hepatic lobe lesion.    Indeterminate bilateral renal cystic lesions, which may reflect   hemorrhagic/proteinaceous cysts. Further evaluation with abdominal   ultrasound is recommended.    1.6 cm sclerotic right acetabular lesion.        < end of copied text >          ROS:  Patient comfortable without distress  No SOB or chest pain  No palpitation  No abdominal pain, diarrhaea or constipation  No weakness of extremities  No skin changes or swelling of legs    Vital Signs Last 24 Hrs  T(C): 36.5 (14 Sep 2019 08:16), Max: 36.9 (13 Sep 2019 15:36)  T(F): 97.7 (14 Sep 2019 08:16), Max: 98.4 (13 Sep 2019 15:36)  HR: 64 (14 Sep 2019 08:16) (60 - 74)  BP: 113/71 (14 Sep 2019 08:16) (109/67 - 149/86)  BP(mean): --  RR: 18 (14 Sep 2019 08:16) (18 - 18)  SpO2: 97% (14 Sep 2019 08:16) (97% - 98%)    Physical exam:  Patient alert and oriented  No distress  CVS: S1, S2 regular or murmur  Chest: bilateral breath sound without rales  Abdomen: soft, not tender, no organomegaly or masses  No focal neuro deficit  No edema      Assessment and Plan: Patient was admitted with change in behaviour. 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 Immunostains are  positive for CK7, TTF-1 and Neuroendocrine markers  (Synaptophysin, chromogranin, CD56). High Ki-67 proliferation  labeling index (>90%) noted. Immunostains are negative PAX-8,  CK19, CDX2, CK20, HEPAR1, GFAP, ARGINASE, PSA, EGFR, IDH1. ATRX  and p53 (focal) are positive. Immunohistochemical features are  suggestive of primary lung origin if all other sites have been  ruled out.    Molecular studies have been ordered and will be reported as an  addendum      Verified by: Jose Sebastian MD PhD  (Electronic Signature)  Reported on: 09/13/19 17:02 EDT, [address] [place] [address]  _________________________________________________________________    Intraoperative Consultation  1. Left brain tumor  - Brain, frontal lobe, left biopsy -markedly necrotic malignant  neoplasm consistent with metastatic carcinoma  By Dr. TING Dumas  _              PAST MEDICAL & SURGICAL HISTORY:  No pertinent past medical history  No significant past surgical history    Medications:  acetaminophen    Suspension .. 650 milliGRAM(s) Oral every 6 hours PRN Temp greater or equal to 38C (100.4F)  chlorproMAZINE    Tablet 10 milliGRAM(s) Oral every 8 hours PRN hiccups  dexamethasone  Injectable 2 milliGRAM(s) IV Push every 8 hours  diVALproex  milliGRAM(s) Oral every 8 hours  docusate sodium Liquid 100 milliGRAM(s) Oral two times a day  enoxaparin Injectable 40 milliGRAM(s) SubCutaneous at bedtime  melatonin 5 milliGRAM(s) Oral <User Schedule>  pantoprazole  Injectable 40 milliGRAM(s) IV Push daily  polyethylene glycol 3350 17 Gram(s) Oral two times a day  QUEtiapine 12.5 milliGRAM(s) Oral at bedtime  senna Syrup 20 milliLiter(s) Oral at bedtime  traMADol 25 milliGRAM(s) Oral every 4 hours PRN Moderate Pain (4 - 6)  traMADol 50 milliGRAM(s) Oral every 4 hours PRN Severe Pain (7 - 10)    Labs:            Radiology:     < from: CT Abdomen and Pelvis w/ IV Cont (09.01.19 @ 17:07) >  MPRESSION:     No lesion or adenopathy in the chest. No adenopathy in the abdomen or   pelvis.     Indeterminate 1.2 cm hypoattenuating right hepatic lobe lesion.    Indeterminate bilateral renal cystic lesions, which may reflect   hemorrhagic/proteinaceous cysts. Further evaluation with abdominal   ultrasound is recommended.    1.6 cm sclerotic right acetabular lesion.        < end of copied text >          ROS:  Patient comfortable without distress, surrounded by family,   No SOB or chest pain  No palpitation  No abdominal pain, diarrhaea or constipation  No weakness of extremities  No skin changes or swelling of legs    Vital Signs Last 24 Hrs  T(C): 36.5 (14 Sep 2019 08:16), Max: 36.9 (13 Sep 2019 15:36)  T(F): 97.7 (14 Sep 2019 08:16), Max: 98.4 (13 Sep 2019 15:36)  HR: 64 (14 Sep 2019 08:16) (60 - 74)  BP: 113/71 (14 Sep 2019 08:16) (109/67 - 149/86)  BP(mean): --  RR: 18 (14 Sep 2019 08:16) (18 - 18)  SpO2: 97% (14 Sep 2019 08:16) (97% - 98%)    Physical exam:  Patient alert and oriented  No distress, staples on scalp  CVS: S1, S2 regular or murmur  Chest: bilateral breath sound without rales  Abdomen: soft, not tender, no organomegaly or masses  No focal neuro deficit, walked with walker yesterday and today  No edema      Assessment and Plan:

## 2019-09-14 NOTE — PROGRESS NOTE ADULT - ASSESSMENT
65yo male presenting to ED for episodes of AMS.daughter   reports that pt has episodes of unresponsiveness "staring " for several seconds   delayed responses which began 2 weeks ago.. + generalized weakness, poor appetite, Long time smoker recently quit after fathers death 2 weeks ago. w/  3.4 x 3.3 cm cystic mass w/in L frontal lobe w/ edema and midline shift on ct head. MRI brain with solitary lesion  Left frontal heterogeneous lesion with cystic and solid components with  surrounding vasogenic edema.  CT C/A/P 1.2 cm hepatic lesion, renal cystic lesions and 1.6 cm R acetabular sclerotic lesion 9/5- s/p Left frontal craniotomy for resection of tumor.intraoperative CT which demonstrated hemorrhage along the superficial aspect and the resection cavity.  The patient was therefore brought back to OR and the flap was reopened, complete hemostasis obtained.Exam improved post op. Post op MRI 9/7     Plan    Neuro stable. On Decadron 2 q8 . Slow taper  (8mm midline shift)  Onc- Called Pathologist Dr Sebastian- Immunostains pending. Metastatic carcinoma- UNknown primary . Await final pathology.    Oncology: will determine need of RT and possible systemic rx   Final pathology is back: metastatic carcinoma.  will recall oncology to let them know their results.    Vitals stable   PT/OT- acute rehab  DVT ppx

## 2019-09-14 NOTE — PROGRESS NOTE ADULT - SUBJECTIVE AND OBJECTIVE BOX
CC: no events, family at bedside, pt resting comfortably    TELEMETRY:     PHYSICAL EXAM:    T(C): 36.5 (09-14-19 @ 04:35), Max: 36.9 (09-13-19 @ 15:36)  HR: 60 (09-14-19 @ 04:35) (60 - 75)  BP: 111/76 (09-14-19 @ 04:35) (109/67 - 149/86)  RR: 18 (09-14-19 @ 04:35) (18 - 18)  SpO2: 97% (09-14-19 @ 04:35) (97% - 98%)  Wt(kg): --  I&O's Summary    13 Sep 2019 07:01  -  14 Sep 2019 07:00  --------------------------------------------------------  IN: 720 mL / OUT: 0 mL / NET: 720 mL        Appearance: Normal	  Cardiovascular: Normal S1 S2,RRR, No JVD, No murmurs  Respiratory: Lungs clear to auscultation	  Gastrointestinal:  Soft, Non-tender, + BS	  Extremities: Normal range of motion, No clubbing, cyanosis or edema  Vascular: Peripheral pulses palpable 2+ bilaterally     LABS:	 	                          13.1   15.18 )-----------( 158      ( 12 Sep 2019 08:58 )             38.6                 CARDIAC MARKERS:

## 2019-09-14 NOTE — PROGRESS NOTE ADULT - ASSESSMENT
63 y/o male  w/ no PMH pw staring episodes and “delayed responsiveness” for 2 weeks. . Long time smoker recently quit after fathers death 2 weeks ago. w/  3.4 x 3.3 cm cystic mass w/in L frontal lobe w/ edema and midline shift on ct head  s/p craniotomy   w/ prelim adenoca/primary unknown at present  post op hemmorhage w/ return to or  post op seizure  steroids as per neurosx   neurochecks  -Keppra a s per neurosx   s/p extubation and much improved neuro and mental status    allie resolved  cards f/u noted   leukocytosis sec to steroids  pt  oob

## 2019-09-14 NOTE — PROGRESS NOTE ADULT - ASSESSMENT
64 year old male former smoker with no significant PMHx presents with AMS, brain imaging with mass.     1. Brain mass, suspected malignancy, ? mets   CT head: 3.4 x 3.3 cm cystic mass w/in L frontal lobe w/ edema and midline shift.  MRI wwo brain: L cystic enhancing lesion causing significant edema  CT chest/abd noted, ? mets   hem/onc, neurosurg, neuro f/u   9/5 - s/p OR for resection/biopsy.   w/ prelim adenoca; post op hemmorhage w/ return to or  post op seizure; keppra, vimpat, steroids as per neurosx, NISCU  bradycardia noted postop, now resolved  care per NeurologyNeurosx     remains cv stable  hr improved and stable  bp parameters per neuro/nsx  echo reveals normal LVEF w no sig valvular dysfunction   await final path  further treatment per neuro/neurosx/onc

## 2019-09-15 RX ADMIN — DIVALPROEX SODIUM 500 MILLIGRAM(S): 500 TABLET, DELAYED RELEASE ORAL at 06:38

## 2019-09-15 RX ADMIN — SENNA PLUS 2 TABLET(S): 8.6 TABLET ORAL at 21:08

## 2019-09-15 RX ADMIN — POLYETHYLENE GLYCOL 3350 17 GRAM(S): 17 POWDER, FOR SOLUTION ORAL at 06:38

## 2019-09-15 RX ADMIN — Medication 100 MILLIGRAM(S): at 06:38

## 2019-09-15 RX ADMIN — Medication 2 MILLIGRAM(S): at 06:37

## 2019-09-15 RX ADMIN — QUETIAPINE FUMARATE 12.5 MILLIGRAM(S): 200 TABLET, FILM COATED ORAL at 21:08

## 2019-09-15 RX ADMIN — Medication 5 MILLIGRAM(S): at 21:10

## 2019-09-15 RX ADMIN — POLYETHYLENE GLYCOL 3350 17 GRAM(S): 17 POWDER, FOR SOLUTION ORAL at 17:25

## 2019-09-15 RX ADMIN — DIVALPROEX SODIUM 500 MILLIGRAM(S): 500 TABLET, DELAYED RELEASE ORAL at 14:40

## 2019-09-15 RX ADMIN — ENOXAPARIN SODIUM 40 MILLIGRAM(S): 100 INJECTION SUBCUTANEOUS at 21:06

## 2019-09-15 RX ADMIN — DIVALPROEX SODIUM 500 MILLIGRAM(S): 500 TABLET, DELAYED RELEASE ORAL at 21:07

## 2019-09-15 RX ADMIN — PANTOPRAZOLE SODIUM 40 MILLIGRAM(S): 20 TABLET, DELAYED RELEASE ORAL at 11:20

## 2019-09-15 RX ADMIN — Medication 100 MILLIGRAM(S): at 17:25

## 2019-09-15 RX ADMIN — Medication 2 MILLIGRAM(S): at 21:07

## 2019-09-15 RX ADMIN — Medication 2 MILLIGRAM(S): at 14:39

## 2019-09-15 NOTE — PROGRESS NOTE ADULT - ASSESSMENT
65yo male presenting to ED for episodes of AMS.daughter   reports that pt has episodes of unresponsiveness "staring " for several seconds   delayed responses which began 2 weeks ago.. + generalized weakness, poor appetite, Long time smoker recently quit after fathers death 2 weeks ago. w/  3.4 x 3.3 cm cystic mass w/in L frontal lobe w/ edema and midline shift on ct head. MRI brain with solitary lesion  Left frontal heterogeneous lesion with cystic and solid components with  surrounding vasogenic edema.  CT C/A/P 1.2 cm hepatic lesion, renal cystic lesions and 1.6 cm R acetabular sclerotic lesion 9/5- s/p Left frontal craniotomy for resection of tumor.intraoperative CT which demonstrated hemorrhage along the superficial aspect and the resection cavity.  The patient was therefore brought back to OR and the flap was reopened, complete hemostasis obtained.Exam improved post op. Post op MRI 9/7     Plan    Neuro stable. On Decadron 2 q8 . Slow taper  (8mm midline shift) continue vpa   Onc- final pathology Metastatic carcinoma- unknown primary but most likely lungs even though ct c/a/p negative.  ct/ pet as outpatient  per oncology  oncology:  most likely tumor came from lung, though CT did not show. PET/CT can be done as outpatient.  His neuroendocrine tumor will need input from Radiation oncology though tumor was resected.  In addition he will need systemic treatment with chemo and possibily immuno (PDL).  DVT ppx  lovenox and scds   GI: patient was having hipcups before and after eating.  protonix added since he felt acidy taste and on decadron for GI prophylaxis, thorazine prn hipcups  seroquel and melatonin for insomnia   tramadol for pain which patient is having minimal   pt/ot : acute tbi

## 2019-09-15 NOTE — PROGRESS NOTE ADULT - SUBJECTIVE AND OBJECTIVE BOX
CHIEF COMPLAINT:Patient is a 64y old  Male who presents with a chief complaint of Altered mental status (14 Sep 2019 12:36)    	        PAST MEDICAL & SURGICAL HISTORY:  No pertinent past medical history  No significant past surgical history          REVIEW OF SYSTEMS:  CONSTITUTIONAL:feels better  EYES: No eye pain, visual disturbances, or discharge  NECK: No pain or stiffness  RESPIRATORY: No cough, wheezing, chills or hemoptysis; No Shortness of Breath  CARDIOVASCULAR: No chest pain, palpitations, passing out, dizziness,  GASTROINTESTINAL: No abdominal or epigastric pain. No nausea, vomiting, or hematemesis; No diarrhea or constipation. No melena or hematochezia.  GENITOURINARY: No dysuria, frequency, hematuria, or incontinence  NEUROLOGICAL: No headaches,   MUSCULOSKELETAL: No joint pain or swelling; No muscle, back, or extremity pain    Medications:  MEDICATIONS  (STANDING):  dexamethasone  Injectable 2 milliGRAM(s) IV Push every 8 hours  diVALproex  milliGRAM(s) Oral every 8 hours  docusate sodium Liquid 100 milliGRAM(s) Oral two times a day  enoxaparin Injectable 40 milliGRAM(s) SubCutaneous at bedtime  melatonin 5 milliGRAM(s) Oral <User Schedule>  pantoprazole  Injectable 40 milliGRAM(s) IV Push daily  polyethylene glycol 3350 17 Gram(s) Oral two times a day  QUEtiapine 12.5 milliGRAM(s) Oral at bedtime  senna 2 Tablet(s) Oral at bedtime    MEDICATIONS  (PRN):  acetaminophen    Suspension .. 650 milliGRAM(s) Oral every 6 hours PRN Temp greater or equal to 38C (100.4F)  chlorproMAZINE    Tablet 10 milliGRAM(s) Oral every 8 hours PRN hiccups  traMADol 25 milliGRAM(s) Oral every 4 hours PRN Moderate Pain (4 - 6)  traMADol 50 milliGRAM(s) Oral every 4 hours PRN Severe Pain (7 - 10)    	    PHYSICAL EXAM:  T(C): 36.5 (09-15-19 @ 04:00), Max: 36.6 (09-14-19 @ 12:00)  HR: 63 (09-15-19 @ 04:00) (57 - 67)  BP: 122/76 (09-15-19 @ 04:00) (114/74 - 150/76)  RR: 18 (09-15-19 @ 04:00) (18 - 18)  SpO2: 97% (09-15-19 @ 04:00) (97% - 98%)  Wt(kg): --  I&O's Summary    14 Sep 2019 07:01  -  15 Sep 2019 07:00  --------------------------------------------------------  IN: 1260 mL / OUT: 0 mL / NET: 1260 mL        Appearance: Normal	  HEENT:   incision clean on scalp   Lymphatic: No lymphadenopathy  Cardiovascular: Normal S1 S2, No JVD, No murmurs, No edema  Respiratory: Lungs clear to auscultation	  Psychiatry: A & O   Gastrointestinal:  Soft, Non-tender, + BS	  Skin: No rashes, No ecchymoses, No cyanosis	  Neurologic: Non-focal/motor equal sensory intact   Extremities: Normal range of motion, No clubbing, cyanosis or edema  Vascular: Peripheral pulses palpable 2+ bilaterally    TELEMETRY: 	    ECG:  	  RADIOLOGY:  OTHER: 	  	  LABS:	 	    CARDIAC MARKERS:                  proBNP:   Lipid Profile:   HgA1c:   TSH:

## 2019-09-15 NOTE — PROGRESS NOTE ADULT - SUBJECTIVE AND OBJECTIVE BOX
Patient was admitted with change in behaviour. 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 Immunostains are  positive for CK7, TTF-1 and Neuroendocrine markers  (Synaptophysin, chromogranin, CD56). High Ki-67 proliferation  labeling index (>90%) noted. Immunostains are negative PAX-8,  CK19, CDX2, CK20, HEPAR1, GFAP, ARGINASE, PSA, EGFR, IDH1. ATRX  and p53 (focal) are positive. Immunohistochemical features are  suggestive of primary lung origin if all other sites have been  ruled out.    Molecular studies have been ordered and will be reported as an  addendum      Verified by: Jose Sebastian MD PhD  (Electronic Signature)  Reported on: 09/13/19 17:02 EDT, [address] [place] [address]  _________________________________________________________________    Intraoperative Consultation  1. Left brain tumor  - Brain, frontal lobe, left biopsy -markedly necrotic malignant  neoplasm consistent with metastatic carcinoma  By Dr. TING Dumas  _                PAST MEDICAL & SURGICAL HISTORY:  No pertinent past medical history  No significant past surgical history    Medications:  acetaminophen    Suspension .. 650 milliGRAM(s) Oral every 6 hours PRN Temp greater or equal to 38C (100.4F)  chlorproMAZINE    Tablet 10 milliGRAM(s) Oral every 8 hours PRN hiccups  dexamethasone  Injectable 2 milliGRAM(s) IV Push every 8 hours  diVALproex  milliGRAM(s) Oral every 8 hours  docusate sodium Liquid 100 milliGRAM(s) Oral two times a day  enoxaparin Injectable 40 milliGRAM(s) SubCutaneous at bedtime  melatonin 5 milliGRAM(s) Oral <User Schedule>  pantoprazole  Injectable 40 milliGRAM(s) IV Push daily  polyethylene glycol 3350 17 Gram(s) Oral two times a day  QUEtiapine 12.5 milliGRAM(s) Oral at bedtime  senna 2 Tablet(s) Oral at bedtime  traMADol 25 milliGRAM(s) Oral every 4 hours PRN Moderate Pain (4 - 6)  traMADol 50 milliGRAM(s) Oral every 4 hours PRN Severe Pain (7 - 10)    Labs:            Radiology:             ROS:  Patient agitated today.  Does not accept questions or visit  Lot of family at bedside, who talked to me again    Vital Signs Last 24 Hrs  T(C): 37.1 (15 Sep 2019 11:48), Max: 37.1 (15 Sep 2019 11:48)  T(F): 98.7 (15 Sep 2019 11:48), Max: 98.7 (15 Sep 2019 11:48)  HR: 64 (15 Sep 2019 11:48) (57 - 65)  BP: 127/68 (15 Sep 2019 11:48) (115/72 - 150/76)  BP(mean): --  RR: 18 (15 Sep 2019 11:48) (18 - 18)  SpO2: 98% (15 Sep 2019 11:48) (97% - 98%)    Physical exam:  Patient alert but agitated, does not allow much conversation or exam    Assessment and Plan:

## 2019-09-15 NOTE — PROGRESS NOTE ADULT - ASSESSMENT
63 y/o male  w/ no PMH pw staring episodes and “delayed responsiveness” for 2 weeks. . Long time smoker recently quit after fathers death 2 weeks ago. w/  3.4 x 3.3 cm cystic mass w/in L frontal lobe w/ edema and midline shift on ct head  s/p craniotomy   w/ padenoca/primary unknown at present  neuroendocrine tumor   post op hemmorhage w/ return to or  post op seizure  steroids as per neurosx   neurochecks  -Keppra a s per neurosx   s/p extubation and much improved neuro and mental status    allie resolved  cards f/u noted   leukocytosis sec to steroids  pt  oob

## 2019-09-15 NOTE — PROGRESS NOTE ADULT - ASSESSMENT
65yo male was admitted for episodes of AMS.  CT's and MRI noted. CT chest and abdomen noted  Patient is on steroids and Keppra.  Now is s/p surgery X2 for tumor in left frontal lobe and bleed  Final path as above, molecular studies pending.    I discussed with patient's yet another daughter (not RN) at her request, though eventually one family member will have to take charge of discussions. She informed her of pathology, most likely tumor came from lung, though CT did not show. PET/CT can be done as outpatient.  His neuroendocrine tumor will need input from Radiation oncology, will call tomorrow  though tumor was resected.  In addition he will need systemic treatment with chemo and possibly immuno (PDL).  Even though they have not told the patient details yet, he will need to know for treatment.   Patient's daughter wanted me to discuss details with daughter who is RN tomorrow and she will likely be spoke person.   Address agitation as per medicine and neuro, likely related to long hospitalization and steroids no

## 2019-09-15 NOTE — PROGRESS NOTE ADULT - SUBJECTIVE AND OBJECTIVE BOX
SUBJECTIVE: Patient seen and examined no acute distress sitting in chair       Vital Signs Last 24 Hrs  T(C): 36.5 (15 Sep 2019 04:00), Max: 36.6 (14 Sep 2019 12:00)  T(F): 97.7 (15 Sep 2019 04:00), Max: 97.9 (14 Sep 2019 12:00)  HR: 63 (15 Sep 2019 04:00) (57 - 67)  BP: 122/76 (15 Sep 2019 04:00) (113/71 - 150/76)  BP(mean): --  RR: 18 (15 Sep 2019 04:00) (18 - 18)  SpO2: 97% (15 Sep 2019 04:00) (97% - 98%)    PHYSICAL EXAM:    Constitutional: No Acute Distress     Neurological: AOx3, Following Commands, Moving all Extremities     Motor exam:          Upper extremity                         Delt     Bicep     Tricep    HG                                                 R         5/5        5/5        5/5       5/5                                               L          5/5        5/5        5/5       5/5          Lower extremity                        HF         KF        KE       DF         PF                                                  R        5/5        5/5        5/5       5/5         5/5                                               L         5/5        5/5       5/5       5/5          5/5                                                 Sensation: [x] intact to light touch  [] decreased:     Pulmonary: Clear to Auscultation, No rales, No rhonchi, No wheezes     Cardiovascular: S1, S2, Regular rate and rhythm     Gastrointestinal: Soft, Non-tender, Non-distended     Extremities: No calf tenderness     Incision: c/d/i   LABS:           09-14 @ 07:01  -  09-15 @ 07:00  --------------------------------------------------------  IN: 1260 mL / OUT: 0 mL / NET: 1260 mL        MEDICATIONS:  Anticoagulation:   enoxaparin Injectable 40 milliGRAM(s) SubCutaneous at bedtime    Antibiotics:    Endo:  dexamethasone  Injectable 2 milliGRAM(s) IV Push every 8 hours    Neuro:  acetaminophen    Suspension .. 650 milliGRAM(s) Oral every 6 hours PRN Temp greater or equal to 38C (100.4F)  chlorproMAZINE    Tablet 10 milliGRAM(s) Oral every 8 hours PRN hiccups  diVALproex  milliGRAM(s) Oral every 8 hours  melatonin 5 milliGRAM(s) Oral <User Schedule>  QUEtiapine 12.5 milliGRAM(s) Oral at bedtime  traMADol 25 milliGRAM(s) Oral every 4 hours PRN Moderate Pain (4 - 6)  traMADol 50 milliGRAM(s) Oral every 4 hours PRN Severe Pain (7 - 10)    Cardiac:    Pulm:    GI/:  docusate sodium Liquid 100 milliGRAM(s) Oral two times a day  pantoprazole  Injectable 40 milliGRAM(s) IV Push daily  polyethylene glycol 3350 17 Gram(s) Oral two times a day  senna 2 Tablet(s) Oral at bedtime    Other:     DIET: regular

## 2019-09-16 ENCOUNTER — TRANSCRIPTION ENCOUNTER (OUTPATIENT)
Age: 64
End: 2019-09-16

## 2019-09-16 VITALS
SYSTOLIC BLOOD PRESSURE: 111 MMHG | HEART RATE: 81 BPM | TEMPERATURE: 98 F | DIASTOLIC BLOOD PRESSURE: 74 MMHG | RESPIRATION RATE: 18 BRPM | OXYGEN SATURATION: 96 %

## 2019-09-16 LAB
ANION GAP SERPL CALC-SCNC: 13 MMOL/L — SIGNIFICANT CHANGE UP (ref 5–17)
BUN SERPL-MCNC: 24 MG/DL — HIGH (ref 7–23)
CALCIUM SERPL-MCNC: 9.1 MG/DL — SIGNIFICANT CHANGE UP (ref 8.4–10.5)
CHLORIDE SERPL-SCNC: 98 MMOL/L — SIGNIFICANT CHANGE UP (ref 96–108)
CO2 SERPL-SCNC: 24 MMOL/L — SIGNIFICANT CHANGE UP (ref 22–31)
CREAT SERPL-MCNC: 0.56 MG/DL — SIGNIFICANT CHANGE UP (ref 0.5–1.3)
GLUCOSE SERPL-MCNC: 87 MG/DL — SIGNIFICANT CHANGE UP (ref 70–99)
HCT VFR BLD CALC: 36.7 % — LOW (ref 39–50)
HGB BLD-MCNC: 12.6 G/DL — LOW (ref 13–17)
MCHC RBC-ENTMCNC: 33.1 PG — SIGNIFICANT CHANGE UP (ref 27–34)
MCHC RBC-ENTMCNC: 34.3 GM/DL — SIGNIFICANT CHANGE UP (ref 32–36)
MCV RBC AUTO: 96.4 FL — SIGNIFICANT CHANGE UP (ref 80–100)
PLATELET # BLD AUTO: 154 K/UL — SIGNIFICANT CHANGE UP (ref 150–400)
POTASSIUM SERPL-MCNC: 4.6 MMOL/L — SIGNIFICANT CHANGE UP (ref 3.5–5.3)
POTASSIUM SERPL-SCNC: 4.6 MMOL/L — SIGNIFICANT CHANGE UP (ref 3.5–5.3)
RBC # BLD: 3.81 M/UL — LOW (ref 4.2–5.8)
RBC # FLD: 12.4 % — SIGNIFICANT CHANGE UP (ref 10.3–14.5)
SODIUM SERPL-SCNC: 135 MMOL/L — SIGNIFICANT CHANGE UP (ref 135–145)
WBC # BLD: 22 K/UL — HIGH (ref 3.8–10.5)
WBC # FLD AUTO: 22 K/UL — HIGH (ref 3.8–10.5)

## 2019-09-16 PROCEDURE — 80053 COMPREHEN METABOLIC PANEL: CPT

## 2019-09-16 PROCEDURE — 99252 IP/OBS CONSLTJ NEW/EST SF 35: CPT

## 2019-09-16 PROCEDURE — 82435 ASSAY OF BLOOD CHLORIDE: CPT

## 2019-09-16 PROCEDURE — C1713: CPT

## 2019-09-16 PROCEDURE — 96374 THER/PROPH/DIAG INJ IV PUSH: CPT

## 2019-09-16 PROCEDURE — 95951: CPT

## 2019-09-16 PROCEDURE — 88360 TUMOR IMMUNOHISTOCHEM/MANUAL: CPT

## 2019-09-16 PROCEDURE — 83735 ASSAY OF MAGNESIUM: CPT

## 2019-09-16 PROCEDURE — 84295 ASSAY OF SERUM SODIUM: CPT

## 2019-09-16 PROCEDURE — 71260 CT THORAX DX C+: CPT

## 2019-09-16 PROCEDURE — 95816 EEG AWAKE AND DROWSY: CPT

## 2019-09-16 PROCEDURE — 80048 BASIC METABOLIC PNL TOTAL CA: CPT

## 2019-09-16 PROCEDURE — 87040 BLOOD CULTURE FOR BACTERIA: CPT

## 2019-09-16 PROCEDURE — G0515: CPT

## 2019-09-16 PROCEDURE — 85014 HEMATOCRIT: CPT

## 2019-09-16 PROCEDURE — 70450 CT HEAD/BRAIN W/O DYE: CPT

## 2019-09-16 PROCEDURE — 99285 EMERGENCY DEPT VISIT HI MDM: CPT | Mod: 25

## 2019-09-16 PROCEDURE — 88307 TISSUE EXAM BY PATHOLOGIST: CPT

## 2019-09-16 PROCEDURE — 86901 BLOOD TYPING SEROLOGIC RH(D): CPT

## 2019-09-16 PROCEDURE — 93970 EXTREMITY STUDY: CPT

## 2019-09-16 PROCEDURE — C9254: CPT

## 2019-09-16 PROCEDURE — 87389 HIV-1 AG W/HIV-1&-2 AB AG IA: CPT

## 2019-09-16 PROCEDURE — 82962 GLUCOSE BLOOD TEST: CPT

## 2019-09-16 PROCEDURE — 83605 ASSAY OF LACTIC ACID: CPT

## 2019-09-16 PROCEDURE — 84100 ASSAY OF PHOSPHORUS: CPT

## 2019-09-16 PROCEDURE — 94002 VENT MGMT INPAT INIT DAY: CPT

## 2019-09-16 PROCEDURE — 97110 THERAPEUTIC EXERCISES: CPT

## 2019-09-16 PROCEDURE — 86900 BLOOD TYPING SEROLOGIC ABO: CPT

## 2019-09-16 PROCEDURE — 93306 TTE W/DOPPLER COMPLETE: CPT

## 2019-09-16 PROCEDURE — 93005 ELECTROCARDIOGRAM TRACING: CPT

## 2019-09-16 PROCEDURE — 97535 SELF CARE MNGMENT TRAINING: CPT

## 2019-09-16 PROCEDURE — 76700 US EXAM ABDOM COMPLETE: CPT

## 2019-09-16 PROCEDURE — 86850 RBC ANTIBODY SCREEN: CPT

## 2019-09-16 PROCEDURE — 86803 HEPATITIS C AB TEST: CPT

## 2019-09-16 PROCEDURE — 82947 ASSAY GLUCOSE BLOOD QUANT: CPT

## 2019-09-16 PROCEDURE — 70553 MRI BRAIN STEM W/O & W/DYE: CPT

## 2019-09-16 PROCEDURE — 85610 PROTHROMBIN TIME: CPT

## 2019-09-16 PROCEDURE — 97530 THERAPEUTIC ACTIVITIES: CPT

## 2019-09-16 PROCEDURE — 97760 ORTHOTIC MGMT&TRAING 1ST ENC: CPT

## 2019-09-16 PROCEDURE — 70496 CT ANGIOGRAPHY HEAD: CPT

## 2019-09-16 PROCEDURE — 82330 ASSAY OF CALCIUM: CPT

## 2019-09-16 PROCEDURE — 84132 ASSAY OF SERUM POTASSIUM: CPT

## 2019-09-16 PROCEDURE — 88331 PATH CONSLTJ SURG 1 BLK 1SPC: CPT

## 2019-09-16 PROCEDURE — 85027 COMPLETE CBC AUTOMATED: CPT

## 2019-09-16 PROCEDURE — 97161 PT EVAL LOW COMPLEX 20 MIN: CPT

## 2019-09-16 PROCEDURE — 97116 GAIT TRAINING THERAPY: CPT

## 2019-09-16 PROCEDURE — 85730 THROMBOPLASTIN TIME PARTIAL: CPT

## 2019-09-16 PROCEDURE — 97166 OT EVAL MOD COMPLEX 45 MIN: CPT

## 2019-09-16 PROCEDURE — 82565 ASSAY OF CREATININE: CPT

## 2019-09-16 PROCEDURE — C1769: CPT

## 2019-09-16 PROCEDURE — 88341 IMHCHEM/IMCYTCHM EA ADD ANTB: CPT

## 2019-09-16 PROCEDURE — C1889: CPT

## 2019-09-16 PROCEDURE — 86480 TB TEST CELL IMMUN MEASURE: CPT

## 2019-09-16 PROCEDURE — 88342 IMHCHEM/IMCYTCHM 1ST ANTB: CPT

## 2019-09-16 PROCEDURE — 82803 BLOOD GASES ANY COMBINATION: CPT

## 2019-09-16 PROCEDURE — 94003 VENT MGMT INPAT SUBQ DAY: CPT

## 2019-09-16 PROCEDURE — 74177 CT ABD & PELVIS W/CONTRAST: CPT

## 2019-09-16 PROCEDURE — 70498 CT ANGIOGRAPHY NECK: CPT

## 2019-09-16 PROCEDURE — 80164 ASSAY DIPROPYLACETIC ACD TOT: CPT

## 2019-09-16 PROCEDURE — 71045 X-RAY EXAM CHEST 1 VIEW: CPT

## 2019-09-16 PROCEDURE — A9585: CPT

## 2019-09-16 RX ORDER — DEXAMETHASONE 0.5 MG/5ML
2 ELIXIR ORAL EVERY 12 HOURS
Refills: 0 | Status: DISCONTINUED | OUTPATIENT
Start: 2019-09-16 | End: 2019-09-16

## 2019-09-16 RX ORDER — TRAMADOL HYDROCHLORIDE 50 MG/1
25 TABLET ORAL EVERY 4 HOURS
Refills: 0 | Status: DISCONTINUED | OUTPATIENT
Start: 2019-09-16 | End: 2019-09-16

## 2019-09-16 RX ORDER — DIVALPROEX SODIUM 500 MG/1
1 TABLET, DELAYED RELEASE ORAL
Qty: 0 | Refills: 0 | DISCHARGE
Start: 2019-09-16

## 2019-09-16 RX ORDER — POLYETHYLENE GLYCOL 3350 17 G/17G
17 POWDER, FOR SOLUTION ORAL
Qty: 0 | Refills: 0 | DISCHARGE
Start: 2019-09-16

## 2019-09-16 RX ORDER — QUETIAPINE FUMARATE 200 MG/1
1 TABLET, FILM COATED ORAL
Qty: 0 | Refills: 0 | DISCHARGE
Start: 2019-09-16

## 2019-09-16 RX ORDER — DEXAMETHASONE 0.5 MG/5ML
1 ELIXIR ORAL
Qty: 30 | Refills: 0
Start: 2019-09-16 | End: 2019-09-29

## 2019-09-16 RX ORDER — ACETAMINOPHEN 500 MG
2 TABLET ORAL
Qty: 0 | Refills: 0 | DISCHARGE
Start: 2019-09-16

## 2019-09-16 RX ORDER — TRAMADOL HYDROCHLORIDE 50 MG/1
50 TABLET ORAL EVERY 4 HOURS
Refills: 0 | Status: DISCONTINUED | OUTPATIENT
Start: 2019-09-16 | End: 2019-09-16

## 2019-09-16 RX ORDER — DOCUSATE SODIUM 100 MG
100 CAPSULE ORAL DAILY
Refills: 0 | Status: DISCONTINUED | OUTPATIENT
Start: 2019-09-16 | End: 2019-09-16

## 2019-09-16 RX ORDER — ENOXAPARIN SODIUM 100 MG/ML
40 INJECTION SUBCUTANEOUS
Qty: 0 | Refills: 0 | DISCHARGE
Start: 2019-09-16

## 2019-09-16 RX ORDER — QUETIAPINE FUMARATE 200 MG/1
1 TABLET, FILM COATED ORAL
Qty: 30 | Refills: 0
Start: 2019-09-16 | End: 2019-10-15

## 2019-09-16 RX ORDER — QUETIAPINE FUMARATE 200 MG/1
25 TABLET, FILM COATED ORAL AT BEDTIME
Refills: 0 | Status: DISCONTINUED | OUTPATIENT
Start: 2019-09-16 | End: 2019-09-16

## 2019-09-16 RX ORDER — DEXAMETHASONE 0.5 MG/5ML
1 ELIXIR ORAL
Qty: 0 | Refills: 0 | DISCHARGE
Start: 2019-09-16

## 2019-09-16 RX ORDER — LANOLIN ALCOHOL/MO/W.PET/CERES
1 CREAM (GRAM) TOPICAL
Qty: 0 | Refills: 0 | DISCHARGE
Start: 2019-09-16

## 2019-09-16 RX ORDER — DIVALPROEX SODIUM 500 MG/1
1 TABLET, DELAYED RELEASE ORAL
Qty: 60 | Refills: 0
Start: 2019-09-16 | End: 2019-10-15

## 2019-09-16 RX ADMIN — POLYETHYLENE GLYCOL 3350 17 GRAM(S): 17 POWDER, FOR SOLUTION ORAL at 05:37

## 2019-09-16 RX ADMIN — DIVALPROEX SODIUM 500 MILLIGRAM(S): 500 TABLET, DELAYED RELEASE ORAL at 14:12

## 2019-09-16 RX ADMIN — Medication 100 MILLIGRAM(S): at 14:12

## 2019-09-16 RX ADMIN — Medication 2 MILLIGRAM(S): at 05:37

## 2019-09-16 RX ADMIN — DIVALPROEX SODIUM 500 MILLIGRAM(S): 500 TABLET, DELAYED RELEASE ORAL at 05:37

## 2019-09-16 NOTE — PROGRESS NOTE ADULT - PROVIDER SPECIALTY LIST ADULT
Cardiology
Heme/Onc
Infectious Disease
Internal Medicine
NSICU
Neurology
Neurosurgery
Rehab Medicine
Rehab Medicine
Cardiology
Heme/Onc
Infectious Disease
Neurology
Neurosurgery
Neurosurgery
Cardiology
Internal Medicine
NSICU

## 2019-09-16 NOTE — PROGRESS NOTE ADULT - SUBJECTIVE AND OBJECTIVE BOX
CARDIOLOGY FOLLOW UP - Dr. Wilson    CC no cp/sob     PHYSICAL EXAM:  T(C): 37.1 (09-16-19 @ 08:00), Max: 37.1 (09-15-19 @ 11:48)  HR: 66 (09-16-19 @ 08:00) (64 - 80)  BP: 114/68 (09-16-19 @ 08:00) (101/61 - 130/80)  RR: 18 (09-16-19 @ 08:00) (18 - 18)  SpO2: 97% (09-16-19 @ 08:00) (94% - 99%)  Wt(kg): --  I&O's Summary    15 Sep 2019 07:01  -  16 Sep 2019 07:00  --------------------------------------------------------  IN: 240 mL / OUT: 0 mL / NET: 240 mL        Appearance: Normal	  Cardiovascular: Normal S1 S2,RRR, No JVD, No murmurs  Respiratory: Lungs clear to auscultation	  Gastrointestinal:  Soft, Non-tender, + BS	  Extremities: Normal range of motion, No clubbing, cyanosis or edema        MEDICATIONS  (STANDING):  dexamethasone  Injectable 2 milliGRAM(s) IV Push every 8 hours  diVALproex  milliGRAM(s) Oral every 8 hours  docusate sodium 100 milliGRAM(s) Oral daily  enoxaparin Injectable 40 milliGRAM(s) SubCutaneous at bedtime  melatonin 5 milliGRAM(s) Oral <User Schedule>  pantoprazole  Injectable 40 milliGRAM(s) IV Push daily  polyethylene glycol 3350 17 Gram(s) Oral two times a day  QUEtiapine 12.5 milliGRAM(s) Oral at bedtime  senna 2 Tablet(s) Oral at bedtime      TELEMETRY: 	    ECG:  	  RADIOLOGY:   DIAGNOSTIC TESTING:  [ ] Echocardiogram:  [ ]  Catheterization:  [ ] Stress Test:    OTHER: 	    LABS:	 	                                12.6   22.0  )-----------( 154      ( 16 Sep 2019 06:03 )             36.7     09-16    135  |  98  |  24<H>  ----------------------------<  87  4.6   |  24  |  0.56    Ca    9.1      16 Sep 2019 06:03

## 2019-09-16 NOTE — DISCHARGE NOTE PROVIDER - NSDCFUADDINST_GEN_ALL_CORE_FT
Return to ER if develops fevers, seizures bleeding , wound drainage, uncontrolled pain, weakness of extremities, lethargy or sluggishness..

## 2019-09-16 NOTE — PROGRESS NOTE ADULT - REASON FOR ADMISSION
AMS
61M a/w AMS with frontal lobe cystic mass
Altered mental status
Behaviour change
AMS
Altered mental status
Mental status change
Altered mental status

## 2019-09-16 NOTE — PROGRESS NOTE ADULT - ASSESSMENT
65yo male presenting to ED for episodes of AMS.daughter   reports that pt has episodes of unresponsiveness "staring " for several seconds   delayed responses which began 2 weeks ago.. + generalized weakness, poor appetite, Long time smoker recently quit after fathers death 2 weeks ago. w/  3.4 x 3.3 cm cystic mass w/in L frontal lobe w/ edema and midline shift on ct head. MRI brain with solitary lesion  Left frontal heterogeneous lesion with cystic and solid components with  surrounding vasogenic edema.  CT C/A/P 1.2 cm hepatic lesion, renal cystic lesions and 1.6 cm R acetabular sclerotic lesion 9/5- s/p Left frontal craniotomy for resection of tumor.  Post op not following commands, Head  CT which demonstrated hemorrhage along the superficial aspect and the resection cavity.  RTOR and the flap was reopened, complete hemostasis obtained. 9/5/19 .   GTC seizure post op, got Keppra,  8 mg ativan total and Vimpat. Seen by neurology . Stopped Keppra related to agitation. Started on VPA 9/10/19.  Exam improved post op. Post op MRI 9/7 . Pathology  high grade cellular neoplasm with necrosis, mitosis molding and neuroendocrine features (with suggestive of small cell type)      Plan    Neuro stable . Continue VPA   Vitals stable  Leucocytosis likely related to steroids. No fevers or chills . Trend CBC. Seen by ID prior for same reason   Oncology - Neuroendocrine tumor brain metastasis. Short course of acute rehab followed by possible radiation 65yo male presenting to ED for episodes of AMS.daughter   reports that pt has episodes of unresponsiveness "staring " for several seconds   delayed responses which began 2 weeks ago.. + generalized weakness, poor appetite, Long time smoker recently quit after fathers death 2 weeks ago. w/  3.4 x 3.3 cm cystic mass w/in L frontal lobe w/ edema and midline shift on ct head. MRI brain with solitary lesion  Left frontal heterogeneous lesion with cystic and solid components with  surrounding vasogenic edema.  CT C/A/P 1.2 cm hepatic lesion, renal cystic lesions and 1.6 cm R acetabular sclerotic lesion 9/5- s/p Left frontal craniotomy for resection of tumor.  Post op not following commands, Head  CT which demonstrated hemorrhage along the superficial aspect and the resection cavity.  RTOR and the flap was reopened, complete hemostasis obtained. 9/5/19 .   GTC seizure post op, got Keppra,  8 mg ativan total and Vimpat. Seen by neurology . Stopped Keppra related to agitation. Started on VPA 9/10/19.  Exam improved post op. Pathology  high grade cellular neoplasm with necrosis, mitosis molding and neuroendocrine features (with suggestive of small cell type)      Plan    Neuro stable . Continue VPA   Vitals stable  Leucocytosis likely related to steroids. No fevers or chills . Trend CBC. Seen by ID prior for same reason   Oncology - Neuroendocrine tumor brain metastasis. Short course of acute rehab followed by possible radiation and chemotherapy as outpatient. D/w Dr Gracia and daughter . Has appointment with rad onc Dr Mcknight on 9/26/19 at 11 am.  DVT ppx   D/c to [facility] rehab today

## 2019-09-16 NOTE — PROGRESS NOTE ADULT - SUBJECTIVE AND OBJECTIVE BOX
CHIEF COMPLAINT:Patient is a 64y old  Male who presents with a chief complaint of AMS (15 Sep 2019 15:56)    	        PAST MEDICAL & SURGICAL HISTORY:  No pertinent past medical history  No significant past surgical history          REVIEW OF SYSTEMS:  CONSTITUTIONAL: feels better overall  EYES: No eye pain, visual disturbances, or discharge  NECK: No pain or stiffness  RESPIRATORY: No cough, wheezing, chills or hemoptysis; No Shortness of Breath  CARDIOVASCULAR: No chest pain, palpitations, passing out, dizziness,  GASTROINTESTINAL: No abdominal or epigastric pain. No nausea, vomiting, or hematemesis; No diarrhea or constipation.  GENITOURINARY: No dysuria, frequency, hematuria, or incontinence  NEUROLOGICAL: No headaches,     Medications:  MEDICATIONS  (STANDING):  dexamethasone  Injectable 2 milliGRAM(s) IV Push every 8 hours  diVALproex  milliGRAM(s) Oral every 8 hours  docusate sodium 100 milliGRAM(s) Oral daily  enoxaparin Injectable 40 milliGRAM(s) SubCutaneous at bedtime  melatonin 5 milliGRAM(s) Oral <User Schedule>  pantoprazole  Injectable 40 milliGRAM(s) IV Push daily  polyethylene glycol 3350 17 Gram(s) Oral two times a day  QUEtiapine 12.5 milliGRAM(s) Oral at bedtime  senna 2 Tablet(s) Oral at bedtime    MEDICATIONS  (PRN):  acetaminophen    Suspension .. 650 milliGRAM(s) Oral every 6 hours PRN Temp greater or equal to 38C (100.4F)  chlorproMAZINE    Tablet 10 milliGRAM(s) Oral every 8 hours PRN hiccups  traMADol 50 milliGRAM(s) Oral every 4 hours PRN Severe Pain (7 - 10)  traMADol 25 milliGRAM(s) Oral every 4 hours PRN Moderate Pain (4 - 6)    	    PHYSICAL EXAM:  T(C): 37.1 (09-16-19 @ 08:00), Max: 37.1 (09-15-19 @ 11:48)  HR: 66 (09-16-19 @ 08:00) (64 - 80)  BP: 114/68 (09-16-19 @ 08:00) (101/61 - 130/80)  RR: 18 (09-16-19 @ 08:00) (18 - 18)  SpO2: 97% (09-16-19 @ 08:00) (94% - 99%)  Wt(kg): --  I&O's Summary    15 Sep 2019 07:01  -  16 Sep 2019 07:00  --------------------------------------------------------  IN: 240 mL / OUT: 0 mL / NET: 240 mL        Appearance: Normal	  HEENT:   incision clean   Cardiovascular: Normal S1 S2, No JVD, No murmurs, No edema  Respiratory: Lungs clear to auscultation	  Psychiatry: A & O x 3,   Gastrointestinal:  Soft, Non-tender, + BS	  Skin: No rashes, No ecchymoses, No cyanosis	  Neurologic: Non-focal/motor equal b/l  sensory intact    Extremities: Normal range of motion, No clubbing, cyanosis or edema  Vascular: Peripheral pulses palpable 2+ bilaterally    TELEMETRY: 	    ECG:  	  RADIOLOGY:  OTHER: 	  	  LABS:	 	    CARDIAC MARKERS:                                12.6   22.0  )-----------( 154      ( 16 Sep 2019 06:03 )             36.7     09-16    135  |  98  |  24<H>  ----------------------------<  87  4.6   |  24  |  0.56    Ca    9.1      16 Sep 2019 06:03      proBNP:   Lipid Profile:   HgA1c:   TSH:

## 2019-09-16 NOTE — DISCHARGE NOTE PROVIDER - NSDCFUSCHEDAPPT_GEN_ALL_CORE_FT
MARCOS KOO ; 09/26/2019 ; [address] MARCOS KOO ; 09/26/2019 ; [address] MARCOS KOO ; 09/26/2019 ; [address] MARCOS KOO ; 09/26/2019 ; [address]

## 2019-09-16 NOTE — PROGRESS NOTE ADULT - NSHPATTENDINGPLANDISCUSS_GEN_ALL_CORE
patient  , pgy3 medicine resident
patient  , pgy3 medicine resident
patient and son bedside , pgy3 medicine resident
NP and patient's daughter
Patient's daughters
Patient's son
family
patient  , pgy3 medicine resident
Family
Patient's daughter
Patient's daughter

## 2019-09-16 NOTE — PROGRESS NOTE ADULT - ASSESSMENT
65yo male was admitted for episodes of AMS.  CT's and MRI noted. CT chest and abdomen noted  Patient is on steroids and Keppra.  Now is s/p surgery X2 for tumor in left frontal lobe and bleed  Final path as above, molecular studies pending.  Agitation being addressed by NS  Today discussion was with RN daughter and neurosurgery NP.  His neuroendocrine tumor will need input from Radiation oncology, appt arranged with Dr. Mcknight for 9/26/19.   In addition he will need systemic treatment with chemo and possibly immuno (PDL).  He is going for short rehab at [facility] and hopefully his RT and chemo will not get delayed too much. Daughter is aware of it.   Even though they have not told the patient details yet, he will need to know for treatment.

## 2019-09-16 NOTE — PROGRESS NOTE ADULT - ASSESSMENT
64 year old male former smoker with no significant PMHx presents with AMS, brain imaging with mass.     1. Brain mass, suspected malignancy, ? mets   CT head: 3.4 x 3.3 cm cystic mass w/in L frontal lobe w/ edema and midline shift.  MRI wwo brain: L cystic enhancing lesion causing significant edema  CT chest/abd noted, ? mets   hem/onc, neurosurg, neuro f/u   9/5 - s/p OR for resection/biopsy.   w/ prelim adenoca; post op hemmorhage w/ return to or  post op seizure; keppra, vimpat, steroids as per neurosx, NISCU  bradycardia noted postop, now resolved  care per NeurologyNeurosx     remains cv stable  hr improved and stable  bp parameters per neuro/nsx  echo reveals normal LVEF w no sig valvular dysfunction   further treatment per neuro/neurosx/onc

## 2019-09-16 NOTE — DISCHARGE NOTE PROVIDER - PROVIDER TOKENS
PROVIDER:[TOKEN:[[id number]]],PROVIDER:[TOKEN:[[id number]]],PROVIDER:[TOKEN:[[id number]]],PROVIDER:[TOKEN:[[id number]]]

## 2019-09-16 NOTE — DISCHARGE NOTE PROVIDER - CARE PROVIDERS DIRECT ADDRESSES
,[email or web address]_Unknown,[email or web address],DirectAddress_Unknown

## 2019-09-16 NOTE — PROGRESS NOTE ADULT - SUBJECTIVE AND OBJECTIVE BOX
Patient is a 64y old  Male who presents with a chief complaint of Altered mental status (16 Sep 2019 12:37)      HPI:  65 yo Male with no significant PMH was admitted on  with AMS. As per records, family had reported that patient had episodes of unresponsiveness and staring for several seconds. Family also stated they had noticed delayed responses for two weeks prior to admission. Patient also had a fall while lifting 25 lbs with no head trauma. To note, patient had recently traveled to [place] as his father had passed away from colon ca several weeks ago. CT head on admission showed cystic mass within the left frontal lobe with  large area of surrounding edema causing leftward midline shift. CT chest/abdomen/pelvis showed  b/l renal lesions, hepatic lesion, and a right acetabular lesion. MRI brain showed left frontal lobe cystic lesion with edema. Patient was taken to the OR o  for craniotomy and tumor resection. Post op patient was not following commands. Repeat CT head showed left parenchymal hemorrhages and patient was taken back to the OR.    Interval history: patient was planned for discharge to rehab today, however patient is ambulating with RW with supervision around the floor, and ambulates without device around the room.  Patient and his family prefer to take him home with supervision and outpatient PT.  Patient states he is feeling well, denies complaints.     PAST MEDICAL & SURGICAL HISTORY  No pertinent past medical history  No significant past surgical history      SOCIAL HISTORY  Smoking - prior smoker, EtOH - social, Drugs - Denied    FUNCTIONAL HISTORY:   Ambulation: independent with no assistive device  ADLs: independent    CURRENT FUNCTIONAL STATUS:  supervision for bed mobility, transfers and ambulation with RW    FAMILY HISTORY   No pertinent family history in first degree relatives  Father -  from colon ca    RECENT LABS/IMAGING  CBC Full  -  ( 16 Sep 2019 06:03 )  WBC Count : 22.0 K/uL  RBC Count : 3.81 M/uL  Hemoglobin : 12.6 g/dL  Hematocrit : 36.7 %  Platelet Count - Automated : 154 K/uL  Mean Cell Volume : 96.4 fl  Mean Cell Hemoglobin : 33.1 pg  Mean Cell Hemoglobin Concentration : 34.3 gm/dL  Auto Neutrophil # : x  Auto Lymphocyte # : x  Auto Monocyte # : x  Auto Eosinophil # : x  Auto Basophil # : x  Auto Neutrophil % : x  Auto Lymphocyte % : x  Auto Monocyte % : x  Auto Eosinophil % : x  Auto Basophil % : x        135  |  98  |  24<H>  ----------------------------<  87  4.6   |  24  |  0.56    Ca    9.1      16 Sep 2019 06:03          VITALS  T(C): 36.6 (19 @ 12:00), Max: 37.1 (19 @ 08:00)  HR: 68 (19 @ 12:00) (66 - 80)  BP: 102/70 (19 @ 12:00) (101/61 - 130/80)  RR: 18 (19 @ 12:00) (18 - 18)  SpO2: 97% (19 @ 12:00) (94% - 99%)  Wt(kg): --    ALLERGIES  No Known Allergies      MEDICATIONS   acetaminophen    Suspension .. 650 milliGRAM(s) Oral every 6 hours PRN  dexamethasone     Tablet 2 milliGRAM(s) Oral every 12 hours  diVALproex  milliGRAM(s) Oral every 8 hours  docusate sodium 100 milliGRAM(s) Oral daily  enoxaparin Injectable 40 milliGRAM(s) SubCutaneous at bedtime  melatonin 5 milliGRAM(s) Oral <User Schedule>  polyethylene glycol 3350 17 Gram(s) Oral two times a day  QUEtiapine 25 milliGRAM(s) Oral at bedtime  senna 2 Tablet(s) Oral at bedtime  traMADol 50 milliGRAM(s) Oral every 4 hours PRN  traMADol 25 milliGRAM(s) Oral every 4 hours PRN      ----------------------------------------------------------------------------------------  PHYSICAL EXAM  Constitutional - NAD, Comfortable  HEENT - NCAT, EOMI  Neck - Supple, No limited ROM  Chest - CTA bilaterally, No wheeze, No rhonchi, No crackles  Cardiovascular - RRR, S1S2, No murmurs  Abdomen - BS+, Soft, NTND  Extremities - No C/C/E, No calf tenderness   Neurologic Exam -                    Cognitive - Awake, Alert, AAO to self, place, date, year, situation     Communication - mild dysarthria     Cranial Nerves - CN 2-12 intact     Motor -  LEFT    UE - ShAB 4+/5, EF 4+/5, EE 4+/5, WE 4+/5,  4+/5                    RIGHT UE - ShAB 4+/5, EF 4+/5, EE 4+/5, WE 4+/5,  4+/5                    LEFT    LE - HF 4+/5, KE 4+/5, DF 4/5, PF 4/5                    RIGHT LE - HF 4/5, KE 4/5, DF 4/5, PF 4/5        Sensory - Intact to LT     Reflexes - DTR Intact, No primitive reflexive     Balance - WNL Static  Psychiatric - Mood stable, Affect WNL    assessment:  64y Male with functional deficits after left frontal cystic mass s/p craniotomy    Plan:   Dispo: patient was initially planned for rehab however patient is ambulating with RW with supervision today, and ambulates around the room without assistive device.  Patient cleared for discharge home with 24 hour supervision. Patient's family would like patient to go home and agrees to provide supervision.  PT/OT: recommend outpatient PT/OT after discharge.   DVT PPX - SCDs, Lovenox sq  Diet - Regular

## 2019-09-16 NOTE — DISCHARGE NOTE PROVIDER - NSDCACTIVITY_GEN_ALL_CORE
Walking - Outdoors allowed/No heavy lifting/straining/Showering allowed/Do not make important decisions/Do not drive or operate machinery/Walking - Indoors allowed

## 2019-09-16 NOTE — DISCHARGE NOTE PROVIDER - NSDCFUADDAPPT_GEN_ALL_CORE_FT
Radiation medicine Dr Mcknight 9/26/19 at 11 am  Follow up with Dr Gracia in 10-14 days  Follow up with Dr May 10-14 days Radiation medicine Dr Mcknight 9/26/19 at 11 am  Follow up with Dr Gracia in 1 week   Follow up with Dr May in 1 week

## 2019-09-16 NOTE — DISCHARGE NOTE PROVIDER - HOSPITAL COURSE
65yo male presenting to ED for episodes of AMS.daughter   reports that pt has episodes of unresponsiveness "staring " for several seconds   delayed responses which began 2 weeks ago.. + generalized weakness, poor appetite, Long time smoker recently quit after fathers death 2 weeks ago. Imaging with  solitary lesion 3.4 x 3.3 cm cystic mass w/in L frontal lobe w/ edema and midline shift on CT  head &  MRI brain   Left frontal heterogeneous lesion with cystic and solid components with  surrounding vasogenic edema.  Seen by medicine, neurology and oncology preop. ID consulted for leucocytosis. Observed off antibiotics.  CT C/A/P 1.2 cm hepatic lesion, renal cystic lesions and 1.6 cm R acetabular sclerotic lesion 9/5- s/p Left frontal craniotomy for resection of tumor.  Post op not following commands, Head  CT which demonstrated hemorrhage along the superficial aspect and the resection cavity.  RTOR and the flap was reopened, complete hemostasis obtained. 9/5/19 .   GTC seizure post op, got Keppra,  8 mg ativan total and Vimpat. Seen by neurology . Stopped Keppra related to agitation. Started on VPA 9/10/19.  Exam improved post op. Pathology  high grade cellular neoplasm with necrosis, mitosis molding and neuroendocrine features (with suggestive of small cell type). Evaluated by PT/ OT and discharged to rehab in stable condition. 65yo male presenting to ED for episodes of AMS.daughter   reports that pt has episodes of unresponsiveness "staring " for several seconds   delayed responses which began 2 weeks ago.. + generalized weakness, poor appetite, Long time smoker recently quit after fathers death 2 weeks ago. Imaging with  solitary lesion 3.4 x 3.3 cm cystic mass w/in L frontal lobe w/ edema and midline shift on CT  head &  MRI brain   Left frontal heterogeneous lesion with cystic and solid components with  surrounding vasogenic edema.  Seen by medicine, neurology and oncology preop. ID consulted for leucocytosis. Observed off antibiotics.  CT C/A/P 1.2 cm hepatic lesion, renal cystic lesions and 1.6 cm R acetabular sclerotic lesion 9/5- s/p Left frontal craniotomy for resection of tumor.  Post op not following commands, Head  CT which demonstrated hemorrhage along the superficial aspect and the resection cavity.  RTOR and the flap was reopened, complete hemostasis obtained. 9/5/19 .   GTC seizure post op, got Keppra,  8 mg ativan total and Vimpat. Seen by neurology . Stopped Keppra related to agitation. Started on VPA 9/10/19.  Exam improved post op. Pathology  high grade cellular neoplasm with necrosis, mitosis molding and neuroendocrine features (with suggestive of small cell type). Evaluated by PT/ OT and recommended for acute rehab. Patient ambulating with Rolling Walker without assist. After PM&R discussion with family, daughter requests discharge home. Discharged home in stable condition with rolling walker and outpatient PT script

## 2019-09-16 NOTE — PROGRESS NOTE ADULT - SUBJECTIVE AND OBJECTIVE BOX
Patient was admitted with change in behaviour. 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 Immunostains are  positive for CK7, TTF-1 and Neuroendocrine markers  (Synaptophysin, chromogranin, CD56). High Ki-67 proliferation  labeling index (>90%) noted. Immunostains are negative PAX-8,  CK19, CDX2, CK20, HEPAR1, GFAP, ARGINASE, PSA, EGFR, IDH1. ATRX  and p53 (focal) are positive. Immunohistochemical features are  suggestive of primary lung origin if all other sites have been  ruled out.    Molecular studies have been ordered and will be reported as an  addendum      Verified by: Jose Sebastian MD PhD  (Electronic Signature)  Reported on: 09/13/19 17:02 EDT, [address] [place] [address]  _________________________________________________________________    Intraoperative Consultation  1. Left brain tumor  - Brain, frontal lobe, left biopsy -markedly necrotic malignant  neoplasm consistent with metastatic carcinoma  By Dr. TING Dumas  _          PAST MEDICAL & SURGICAL HISTORY:  No pertinent past medical history  No significant past surgical history    Medications:  acetaminophen    Suspension .. 650 milliGRAM(s) Oral every 6 hours PRN Temp greater or equal to 38C (100.4F)  dexamethasone     Tablet 2 milliGRAM(s) Oral every 12 hours  diVALproex  milliGRAM(s) Oral every 8 hours  docusate sodium 100 milliGRAM(s) Oral daily  enoxaparin Injectable 40 milliGRAM(s) SubCutaneous at bedtime  melatonin 5 milliGRAM(s) Oral <User Schedule>  polyethylene glycol 3350 17 Gram(s) Oral two times a day  QUEtiapine 25 milliGRAM(s) Oral at bedtime  senna 2 Tablet(s) Oral at bedtime  traMADol 50 milliGRAM(s) Oral every 4 hours PRN Severe Pain (7 - 10)  traMADol 25 milliGRAM(s) Oral every 4 hours PRN Moderate Pain (4 - 6)    Labs:  CBC Full  -  ( 16 Sep 2019 06:03 )  WBC Count : 22.0 K/uL  Hemoglobin : 12.6 g/dL  Hematocrit : 36.7 %  Platelet Count - Automated : 154 K/uL  Mean Cell Volume : 96.4 fl  Mean Cell Hemoglobin : 33.1 pg  Mean Cell Hemoglobin Concentration : 34.3 gm/dL  Auto Neutrophil # : x  Auto Lymphocyte # : x  Auto Monocyte # : x  Auto Eosinophil # : x  Auto Basophil # : x  Auto Neutrophil % : x  Auto Lymphocyte % : x  Auto Monocyte % : x  Auto Eosinophil % : x  Auto Basophil % : x    09-16    135  |  98  |  24<H>  ----------------------------<  87  4.6   |  24  |  0.56    Ca    9.1      16 Sep 2019 06:03        Radiology:             ROS:  Patient has been ambulating but gets agitated thus my conversation was with daughter who is RN  The have so far not wanted to discuss dx with patient and want to tell him themselves first  Vital Signs Last 24 Hrs  T(C): 37.1 (16 Sep 2019 08:00), Max: 37.1 (16 Sep 2019 08:00)  T(F): 98.8 (16 Sep 2019 08:00), Max: 98.8 (16 Sep 2019 08:00)  HR: 66 (16 Sep 2019 08:00) (66 - 80)  BP: 114/68 (16 Sep 2019 08:00) (101/61 - 130/80)  BP(mean): --  RR: 18 (16 Sep 2019 08:00) (18 - 18)  SpO2: 97% (16 Sep 2019 08:00) (94% - 99%)    Physical exam:  Patient alert but gets agitated.  Walking hallways wit walker    Assessment and Plan:

## 2019-09-16 NOTE — DISCHARGE NOTE PROVIDER - CARE PROVIDER_API CALL
George May)  Neurosurgery  General  [address] [facility] [address], [address]  [place] [address]  Phone: [phone]  Fax: [phone]  Follow Up Time:     Carmel Gracia)  Hematology; Internal Medicine; Medical Oncology  [address] [facility] [address], [address]  [place] [address]  Phone: [phone]  Fax: [phone]  Follow Up Time:     Edilberto Mcknight)  Radiation Oncology  [address] [facility]  [place] [address]  Phone: [phone]  Fax: [phone]  Follow Up Time:     George Teague)  Electrodiagnostic Medicine; Neurology  [address] [facility] [address], [address]  [place] [address]  Phone: [phone]  Fax: [phone]  Follow Up Time:

## 2019-09-16 NOTE — DISCHARGE NOTE PROVIDER - NSDCCPCAREPLAN_GEN_ALL_CORE_FT
PRINCIPAL DISCHARGE DIAGNOSIS  Diagnosis: Brain metastases  Assessment and Plan of Treatment: 9/5- s/p Left frontal craniotomy for resection of tumor.  Keep Incision Clean and Dry. May shower . pat dry after. no creams or lotions on incision. No immersion baths..  Incision evaluation and staple removal 9/20/19   No strenous activity. No heavy lifting. Do not return to work until cleared by physician. No driving until cleared by physician.        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 daughter aware  Radiation medicine appointment 9/26/19 at 11 am Dr Mcknight office   Follow up with Dr Gracia soon after rehab    Diagnosis: Leucocytosis  Assessment and Plan of Treatment: Trend CBC in rehab in 3-5 days    Diagnosis: Seizures  Assessment and Plan of Treatment: Continue Depakote.   No driving until cleared by Neurologist PRINCIPAL DISCHARGE DIAGNOSIS  Diagnosis: Brain metastases  Assessment and Plan of Treatment: 9/5- s/p Left frontal craniotomy for resection of tumor.  Keep Incision Clean and Dry. May shower . pat dry after. no creams or lotions on incision. No immersion baths..  Incision evaluation and staple removal at Dr Sherwood  office in 5-7 days   No strenous activity. No heavy lifting. Do not return to work until cleared by physician. No driving until cleared by physician.        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 daughter aware  Radiation medicine appointment 9/26/19 at 11 am Dr Mcknight office   Follow up with Dr Gracia in 1 week    Diagnosis: Leucocytosis  Assessment and Plan of Treatment: Recheck CBC  at primary care office in 1 week    Diagnosis: Seizures  Assessment and Plan of Treatment: Continue Depakote.   No driving until cleared by Neurologist

## 2019-09-16 NOTE — PROGRESS NOTE ADULT - SUBJECTIVE AND OBJECTIVE BOX
SUBJECTIVE:   No complaints. Unaware of pathology results as per family request. Daughter (RN) at bedside . Ambulating hallways with walker   OVERNIGHT EVENTS: none    Vital Signs Last 24 Hrs  T(C): 37.1 (16 Sep 2019 08:00), Max: 37.1 (15 Sep 2019 11:48)  T(F): 98.8 (16 Sep 2019 08:00), Max: 98.8 (16 Sep 2019 08:00)  HR: 66 (16 Sep 2019 08:00) (64 - 80)  BP: 114/68 (16 Sep 2019 08:00) (101/61 - 130/80)  RR: 18 (16 Sep 2019 08:00) (18 - 18)  SpO2: 97% (16 Sep 2019 08:00) (94% - 99%)    PHYSICAL EXAM:    Constitutional: No Acute Distress     Neurological: Alert Ox3, Speech clear Following Commands, Moving all Extremities 5/5. Cranial staples C/D/I .     Pulmonary: Clear to Auscultation,     Cardiovascular: S1, S2, Regular rate and rhythm     Gastrointestinal: Soft, Non-tender, Non-distended     Extremities: No calf tenderness      LABS:                        12.6   22.0  )-----------( 154      ( 16 Sep 2019 06:03 )             36.7    09-16    135  |  98  |  24<H>  ----------------------------<  87  4.6   |  24  |  0.56    Ca    9.1      16 Sep 2019 06:03        IMAGING:         MEDICATIONS:    acetaminophen    Suspension .. 650 milliGRAM(s) Oral every 6 hours PRN Temp greater or equal to 38C (100.4F)  diVALproex  milliGRAM(s) Oral every 8 hours  melatonin 5 milliGRAM(s) Oral <User Schedule>  QUEtiapine 25 milliGRAM(s) Oral at bedtime  traMADol 50 milliGRAM(s) Oral every 4 hours PRN Severe Pain (7 - 10)  traMADol 25 milliGRAM(s) Oral every 4 hours PRN Moderate Pain (4 - 6)  docusate sodium 100 milliGRAM(s) Oral daily  polyethylene glycol 3350 17 Gram(s) Oral two times a day  senna 2 Tablet(s) Oral at bedtime  dexamethasone     Tablet 2 milliGRAM(s) Oral every 12 hours  enoxaparin Injectable 40 milliGRAM(s) SubCutaneous at bedtime      DIET:     Assessment:  Please Check When Present   []  GCS  E   V  M     Heart Failure: []Acute, [] acute on chronic , []chronic  Heart Failure:  [] Diastolic (HFpEF), [] Systolic (HFrEF), []Combined (HFpEF and HFrEF), [] RHF, [] Pulm HTN, [] Other    [] ROGERS, [] ATN, [] AIN, [] other  [] CKD1, [] CKD2, [] CKD 3, [] CKD 4, [] CKD 5, []ESRD    Encephalopathy: [] Metabolic, [] Hepatic, [] toxic, [] Neurological, [] Other    Abnormal Nurtitional Status: [] malnurtition (see nutrition note), [ ]underweight: BMI < 19, [] morbid obesity: BMI >40, [] Cachexia    [] Sepsis  [] hypovolemic shock,[] cardiogenic shock, [] hemorrhagic shock, [] neuogenic shock  [] Acute Respiratory Failure  []Cerebral edema, [] Brain compression/ herniation,   [] Functional quadriplegia  [] Acute blood loss anemia

## 2019-09-16 NOTE — PROGRESS NOTE ADULT - ASSESSMENT
63 y/o male  w/ no PMH pw staring episodes and “delayed responsiveness” for 2 weeks. . Long time smoker recently quit after fathers death 2 weeks ago. w/  3.4 x 3.3 cm cystic mass w/in L frontal lobe w/ edema and midline shift on ct head  s/p craniotomy   w/  adenoca/primary unknown at present  neuroendocrine tumor   post op hemmorhage w/ return to or  post op seizure  steroids as per neurosx  -Keppra a s per neurosx   s/p extubation and much improved neuro and mental status    allie resolved  cards f/u noted   leukocytosis sec to steroids  pt  oob  tx plan for malignancy as per oncology  discussed w/ daughter zuka

## 2019-09-20 ENCOUNTER — APPOINTMENT (OUTPATIENT)
Dept: NEUROSURGERY | Facility: CLINIC | Age: 64
End: 2019-09-20
Payer: COMMERCIAL

## 2019-09-20 VITALS
DIASTOLIC BLOOD PRESSURE: 72 MMHG | OXYGEN SATURATION: 96 % | RESPIRATION RATE: 18 BRPM | TEMPERATURE: 98.4 F | BODY MASS INDEX: 19.66 KG/M2 | HEART RATE: 85 BPM | HEIGHT: 65 IN | SYSTOLIC BLOOD PRESSURE: 109 MMHG | WEIGHT: 118 LBS

## 2019-09-20 DIAGNOSIS — Z98.890 OTHER SPECIFIED POSTPROCEDURAL STATES: ICD-10-CM

## 2019-09-20 DIAGNOSIS — G93.9 DISORDER OF BRAIN, UNSPECIFIED: ICD-10-CM

## 2019-09-20 DIAGNOSIS — F17.200 NICOTINE DEPENDENCE, UNSPECIFIED, UNCOMPLICATED: ICD-10-CM

## 2019-09-20 PROCEDURE — 99024 POSTOP FOLLOW-UP VISIT: CPT

## 2019-09-20 RX ORDER — DEXAMETHASONE 2 MG/1
2 TABLET ORAL
Qty: 30 | Refills: 0 | Status: ACTIVE | COMMUNITY
Start: 2019-09-16

## 2019-09-20 RX ORDER — QUETIAPINE FUMARATE 25 MG/1
25 TABLET ORAL
Qty: 30 | Refills: 0 | Status: ACTIVE | COMMUNITY
Start: 2019-09-16

## 2019-09-20 RX ORDER — DIVALPROEX SODIUM 500 MG/1
500 TABLET, DELAYED RELEASE ORAL
Qty: 60 | Refills: 0 | Status: ACTIVE | COMMUNITY
Start: 2019-09-16

## 2019-09-23 ENCOUNTER — OUTPATIENT (OUTPATIENT)
Dept: OUTPATIENT SERVICES | Facility: HOSPITAL | Age: 64
LOS: 1 days | Discharge: HOME | End: 2019-09-23

## 2019-09-24 ENCOUNTER — INPATIENT (INPATIENT)
Facility: HOSPITAL | Age: 64
LOS: 2 days | Discharge: HOME | DRG: 194 | End: 2019-09-27
Attending: INTERNAL MEDICINE | Admitting: INTERNAL MEDICINE
Payer: COMMERCIAL

## 2019-09-24 VITALS
TEMPERATURE: 100 F | HEIGHT: 67 IN | WEIGHT: 115.08 LBS | SYSTOLIC BLOOD PRESSURE: 115 MMHG | DIASTOLIC BLOOD PRESSURE: 66 MMHG | OXYGEN SATURATION: 96 % | RESPIRATION RATE: 20 BRPM | HEART RATE: 124 BPM

## 2019-09-24 DIAGNOSIS — Z98.890 OTHER SPECIFIED POSTPROCEDURAL STATES: Chronic | ICD-10-CM

## 2019-09-24 DIAGNOSIS — J18.9 PNEUMONIA, UNSPECIFIED ORGANISM: ICD-10-CM

## 2019-09-24 LAB
ALBUMIN SERPL ELPH-MCNC: 3.2 G/DL — LOW (ref 3.3–5)
ALP SERPL-CCNC: 149 U/L — HIGH (ref 40–120)
ALT FLD-CCNC: 160 U/L — HIGH (ref 10–45)
ANION GAP SERPL CALC-SCNC: 14 MMOL/L — SIGNIFICANT CHANGE UP (ref 5–17)
APPEARANCE UR: CLEAR — SIGNIFICANT CHANGE UP
APTT BLD: 24.2 SEC — LOW (ref 27.5–36.3)
AST SERPL-CCNC: 160 U/L — HIGH (ref 10–40)
BACTERIA # UR AUTO: NEGATIVE — SIGNIFICANT CHANGE UP
BASOPHILS # BLD AUTO: 0.1 K/UL — SIGNIFICANT CHANGE UP (ref 0–0.2)
BILIRUB SERPL-MCNC: 0.4 MG/DL — SIGNIFICANT CHANGE UP (ref 0.2–1.2)
BILIRUB UR-MCNC: NEGATIVE — SIGNIFICANT CHANGE UP
BUN SERPL-MCNC: 24 MG/DL — HIGH (ref 7–23)
CALCIUM SERPL-MCNC: 9.5 MG/DL — SIGNIFICANT CHANGE UP (ref 8.4–10.5)
CHLORIDE SERPL-SCNC: 100 MMOL/L — SIGNIFICANT CHANGE UP (ref 96–108)
CO2 SERPL-SCNC: 23 MMOL/L — SIGNIFICANT CHANGE UP (ref 22–31)
COLOR SPEC: YELLOW — SIGNIFICANT CHANGE UP
COMMENT - URINE: SIGNIFICANT CHANGE UP
CREAT SERPL-MCNC: 0.7 MG/DL — SIGNIFICANT CHANGE UP (ref 0.5–1.3)
DIFF PNL FLD: ABNORMAL
EOSINOPHIL # BLD AUTO: 0.1 K/UL — SIGNIFICANT CHANGE UP (ref 0–0.5)
EPI CELLS # UR: 1 /HPF — SIGNIFICANT CHANGE UP
GLUCOSE SERPL-MCNC: 98 MG/DL — SIGNIFICANT CHANGE UP (ref 70–99)
GLUCOSE UR QL: NEGATIVE — SIGNIFICANT CHANGE UP
HCT VFR BLD CALC: 40.2 % — SIGNIFICANT CHANGE UP (ref 39–50)
HGB BLD-MCNC: 12.9 G/DL — LOW (ref 13–17)
HYALINE CASTS # UR AUTO: 5 /LPF — SIGNIFICANT CHANGE UP (ref 0–7)
INR BLD: 0.97 RATIO — SIGNIFICANT CHANGE UP (ref 0.88–1.16)
KETONES UR-MCNC: ABNORMAL
LACTATE BLDV-MCNC: 2.1 MMOL/L — HIGH (ref 0.7–2)
LACTATE BLDV-MCNC: 2.1 MMOL/L — HIGH (ref 0.7–2)
LEUKOCYTE ESTERASE UR-ACNC: NEGATIVE — SIGNIFICANT CHANGE UP
LYMPHOCYTES # BLD AUTO: 1.6 K/UL — SIGNIFICANT CHANGE UP (ref 1–3.3)
LYMPHOCYTES # BLD AUTO: 10 % — LOW (ref 13–44)
MCHC RBC-ENTMCNC: 31.2 PG — SIGNIFICANT CHANGE UP (ref 27–34)
MCHC RBC-ENTMCNC: 32 GM/DL — SIGNIFICANT CHANGE UP (ref 32–36)
MCV RBC AUTO: 97.6 FL — SIGNIFICANT CHANGE UP (ref 80–100)
METAMYELOCYTES # FLD: 2 % — HIGH (ref 0–0)
MONOCYTES # BLD AUTO: 2.4 K/UL — HIGH (ref 0–0.9)
MONOCYTES NFR BLD AUTO: 14 % — SIGNIFICANT CHANGE UP (ref 2–14)
NEUTROPHILS # BLD AUTO: 9.8 K/UL — HIGH (ref 1.8–7.4)
NEUTROPHILS NFR BLD AUTO: 70 % — SIGNIFICANT CHANGE UP (ref 43–77)
NEUTS BAND # BLD: 4 % — SIGNIFICANT CHANGE UP (ref 0–8)
NITRITE UR-MCNC: NEGATIVE — SIGNIFICANT CHANGE UP
PH UR: 6 — SIGNIFICANT CHANGE UP (ref 5–8)
PLAT MORPH BLD: NORMAL — SIGNIFICANT CHANGE UP
PLATELET # BLD AUTO: 143 K/UL — LOW (ref 150–400)
POTASSIUM SERPL-MCNC: 4 MMOL/L — SIGNIFICANT CHANGE UP (ref 3.5–5.3)
POTASSIUM SERPL-SCNC: 4 MMOL/L — SIGNIFICANT CHANGE UP (ref 3.5–5.3)
PROT SERPL-MCNC: 6.2 G/DL — SIGNIFICANT CHANGE UP (ref 6–8.3)
PROT UR-MCNC: ABNORMAL
PROTHROM AB SERPL-ACNC: 11.1 SEC — SIGNIFICANT CHANGE UP (ref 10–12.9)
RAPID RVP RESULT: DETECTED
RBC # BLD: 4.12 M/UL — LOW (ref 4.2–5.8)
RBC # FLD: 13 % — SIGNIFICANT CHANGE UP (ref 10.3–14.5)
RBC BLD AUTO: SIGNIFICANT CHANGE UP
RBC CASTS # UR COMP ASSIST: 3 /HPF — SIGNIFICANT CHANGE UP (ref 0–4)
RV+EV RNA SPEC QL NAA+PROBE: DETECTED
SODIUM SERPL-SCNC: 137 MMOL/L — SIGNIFICANT CHANGE UP (ref 135–145)
SP GR SPEC: 1.03 — HIGH (ref 1.01–1.02)
UROBILINOGEN FLD QL: NEGATIVE — SIGNIFICANT CHANGE UP
WBC # BLD: 14.1 K/UL — HIGH (ref 3.8–10.5)
WBC # FLD AUTO: 14.1 K/UL — HIGH (ref 3.8–10.5)
WBC UR QL: 6 /HPF — HIGH (ref 0–5)

## 2019-09-24 PROCEDURE — 99222 1ST HOSP IP/OBS MODERATE 55: CPT

## 2019-09-24 PROCEDURE — 99285 EMERGENCY DEPT VISIT HI MDM: CPT | Mod: 25

## 2019-09-24 PROCEDURE — 93010 ELECTROCARDIOGRAM REPORT: CPT | Mod: NC

## 2019-09-24 PROCEDURE — 71045 X-RAY EXAM CHEST 1 VIEW: CPT | Mod: 26

## 2019-09-24 PROCEDURE — 70470 CT HEAD/BRAIN W/O & W/DYE: CPT | Mod: 26

## 2019-09-24 RX ORDER — DIVALPROEX SODIUM 500 MG/1
500 TABLET, DELAYED RELEASE ORAL EVERY 12 HOURS
Refills: 0 | Status: DISCONTINUED | OUTPATIENT
Start: 2019-09-24 | End: 2019-09-24

## 2019-09-24 RX ORDER — CEFEPIME 1 G/1
INJECTION, POWDER, FOR SOLUTION INTRAMUSCULAR; INTRAVENOUS
Refills: 0 | Status: DISCONTINUED | OUTPATIENT
Start: 2019-09-24 | End: 2019-09-27

## 2019-09-24 RX ORDER — LEVETIRACETAM 250 MG/1
500 TABLET, FILM COATED ORAL
Refills: 0 | Status: DISCONTINUED | OUTPATIENT
Start: 2019-09-24 | End: 2019-09-27

## 2019-09-24 RX ORDER — FAMOTIDINE 10 MG/ML
20 INJECTION INTRAVENOUS DAILY
Refills: 0 | Status: DISCONTINUED | OUTPATIENT
Start: 2019-09-24 | End: 2019-09-27

## 2019-09-24 RX ORDER — PIPERACILLIN AND TAZOBACTAM 4; .5 G/20ML; G/20ML
3.38 INJECTION, POWDER, LYOPHILIZED, FOR SOLUTION INTRAVENOUS ONCE
Refills: 0 | Status: COMPLETED | OUTPATIENT
Start: 2019-09-24 | End: 2019-09-24

## 2019-09-24 RX ORDER — QUETIAPINE FUMARATE 200 MG/1
25 TABLET, FILM COATED ORAL AT BEDTIME
Refills: 0 | Status: DISCONTINUED | OUTPATIENT
Start: 2019-09-24 | End: 2019-09-27

## 2019-09-24 RX ORDER — LANOLIN ALCOHOL/MO/W.PET/CERES
3 CREAM (GRAM) TOPICAL AT BEDTIME
Refills: 0 | Status: DISCONTINUED | OUTPATIENT
Start: 2019-09-24 | End: 2019-09-27

## 2019-09-24 RX ORDER — DEXAMETHASONE 0.5 MG/5ML
2 ELIXIR ORAL DAILY
Refills: 0 | Status: DISCONTINUED | OUTPATIENT
Start: 2019-09-24 | End: 2019-09-27

## 2019-09-24 RX ORDER — SODIUM CHLORIDE 9 MG/ML
1000 INJECTION INTRAMUSCULAR; INTRAVENOUS; SUBCUTANEOUS
Refills: 0 | Status: DISCONTINUED | OUTPATIENT
Start: 2019-09-24 | End: 2019-09-25

## 2019-09-24 RX ORDER — CEFEPIME 1 G/1
1000 INJECTION, POWDER, FOR SOLUTION INTRAMUSCULAR; INTRAVENOUS EVERY 8 HOURS
Refills: 0 | Status: DISCONTINUED | OUTPATIENT
Start: 2019-09-24 | End: 2019-09-27

## 2019-09-24 RX ORDER — SODIUM CHLORIDE 9 MG/ML
1600 INJECTION INTRAMUSCULAR; INTRAVENOUS; SUBCUTANEOUS ONCE
Refills: 0 | Status: COMPLETED | OUTPATIENT
Start: 2019-09-24 | End: 2019-09-24

## 2019-09-24 RX ORDER — CEFEPIME 1 G/1
1000 INJECTION, POWDER, FOR SOLUTION INTRAMUSCULAR; INTRAVENOUS ONCE
Refills: 0 | Status: COMPLETED | OUTPATIENT
Start: 2019-09-24 | End: 2019-09-24

## 2019-09-24 RX ADMIN — FAMOTIDINE 20 MILLIGRAM(S): 10 INJECTION INTRAVENOUS at 11:33

## 2019-09-24 RX ADMIN — LEVETIRACETAM 500 MILLIGRAM(S): 250 TABLET, FILM COATED ORAL at 21:04

## 2019-09-24 RX ADMIN — SODIUM CHLORIDE 70 MILLILITER(S): 9 INJECTION INTRAMUSCULAR; INTRAVENOUS; SUBCUTANEOUS at 07:51

## 2019-09-24 RX ADMIN — Medication 3 MILLIGRAM(S): at 21:04

## 2019-09-24 RX ADMIN — QUETIAPINE FUMARATE 25 MILLIGRAM(S): 200 TABLET, FILM COATED ORAL at 21:04

## 2019-09-24 RX ADMIN — SODIUM CHLORIDE 1600 MILLILITER(S): 9 INJECTION INTRAMUSCULAR; INTRAVENOUS; SUBCUTANEOUS at 01:53

## 2019-09-24 RX ADMIN — PIPERACILLIN AND TAZOBACTAM 200 GRAM(S): 4; .5 INJECTION, POWDER, LYOPHILIZED, FOR SOLUTION INTRAVENOUS at 03:01

## 2019-09-24 RX ADMIN — Medication 100 MILLIGRAM(S): at 03:01

## 2019-09-24 RX ADMIN — CEFEPIME 100 MILLIGRAM(S): 1 INJECTION, POWDER, FOR SOLUTION INTRAMUSCULAR; INTRAVENOUS at 11:33

## 2019-09-24 RX ADMIN — CEFEPIME 100 MILLIGRAM(S): 1 INJECTION, POWDER, FOR SOLUTION INTRAMUSCULAR; INTRAVENOUS at 21:04

## 2019-09-24 RX ADMIN — LEVETIRACETAM 500 MILLIGRAM(S): 250 TABLET, FILM COATED ORAL at 11:33

## 2019-09-24 RX ADMIN — Medication 2 MILLIGRAM(S): at 17:48

## 2019-09-24 NOTE — ED PROVIDER NOTE - CONSTITUTIONAL, MLM
normal... Well appearing, well nourished, awake, alert, oriented to person, place, time/situation and in no apparent distress. On NC O2

## 2019-09-24 NOTE — ED PROVIDER NOTE - CLINICAL SUMMARY MEDICAL DECISION MAKING FREE TEXT BOX
64 M recent surgical procedure p/w f/c/ and cough, rqeuirin supplemental O2. May be due to PNA vs less likely bronchitis and URI given need for supplemental O2 but will draw sepsis labs and eval for UTI given dysuria.

## 2019-09-24 NOTE — H&P ADULT - ASSESSMENT
pt w/ recent craniotomy for brain tumor /w likely met / adenoca  w/u undergoing for potential tx/ rt/plus chemo w/ fever / cough and likely pna / plus viral syndrome   iv abs as per id  pulm eval  f/u cultures  c/w outpt meds  neuro eval  follow [facility]  ? med related  discussed w/ pt/ daughter at bedside

## 2019-09-24 NOTE — ED PROVIDER NOTE - ATTENDING CONTRIBUTION TO CARE
64 M recent craniotomy and brain tumor resection Dr May, post op sz on depakote, ex smoker p/w several days of f/c and cough, and dysuria 9/15 with SS on arrival received full sepsis work up, repeat lactate ordered, abx for noscomial pna ordered lll, full fluid bolus but no resp distress sats 92% on ra, surgical site c/d/i, no neuro complaints.

## 2019-09-24 NOTE — H&P ADULT - HISTORY OF PRESENT ILLNESS
65 y/o  Male w/ recent craniotomy and brain tumor resection by neurosx here at [facility] post op sz on depakote now   p/w several days of fever / c hills and cough,    Saw his surgeon several days ago was sent home but then had fever that started so presented to the ER,   Tm 103 at home. Denies ABD pain, n/v/d, CP.  no  focal numbness/weakness, stable gait.   Denies HA and drainage from surgical site,

## 2019-09-24 NOTE — ED ADULT NURSE NOTE - OBJECTIVE STATEMENT
64y male presents to ED complaining of fever and cough. Pt is a/ox4 s/p craniotomy on 9/5 of L frontal mass d/marin 9/16. Pt presents now with cough, fever, chills at home. Pt states fever was as high as 103, took motrin 0300. Pt breathing spontaneous and unlabored with lungs diminished to auscultation bilaterally. Pt skin is warm, dry and intact with no edema present. Pt abdomen soft, nontender, nondistended with bowel sounds present in all 4 quadrants. Pt PERRL, with equal strength bilaterally in upper and lower extremities with full sensation. Pt denies chest pain and SOB, denies n/v/d, denies dysuria, denies numbness/tingling and weakness.

## 2019-09-24 NOTE — H&P ADULT - NSHPLABSRESULTS_GEN_ALL_CORE
12.9   14.1  )-----------( 143      ( 24 Sep 2019 01:52 )             40.2           137  |  100  |  24<H>  ----------------------------<  98  4.0   |  23  |  0.70    Ca    9.5      24 Sep 2019 01:52    TPro  6.2  /  Alb  3.2<L>  /  TBili  0.4  /  DBili  x   /  AST  160<H>  /  ALT  160<H>  /  AlkPhos  149<H>                Urinalysis Basic - ( 24 Sep 2019 04:12 )    Color: Yellow / Appearance: Clear / S.029 / pH: x  Gluc: x / Ketone: Small  / Bili: Negative / Urobili: Negative   Blood: x / Protein: 30 mg/dL / Nitrite: Negative   Leuk Esterase: Negative / RBC: 3 /hpf / WBC 6 /HPF   Sq Epi: x / Non Sq Epi: 1 /hpf / Bacteria: Negative        PT/INR - ( 24 Sep 2019 01:52 )   PT: 11.1 sec;   INR: 0.97 ratio         PTT - ( 24 Sep 2019 01:52 )  PTT:24.2 sec    Lactate Trend            CAPILLARY BLOOD GLUCOSE            Culture Results:   No growth at 5 days. ( @ 01:11)  Culture Results:   No growth at 5 days. ( @ 01:11)

## 2019-09-24 NOTE — ED PROVIDER NOTE - OBJECTIVE STATEMENT
64 M recent craniotomy and brain tumor resection Dr May, post op sz on depakote, ex smoker p/w several days of f/c and cough, and dysuria. Saw his surgeon several days ago was sent home but then had fever that started so presented to the ER, Tm 103 at home. Denies ABD pain, n/v/d, CP.Wife and pt note no new confusion, focal numbness/weakness, stable gait. Denies HA and drainage from surgical site, notes the site is slightly painful.

## 2019-09-24 NOTE — CONSULT NOTE ADULT - ASSESSMENT
This is a 64y year old Male with the below past medical history who presents with the chief complaint of fever up to 103 at home, recent discharge from NS service here s/p left frontal brain mass resected ? lung primary metastatic to brain tumor was supposed to get PET scan study done today in [place]. cough few days and fevere + rapRVP today in ER. possibly pneumonia, admitted for treatment and furhter workup. patient denies seizure, maintained on depakote since discharge although LFTS now 160s from normal last week in patient. denies focal weakness or sensory chagnes, denies falls or gait issues. denies vision changes or speech changes or confusion. no drainage from surgical site.    nonfocal exam, neuro stable  no clinical evidence of seizure or encephalitis/ no meningismus    -no obvious reason to suggest CNS infection  -treat underlying fever source / pneumonia? viral ?  -ct head to ensure post op stability  -would change depakote to keppra 500mg BID with almost 3x elevated LFTs from baseline, keppra with rapid bioavailability, would start now and d/c depakote  -continue with planned workup for primary mets source -lung? was supposed to have PET today    d/w daughter bedside and patient

## 2019-09-25 LAB
ANION GAP SERPL CALC-SCNC: 10 MMOL/L — SIGNIFICANT CHANGE UP (ref 5–17)
BUN SERPL-MCNC: 18 MG/DL — SIGNIFICANT CHANGE UP (ref 7–23)
CALCIUM SERPL-MCNC: 9 MG/DL — SIGNIFICANT CHANGE UP (ref 8.4–10.5)
CHLORIDE SERPL-SCNC: 104 MMOL/L — SIGNIFICANT CHANGE UP (ref 96–108)
CO2 SERPL-SCNC: 24 MMOL/L — SIGNIFICANT CHANGE UP (ref 22–31)
CREAT SERPL-MCNC: 0.51 MG/DL — SIGNIFICANT CHANGE UP (ref 0.5–1.3)
CULTURE RESULTS: NO GROWTH — SIGNIFICANT CHANGE UP
GLUCOSE SERPL-MCNC: 120 MG/DL — HIGH (ref 70–99)
HCT VFR BLD CALC: 34.3 % — LOW (ref 39–50)
HGB BLD-MCNC: 11 G/DL — LOW (ref 13–17)
MCHC RBC-ENTMCNC: 31.8 PG — SIGNIFICANT CHANGE UP (ref 27–34)
MCHC RBC-ENTMCNC: 32.1 GM/DL — SIGNIFICANT CHANGE UP (ref 32–36)
MCV RBC AUTO: 99.1 FL — SIGNIFICANT CHANGE UP (ref 80–100)
PLATELET # BLD AUTO: 153 K/UL — SIGNIFICANT CHANGE UP (ref 150–400)
POTASSIUM SERPL-MCNC: 4.3 MMOL/L — SIGNIFICANT CHANGE UP (ref 3.5–5.3)
POTASSIUM SERPL-SCNC: 4.3 MMOL/L — SIGNIFICANT CHANGE UP (ref 3.5–5.3)
RBC # BLD: 3.46 M/UL — LOW (ref 4.2–5.8)
RBC # FLD: 14.9 % — HIGH (ref 10.3–14.5)
SODIUM SERPL-SCNC: 138 MMOL/L — SIGNIFICANT CHANGE UP (ref 135–145)
SPECIMEN SOURCE: SIGNIFICANT CHANGE UP
WBC # BLD: 18.29 K/UL — HIGH (ref 3.8–10.5)
WBC # FLD AUTO: 18.29 K/UL — HIGH (ref 3.8–10.5)

## 2019-09-25 PROCEDURE — 71250 CT THORAX DX C-: CPT | Mod: 26

## 2019-09-25 PROCEDURE — 99232 SBSQ HOSP IP/OBS MODERATE 35: CPT

## 2019-09-25 RX ORDER — SODIUM CHLORIDE 9 MG/ML
1000 INJECTION INTRAMUSCULAR; INTRAVENOUS; SUBCUTANEOUS
Refills: 0 | Status: DISCONTINUED | OUTPATIENT
Start: 2019-09-25 | End: 2019-09-27

## 2019-09-25 RX ADMIN — LEVETIRACETAM 500 MILLIGRAM(S): 250 TABLET, FILM COATED ORAL at 11:11

## 2019-09-25 RX ADMIN — LEVETIRACETAM 500 MILLIGRAM(S): 250 TABLET, FILM COATED ORAL at 22:29

## 2019-09-25 RX ADMIN — CEFEPIME 100 MILLIGRAM(S): 1 INJECTION, POWDER, FOR SOLUTION INTRAMUSCULAR; INTRAVENOUS at 11:12

## 2019-09-25 RX ADMIN — QUETIAPINE FUMARATE 25 MILLIGRAM(S): 200 TABLET, FILM COATED ORAL at 22:29

## 2019-09-25 RX ADMIN — CEFEPIME 100 MILLIGRAM(S): 1 INJECTION, POWDER, FOR SOLUTION INTRAMUSCULAR; INTRAVENOUS at 05:24

## 2019-09-25 RX ADMIN — Medication 3 MILLIGRAM(S): at 22:29

## 2019-09-25 RX ADMIN — Medication 2 MILLIGRAM(S): at 05:50

## 2019-09-25 RX ADMIN — CEFEPIME 100 MILLIGRAM(S): 1 INJECTION, POWDER, FOR SOLUTION INTRAMUSCULAR; INTRAVENOUS at 17:34

## 2019-09-25 RX ADMIN — FAMOTIDINE 20 MILLIGRAM(S): 10 INJECTION INTRAVENOUS at 11:11

## 2019-09-25 RX ADMIN — SODIUM CHLORIDE 70 MILLILITER(S): 9 INJECTION INTRAMUSCULAR; INTRAVENOUS; SUBCUTANEOUS at 20:47

## 2019-09-25 RX ADMIN — SODIUM CHLORIDE 70 MILLILITER(S): 9 INJECTION INTRAMUSCULAR; INTRAVENOUS; SUBCUTANEOUS at 17:34

## 2019-09-25 NOTE — PROVIDER CONTACT NOTE (OTHER) - SITUATION
patient family members are repeatedly disconnecting pt off IV fluids, or turning IV pump off.
pt's daughter refusing IV fluids because pt is urinating too much , Daughter states , " they let us do that after his surgery at night time."

## 2019-09-26 ENCOUNTER — APPOINTMENT (OUTPATIENT)
Dept: RADIATION ONCOLOGY | Facility: CLINIC | Age: 64
End: 2019-09-26

## 2019-09-26 LAB
ALBUMIN SERPL ELPH-MCNC: 2.7 G/DL — LOW (ref 3.3–5)
ALP SERPL-CCNC: 125 U/L — HIGH (ref 40–120)
ALT FLD-CCNC: 97 U/L — HIGH (ref 10–45)
ANION GAP SERPL CALC-SCNC: 8 MMOL/L — SIGNIFICANT CHANGE UP (ref 5–17)
AST SERPL-CCNC: 42 U/L — HIGH (ref 10–40)
BASOPHILS # BLD AUTO: 0 K/UL — SIGNIFICANT CHANGE UP (ref 0–0.2)
BASOPHILS NFR BLD AUTO: 0 % — SIGNIFICANT CHANGE UP (ref 0–2)
BILIRUB SERPL-MCNC: 0.2 MG/DL — SIGNIFICANT CHANGE UP (ref 0.2–1.2)
BUN SERPL-MCNC: 18 MG/DL — SIGNIFICANT CHANGE UP (ref 7–23)
CALCIUM SERPL-MCNC: 8.8 MG/DL — SIGNIFICANT CHANGE UP (ref 8.4–10.5)
CHLORIDE SERPL-SCNC: 108 MMOL/L — SIGNIFICANT CHANGE UP (ref 96–108)
CO2 SERPL-SCNC: 24 MMOL/L — SIGNIFICANT CHANGE UP (ref 22–31)
CREAT SERPL-MCNC: 0.58 MG/DL — SIGNIFICANT CHANGE UP (ref 0.5–1.3)
EOSINOPHIL # BLD AUTO: 0.16 K/UL — SIGNIFICANT CHANGE UP (ref 0–0.5)
EOSINOPHIL NFR BLD AUTO: 0.9 % — SIGNIFICANT CHANGE UP (ref 0–6)
GLUCOSE SERPL-MCNC: 95 MG/DL — SIGNIFICANT CHANGE UP (ref 70–99)
HCT VFR BLD CALC: 33.7 % — LOW (ref 39–50)
HGB BLD-MCNC: 10.8 G/DL — LOW (ref 13–17)
LYMPHOCYTES # BLD AUTO: 19.1 % — SIGNIFICANT CHANGE UP (ref 13–44)
LYMPHOCYTES # BLD AUTO: 3.36 K/UL — HIGH (ref 1–3.3)
MANUAL SMEAR VERIFICATION: SIGNIFICANT CHANGE UP
MCHC RBC-ENTMCNC: 31.1 PG — SIGNIFICANT CHANGE UP (ref 27–34)
MCHC RBC-ENTMCNC: 32 GM/DL — SIGNIFICANT CHANGE UP (ref 32–36)
MCV RBC AUTO: 97.1 FL — SIGNIFICANT CHANGE UP (ref 80–100)
MONOCYTES # BLD AUTO: 1.53 K/UL — HIGH (ref 0–0.9)
MONOCYTES NFR BLD AUTO: 8.7 % — SIGNIFICANT CHANGE UP (ref 2–14)
MYELOCYTES NFR BLD: 1.7 % — HIGH (ref 0–0)
NEUTROPHILS # BLD AUTO: 12.09 K/UL — HIGH (ref 1.8–7.4)
NEUTROPHILS NFR BLD AUTO: 66.1 % — SIGNIFICANT CHANGE UP (ref 43–77)
NEUTS BAND # BLD: 2.6 % — SIGNIFICANT CHANGE UP (ref 0–8)
PLAT MORPH BLD: NORMAL — SIGNIFICANT CHANGE UP
PLATELET # BLD AUTO: 169 K/UL — SIGNIFICANT CHANGE UP (ref 150–400)
POTASSIUM SERPL-MCNC: 3.8 MMOL/L — SIGNIFICANT CHANGE UP (ref 3.5–5.3)
POTASSIUM SERPL-SCNC: 3.8 MMOL/L — SIGNIFICANT CHANGE UP (ref 3.5–5.3)
PROMYELOCYTES # FLD: 0.9 % — HIGH (ref 0–0)
PROT SERPL-MCNC: 5.6 G/DL — LOW (ref 6–8.3)
RBC # BLD: 3.47 M/UL — LOW (ref 4.2–5.8)
RBC # FLD: 15 % — HIGH (ref 10.3–14.5)
RBC BLD AUTO: NORMAL — SIGNIFICANT CHANGE UP
SODIUM SERPL-SCNC: 140 MMOL/L — SIGNIFICANT CHANGE UP (ref 135–145)
WBC # BLD: 17.6 K/UL — HIGH (ref 3.8–10.5)
WBC # FLD AUTO: 17.6 K/UL — HIGH (ref 3.8–10.5)

## 2019-09-26 PROCEDURE — 99232 SBSQ HOSP IP/OBS MODERATE 35: CPT

## 2019-09-26 RX ADMIN — Medication 3 MILLIGRAM(S): at 21:41

## 2019-09-26 RX ADMIN — CEFEPIME 100 MILLIGRAM(S): 1 INJECTION, POWDER, FOR SOLUTION INTRAMUSCULAR; INTRAVENOUS at 09:54

## 2019-09-26 RX ADMIN — Medication 100 MILLIGRAM(S): at 21:41

## 2019-09-26 RX ADMIN — Medication 2 MILLIGRAM(S): at 06:02

## 2019-09-26 RX ADMIN — CEFEPIME 100 MILLIGRAM(S): 1 INJECTION, POWDER, FOR SOLUTION INTRAMUSCULAR; INTRAVENOUS at 17:46

## 2019-09-26 RX ADMIN — LEVETIRACETAM 500 MILLIGRAM(S): 250 TABLET, FILM COATED ORAL at 09:55

## 2019-09-26 RX ADMIN — QUETIAPINE FUMARATE 25 MILLIGRAM(S): 200 TABLET, FILM COATED ORAL at 21:41

## 2019-09-26 RX ADMIN — SODIUM CHLORIDE 70 MILLILITER(S): 9 INJECTION INTRAMUSCULAR; INTRAVENOUS; SUBCUTANEOUS at 08:05

## 2019-09-26 RX ADMIN — FAMOTIDINE 20 MILLIGRAM(S): 10 INJECTION INTRAVENOUS at 11:52

## 2019-09-26 RX ADMIN — LEVETIRACETAM 500 MILLIGRAM(S): 250 TABLET, FILM COATED ORAL at 21:41

## 2019-09-26 RX ADMIN — CEFEPIME 100 MILLIGRAM(S): 1 INJECTION, POWDER, FOR SOLUTION INTRAMUSCULAR; INTRAVENOUS at 02:17

## 2019-09-27 ENCOUNTER — TRANSCRIPTION ENCOUNTER (OUTPATIENT)
Age: 64
End: 2019-09-27

## 2019-09-27 VITALS
DIASTOLIC BLOOD PRESSURE: 65 MMHG | SYSTOLIC BLOOD PRESSURE: 117 MMHG | OXYGEN SATURATION: 96 % | TEMPERATURE: 98 F | RESPIRATION RATE: 17 BRPM | HEART RATE: 80 BPM

## 2019-09-27 LAB
ALBUMIN SERPL ELPH-MCNC: 3.2 G/DL — LOW (ref 3.3–5)
ALP SERPL-CCNC: 135 U/L — HIGH (ref 40–120)
ALT FLD-CCNC: 103 U/L — HIGH (ref 10–45)
ANION GAP SERPL CALC-SCNC: 10 MMOL/L — SIGNIFICANT CHANGE UP (ref 5–17)
AST SERPL-CCNC: 38 U/L — SIGNIFICANT CHANGE UP (ref 10–40)
BASOPHILS # BLD AUTO: 0 K/UL — SIGNIFICANT CHANGE UP (ref 0–0.2)
BILIRUB SERPL-MCNC: 0.1 MG/DL — LOW (ref 0.2–1.2)
BUN SERPL-MCNC: 16 MG/DL — SIGNIFICANT CHANGE UP (ref 7–23)
CALCIUM SERPL-MCNC: 9.2 MG/DL — SIGNIFICANT CHANGE UP (ref 8.4–10.5)
CHLORIDE SERPL-SCNC: 101 MMOL/L — SIGNIFICANT CHANGE UP (ref 96–108)
CO2 SERPL-SCNC: 26 MMOL/L — SIGNIFICANT CHANGE UP (ref 22–31)
CREAT SERPL-MCNC: 0.54 MG/DL — SIGNIFICANT CHANGE UP (ref 0.5–1.3)
EOSINOPHIL # BLD AUTO: 0 K/UL — SIGNIFICANT CHANGE UP (ref 0–0.5)
GLUCOSE SERPL-MCNC: 104 MG/DL — HIGH (ref 70–99)
HCT VFR BLD CALC: 38.4 % — LOW (ref 39–50)
HGB BLD-MCNC: 12.5 G/DL — LOW (ref 13–17)
LYMPHOCYTES # BLD AUTO: 1.7 K/UL — SIGNIFICANT CHANGE UP (ref 1–3.3)
LYMPHOCYTES # BLD AUTO: 11 % — LOW (ref 13–44)
MCHC RBC-ENTMCNC: 32.1 PG — SIGNIFICANT CHANGE UP (ref 27–34)
MCHC RBC-ENTMCNC: 32.5 GM/DL — SIGNIFICANT CHANGE UP (ref 32–36)
MCV RBC AUTO: 98.8 FL — SIGNIFICANT CHANGE UP (ref 80–100)
METAMYELOCYTES # FLD: 1 % — HIGH (ref 0–0)
MONOCYTES # BLD AUTO: 1.2 K/UL — HIGH (ref 0–0.9)
MONOCYTES NFR BLD AUTO: 10 % — SIGNIFICANT CHANGE UP (ref 2–14)
MYELOCYTES NFR BLD: 4 % — HIGH (ref 0–0)
NEUTROPHILS # BLD AUTO: 12 K/UL — HIGH (ref 1.8–7.4)
NEUTROPHILS NFR BLD AUTO: 73 % — SIGNIFICANT CHANGE UP (ref 43–77)
NEUTS BAND # BLD: 1 % — SIGNIFICANT CHANGE UP (ref 0–8)
PLAT MORPH BLD: NORMAL — SIGNIFICANT CHANGE UP
PLATELET # BLD AUTO: 211 K/UL — SIGNIFICANT CHANGE UP (ref 150–400)
POTASSIUM SERPL-MCNC: 4.4 MMOL/L — SIGNIFICANT CHANGE UP (ref 3.5–5.3)
POTASSIUM SERPL-SCNC: 4.4 MMOL/L — SIGNIFICANT CHANGE UP (ref 3.5–5.3)
PROT SERPL-MCNC: 6.3 G/DL — SIGNIFICANT CHANGE UP (ref 6–8.3)
RBC # BLD: 3.89 M/UL — LOW (ref 4.2–5.8)
RBC # FLD: 13.3 % — SIGNIFICANT CHANGE UP (ref 10.3–14.5)
RBC BLD AUTO: SIGNIFICANT CHANGE UP
SODIUM SERPL-SCNC: 137 MMOL/L — SIGNIFICANT CHANGE UP (ref 135–145)
WBC # BLD: 15 K/UL — HIGH (ref 3.8–10.5)
WBC # FLD AUTO: 15 K/UL — HIGH (ref 3.8–10.5)

## 2019-09-27 PROCEDURE — 81001 URINALYSIS AUTO W/SCOPE: CPT

## 2019-09-27 PROCEDURE — 80053 COMPREHEN METABOLIC PANEL: CPT

## 2019-09-27 PROCEDURE — 85730 THROMBOPLASTIN TIME PARTIAL: CPT

## 2019-09-27 PROCEDURE — 85610 PROTHROMBIN TIME: CPT

## 2019-09-27 PROCEDURE — 87633 RESP VIRUS 12-25 TARGETS: CPT

## 2019-09-27 PROCEDURE — 87086 URINE CULTURE/COLONY COUNT: CPT

## 2019-09-27 PROCEDURE — 87486 CHLMYD PNEUM DNA AMP PROBE: CPT

## 2019-09-27 PROCEDURE — 85027 COMPLETE CBC AUTOMATED: CPT

## 2019-09-27 PROCEDURE — 87040 BLOOD CULTURE FOR BACTERIA: CPT

## 2019-09-27 PROCEDURE — 96374 THER/PROPH/DIAG INJ IV PUSH: CPT

## 2019-09-27 PROCEDURE — 87581 M.PNEUMON DNA AMP PROBE: CPT

## 2019-09-27 PROCEDURE — 87798 DETECT AGENT NOS DNA AMP: CPT

## 2019-09-27 PROCEDURE — 93005 ELECTROCARDIOGRAM TRACING: CPT

## 2019-09-27 PROCEDURE — 99232 SBSQ HOSP IP/OBS MODERATE 35: CPT

## 2019-09-27 PROCEDURE — 70470 CT HEAD/BRAIN W/O & W/DYE: CPT

## 2019-09-27 PROCEDURE — 71045 X-RAY EXAM CHEST 1 VIEW: CPT

## 2019-09-27 PROCEDURE — 80048 BASIC METABOLIC PNL TOTAL CA: CPT

## 2019-09-27 PROCEDURE — 99285 EMERGENCY DEPT VISIT HI MDM: CPT | Mod: 25

## 2019-09-27 PROCEDURE — 71250 CT THORAX DX C-: CPT

## 2019-09-27 PROCEDURE — 83605 ASSAY OF LACTIC ACID: CPT

## 2019-09-27 RX ORDER — LEVETIRACETAM 250 MG/1
1 TABLET, FILM COATED ORAL
Qty: 0 | Refills: 0 | DISCHARGE
Start: 2019-09-27

## 2019-09-27 RX ORDER — FAMOTIDINE 10 MG/ML
1 INJECTION INTRAVENOUS
Qty: 30 | Refills: 0
Start: 2019-09-27 | End: 2019-10-26

## 2019-09-27 RX ORDER — FAMOTIDINE 10 MG/ML
1 INJECTION INTRAVENOUS
Qty: 0 | Refills: 0 | DISCHARGE

## 2019-09-27 RX ORDER — CEFUROXIME AXETIL 250 MG
1 TABLET ORAL
Qty: 6 | Refills: 0
Start: 2019-09-27 | End: 2019-09-29

## 2019-09-27 RX ORDER — LEVETIRACETAM 250 MG/1
1 TABLET, FILM COATED ORAL
Qty: 60 | Refills: 0
Start: 2019-09-27 | End: 2019-10-26

## 2019-09-27 RX ADMIN — FAMOTIDINE 20 MILLIGRAM(S): 10 INJECTION INTRAVENOUS at 11:47

## 2019-09-27 RX ADMIN — CEFEPIME 100 MILLIGRAM(S): 1 INJECTION, POWDER, FOR SOLUTION INTRAMUSCULAR; INTRAVENOUS at 09:35

## 2019-09-27 RX ADMIN — LEVETIRACETAM 500 MILLIGRAM(S): 250 TABLET, FILM COATED ORAL at 09:35

## 2019-09-27 RX ADMIN — CEFEPIME 100 MILLIGRAM(S): 1 INJECTION, POWDER, FOR SOLUTION INTRAMUSCULAR; INTRAVENOUS at 02:08

## 2019-09-27 RX ADMIN — Medication 2 MILLIGRAM(S): at 05:10

## 2019-09-27 NOTE — DISCHARGE NOTE PROVIDER - CARE PROVIDERS DIRECT ADDRESSES
[email or web address],DirectAddress_[email or web address],[email or web address],[email or web address],DirectAddress_Unknown,[email or web address]

## 2019-09-27 NOTE — DISCHARGE NOTE PROVIDER - NSDCCPCAREPLAN_GEN_ALL_CORE_FT
PRINCIPAL DISCHARGE DIAGNOSIS  Diagnosis: PNA (pneumonia)  Assessment and Plan of Treatment: treatment with antibiotic; complete 3 more days of ceftin;  if you develop fever, breathing difficulty please get medical assistance  follow up with lung doctor as needed      SECONDARY DISCHARGE DIAGNOSES  Diagnosis: Elevated LFTs  Assessment and Plan of Treatment: follow up blood work in 1 week    Diagnosis: H/O brain tumor  Assessment and Plan of Treatment: s/p crariotomy  follow up with oncology/radiation treatment  continue medication as prescribed  \follow up withy neurology/neuro surgery

## 2019-09-27 NOTE — PROVIDER CONTACT NOTE (OTHER) - REASON
patient's family members are disconnecting pt off IV fluids
pt and his daughter are requesting iv fluids to be off for the night.
pt ordered for vitals q4h  but refusing at this time.
refusing IV fluids

## 2019-09-27 NOTE — DISCHARGE NOTE NURSING/CASE MANAGEMENT/SOCIAL WORK - PATIENT PORTAL LINK FT
You can access the FollowMyHealth Patient Portal offered by [facility] by registering at the following website: [email or web address]. By joining [employer]’s FollowMyHealth portal, you will also be able to view your health information using other applications (apps) compatible with our system.

## 2019-09-27 NOTE — PROGRESS NOTE ADULT - SUBJECTIVE AND OBJECTIVE BOX
Admitting Diagnosis:  Pneumonia (J18.9): PNEUMONIA, UNSPECIFIED ORGANISM      HPI:  This is a 64y year old Male with the below past medical history who presents with the chief complaint of fever up to 103 at home, recent discharge from [facility] service here s/p left frontal brain mass resected ? lung primary metastatic to brain tumor was supposed to get PET scan study done today in [place]. cough few days and fevere + rapRVP today in ER. possibly pneumonia, admitted for treatment and furhter workup. patient denies seizure, maintained on depakote since discharge although LFTS now 160s from normal last week in patient. denies focal weakness or sensory chagnes, denies falls or gait issues. denies vision changes or speech changes or confusion. no drainage from surgical site.    no new headache, weakness, slurred speech    Past Medical History:  No pertinent past medical history ([id number])      Past Surgical History:  Status post craniotomy (Z98.890)  No significant past surgical history ([id number])      Social History:  No toxic habits    Family History:  FAMILY HISTORY:  No pertinent family history in first degree relatives      Allergies:  No Known Allergies      ROS:  Constitutional: Patient offers no complaints of fevers or significant weight loss  Ears, Nose, Mouth and Throat: The patient presents with no abnormalities of the head, ears, eyes, nose or throat  Skin: Patient offers no concerns of new rashes or lesions  Respiratory: The patient presents with no abnormalities of the respiratory tract  Cardiovascular: The patient presents with no cardiac abnormalities  Gastrointestinal: The patient presents with no abnormalities of the GI system  Genitourinary: The patient presents with no dysuria, hematuria or frequent urination  Neurological: See HPI  Endocrine: Patient offers no complaints of excessive thirst, urination, or heat/cold intolerance    Advanced care planning reviewed and noted in the chart.    Medications:  cefepime   IVPB      cefepime   IVPB 1000 milliGRAM(s) IV Intermittent every 8 hours  dexamethasone     Tablet 2 milliGRAM(s) Oral daily  famotidine    Tablet 20 milliGRAM(s) Oral daily  levETIRAcetam 500 milliGRAM(s) Oral two times a day  melatonin 3 milliGRAM(s) Oral at bedtime  QUEtiapine 25 milliGRAM(s) Oral at bedtime  sodium chloride 0.9%. 1000 milliLiter(s) IV Continuous <Continuous>      Labs:  CBC Full  -  ( 26 Sep 2019 08:41 )  WBC Count : 17.60 K/uL  RBC Count : 3.47 M/uL  Hemoglobin : 10.8 g/dL  Hematocrit : 33.7 %  Platelet Count - Automated : 169 K/uL  Mean Cell Volume : 97.1 fl  Mean Cell Hemoglobin : 31.1 pg  Mean Cell Hemoglobin Concentration : 32.0 gm/dL  Auto Neutrophil # : x  Auto Lymphocyte # : x  Auto Monocyte # : x  Auto Eosinophil # : x  Auto Basophil # : x  Auto Neutrophil % : x  Auto Lymphocyte % : x  Auto Monocyte % : x  Auto Eosinophil % : x  Auto Basophil % : x    09-26    140  |  108  |  18  ----------------------------<  95  3.8   |  24  |  0.58    Ca    8.8      26 Sep 2019 07:00    TPro  5.6<L>  /  Alb  2.7<L>  /  TBili  0.2  /  DBili  x   /  AST  42<H>  /  ALT  97<H>  /  AlkPhos  125<H>  09-26    CAPILLARY BLOOD GLUCOSE        LIVER FUNCTIONS - ( 26 Sep 2019 07:00 )  Alb: 2.7 g/dL / Pro: 5.6 g/dL / ALK PHOS: 125 U/L / ALT: 97 U/L / AST: 42 U/L / GGT: x                   Vitals:  Vital Signs Last 24 Hrs  T(C): 36.4 (26 Sep 2019 06:01), Max: 36.7 (25 Sep 2019 20:17)  T(F): 97.5 (26 Sep 2019 06:01), Max: 98.1 (26 Sep 2019 00:51)  HR: 65 (26 Sep 2019 06:01) (64 - 68)  BP: 126/76 (26 Sep 2019 06:01) (104/63 - 126/76)  BP(mean): --  RR: 18 (26 Sep 2019 06:01) (18 - 18)  SpO2: 95% (26 Sep 2019 06:01) (95% - 97%)    NEUROLOGICAL EXAM:    Mental status: Awake, alert, and in no apparent distress.  Oriented to person, place and time. Language function is normal. Recent memory, digit span and concentration were normal.  increased verbal output, flat affect, depressed.     Cranial Nerves: Pupils were equal, round, reactive to light. Extraocular movements were intact. Visual field were full. Fundoscopic exam was deferred. Facial sensation was intact to light touch. There was no facial asymmetry. The palate was upgoing symmetrically and tongue was midline. Hearing acuity was intact to finger rub AU. Shoulder shrug was full bilaterally    Motor exam: Bulk and tone were normal. Strength was 5/5 in all four extremities. Fine finger movements were symmetric and normal. There was no pronator drift    Reflexes: 2+ in the bilateral upper extremities. 2+ in the bilateral lower extremities. Toes were downgoing bilaterally.     Sensation: Intact to light touch, temperature, vibration and proprioception.     Coordination: Finger-nose-finger and heel-to-shin was without dysmetria.     Gait: deferred
CHIEF COMPLAINT:Patient is a 64y old  Male who presents with a chief complaint of fever , cough (25 Sep 2019 09:43)    	        PAST MEDICAL & SURGICAL HISTORY:  No pertinent past medical history  Status post craniotomy          REVIEW OF SYSTEMS:feels better   NECK: No pain or stiffness  RESPIRATORY: dec sob/ cough   CARDIOVASCULAR: No chest pain, palpitations, passing out, dizziness,   GASTROINTESTINAL: No abdominal or epigastric pain. No nausea, vomiting, or hematemesis; No diarrhea or constipation.   GENITOURINARY: No dysuria, frequency, hematuria, or incontinence  NEUROLOGICAL: No headaches, memory loss, loss of strength, numbness, or tremors  MUSCULOSKELETAL: No joint pain or swelling; No muscle, back, or extremity pain    Medications:  MEDICATIONS  (STANDING):  cefepime   IVPB      cefepime   IVPB 1000 milliGRAM(s) IV Intermittent every 8 hours  dexamethasone     Tablet 2 milliGRAM(s) Oral daily  famotidine    Tablet 20 milliGRAM(s) Oral daily  levETIRAcetam 500 milliGRAM(s) Oral two times a day  melatonin 3 milliGRAM(s) Oral at bedtime  QUEtiapine 25 milliGRAM(s) Oral at bedtime    MEDICATIONS  (PRN):    	    PHYSICAL EXAM:  T(C): 36.6 (09-25-19 @ 05:22), Max: 36.8 (09-24-19 @ 19:47)  HR: 66 (09-25-19 @ 05:22) (66 - 84)  BP: 102/65 (09-25-19 @ 05:22) (102/65 - 107/70)  RR: 17 (09-25-19 @ 05:22) (17 - 18)  SpO2: 96% (09-25-19 @ 05:22) (93% - 99%)  Wt(kg): --  I&O's Summary    24 Sep 2019 07:01  -  25 Sep 2019 07:00  --------------------------------------------------------  IN: 2110 mL / OUT: 0 mL / NET: 2110 mL        Appearance: Normal	  HEENT:   Normal oral mucosa, PERRL, EOMI	  Lymphatic: No lymphadenopathy  Cardiovascular: Normal S1 S2, No JVD, No murmurs  Respiratory: dec bs   Psychiatry: A & O x 3,   Gastrointestinal:  Soft, Non-tender, + BS	  Skin: No rashes, No ecchymoses, No cyanosis	  Neurologic: Non-focal  Extremities: Normal range of motion, No clubbing, cyanosis or edema  Vascular: Peripheral pulses palpable 2+ bilaterally    TELEMETRY: 	    ECG:  	  RADIOLOGY:  OTHER: 	  	  LABS:	 	    CARDIAC MARKERS:                                11.0   18.29 )-----------( 153      ( 25 Sep 2019 09:29 )             34.3     09-25    138  |  104  |  18  ----------------------------<  120<H>  4.3   |  24  |  0.51    Ca    9.0      25 Sep 2019 06:22    TPro  6.2  /  Alb  3.2<L>  /  TBili  0.4  /  DBili  x   /  AST  160<H>  /  ALT  160<H>  /  AlkPhos  149<H>  09-24    proBNP:   Lipid Profile:   HgA1c:   TSH:
infectious diseases progress note:    Patient is a 64y old  Male who presents with a chief complaint of fever , cough (25 Sep 2019 09:43)        Pneumonia        ROS:  CONSTITUTIONAL:  Negative fever or chills, feels well, good appetite  EYES:  Negative  blurry vision or double vision  CARDIOVASCULAR:  Negative for chest pain or palpitations  RESPIRATORY:  Negative for cough, wheezing, or SOB   GASTROINTESTINAL:  Negative for nausea, vomiting, diarrhea, constipation, or abdominal pain  GENITOURINARY:  Negative frequency, urgency or dysuria  NEUROLOGIC:  No headache, confusion, dizziness, lightheadedness    Allergies    No Known Allergies    Intolerances        ANTIBIOTICS/RELEVANT:  antimicrobials  cefepime   IVPB      cefepime   IVPB 1000 milliGRAM(s) IV Intermittent every 8 hours    immunologic:    OTHER:  dexamethasone     Tablet 2 milliGRAM(s) Oral daily  famotidine    Tablet 20 milliGRAM(s) Oral daily  levETIRAcetam 500 milliGRAM(s) Oral two times a day  melatonin 3 milliGRAM(s) Oral at bedtime  QUEtiapine 25 milliGRAM(s) Oral at bedtime  sodium chloride 0.9%. 1000 milliLiter(s) IV Continuous <Continuous>      Objective:  Vital Signs Last 24 Hrs  T(C): 36.4 (26 Sep 2019 06:01), Max: 36.7 (25 Sep 2019 20:17)  T(F): 97.5 (26 Sep 2019 06:01), Max: 98.1 (26 Sep 2019 00:51)  HR: 65 (26 Sep 2019 06:01) (64 - 68)  BP: 126/76 (26 Sep 2019 06:01) (104/63 - 126/76)  BP(mean): --  RR: 18 (26 Sep 2019 06:01) (18 - 18)  SpO2: 95% (26 Sep 2019 06:01) (95% - 97%)     Eyes:LATONYA, EOMI  Ear/Nose/Throat: no oral lesion, no sinus tenderness on percussion	  Neck:no JVD, no lymphadenopathy, supple  Respiratory: CTA aleyda  Cardiovascular: S1S2 RRR, no murmurs  Gastrointestinal:soft, (+) BS, no HSM  Extremities:no e/e/c        LABS:                        11.0   18.29 )-----------( 153      ( 25 Sep 2019 09:29 )             34.3     09-25    138  |  104  |  18  ----------------------------<  120<H>  4.3   |  24  |  0.51    Ca    9.0      25 Sep 2019 06:22              MICROBIOLOGY:    RECENT CULTURES:  09-24 @ 17:18 .Urine                No growth    09-24 @ 04:54 .Blood                No growth to date.          RESPIRATORY CULTURES:              RADIOLOGY & ADDITIONAL STUDIES:        Pager [phone]  After 5 pm/weekends or if no response :[phone]
CHIEF COMPLAINT:Patient is a 64y old  Male who presents with a chief complaint of fever , cough (27 Sep 2019 10:25)    	        PAST MEDICAL & SURGICAL HISTORY:  No pertinent past medical history  Status post craniotomy          REVIEW OF SYSTEMS:  feels much better  EYES: No eye pain, visual disturbances, or discharge  NECK: No pain or stiffness  RESPIRATORY:dec cough and sob  CARDIOVASCULAR: No chest pain, palpitations, passing out, dizziness, or leg swelling  GASTROINTESTINAL: No abdominal or epigastric pain. No nausea, vomiting, or hematemesis; No diarrhea or constipation. No melena or hematochezia.  GENITOURINARY: No dysuria, frequency, hematuria, or incontinence  NEUROLOGICAL: No headaches, memory loss, loss of strength, numbness, or tremors  MUSCULOSKELETAL: No joint pain or swelling; No muscle, back, or extremity pain    Medications:  MEDICATIONS  (STANDING):  cefepime   IVPB      cefepime   IVPB 1000 milliGRAM(s) IV Intermittent every 8 hours  dexamethasone     Tablet 2 milliGRAM(s) Oral daily  famotidine    Tablet 20 milliGRAM(s) Oral daily  levETIRAcetam 500 milliGRAM(s) Oral two times a day  melatonin 3 milliGRAM(s) Oral at bedtime  QUEtiapine 25 milliGRAM(s) Oral at bedtime  sodium chloride 0.9%. 1000 milliLiter(s) (70 mL/Hr) IV Continuous <Continuous>    MEDICATIONS  (PRN):  benzonatate 100 milliGRAM(s) Oral every 8 hours PRN Cough    	    PHYSICAL EXAM:  T(C): 36.4 (09-27-19 @ 05:09), Max: 36.7 (09-26-19 @ 19:22)  HR: 60 (09-27-19 @ 05:09) (60 - 77)  BP: 110/67 (09-27-19 @ 05:09) (108/73 - 110/67)  RR: 18 (09-27-19 @ 05:09) (18 - 18)  SpO2: 97% (09-27-19 @ 05:09) (94% - 97%)  Wt(kg): --  I&O's Summary    26 Sep 2019 07:01  -  27 Sep 2019 07:00  --------------------------------------------------------  IN: 1120 mL / OUT: 1 mL / NET: 1119 mL    27 Sep 2019 07:01  -  27 Sep 2019 10:38  --------------------------------------------------------  IN: 120 mL / OUT: 0 mL / NET: 120 mL        Appearance: Normal	  HEENT:   Normal oral mucosa, PERRL, EOMI	  Lymphatic: No lymphadenopathy  Cardiovascular: Normal S1 S2, No JVD, No murmurs, No edema  Respiratory  dec bs   Psychiatry: A & O x 3,   Gastrointestinal:  Soft, Non-tender, + BS	  Skin: scalp incision clean 	  Neurologic: Non-focal  Extremities: Normal range of motion, No clubbing, cyanosis or edema  Vascular: Peripheral pulses palpable 2+ bilaterally    TELEMETRY: 	    ECG:  	  RADIOLOGY:  OTHER: 	  	  LABS:	 	    CARDIAC MARKERS:                                10.8   17.60 )-----------( 169      ( 26 Sep 2019 08:41 )             33.7     09-26    140  |  108  |  18  ----------------------------<  95  3.8   |  24  |  0.58    Ca    8.8      26 Sep 2019 07:00    TPro  5.6<L>  /  Alb  2.7<L>  /  TBili  0.2  /  DBili  x   /  AST  42<H>  /  ALT  97<H>  /  AlkPhos  125<H>  09-26    proBNP:   Lipid Profile:   HgA1c:   TSH:
CHIEF COMPLAINT:Patient is a 64y old  Male who presents with a chief complaint of met cancer (26 Sep 2019 10:19)    	        PAST MEDICAL & SURGICAL HISTORY:  No pertinent past medical history  Status post craniotomy          REVIEW OF SYSTEMS:  feels slightly better  EYES: No eye pain, visual disturbances, or discharge  NECK: No pain or stiffness  RESPIRATORY:cough +  dec sob   CARDIOVASCULAR: No chest pain, palpitations, passing out, dizziness, or leg swelling  GASTROINTESTINAL: No abdominal or epigastric pain. No nausea, vomiting, or hematemesis; No diarrhea or constipation. No melena or hematochezia.  GENITOURINARY: No dysuria, frequency, hematuria, or incontinence  NEUROLOGICAL: No headaches,    Medications:  MEDICATIONS  (STANDING):  cefepime   IVPB      cefepime   IVPB 1000 milliGRAM(s) IV Intermittent every 8 hours  dexamethasone     Tablet 2 milliGRAM(s) Oral daily  famotidine    Tablet 20 milliGRAM(s) Oral daily  levETIRAcetam 500 milliGRAM(s) Oral two times a day  melatonin 3 milliGRAM(s) Oral at bedtime  QUEtiapine 25 milliGRAM(s) Oral at bedtime  sodium chloride 0.9%. 1000 milliLiter(s) (70 mL/Hr) IV Continuous <Continuous>    MEDICATIONS  (PRN):    	    PHYSICAL EXAM:  T(C): 36.4 (09-26-19 @ 06:01), Max: 36.7 (09-25-19 @ 20:17)  HR: 65 (09-26-19 @ 06:01) (64 - 68)  BP: 126/76 (09-26-19 @ 06:01) (104/63 - 126/76)  RR: 18 (09-26-19 @ 06:01) (18 - 18)  SpO2: 95% (09-26-19 @ 06:01) (95% - 97%)  Wt(kg): --  I&O's Summary    25 Sep 2019 07:01  -  26 Sep 2019 07:00  --------------------------------------------------------  IN: 2515 mL / OUT: 2 mL / NET: 2513 mL    26 Sep 2019 07:01  -  26 Sep 2019 11:03  --------------------------------------------------------  IN: 50 mL / OUT: 0 mL / NET: 50 mL        scalp incision clean   HEENT:   Normal oral mucosa, PERRL, EOMI	  Lymphatic: No lymphadenopathy  Cardiovascular: Normal S1 S2, No JVD, No murmurs, No edema  Respiratory:dec bs   Psychiatry: A & O x 3,   Gastrointestinal:  Soft, Non-tender, + BS	  Skin: No rashes, No ecchymoses, No cyanosis	  Neurologic: Non-focal  Extremities: Normal range of motion, No clubbing, cyanosis or edema  Vascular: Peripheral pulses palpable 2+ bilaterally    TELEMETRY: 	    ECG:  	  RADIOLOGY:  OTHER: 	  	  LABS:	 	    CARDIAC MARKERS:                                10.8   17.60 )-----------( 169      ( 26 Sep 2019 08:41 )             33.7     09-26    140  |  108  |  18  ----------------------------<  95  3.8   |  24  |  0.58    Ca    8.8      26 Sep 2019 07:00    TPro  5.6<L>  /  Alb  2.7<L>  /  TBili  0.2  /  DBili  x   /  AST  42<H>  /  ALT  97<H>  /  AlkPhos  125<H>  09-26    proBNP:   Lipid Profile:   HgA1c:   TSH:
NAD Productive cough    VS Stable Afeb    Lungs- minimal rhonchi bilat  Heart- Reg  Abd- non tender  Ext= no edema    IMP: Productive cough with LLL process  Brain lesion    PLAN: Antibx as ordered  Neb tx  Chest CT as planned    Darryl Peraza MD [facility]  [phone]    (Karmen/Mandy/Leonard)
Patient is a 64y old  Male who presents with a chief complaint of   HPI:      PAST MEDICAL & SURGICAL HISTORY:  No pertinent past medical history  No significant past surgical history      Social history:    FAMILY HISTORY:  No pertinent family history in first degree relatives    REVIEW OF SYSTEMS  General:	Denies any malaise fatigue or chills. Fevers absent    Skin:No rash  	  Ophthalmologic:Denies any visual complaints,discharge redness or photophobia  	  ENMT:No nasal discharge,headache,sinus congestion or throat pain.No dental complaints    Respiratory and Thorax:No cough,sputum or chest pain.Denies shortness of breath  	  Cardiovascular:	No chest pain,palpitaions or dizziness    Gastrointestinal:	NO nausea,abdominal pain or diarrhea.    Genitourinary:	No dysuria,frequency. No flank pain    Musculoskeletal:	No joint swelling or pain.No weakness    Neurological:No confusion,diziness.No extremity weakness.No bladder or bowel incontinence	    Psychiatric:No delusions or hallucinations	    Hematology/Lymphatics:	No LN swelling.No gum bleeding     Endocrine:	No recent weight gain or loss.No abnormal heat/cold intolerance    Allergic/Immunologic:	No hives or rash   Allergies    No Known Allergies    Intolerances        Antimicrobials:          Vital Signs Last 24 Hrs  T(C): 36.7 (24 Sep 2019 09:06), Max: 38 (24 Sep 2019 01:16)  T(F): 98 (24 Sep 2019 09:06), Max: 100.4 (24 Sep 2019 01:16)  HR: 65 (24 Sep 2019 09:06) (65 - 124)  BP: 94/57 (24 Sep 2019 09:06) (94/57 - 115/66)  BP(mean): --  RR: 17 (24 Sep 2019 09:06) (17 - 22)  SpO2: 97% (24 Sep 2019 09:06) (95% - 97%)    PHYSICAL EXAM:Pleasant patient in no acute distress.         No cachexia     Eyes:PERRL EOMI.NO discharge or conjunctival injection    ENMT:No sinus tenderness.No thrush.No pharyngeal exudate or erythema.Fair dental hygiene    Neck:Supple,No LN,no JVD      Respiratory:Good air entry bilaterally,CTA     ps    Gastrointestinal:Soft BS(+) no tenderness no masses ,No rebound or guarding    Genitourinary:No CVA tendereness     Rectal:    Extremities:No cyanosis,clubbing or edema.                                        12.9   14.1  )-----------( 143      ( 24 Sep 2019 01:52 )             40.2         09-24    137  |  100  |  24<H>  ----------------------------<  98  4.0   |  23  |  0.70    Ca    9.5      24 Sep 2019 01:52    TPro  6.2  /  Alb  3.2<L>  /  TBili  0.4  /  DBili  x   /  AST  160<H>  /  ALT  160<H>  /  AlkPhos  149<H>  09-24      RECENT CULTURES:      MICROBIOLOGY:          Radiology:      Assessment:        Recommendations and Plan:    Pager [phone]  After 5 pm/weekends or if no response :[phone]
Pulmonary Medicine       Feels better. Coughing is less.       Vital Signs Last 24 Hrs  T(C): 36.4 (27 Sep 2019 05:09), Max: 36.8 (26 Sep 2019 10:38)  T(F): 97.5 (27 Sep 2019 05:09), Max: 98.2 (26 Sep 2019 10:38)  HR: 60 (27 Sep 2019 05:09) (60 - 77)  BP: 110/67 (27 Sep 2019 05:09) (108/73 - 110/67)  BP(mean): --  RR: 18 (27 Sep 2019 05:09) (18 - 18)  SpO2: 97% (27 Sep 2019 05:09) (94% - 97%)      Physical examination   Alert and oriented X three   No evident distress   On room air. No respiratory distress. Ambulating  Heart sounds were regular   Crackles at the left base, no wheezing   The abdomen was soft and not tender  No cyanosis   Skin was warm                               10.8   17.60 )-----------( 169      ( 26 Sep 2019 08:41 )             33.7         MEDICATIONS  (STANDING):  cefepime   IVPB      cefepime   IVPB 1000 milliGRAM(s) IV Intermittent every 8 hours  dexamethasone     Tablet 2 milliGRAM(s) Oral daily  famotidine    Tablet 20 milliGRAM(s) Oral daily  levETIRAcetam 500 milliGRAM(s) Oral two times a day  melatonin 3 milliGRAM(s) Oral at bedtime  QUEtiapine 25 milliGRAM(s) Oral at bedtime  sodium chloride 0.9%. 1000 milliLiter(s) (70 mL/Hr) IV Continuous <Continuous>
Pulmonary Medicine       Pt known to me. He is a 65 y/o man who smoked until August 2019. He went to [facility] for vacation. When he came back his daughter notice he was confused and he was brought to [facility] and was found to have a brain tumor. 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 No lung primary noted on Chest CT. As of yet had not been treated. Sought a second opinion at [facility]. Was to have a PET/CT today. Re-admitted here for cough and fever. No hemoptysis.       Vital Signs Last 24 Hrs  T(C): 36.6 (24 Sep 2019 13:00), Max: 38 (24 Sep 2019 01:16)  T(F): 97.9 (24 Sep 2019 13:00), Max: 100.4 (24 Sep 2019 01:16)  HR: 75 (24 Sep 2019 13:00) (65 - 124)  BP: 102/66 (24 Sep 2019 13:00) (94/57 - 115/66)  BP(mean): --  RR: 18 (24 Sep 2019 13:00) (17 - 22)  SpO2: 99% (24 Sep 2019 13:00) (95% - 99%)      PAST MEDICAL & SURGICAL HISTORY:  No pertinent past medical history  Status post craniotomy    Home Medications:  acetaminophen 500 mg oral capsule: 2 cap(s) orally every 6 hours, As Needed for headaches  (24 Sep 2019 14:11)  dexamethasone 2 mg oral tablet: 1 tab(s) orally once a day (24 Sep 2019 14:11)  melatonin 5 mg oral tablet: 1 tab(s) orally once a day (at bedtime) (24 Sep 2019 14:11)  Pepcid 20 mg oral tablet: 1 tab(s) orally once a day (24 Sep 2019 14:11)  polyethylene glycol 3350 oral powder for reconstitution: 17 gram(s) orally once a day (24 Sep 2019 14:11)        Physical examination   Resting in bed, lethargic.   No respiratory distress   Heart sounds were regular   Minimal rhonchi  The abdomen was soft and not tender  No cyanosis   No dependent edema   Skin was warm                             12.9   14.1  )-----------( 143      ( 24 Sep 2019 01:52 )             40.2       09-24    137  |  100  |  24<H>  ----------------------------<  98  4.0   |  23  |  0.70    Ca    9.5      24 Sep 2019 01:52    TPro  6.2  /  Alb  3.2<L>  /  TBili  0.4  /  DBili  x   /  AST  160<H>  /  ALT  160<H>  /  AlkPhos  149<H>  09-24      MEDICATIONS  (STANDING):  cefepime   IVPB      cefepime   IVPB 1000 milliGRAM(s) IV Intermittent every 8 hours  dexamethasone     Tablet 2 milliGRAM(s) Oral daily  famotidine    Tablet 20 milliGRAM(s) Oral daily  levETIRAcetam 500 milliGRAM(s) Oral two times a day  melatonin 3 milliGRAM(s) Oral at bedtime  QUEtiapine 25 milliGRAM(s) Oral at bedtime  sodium chloride 0.9%. 1000 milliLiter(s) (70 mL/Hr) IV Continuous <Continuous>
Pulmonary Medicine       Pt seen with son at the bedside. Has a cough. No chest pain or shortness of breath.       ICU Vital Signs Last 24 Hrs  T(C): 36.4 (26 Sep 2019 06:01), Max: 36.7 (25 Sep 2019 20:17)  T(F): 97.5 (26 Sep 2019 06:01), Max: 98.1 (26 Sep 2019 00:51)  HR: 65 (26 Sep 2019 06:01) (64 - 68)  BP: 126/76 (26 Sep 2019 06:01) (104/63 - 126/76)  BP(mean): --  ABP: --  ABP(mean): --  RR: 18 (26 Sep 2019 06:01) (18 - 18)  SpO2: 95% (26 Sep 2019 06:01) (95% - 97%)      Physical examination  No evident distress   - on room air   Heart sounds were regular   Minimal rhonchi noted, no wheezing.   The abdomen was soft and not tender   No cyanosis   No dependent edema                             11.0   18.29 )-----------( 153      ( 25 Sep 2019 09:29 )             34.3       09-25    138  |  104  |  18  ----------------------------<  120<H>  4.3   |  24  |  0.51    Ca    9.0      25 Sep 2019 06:22        MEDICATIONS  (STANDING):  cefepime   IVPB      cefepime   IVPB 1000 milliGRAM(s) IV Intermittent every 8 hours  dexamethasone     Tablet 2 milliGRAM(s) Oral daily  famotidine    Tablet 20 milliGRAM(s) Oral daily  levETIRAcetam 500 milliGRAM(s) Oral two times a day  melatonin 3 milliGRAM(s) Oral at bedtime  QUEtiapine 25 milliGRAM(s) Oral at bedtime  sodium chloride 0.9%. 1000 milliLiter(s) (70 mL/Hr) IV Continuous <Continuous>
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
infectious diseases progress note:    Patient is a 64y old  Male who presents with a chief complaint of cough (25 Sep 2019 07:46)        Pneumonia        ROS:  CONSTITUTIONAL:  Negative fever or chills, feels well, good appetite  EYES:  Negative  blurry vision or double vision  CARDIOVASCULAR:  Negative for chest pain or palpitations  RESPIRATORY:  Negative for cough, wheezing, or SOB   GASTROINTESTINAL:  Negative for nausea, vomiting, diarrhea, constipation, or abdominal pain  GENITOURINARY:  Negative frequency, urgency or dysuria  NEUROLOGIC:  No headache, confusion, dizziness, lightheadedness    Allergies    No Known Allergies    Intolerances        ANTIBIOTICS/RELEVANT:  antimicrobials  cefepime   IVPB      cefepime   IVPB 1000 milliGRAM(s) IV Intermittent every 8 hours    immunologic:    OTHER:  dexamethasone     Tablet 2 milliGRAM(s) Oral daily  famotidine    Tablet 20 milliGRAM(s) Oral daily  levETIRAcetam 500 milliGRAM(s) Oral two times a day  melatonin 3 milliGRAM(s) Oral at bedtime  QUEtiapine 25 milliGRAM(s) Oral at bedtime  sodium chloride 0.9%. 1000 milliLiter(s) IV Continuous <Continuous>      Objective:  Vital Signs Last 24 Hrs  T(C): 36.6 (25 Sep 2019 05:22), Max: 36.8 (24 Sep 2019 19:47)  T(F): 97.9 (25 Sep 2019 05:22), Max: 98.2 (24 Sep 2019 19:47)  HR: 66 (25 Sep 2019 05:22) (65 - 84)  BP: 102/65 (25 Sep 2019 05:22) (94/57 - 107/70)  BP(mean): --  RR: 17 (25 Sep 2019 05:22) (17 - 18)  SpO2: 96% (25 Sep 2019 05:22) (93% - 99%)       Eyes:LATONYA, EOMI  Ear/Nose/Throat: no oral lesion, no sinus tenderness on percussion	  Neck:no JVD, no lymphadenopathy, supple  Respiratory: CTA aleyda  Cardiovascular: S1S2 RRR, no murmurs  Gastrointestinal:soft, (+) BS, no HSM  Extremities:no e/e/c        LABS:                        12.9   14.1  )-----------( 143      ( 24 Sep 2019 01:52 )             40.2     09-25    138  |  104  |  18  ----------------------------<  120<H>  4.3   |  24  |  0.51    Ca    9.0      25 Sep 2019 06:22    TPro  6.2  /  Alb  3.2<L>  /  TBili  0.4  /  DBili  x   /  AST  160<H>  /  ALT  160<H>  /  AlkPhos  149<H>      PT/INR - ( 24 Sep 2019 01:52 )   PT: 11.1 sec;   INR: 0.97 ratio         PTT - ( 24 Sep 2019 01:52 )  PTT:24.2 sec  Urinalysis Basic - ( 24 Sep 2019 04:12 )    Color: Yellow / Appearance: Clear / S.029 / pH: x  Gluc: x / Ketone: Small  / Bili: Negative / Urobili: Negative   Blood: x / Protein: 30 mg/dL / Nitrite: Negative   Leuk Esterase: Negative / RBC: 3 /hpf / WBC 6 /HPF   Sq Epi: x / Non Sq Epi: 1 /hpf / Bacteria: Negative          MICROBIOLOGY:    RECENT CULTURES:   @ 04:54 .Blood                No growth to date.          RESPIRATORY CULTURES:              RADIOLOGY & ADDITIONAL STUDIES:        Pager [phone]  After 5 pm/weekends or if no response :[phone]
infectious diseases progress note:    Patient is a 64y old  Male who presents with a chief complaint of met carcinoma (27 Sep 2019 09:09)        Pneumonia        ROS:  CONSTITUTIONAL:  Negative fever or chills, feels well, good appetite  EYES:  Negative  blurry vision or double vision  CARDIOVASCULAR:  Negative for chest pain or palpitations  RESPIRATORY:  Negative for cough, wheezing, or SOB   GASTROINTESTINAL:  Negative for nausea, vomiting, diarrhea, constipation, or abdominal pain  GENITOURINARY:  Negative frequency, urgency or dysuria  NEUROLOGIC:  No headache, confusion, dizziness, lightheadedness    Allergies    No Known Allergies    Intolerances        ANTIBIOTICS/RELEVANT:  antimicrobials  cefepime   IVPB      cefepime   IVPB 1000 milliGRAM(s) IV Intermittent every 8 hours    immunologic:    OTHER:  benzonatate 100 milliGRAM(s) Oral every 8 hours PRN  dexamethasone     Tablet 2 milliGRAM(s) Oral daily  famotidine    Tablet 20 milliGRAM(s) Oral daily  levETIRAcetam 500 milliGRAM(s) Oral two times a day  melatonin 3 milliGRAM(s) Oral at bedtime  QUEtiapine 25 milliGRAM(s) Oral at bedtime  sodium chloride 0.9%. 1000 milliLiter(s) IV Continuous <Continuous>      Objective:  Vital Signs Last 24 Hrs  T(C): 36.4 (27 Sep 2019 05:09), Max: 36.8 (26 Sep 2019 10:38)  T(F): 97.5 (27 Sep 2019 05:09), Max: 98.2 (26 Sep 2019 10:38)  HR: 60 (27 Sep 2019 05:09) (60 - 77)  BP: 110/67 (27 Sep 2019 05:09) (108/73 - 110/67)  BP(mean): --  RR: 18 (27 Sep 2019 05:09) (18 - 18)  SpO2: 97% (27 Sep 2019 05:09) (94% - 97%)    PHYSICAL EXAM:     Eyes:LATONYA, EOMI  Ear/Nose/Throat: no oral lesion, no sinus tenderness on percussion	  Neck:no JVD, no lymphadenopathy, supple  Respiratory: CTA aleyda  Cardiovascular: S1S2 RRR, no murmurs  Gastrointestinal:soft, (+) BS, no HSM  Extremities:no e/e/c        LABS:                        10.8   17.60 )-----------( 169      ( 26 Sep 2019 08:41 )             33.7     09-26    140  |  108  |  18  ----------------------------<  95  3.8   |  24  |  0.58    Ca    8.8      26 Sep 2019 07:00    TPro  5.6<L>  /  Alb  2.7<L>  /  TBili  0.2  /  DBili  x   /  AST  42<H>  /  ALT  97<H>  /  AlkPhos  125<H>  09-26            MICROBIOLOGY:    RECENT CULTURES:  09-24 @ 17:18 .Urine                No growth    09-24 @ 04:54 .Blood                No growth to date.          RESPIRATORY CULTURES:              RADIOLOGY & ADDITIONAL STUDIES:        Pager [phone]  After 5 pm/weekends or if no response :[phone]
met cancer  65 yo man met carcinoma adm with febrile illness found virus  last month presented with confusion found brain mass resected path carcinoma neuroendocrine features high grade ct scans no def primary  pmh sh fh unchanged  comp ros resolving fever no wt loss otherwise neg  physical  comfortable  vs t97.5 65 126/76 18 95 sat  lungs clear  cor s1s2  abd soft nontender  ext no edema    data wbc 71630 hgb 10.8 hct 33.7 plt [id number] cr 0.5 alb 2.7  path high grade neuroendocrine tumor pdl-1 positive
Admitting Diagnosis:  Pneumonia (J18.9): PNEUMONIA, UNSPECIFIED ORGANISM      HPI:  This is a 64y year old Male with the below past medical history who presents with the chief complaint of fever up to 103 at home, recent discharge from NS service here s/p left frontal brain mass resected ? lung primary metastatic to brain tumor was supposed to get PET scan study done today in [place]. cough few days and fevere + rapRVP today in ER. possibly pneumonia, admitted for treatment and furhter workup. patient denies seizure, maintained on depakote since discharge although LFTS now 160s from normal last week in patient. denies focal weakness or sensory chagnes, denies falls or gait issues. denies vision changes or speech changes or confusion. no drainage from surgical site.    no new headache, weakness, slurred speech    feels better    Past Medical History:  No pertinent past medical history ([id number])      Past Surgical History:  Status post craniotomy (Z98.890)  No significant past surgical history ([id number])      Social History:  No toxic habits    Family History:  FAMILY HISTORY:  No pertinent family history in first degree relatives      Allergies:  No Known Allergies      ROS:  Constitutional: Patient offers no complaints of fevers or significant weight loss  Ears, Nose, Mouth and Throat: The patient presents with no abnormalities of the head, ears, eyes, nose or throat  Skin: Patient offers no concerns of new rashes or lesions  Respiratory: The patient presents with no abnormalities of the respiratory tract  Cardiovascular: The patient presents with no cardiac abnormalities  Gastrointestinal: The patient presents with no abnormalities of the GI system  Genitourinary: The patient presents with no dysuria, hematuria or frequent urination  Neurological: See HPI  Endocrine: Patient offers no complaints of excessive thirst, urination, or heat/cold intolerance    Advanced care planning reviewed and noted in the chart.    Medications:  benzonatate 100 milliGRAM(s) Oral every 8 hours PRN  cefepime   IVPB      cefepime   IVPB 1000 milliGRAM(s) IV Intermittent every 8 hours  dexamethasone     Tablet 2 milliGRAM(s) Oral daily  famotidine    Tablet 20 milliGRAM(s) Oral daily  levETIRAcetam 500 milliGRAM(s) Oral two times a day  melatonin 3 milliGRAM(s) Oral at bedtime  QUEtiapine 25 milliGRAM(s) Oral at bedtime  sodium chloride 0.9%. 1000 milliLiter(s) IV Continuous <Continuous>      Labs:  CBC Full  -  ( 26 Sep 2019 08:41 )  WBC Count : 17.60 K/uL  RBC Count : 3.47 M/uL  Hemoglobin : 10.8 g/dL  Hematocrit : 33.7 %  Platelet Count - Automated : 169 K/uL  Mean Cell Volume : 97.1 fl  Mean Cell Hemoglobin : 31.1 pg  Mean Cell Hemoglobin Concentration : 32.0 gm/dL  Auto Neutrophil # : 12.09 K/uL  Auto Lymphocyte # : 3.36 K/uL  Auto Monocyte # : 1.53 K/uL  Auto Eosinophil # : 0.16 K/uL  Auto Basophil # : 0.00 K/uL  Auto Neutrophil % : 66.1 %  Auto Lymphocyte % : 19.1 %  Auto Monocyte % : 8.7 %  Auto Eosinophil % : 0.9 %  Auto Basophil % : 0.0 %    09-26    140  |  108  |  18  ----------------------------<  95  3.8   |  24  |  0.58    Ca    8.8      26 Sep 2019 07:00    TPro  5.6<L>  /  Alb  2.7<L>  /  TBili  0.2  /  DBili  x   /  AST  42<H>  /  ALT  97<H>  /  AlkPhos  125<H>  09-26    CAPILLARY BLOOD GLUCOSE        LIVER FUNCTIONS - ( 26 Sep 2019 07:00 )  Alb: 2.7 g/dL / Pro: 5.6 g/dL / ALK PHOS: 125 U/L / ALT: 97 U/L / AST: 42 U/L / GGT: x                   Vitals:  Vital Signs Last 24 Hrs  T(C): 36.4 (27 Sep 2019 05:09), Max: 36.8 (26 Sep 2019 10:38)  T(F): 97.5 (27 Sep 2019 05:09), Max: 98.2 (26 Sep 2019 10:38)  HR: 60 (27 Sep 2019 05:09) (60 - 77)  BP: 110/67 (27 Sep 2019 05:09) (108/73 - 110/67)  BP(mean): --  RR: 18 (27 Sep 2019 05:09) (18 - 18)  SpO2: 97% (27 Sep 2019 05:09) (94% - 97%)    NEUROLOGICAL EXAM:    Mental status: Awake, alert, and in no apparent distress.  Oriented to person, place and time. Language function is normal. Recent memory, digit span and concentration were normal.  increased verbal output, flat affect, depressed.     Cranial Nerves: Pupils were equal, round, reactive to light. Extraocular movements were intact. Visual field were full. Fundoscopic exam was deferred. Facial sensation was intact to light touch. There was no facial asymmetry. The palate was upgoing symmetrically and tongue was midline. Hearing acuity was intact to finger rub AU. Shoulder shrug was full bilaterally    Motor exam: Bulk and tone were normal. Strength was 5/5 in all four extremities. Fine finger movements were symmetric and normal. There was no pronator drift    Reflexes: 2+ in the bilateral upper extremities. 2+ in the bilateral lower extremities. Toes were downgoing bilaterally.     Sensation: Intact to light touch, temperature, vibration and proprioception.     Coordination: Finger-nose-finger and heel-to-shin was without dysmetria.     Gait: deferred
Admitting Diagnosis:  Pneumonia (J18.9): PNEUMONIA, UNSPECIFIED ORGANISM      HPI:  This is a 64y year old Male with the below past medical history who presents with the chief complaint of fever up to 103 at home, recent discharge from NS service here s/p left frontal brain mass resected ? lung primary metastatic to brain tumor was supposed to get PET scan study done today in [place]. cough few days and fevere + rapRVP today in ER. possibly pneumonia, admitted for treatment and furhter workup. patient denies seizure, maintained on depakote since discharge although LFTS now 160s from normal last week in patient. denies focal weakness or sensory chagnes, denies falls or gait issues. denies vision changes or speech changes or confusion. no drainage from surgical site.    no new headache or weakness    Past Medical History:  No pertinent past medical history ([id number])      Past Surgical History:  Status post craniotomy (Z98.890)  No significant past surgical history ([id number])      Social History:  No toxic habits    Family History:  FAMILY HISTORY:  No pertinent family history in first degree relatives      Allergies:  No Known Allergies      ROS:  Constitutional: Patient offers no complaints of fevers or significant weight loss  Ears, Nose, Mouth and Throat: The patient presents with no abnormalities of the head, ears, eyes, nose or throat  Skin: Patient offers no concerns of new rashes or lesions  Respiratory: The patient presents with no abnormalities of the respiratory tract  Cardiovascular: The patient presents with no cardiac abnormalities  Gastrointestinal: The patient presents with no abnormalities of the GI system  Genitourinary: The patient presents with no dysuria, hematuria or frequent urination  Neurological: See HPI  Endocrine: Patient offers no complaints of excessive thirst, urination, or heat/cold intolerance    Advanced care planning reviewed and noted in the chart.      Medications:  cefepime   IVPB      cefepime   IVPB 1000 milliGRAM(s) IV Intermittent every 8 hours  dexamethasone     Tablet 2 milliGRAM(s) Oral daily  famotidine    Tablet 20 milliGRAM(s) Oral daily  levETIRAcetam 500 milliGRAM(s) Oral two times a day  melatonin 3 milliGRAM(s) Oral at bedtime  QUEtiapine 25 milliGRAM(s) Oral at bedtime      Labs:  CBC Full  -  ( 24 Sep 2019 01:52 )  WBC Count : 14.1 K/uL  RBC Count : 4.12 M/uL  Hemoglobin : 12.9 g/dL  Hematocrit : 40.2 %  Platelet Count - Automated : 143 K/uL  Mean Cell Volume : 97.6 fl  Mean Cell Hemoglobin : 31.2 pg  Mean Cell Hemoglobin Concentration : 32.0 gm/dL  Auto Neutrophil # : 9.8 K/uL  Auto Lymphocyte # : 1.6 K/uL  Auto Monocyte # : 2.4 K/uL  Auto Eosinophil # : 0.1 K/uL  Auto Basophil # : 0.1 K/uL  Auto Neutrophil % : 70.0 %  Auto Lymphocyte % : 10.0 %  Auto Monocyte % : 14.0 %  Auto Eosinophil % : x  Auto Basophil % : x        138  |  104  |  18  ----------------------------<  120<H>  4.3   |  24  |  0.51    Ca    9.0      25 Sep 2019 06:22    TPro  6.2  /  Alb  3.2<L>  /  TBili  0.4  /  DBili  x   /  AST  160<H>  /  ALT  160<H>  /  AlkPhos  149<H>      CAPILLARY BLOOD GLUCOSE        LIVER FUNCTIONS - ( 24 Sep 2019 01:52 )  Alb: 3.2 g/dL / Pro: 6.2 g/dL / ALK PHOS: 149 U/L / ALT: 160 U/L / AST: 160 U/L / GGT: x           PT/INR - ( 24 Sep 2019 01:52 )   PT: 11.1 sec;   INR: 0.97 ratio         PTT - ( 24 Sep 2019 01:52 )  PTT:24.2 sec  Urinalysis Basic - ( 24 Sep 2019 04:12 )    Color: Yellow / Appearance: Clear / S.029 / pH: x  Gluc: x / Ketone: Small  / Bili: Negative / Urobili: Negative   Blood: x / Protein: 30 mg/dL / Nitrite: Negative   Leuk Esterase: Negative / RBC: 3 /hpf / WBC 6 /HPF   Sq Epi: x / Non Sq Epi: 1 /hpf / Bacteria: Negative          Vitals:  Vital Signs Last 24 Hrs  T(C): 36.6 (25 Sep 2019 05:22), Max: 36.8 (24 Sep 2019 19:47)  T(F): 97.9 (25 Sep 2019 05:22), Max: 98.2 (24 Sep 2019 19:47)  HR: 66 (25 Sep 2019 05:22) (66 - 84)  BP: 102/65 (25 Sep 2019 05:22) (102/65 - 107/70)  BP(mean): --  RR: 17 (25 Sep 2019 05:22) (17 - 18)  SpO2: 96% (25 Sep 2019 05:22) (93% - 99%)    NEUROLOGICAL EXAM:    Mental status: Awake, alert, and in no apparent distress.  Oriented to person, place and time. Language function is normal. Recent memory, digit span and concentration were normal.  minimal verbal output, flat affect, depressed.     Cranial Nerves: Pupils were equal, round, reactive to light. Extraocular movements were intact. Visual field were full. Fundoscopic exam was deferred. Facial sensation was intact to light touch. There was no facial asymmetry. The palate was upgoing symmetrically and tongue was midline. Hearing acuity was intact to finger rub AU. Shoulder shrug was full bilaterally    Motor exam: Bulk and tone were normal. Strength was 5/5 in all four extremities. Fine finger movements were symmetric and normal. There was no pronator drift    Reflexes: 2+ in the bilateral upper extremities. 2+ in the bilateral lower extremities. Toes were downgoing bilaterally.     Sensation: Intact to light touch, temperature, vibration and proprioception.     Coordination: Finger-nose-finger and heel-to-shin was without dysmetria.     Gait: deferred

## 2019-09-27 NOTE — DISCHARGE NOTE PROVIDER - NSDCFUSCHEDAPPT_GEN_ALL_CORE_FT
MARCOS KOO ; 10/15/2019 ; NPP NeuroSurg 1 [facility] MARCOS KOO ; 10/15/2019 ; NPP NeuroSurg 1 [facility] MARCOS KOO ; 10/15/2019 ; NPP NeuroSurg 1 [facility] MARCOS KOO ; 10/15/2019 ; NPP NeuroSurg 1 [facility]

## 2019-09-27 NOTE — PROGRESS NOTE ADULT - ASSESSMENT
This is a 64y year old Male with the below past medical history who presents with the chief complaint of fever up to 103 at home, recent discharge from NS service here s/p left frontal brain mass resected ? lung primary metastatic to brain tumor was supposed to get PET scan study done today in [place]. cough few days and fevere + rapRVP today in ER. possibly pneumonia, admitted for treatment and furhter workup. patient denies seizure, maintained on depakote since discharge although LFTS now 160s from normal last week in patient. denies focal weakness or sensory chagnes, denies falls or gait issues. denies vision changes or speech changes or confusion. no drainage from surgical site.    nonfocal exam, neuro stable  no clinical evidence of seizure or encephalitis/ no meningismus    -no obvious reason to suggest CNS infection  -treat underlying fever source / pneumonia? viral ?  -ct head to ensure post op stability  -now on keppra as elevated lfts so d/c depakote  -continue with planned workup for primary mets source -lung? was supposed to have PET     d/w  patient
64 yr old recently diagnosed with metastatic brain tumor; awaiting RT ad chemotherapy   discharge from the hospital  and was to go to rehab.   presents with fever, cough , sputum, malaise  no real sob.  history of smoking  , tumor likely lung ca.  chest xray with   infiltrate and rvp positve for rhinovirus    probable rhinovirus with secondary pna  recent hospitalization  on  cefepime  not likely MRSA    seems better.  ct suggests residual tumor in brain   \awiitng ct but hopefully will beablt to switch to po ab in the next few days
This is a 64y year old Male with the below past medical history who presents with the chief complaint of fever up to 103 at home, recent discharge from NS service here s/p left frontal brain mass resected ? lung primary metastatic to brain tumor was supposed to get PET scan study done today in [place]. cough few days and fevere + rapRVP today in ER. possibly pneumonia, admitted for treatment and furhter workup. patient denies seizure, maintained on depakote since discharge although LFTS now 160s from normal last week in patient. denies focal weakness or sensory chagnes, denies falls or gait issues. denies vision changes or speech changes or confusion. no drainage from surgical site.    nonfocal exam, neuro stable  no clinical evidence of seizure or encephalitis/ no meningismus    -no obvious reason to suggest CNS infection  -treat underlying fever source / pneumonia? viral ?  -now on keppra as elevated lfts so d/c depakote  -continue with planned workup for primary mets source -lung? was supposed to have PET   reviewed case with dr bee    d/w  patient
pt w/ recent craniotomy for brain tumor /w likely met / adenoca  w/u undergoing for potential tx/ rt/plus chemo w/ fever / cough and likely pna / plus viral syndrome   iv abs as per id  pulm eval noted   f/u cultures ne gthus far   c/w outpt meds  neuro eval noted  follow lfts  ? med related
64 yr old recently diagnosed with metastatic brain tumor; awaiting RT ad chemotherapy   discharge from the hospital  and was to go to rehab.   presents with fever, cough , sputum, malaise  no real sob.  history of smoking  , tumor likely lung ca.  chest xray with   infiltrate and rvp positve for rhinovirus    probable rhinovirus with secondary pna  recent hospitalization  on  cefepime  not likely MRSA     better ambulating in halls   no sob wantsto go home  In view of rapid improvement I think it is reasonable r to discharge on ceftin 500 bid for 3 days
64 yr old recently diagnosed with metastatic brain tumor; awaiting RT ad chemotherapy   discharge from the hospital  and was to go to rehab.   presents with fever, cough , sputum, malaise  no real sob.  history of smoking  , tumor likely lung ca.  chest xray with   infiltrate and rvp positve for rhinovirus    probable rhinovirus with secondary pna  recent hospitalization  on  cefepime  not likely MRSA    seems better.  ct suggests residual tumor in brain
64 yr old recently diagnosed with metastatic brain tumor; awaiting RT ad chemotherapy   discharge from the hospital  and was to go to rehab.   presents with fever, cough , sputum, malaise  no real sob.  history of smoking  , tumor likely lung ca.  chest xray with   infiltrate and rvp positve for rhinovirus    probable rhinovirus with secondary pna  recent hospitalization  will change to cefepime  not likely MRSA
Impression :     63 y/o man who smoked until August 2019. Developed confusion in early September 2019 and was found to have a brain tumor suspicious for metastatic lung cancer possibly small cell.   Admitted with cough and fever. Chest X-Ray showed ? left  lower lobe infiltrate.     No primary lung lesion noted on CT Chest a few weeks ago.       Recommend :     Would obtain a non-contrast CT of the Chest now to assess the nature of the left lower lobe process    Continue antibiotics as per ID for now    Discussed in detail with his daughter Romulo who is an RN      Delonte Galvan MD, [facility]  Jordana Galvan/Karmen/[facility]ard  Pulmonary Medicine  
Impression :   65 y/o man who smoked until August 2019. Developed confusion in early September 2019 and was found to have a brain tumor, biopsy positive for malignancy, suspicious for metastatic lung cancer possibly small cell.   Admitted with cough and fever. Chest X-Ray showed ? left lower lobe infiltrate. CT shows bilateral lower lobe infiltrates, left more than right, with a focal area of consolidation at the left base. Official report is pending. RVP is positive for rhinovirus.   On antibiotics as per ID.     Recommend :   Await official  CT report  Continue antibiotics per ID  Follow WBC count   - elevation may in part be due to steroids  Follow pulse oximetry       Delonte Galvan MD, FCCP  Jordana Galvan/[facility]en/[facility]  Pulmonary Medicine  
Impression :   Pneumonia left lower lobe  - ? viral  History of lung metastatic cancer, to brain      Recommend :   Complete antibiotics per ID  Can f/U as out patient      Discussed with daughter Romulo (RN) at the bedside       Delonte Galvan MD, [facility]  [facility]ana Galvan/[facility]armen/[facility]ard  Pulmonary Medicine  
high grade neuroendocrine tumor met to brain  plan for radiation  chemo carbo/etopside followed by immunotherapy maintenance at [facility]  LFTs improving
likely small cell lung cancer   was to have outpt pet/ct and brain radiation  will need systemic chemo, chemo/immunotherapy now likely best therapy  febrile illness resolved  rest workup, treatment likely as outpatient
pt w/ recent craniotomy for brain tumor /w likely met / adenoca  w/u undergoing for potential tx/ rt/plus chemo w/ fever / cough and likely pna / plus viral syndrome   change to po abs   pulm eval noted   ct chest c/w l pna  c/w outpt meds  neuro eval noted  follow lfts  ? med related  changed to keppra from dilantin   oob  pt  d/c home
pt w/ recent craniotomy for brain tumor /w likely met / adenoca  w/u undergoing for potential tx/ rt/plus chemo w/ fever / cough and likely pna / plus viral syndrome   iv abs as per id  pulm eval noted   ct chest c/w l pna  c/w outpt meds  neuro eval noted  follow lfts  ? med related  changed to keppra from dilantin   oob  pt
This is a 64y year old Male with the below past medical history who presents with the chief complaint of fever up to 103 at home, recent discharge from NS service here s/p left frontal brain mass resected ? lung primary metastatic to brain tumor was supposed to get PET scan study done today in [place]. cough few days and fevere + rapRVP today in ER. possibly pneumonia, admitted for treatment and furhter workup. patient denies seizure, maintained on depakote since discharge although LFTS now 160s from normal last week in patient. denies focal weakness or sensory chagnes, denies falls or gait issues. denies vision changes or speech changes or confusion. no drainage from surgical site.    nonfocal exam, neuro stable  no clinical evidence of seizure or encephalitis/ no meningismus  sx are improving  d/c home today  d/w  patient

## 2019-09-27 NOTE — PROVIDER CONTACT NOTE (OTHER) - RECOMMENDATIONS
none at present
patient educated
patient educated on importance of vitals.
continuing to provide education to family members in regards to IV fluids and importance of not manipulating IV pump, and to call RN for assistance. IV pump locked at this time.

## 2019-09-27 NOTE — ASSESSMENT
[FreeTextEntry1] : Staples removed today and surgical incision remains well approximated, with no signs of drainage, or irritation with standard post operative wound care instructions reviewed with daughter present.  They will be seeking treatment at [facility] with Oncologyt Dr. Villaseñor, and Dr. Meier for RT with plan in place and scheduled next week for PET and f/u.  They will also f/u with epilepsy neurology at [facility] for seizure prophylaxis and management currently taking Depakote, explained to continue until evaluated.\par \par  We will see back in 3 months to assess progress and for a wound check.  We are here for any assistance needed.\par \par 1)  RTO 3 months

## 2019-09-27 NOTE — PROGRESS NOTE ADULT - PROVIDER SPECIALTY LIST ADULT
Heme/Onc
Heme/Onc
Infectious Disease
Internal Medicine
Internal Medicine
Neurology
Pulmonology
Internal Medicine
Infectious Disease
Neurology
Neurology

## 2019-09-27 NOTE — REVIEW OF SYSTEMS
[As Noted in HPI] : as noted in HPI [Negative] : Endocrine [Abdominal Pain] : no abdominal pain [Diarrhea] : no diarrhea [Vomiting] : no vomiting [Incontinence] : no incontinence [Dysuria] : no dysuria [Skin Lesions] : no skin lesions [Skin Wound] : no skin wound [Itching] : no itching [Easy Bleeding] : no tendency for easy bleeding [Easy Bruising] : no tendency for easy bruising

## 2019-09-27 NOTE — DISCHARGE NOTE PROVIDER - PROVIDER TOKENS
PROVIDER:[TOKEN:[[id number]]],PROVIDER:[TOKEN:[[id number]]],FREE:[LAST:[balascu],PHONE:[(   )    -],FAX:[(   )    -],ADDRESS:[PMD]],PROVIDER:[TOKEN:[[id number]]] PROVIDER:[TOKEN:[[id number]]],PROVIDER:[TOKEN:[[id number]],FOLLOWUP:[2 weeks]],PROVIDER:[TOKEN:[[id number]]],FREE:[LAST:[balascu],PHONE:[(   )    -],FAX:[(   )    -],ADDRESS:[PMD],FOLLOWUP:[1 week]],PROVIDER:[TOKEN:[[id number]]]

## 2019-09-27 NOTE — HISTORY OF PRESENT ILLNESS
[FreeTextEntry1] : Mr. Andrews is a 65 yo male with no significant PMH who arrives for initial post op visit s/p 9/5/19 Left Craniotomy for Left frontal lobe tumor resection c/w post op hemorrhage along the resection cavity RTOR for evacuation same day @ [facility].  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  He presented with AMS on 9/1/19 and seizure like episodes.   \par \par TODAY, he is feeling well, the surgical incision is well healed, well approximated with staples intact, and no signs of irritation, erythema, warmth, edema, pain, or drainage.  \par \par He denies headache, seizure, nausea, vomiting, fever, chest pain, palpitations, shortness of breath, visual, hearing, or speech disturbances.

## 2019-09-27 NOTE — PHYSICAL EXAM
[General Appearance - Alert] : alert [General Appearance - In No Acute Distress] : in no acute distress [General Appearance - Well Developed] : well developed [Clean] : clean [Dry] : dry [Healing Well] : healing well [Staple Intact] : closed with intact staples [No Drainage] : without drainage [Normal Skin] : normal [Oriented To Time, Place, And Person] : oriented to person, place, and time [Impaired Insight] : insight and judgment were intact [Affect] : the affect was normal [Mood] : the mood was normal [Cranial Nerves Optic (II)] : visual acuity intact bilaterally,  pupils equal round and reactive to light [Cranial Nerves Trigeminal (V)] : facial sensation intact symmetrically [Cranial Nerves Oculomotor (III)] : extraocular motion intact [Cranial Nerves Facial (VII)] : face symmetrical [Cranial Nerves Vestibulocochlear (VIII)] : hearing was intact bilaterally [Cranial Nerves Glossopharyngeal (IX)] : tongue and palate midline [Cranial Nerves Accessory (XI - Cranial And Spinal)] : head turning and shoulder shrug symmetric [Cranial Nerves Hypoglossal (XII)] : there was no tongue deviation with protrusion [Motor Strength] : muscle strength was normal in all four extremities [Involuntary Movements] : no involuntary movements were seen [Sensation Tactile Decrease] : light touch was intact [Intact] : all sensory within normal limits bilaterally [Sclera] : the sclera and conjunctiva were normal [Neck Appearance] : the appearance of the neck was normal [Hearing Threshold Finger Rub Not [place]] : hearing was normal [] : no respiratory distress [Respiration, Rhythm And Depth] : normal respiratory rhythm and effort [Edema] : there was no peripheral edema [Abnormal Walk] : normal gait [Motor Tone] : muscle strength and tone were normal [Skin Color & Pigmentation] : normal skin color and pigmentation [FreeTextEntry1] : cachectic -  [Erythema] : not erythematous [Warm] : not warm [Limited Balance] : balance was intact [Romberg's Sign] : Romberg's sign was negtive [Tremor] : no tremor present

## 2019-09-27 NOTE — PROVIDER CONTACT NOTE (OTHER) - ACTION/TREATMENT ORDERED:
PROGRESS  REPORT    Progress reporting period is from 3-15-19 to 4-16-19.       SUBJECTIVE    Subjective: Pt reports he took his grand kids to the pool and tried  the breast stroke and  played with the kids. Got sore that night,  but next day did his exs and felt ok     Current Pain level: 3/10.     Previous pain level was  5/10  .   Changes in function:  Yes (See Goal flowsheet attached for changes in current functional level)  Adverse reaction to treatment or activity: None    OBJECTIVE  Changes noted in objective findings:  The objective findings below are from DOS 4-16-19.  Objective: His days are up and down. Some days are worse than others.. AROM flexion to 120 no pain Abd to90  painfu,l extension wnl no, painful ext/ir to T12 slight pain. PROM flexion 120 Abd 90 ER 80 vs 90 on right IR wnl all motions are painful at end rom except IR. Very slow progress with the exs.To see MD this week     ASSESSMENT/PLAN  Updated problem list and treatment plan: Diagnosis 1:  L shoulder pain  Pain -  US and manual therapy  Decreased ROM/flexibility - manual therapy and therapeutic exercise  Decreased strength - therapeutic exercise and therapeutic activities  Decreased function - therapeutic activities  STG/LTGs have been met or progress has been made towards goals:  Yes (See Goal flow sheet completed today.)  Assessment of Progress: The patient's condition has potential to improve.  Self Management Plans:  Patient has been instructed in a home treatment program.  I have re-evaluated this patient and find that the nature, scope, duration and intensity of the therapy is appropriate for the medical condition of the patient.  Chester continues to require the following intervention to meet STG and LTG's:  PT    Recommendations:  This patient would benefit from continued therapy.     Frequency:  1 X week, once daily  Duration:  for 6 weeks        Please refer to the daily flowsheet for treatment today, total treatment time and time 
spent performing 1:1 timed codes.        
ERIN Sampson notified and aware
PA made aware. no new orders.
PA made aware. no new orders.
NP notified and made aware, as per NP continue to provide education to family members. no further interventions at this time will continue to monitor.

## 2019-09-27 NOTE — DISCHARGE NOTE PROVIDER - CARE PROVIDER_API CALL
George May)  Neurosurgery  General  [address], [address]  [place] [address]  Phone: [phone]  Fax: [phone]  Follow Up Time:     George Teague)  Electrodiagnostic Medicine; Neurology  [address], [address]  [place] [address]  Phone: [phone]  Fax: [phone]  Follow Up Time:     RENETTA pena  Phone: (   )    -  Fax: (   )    -  Follow Up Time:     Clement Ashton)  Infectious Disease; Internal Medicine  [address] [place] [address]  Phone: [phone]  Fax: [phone]  Follow Up Time: George May)  Neurosurgery  General  [address], [address]  [place] [address]  Phone: [phone]  Fax: [phone]  Follow Up Time:     George Teague)  Electrodiagnostic Medicine; Neurology  [address], [address]  [place] [address]  Phone: [phone]  Fax: [phone]  Follow Up Time: 2 weeks    Clement Ashton)  Infectious Disease; Internal Medicine  [address] [place] [address]  Phone: [phone]  Fax: [phone]  Follow Up Time:     RENETTA pena  Phone: (   )    -  Fax: (   )    -  Follow Up Time: 1 week    Delonte Galvan)  Critical Care Medicine; Internal Medicine; Pulmonary Disease  [address] [place] [address]  Phone: [phone]  Fax: [phone]  Follow Up Time:

## 2019-09-27 NOTE — REASON FOR VISIT
[Family Member] : family member [de-identified] : HFU s/p 9/5/19 Left Craniotomy for Left frontal lobe tumor resection @ [facility]

## 2019-09-27 NOTE — PROVIDER CONTACT NOTE (OTHER) - ASSESSMENT
pt AXOX4 , offering no c/o with  intermittent cough
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
pt A&Ox4 daughter at bedside also refusing, pt denies pain and wants to sleep. pt resting in bed.
pt AOX4, irritable at times, no sob or distress noted v/s wnl.

## 2019-09-29 LAB
CULTURE RESULTS: SIGNIFICANT CHANGE UP
CULTURE RESULTS: SIGNIFICANT CHANGE UP
SPECIMEN SOURCE: SIGNIFICANT CHANGE UP
SPECIMEN SOURCE: SIGNIFICANT CHANGE UP

## 2019-10-17 ENCOUNTER — NON-APPOINTMENT (OUTPATIENT)
Age: 64
End: 2019-10-17

## 2019-10-17 ENCOUNTER — APPOINTMENT (OUTPATIENT)
Dept: OPHTHALMOLOGY | Facility: CLINIC | Age: 64
End: 2019-10-17
Payer: COMMERCIAL

## 2019-10-17 ENCOUNTER — TRANSCRIPTION ENCOUNTER (OUTPATIENT)
Age: 64
End: 2019-10-17

## 2019-10-17 PROCEDURE — 92012 INTRM OPH EXAM EST PATIENT: CPT

## 2019-12-07 ENCOUNTER — INPATIENT (INPATIENT)
Facility: HOSPITAL | Age: 64
LOS: 13 days | Discharge: HOSPICE | DRG: 843 | End: 2019-12-21
Attending: INTERNAL MEDICINE | Admitting: INTERNAL MEDICINE
Payer: COMMERCIAL

## 2019-12-07 VITALS
TEMPERATURE: 98 F | DIASTOLIC BLOOD PRESSURE: 90 MMHG | SYSTOLIC BLOOD PRESSURE: 147 MMHG | HEART RATE: 79 BPM | WEIGHT: 119.93 LBS | OXYGEN SATURATION: 97 % | RESPIRATION RATE: 19 BRPM | HEIGHT: 67 IN

## 2019-12-07 DIAGNOSIS — G95.20 UNSPECIFIED CORD COMPRESSION: ICD-10-CM

## 2019-12-07 DIAGNOSIS — Z98.890 OTHER SPECIFIED POSTPROCEDURAL STATES: Chronic | ICD-10-CM

## 2019-12-07 DIAGNOSIS — J18.9 PNEUMONIA, UNSPECIFIED ORGANISM: ICD-10-CM

## 2019-12-07 DIAGNOSIS — I26.99 OTHER PULMONARY EMBOLISM WITHOUT ACUTE COR PULMONALE: ICD-10-CM

## 2019-12-07 DIAGNOSIS — Z29.9 ENCOUNTER FOR PROPHYLACTIC MEASURES, UNSPECIFIED: ICD-10-CM

## 2019-12-07 DIAGNOSIS — C79.31 SECONDARY MALIGNANT NEOPLASM OF BRAIN: ICD-10-CM

## 2019-12-07 DIAGNOSIS — R07.89 OTHER CHEST PAIN: ICD-10-CM

## 2019-12-07 LAB
ALBUMIN SERPL ELPH-MCNC: 4.5 G/DL — SIGNIFICANT CHANGE UP (ref 3.3–5)
ALP SERPL-CCNC: 61 U/L — SIGNIFICANT CHANGE UP (ref 40–120)
ALT FLD-CCNC: 32 U/L — SIGNIFICANT CHANGE UP (ref 10–45)
ANION GAP SERPL CALC-SCNC: 15 MMOL/L — SIGNIFICANT CHANGE UP (ref 5–17)
APPEARANCE UR: CLEAR — SIGNIFICANT CHANGE UP
APTT BLD: 25.7 SEC — LOW (ref 27.5–36.3)
AST SERPL-CCNC: 22 U/L — SIGNIFICANT CHANGE UP (ref 10–40)
BACTERIA # UR AUTO: NEGATIVE — SIGNIFICANT CHANGE UP
BASE EXCESS BLDV CALC-SCNC: 2.7 MMOL/L — HIGH (ref -2–2)
BASOPHILS # BLD AUTO: 0 K/UL — SIGNIFICANT CHANGE UP (ref 0–0.2)
BASOPHILS NFR BLD AUTO: 0 % — SIGNIFICANT CHANGE UP (ref 0–2)
BILIRUB SERPL-MCNC: 0.5 MG/DL — SIGNIFICANT CHANGE UP (ref 0.2–1.2)
BILIRUB UR-MCNC: NEGATIVE — SIGNIFICANT CHANGE UP
BUN SERPL-MCNC: 34 MG/DL — HIGH (ref 7–23)
CA-I SERPL-SCNC: 1.3 MMOL/L — SIGNIFICANT CHANGE UP (ref 1.12–1.3)
CALCIUM SERPL-MCNC: 10.1 MG/DL — SIGNIFICANT CHANGE UP (ref 8.4–10.5)
CHLORIDE BLDV-SCNC: 99 MMOL/L — SIGNIFICANT CHANGE UP (ref 96–108)
CHLORIDE SERPL-SCNC: 98 MMOL/L — SIGNIFICANT CHANGE UP (ref 96–108)
CO2 BLDV-SCNC: 30 MMOL/L — SIGNIFICANT CHANGE UP (ref 22–30)
CO2 SERPL-SCNC: 25 MMOL/L — SIGNIFICANT CHANGE UP (ref 22–31)
COLOR SPEC: YELLOW — SIGNIFICANT CHANGE UP
CREAT SERPL-MCNC: 0.68 MG/DL — SIGNIFICANT CHANGE UP (ref 0.5–1.3)
DIFF PNL FLD: NEGATIVE — SIGNIFICANT CHANGE UP
EOSINOPHIL # BLD AUTO: 0 K/UL — SIGNIFICANT CHANGE UP (ref 0–0.5)
EOSINOPHIL NFR BLD AUTO: 0 % — SIGNIFICANT CHANGE UP (ref 0–6)
EPI CELLS # UR: 0 /HPF — SIGNIFICANT CHANGE UP
GAS PNL BLDV: 138 MMOL/L — SIGNIFICANT CHANGE UP (ref 135–145)
GAS PNL BLDV: SIGNIFICANT CHANGE UP
GIANT PLATELETS BLD QL SMEAR: PRESENT — SIGNIFICANT CHANGE UP
GLUCOSE BLDV-MCNC: 77 MG/DL — SIGNIFICANT CHANGE UP (ref 70–99)
GLUCOSE SERPL-MCNC: 80 MG/DL — SIGNIFICANT CHANGE UP (ref 70–99)
GLUCOSE UR QL: NEGATIVE — SIGNIFICANT CHANGE UP
HCO3 BLDV-SCNC: 28 MMOL/L — SIGNIFICANT CHANGE UP (ref 21–29)
HCT VFR BLD CALC: 48.7 % — SIGNIFICANT CHANGE UP (ref 39–50)
HCT VFR BLDA CALC: 50 % — SIGNIFICANT CHANGE UP (ref 39–50)
HGB BLD CALC-MCNC: 16.2 G/DL — SIGNIFICANT CHANGE UP (ref 13–17)
HGB BLD-MCNC: 16.1 G/DL — SIGNIFICANT CHANGE UP (ref 13–17)
HYALINE CASTS # UR AUTO: 0 /LPF — SIGNIFICANT CHANGE UP (ref 0–2)
INR BLD: 1.1 RATIO — SIGNIFICANT CHANGE UP (ref 0.88–1.16)
KETONES UR-MCNC: SIGNIFICANT CHANGE UP
LACTATE BLDV-MCNC: 1.8 MMOL/L — SIGNIFICANT CHANGE UP (ref 0.7–2)
LEUKOCYTE ESTERASE UR-ACNC: NEGATIVE — SIGNIFICANT CHANGE UP
LYMPHOCYTES # BLD AUTO: 0.93 K/UL — LOW (ref 1–3.3)
LYMPHOCYTES # BLD AUTO: 6.1 % — LOW (ref 13–44)
MAGNESIUM SERPL-MCNC: 2.1 MG/DL — SIGNIFICANT CHANGE UP (ref 1.6–2.6)
MANUAL SMEAR VERIFICATION: SIGNIFICANT CHANGE UP
MCHC RBC-ENTMCNC: 31.7 PG — SIGNIFICANT CHANGE UP (ref 27–34)
MCHC RBC-ENTMCNC: 33.1 GM/DL — SIGNIFICANT CHANGE UP (ref 32–36)
MCV RBC AUTO: 95.9 FL — SIGNIFICANT CHANGE UP (ref 80–100)
MONOCYTES # BLD AUTO: 1.18 K/UL — HIGH (ref 0–0.9)
MONOCYTES NFR BLD AUTO: 7.8 % — SIGNIFICANT CHANGE UP (ref 2–14)
NEUTROPHILS # BLD AUTO: 12.54 K/UL — HIGH (ref 1.8–7.4)
NEUTROPHILS NFR BLD AUTO: 82.6 % — HIGH (ref 43–77)
NITRITE UR-MCNC: NEGATIVE — SIGNIFICANT CHANGE UP
PCO2 BLDV: 47 MMHG — SIGNIFICANT CHANGE UP (ref 35–50)
PH BLDV: 7.39 — SIGNIFICANT CHANGE UP (ref 7.35–7.45)
PH UR: 6 — SIGNIFICANT CHANGE UP (ref 5–8)
PHOSPHATE SERPL-MCNC: 2.7 MG/DL — SIGNIFICANT CHANGE UP (ref 2.5–4.5)
PLAT MORPH BLD: NORMAL — SIGNIFICANT CHANGE UP
PLATELET # BLD AUTO: 314 K/UL — SIGNIFICANT CHANGE UP (ref 150–400)
PO2 BLDV: 25 MMHG — SIGNIFICANT CHANGE UP (ref 25–45)
POTASSIUM BLDV-SCNC: 3.8 MMOL/L — SIGNIFICANT CHANGE UP (ref 3.5–5.3)
POTASSIUM SERPL-MCNC: 3.7 MMOL/L — SIGNIFICANT CHANGE UP (ref 3.5–5.3)
POTASSIUM SERPL-SCNC: 3.7 MMOL/L — SIGNIFICANT CHANGE UP (ref 3.5–5.3)
PROT SERPL-MCNC: 7.2 G/DL — SIGNIFICANT CHANGE UP (ref 6–8.3)
PROT UR-MCNC: SIGNIFICANT CHANGE UP
PROTHROM AB SERPL-ACNC: 12.6 SEC — SIGNIFICANT CHANGE UP (ref 10–12.9)
RBC # BLD: 5.08 M/UL — SIGNIFICANT CHANGE UP (ref 4.2–5.8)
RBC # FLD: 13.7 % — SIGNIFICANT CHANGE UP (ref 10.3–14.5)
RBC BLD AUTO: SIGNIFICANT CHANGE UP
RBC CASTS # UR COMP ASSIST: 5 /HPF — HIGH (ref 0–4)
SAO2 % BLDV: 36 % — LOW (ref 67–88)
SODIUM SERPL-SCNC: 138 MMOL/L — SIGNIFICANT CHANGE UP (ref 135–145)
SP GR SPEC: 1.03 — HIGH (ref 1.01–1.02)
UROBILINOGEN FLD QL: NEGATIVE — SIGNIFICANT CHANGE UP
VARIANT LYMPHS # BLD: 3.5 % — SIGNIFICANT CHANGE UP (ref 0–6)
WBC # BLD: 15.18 K/UL — HIGH (ref 3.8–10.5)
WBC # FLD AUTO: 15.18 K/UL — HIGH (ref 3.8–10.5)
WBC UR QL: 0 /HPF — SIGNIFICANT CHANGE UP (ref 0–5)

## 2019-12-07 PROCEDURE — 99291 CRITICAL CARE FIRST HOUR: CPT

## 2019-12-07 PROCEDURE — 72157 MRI CHEST SPINE W/O & W/DYE: CPT | Mod: 26

## 2019-12-07 PROCEDURE — 72158 MRI LUMBAR SPINE W/O & W/DYE: CPT | Mod: 26

## 2019-12-07 PROCEDURE — 72156 MRI NECK SPINE W/O & W/DYE: CPT | Mod: 26

## 2019-12-07 RX ORDER — DEXAMETHASONE 0.5 MG/5ML
4 ELIXIR ORAL EVERY 6 HOURS
Refills: 0 | Status: DISCONTINUED | OUTPATIENT
Start: 2019-12-07 | End: 2019-12-21

## 2019-12-07 RX ORDER — LEVETIRACETAM 250 MG/1
500 TABLET, FILM COATED ORAL
Refills: 0 | Status: DISCONTINUED | OUTPATIENT
Start: 2019-12-07 | End: 2019-12-21

## 2019-12-07 RX ORDER — HYDROMORPHONE HYDROCHLORIDE 2 MG/ML
1 INJECTION INTRAMUSCULAR; INTRAVENOUS; SUBCUTANEOUS ONCE
Refills: 0 | Status: DISCONTINUED | OUTPATIENT
Start: 2019-12-07 | End: 2019-12-07

## 2019-12-07 RX ORDER — MORPHINE SULFATE 50 MG/1
4 CAPSULE, EXTENDED RELEASE ORAL ONCE
Refills: 0 | Status: DISCONTINUED | OUTPATIENT
Start: 2019-12-07 | End: 2019-12-07

## 2019-12-07 RX ORDER — PIPERACILLIN AND TAZOBACTAM 4; .5 G/20ML; G/20ML
3.38 INJECTION, POWDER, LYOPHILIZED, FOR SOLUTION INTRAVENOUS EVERY 8 HOURS
Refills: 0 | Status: DISCONTINUED | OUTPATIENT
Start: 2019-12-07 | End: 2019-12-08

## 2019-12-07 RX ORDER — ENOXAPARIN SODIUM 100 MG/ML
80 INJECTION SUBCUTANEOUS DAILY
Refills: 0 | Status: DISCONTINUED | OUTPATIENT
Start: 2019-12-07 | End: 2019-12-17

## 2019-12-07 RX ORDER — QUETIAPINE FUMARATE 200 MG/1
25 TABLET, FILM COATED ORAL AT BEDTIME
Refills: 0 | Status: DISCONTINUED | OUTPATIENT
Start: 2019-12-07 | End: 2019-12-17

## 2019-12-07 RX ORDER — LIDOCAINE 4 G/100G
1 CREAM TOPICAL ONCE
Refills: 0 | Status: COMPLETED | OUTPATIENT
Start: 2019-12-07 | End: 2019-12-07

## 2019-12-07 RX ADMIN — LIDOCAINE 1 PATCH: 4 CREAM TOPICAL at 19:59

## 2019-12-07 RX ADMIN — MORPHINE SULFATE 4 MILLIGRAM(S): 50 CAPSULE, EXTENDED RELEASE ORAL at 10:45

## 2019-12-07 RX ADMIN — MORPHINE SULFATE 4 MILLIGRAM(S): 50 CAPSULE, EXTENDED RELEASE ORAL at 18:53

## 2019-12-07 RX ADMIN — MORPHINE SULFATE 4 MILLIGRAM(S): 50 CAPSULE, EXTENDED RELEASE ORAL at 18:02

## 2019-12-07 RX ADMIN — MORPHINE SULFATE 4 MILLIGRAM(S): 50 CAPSULE, EXTENDED RELEASE ORAL at 10:17

## 2019-12-07 RX ADMIN — LIDOCAINE 1 PATCH: 4 CREAM TOPICAL at 18:02

## 2019-12-07 RX ADMIN — HYDROMORPHONE HYDROCHLORIDE 1 MILLIGRAM(S): 2 INJECTION INTRAMUSCULAR; INTRAVENOUS; SUBCUTANEOUS at 21:54

## 2019-12-07 RX ADMIN — HYDROMORPHONE HYDROCHLORIDE 1 MILLIGRAM(S): 2 INJECTION INTRAMUSCULAR; INTRAVENOUS; SUBCUTANEOUS at 19:58

## 2019-12-07 NOTE — ED PROVIDER NOTE - PROGRESS NOTE DETAILS
ATTG: : case dw with spine and radiology. rec MRI spine. full consult pending from Spine surgery. AG Pgy3: discussed findings with neuroradiologist dr. neves - reports mutliple likely metastatic lesions throughout all levels of spine with incomplete cord compression. Family aware. nsgy also aware and has discussed the case with the family. nsgy Not recommending surgical intervention at this time. They understand risks and benefits of withholding procedure including worsening weakness/paralysis. Will a/m to medicine for failure to thrive/neuroonc eval.

## 2019-12-07 NOTE — ED PROVIDER NOTE - CLINICAL SUMMARY MEDICAL DECISION MAKING FREE TEXT BOX
ATTG: : back pain and worsening lower ext weakness with MET ca. concern for cord compression / ICH will check ct head, check MRI check labs, pain medication, post void res, Spine surgery eval.

## 2019-12-07 NOTE — H&P ADULT - PROBLEM SELECTOR PLAN 1
Seen on MRI  Neurosurg eval appreciated  will get NeuroOnc eval in AM   Decadron 4mg Q6hrs  pain control, aggressive   Neuro check

## 2019-12-07 NOTE — H&P ADULT - PROBLEM SELECTOR PLAN 4
will star zosyn for now  monitor for respiratory worsneing   O2 supplement  Nebs   check procalcitonin in AM

## 2019-12-07 NOTE — ED ADULT NURSE NOTE - ED STAT RN HANDOFF DETAILS 2
pt endorsed to lori MCDONALD in holding with report taken by lab on skewed results TAMARA Desir informed new BMP needed

## 2019-12-07 NOTE — H&P ADULT - NSHPPHYSICALEXAM_GEN_ALL_CORE
Vital Signs Last 24 Hrs  T(C): 36.9 (07 Dec 2019 16:19), Max: 36.9 (07 Dec 2019 16:19)  T(F): 98.4 (07 Dec 2019 16:19), Max: 98.4 (07 Dec 2019 16:19)  HR: 78 (07 Dec 2019 20:02) (69 - 93)  BP: 102/74 (07 Dec 2019 20:02) (93/74 - 147/90)  BP(mean): --  RR: 18 (07 Dec 2019 20:02) (18 - 20)  SpO2: 97% (07 Dec 2019 20:02) (97% - 98%)    Appearance: Normal	  HEENT:   Normal oral mucosa, PERRL, EOMI	  Lymphatic: No lymphadenopathy , no edema  Cardiovascular: Normal S1 S2, No JVD, No murmurs , Peripheral pulses palpable 2+ bilaterally  Respiratory: Lungs clear to auscultation, normal effort 	  Gastrointestinal:  Soft, Non-tender, + BS	  Skin: No rashes, No ecchymoses, No cyanosis, warm to touch  Musculoskeletal: LE weakness, loss motor   Psychiatry:  Mood & affect appropriate  Ext: No edema

## 2019-12-07 NOTE — ED PROVIDER NOTE - OBJECTIVE STATEMENT
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 patient reports gradually worsening lbp and lower extremity weakness. Pain is severe and worse with movement. Taking oxycodone, gabapentin, dexamethasone with minimal relief. Denies urinary/bm complaints, fevers, cp, sob, abd pain.

## 2019-12-07 NOTE — H&P ADULT - HISTORY OF PRESENT ILLNESS
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 taking meds at home with no relief. no fever or chills. unable to get up and move around as he had recently done in the past. no cough. no shortness of breath. no new numbness. no headache. daughter / son in law and wife at the bedside.

## 2019-12-07 NOTE — ED PROVIDER NOTE - ATTENDING CONTRIBUTION TO CARE
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 taking meds at home with no relief. no fever or chills. unable to get up and move around as he had recently done in the past. no cough. no shortness of breath. no new numbness. no headache. daughter / son in law and wife at the bedside.   Gen.  mild distress from pain, no resp distress  HEENT:  perrl, Dry mm. EOMI  Lungs:  b/l bs  CVS: S1S2   Abd;  soft non tender, no distention  Ext:  no pitting edema. no erythema  Neuro: aaox3, sensation intact distally.   MSK: pulses 2+ DP. strength 3/5 b/l lower ext able to bend knee and move feet.   skin no changes

## 2019-12-07 NOTE — ED ADULT NURSE REASSESSMENT NOTE - RESPONSE TO
Bladder  scan  done,  962 ml  of  urine  in the  bladder.  MD  is  notified.  F/C  is  inserted  as  ordered./response to care/treatments:

## 2019-12-07 NOTE — CONSULT NOTE ADULT - SUBJECTIVE AND OBJECTIVE BOX
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 taking meds at home with no relief. no fever or chills. unable to get up and move around as he had recently done in the past. no cough. no shortness of breath. no new numbness. no headache. daughter / son in law and wife at the bedside. (07 Dec 2019 20:21)      PAST MEDICAL & SURGICAL HISTORY:  No pertinent past medical history  Status post craniotomy          MEDICATIONS:  enoxaparin Injectable 80 milliGRAM(s) SubCutaneous every 12 hours    piperacillin/tazobactam IVPB.. 3.375 Gram(s) IV Intermittent every 8 hours      levETIRAcetam 500 milliGRAM(s) Oral two times a day  QUEtiapine 25 milliGRAM(s) Oral at bedtime      dexAMETHasone     Tablet 4 milliGRAM(s) Oral every 6 hours        FAMILY HISTORY:  No pertinent family history in first degree relatives      Non-contributory    SOCIAL HISTORY:    [ ] Tobacco  [ ] Drugs  [ ] Alcohol    Allergies    No Known Allergies    Intolerances    	    REVIEW OF SYSTEMS:  CONSTITUTIONAL: No fever  EYES: No eye pain, visual disturbances, or discharge  ENMT:  No difficulty hearing, tinnitus  NECK: No pain or stiffness  RESPIRATORY: No cough, wheezing,  CARDIOVASCULAR: No chest pain, palpitations, passing out, dizziness, or leg swelling  GASTROINTESTINAL:  No nausea, vomiting, diarrhea or constipation. No melena.  GENITOURINARY: No dysuria, hematuria  NEUROLOGICAL: No stroke like symptoms  SKIN: No burning or lesions   ENDOCRINE: No heat or cold intolerance  MUSCULOSKELETAL: No joint pain or swelling  PSYCHIATRIC: No  anxiety, mood swings  HEME/LYMPH: No bleeding gums  ALLERGY AND IMMUNOLOGIC: No hives or eczema	    All other ROS negative    PHYSICAL EXAM:  T(C): 36.9 (12-07-19 @ 16:19), Max: 36.9 (12-07-19 @ 16:19)  HR: 78 (12-07-19 @ 20:02) (69 - 93)  BP: 102/74 (12-07-19 @ 20:02) (93/74 - 147/90)  RR: 18 (12-07-19 @ 20:02) (18 - 20)  SpO2: 97% (12-07-19 @ 20:02) (97% - 98%)  Wt(kg): --  I&O's Summary    07 Dec 2019 07:01  -  08 Dec 2019 00:01  --------------------------------------------------------  IN: 0 mL / OUT: 1000 mL / NET: -1000 mL        Appearance: Normal	  HEENT:   Normal oral mucosa, EOMI	  Cardiovascular: Normal S1 S2, No JVD, No murmurs  Respiratory: Lungs clear to auscultation	  Psychiatry: Alert  Gastrointestinal:  Soft, Non-tender, + BS	  Skin: No rashes   Neurologic: Non-focal  Extremities:  No edema  Vascular: Peripheral pulses palpable    	    	  	  CARDIAC MARKERS:  Labs personally reviewed by me                                  16.1   15.18 )-----------( 314      ( 07 Dec 2019 10:19 )             48.7     12-07    138  |  98  |  34<H>  ----------------------------<  80  3.7   |  25  |  0.68    Ca    10.1      07 Dec 2019 10:19  Phos  2.7     12-07  Mg     2.1     12-07    TPro  7.2  /  Alb  4.5  /  TBili  0.5  /  DBili  x   /  AST  22  /  ALT  32  /  AlkPhos  61  12-07          EKG: Personally reviewed by me -   Radiology: Personally reviewed by me -       Assessment /Plan:         Petey Roberson DO [facility]  Cardiovascular Medicine  [phone] CHIEF COMPLAINT:Patient is a 64y old  Male who presents with a chief complaint of LE weakness (07 Dec 2019 20:21)      HISTORY OF PRESENT ILLNESS:HPI:  Pt is a 63 y/o male with pmhx Smoker 50+pk yrs, 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 taking meds at home with no relief. no fever or chills. unable to get up and move around as he had recently done in the past. no cough. no shortness of breath. no new numbness. no headache. daughter / son in law and wife at the bedside. (07 Dec 2019 20:21)      PAST MEDICAL & SURGICAL HISTORY:  No pertinent past medical history  Status post craniotomy          MEDICATIONS:  enoxaparin Injectable 80 milliGRAM(s) SubCutaneous every 12 hours    piperacillin/tazobactam IVPB.. 3.375 Gram(s) IV Intermittent every 8 hours      levETIRAcetam 500 milliGRAM(s) Oral two times a day  QUEtiapine 25 milliGRAM(s) Oral at bedtime      dexAMETHasone     Tablet 4 milliGRAM(s) Oral every 6 hours        FAMILY HISTORY:  No pertinent family history in first degree relatives      Non-contributory    SOCIAL HISTORY:    not a smoker    Allergies    No Known Allergies    Intolerances    	     Review of Systems:  Review of Systems: REVIEW OF SYSTEMS:   CONSTITUTIONAL: + weakness, + Pain   EYES/ENT: No visual changes;  No vertigo or throat pain   NECK: No pain or stiffness  RESPIRATORY: No cough, wheezing, hemoptysis; No shortness of breath  CARDIOVASCULAR: chest wall pain   GASTROINTESTINAL: No abdominal or epigastric pain  GENITOURINARY: loss control   NEUROLOGICAL: LE weakness   SKIN: No itching, burning, rashes, or lesions  All other review of systems is negative unless indicated above.	  	    All other ROS negative    PHYSICAL EXAM:  T(C): 36.9 (12-07-19 @ 16:19), Max: 36.9 (12-07-19 @ 16:19)  HR: 78 (12-07-19 @ 20:02) (69 - 93)  BP: 102/74 (12-07-19 @ 20:02) (93/74 - 147/90)  RR: 18 (12-07-19 @ 20:02) (18 - 20)  SpO2: 97% (12-07-19 @ 20:02) (97% - 98%)  Wt(kg): --  I&O's Summary    07 Dec 2019 07:01  -  08 Dec 2019 00:01  --------------------------------------------------------  IN: 0 mL / OUT: 1000 mL / NET: -1000 mL      	Appearance: Normal	  	HEENT:   Normal oral mucosa, PERRL, EOMI	  	Lymphatic: No lymphadenopathy , no edema  	Cardiovascular: Normal S1 S2, No JVD, No murmurs , Peripheral pulses palpable 2+ bilaterally  	Respiratory: Lungs clear to auscultation, normal effort 	  	Gastrointestinal:  Soft, Non-tender, + BS	  	Skin: No rashes, No ecchymoses, No cyanosis, warm to touch  	Musculoskeletal: LE weakness, loss motor   	Psychiatry:  Mood & affect appropriate  Ext: No edema  	  	  CARDIAC MARKERS:  Labs personally reviewed by me                                  16.1   15.18 )-----------( 314      ( 07 Dec 2019 10:19 )             48.7     12-07    138  |  98  |  34<H>  ----------------------------<  80  3.7   |  25  |  0.68    Ca    10.1      07 Dec 2019 10:19  Phos  2.7     12-07  Mg     2.1     12-07    TPro  7.2  /  Alb  4.5  /  TBili  0.5  /  DBili  x   /  AST  22  /  ALT  32  /  AlkPhos  61  12-07          EKG: Personally reviewed by me - 9/24 nsr        Assessment /Plan:   Chest pain - atypical, does not appear cardiac in nature. Repeat EKG and echo to further assess  Assessment and Plan:   Assessment:  · Assessment		  Pt is a 65 Y/O Male with PMHx smoker 50+yr 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 Per family the patient had MR at [facility] 3 weeks ago showing diffuse leptomeningeal spread (films unavail). Family is still shrouding patient from diagnosis/prognosis. Exam: oriented x3, FC, distal LE 4/5, proximal LE 2-3/5, UE 4/5. SILT. Endorses back pain.      Problem/Plan - 1:  ·  Problem: Cord compression.  Plan: Seen on MRI  Neurosurg eval appreciated      Problem/Plan - 2:  ·  Problem: Pulmonary embolism.  Plan: COnt lovenox   Monitor Hgb level   O 2 supplement.     Problem/Plan - 3:  ·  Problem: Chest pain, atypical.  Plan: does not appear to be cardiac in nature. ?2/2 PE   Repeat EKG and echo to further assess    Problem/Plan - 4:  Problem: Prophylactic measure. Plan: DVT and gI PPX.      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 taking meds at home with no relief. no fever or chills. unable to get up and move around as he had recently done in the past. no cough. no shortness of breath. no new numbness. no headache. daughter / son in law and wife at the bedside. (07 Dec 2019 20:21)      PAST MEDICAL & SURGICAL HISTORY:  No pertinent past medical history  Status post craniotomy          MEDICATIONS:  enoxaparin Injectable 80 milliGRAM(s) SubCutaneous every 12 hours    piperacillin/tazobactam IVPB.. 3.375 Gram(s) IV Intermittent every 8 hours      levETIRAcetam 500 milliGRAM(s) Oral two times a day  QUEtiapine 25 milliGRAM(s) Oral at bedtime      dexAMETHasone     Tablet 4 milliGRAM(s) Oral every 6 hours        FAMILY HISTORY:  No pertinent family history in first degree relatives      Non-contributory    SOCIAL HISTORY:    not a smoker    Allergies    No Known Allergies    Intolerances    	     Review of Systems:  Review of Systems: REVIEW OF SYSTEMS:   CONSTITUTIONAL: + weakness, + Pain   EYES/ENT: No visual changes;  No vertigo or throat pain   NECK: No pain or stiffness  RESPIRATORY: No cough, wheezing, hemoptysis; No shortness of breath  CARDIOVASCULAR: chest wall pain   GASTROINTESTINAL: No abdominal or epigastric pain  GENITOURINARY: loss control   NEUROLOGICAL: LE weakness   SKIN: No itching, burning, rashes, or lesions  All other review of systems is negative unless indicated above.	  	    All other ROS negative    PHYSICAL EXAM:  T(C): 36.9 (12-07-19 @ 16:19), Max: 36.9 (12-07-19 @ 16:19)  HR: 78 (12-07-19 @ 20:02) (69 - 93)  BP: 102/74 (12-07-19 @ 20:02) (93/74 - 147/90)  RR: 18 (12-07-19 @ 20:02) (18 - 20)  SpO2: 97% (12-07-19 @ 20:02) (97% - 98%)  Wt(kg): --  I&O's Summary    07 Dec 2019 07:01  -  08 Dec 2019 00:01  --------------------------------------------------------  IN: 0 mL / OUT: 1000 mL / NET: -1000 mL      	Appearance: Normal	  	HEENT:   Normal oral mucosa, PERRL, EOMI	  	Lymphatic: No lymphadenopathy , no edema  	Cardiovascular: Normal S1 S2, No JVD, No murmurs , Peripheral pulses palpable 2+ bilaterally  	Respiratory: Lungs clear to auscultation, normal effort 	  	Gastrointestinal:  Soft, Non-tender, + BS	  	Skin: No rashes, No ecchymoses, No cyanosis, warm to touch  	Musculoskeletal: LE weakness, loss motor   	Psychiatry:  Mood & affect appropriate  Ext: No edema  	  	  CARDIAC MARKERS:  Labs personally reviewed by me                                  16.1   15.18 )-----------( 314      ( 07 Dec 2019 10:19 )             48.7     12-07    138  |  98  |  34<H>  ----------------------------<  80  3.7   |  25  |  0.68    Ca    10.1      07 Dec 2019 10:19  Phos  2.7     12-07  Mg     2.1     12-07    TPro  7.2  /  Alb  4.5  /  TBili  0.5  /  DBili  x   /  AST  22  /  ALT  32  /  AlkPhos  61  12-07          EKG: Personally reviewed by me - 9/24 nsr        Assessment /Plan:   Chest pain - atypical, does not appear cardiac in nature. Repeat EKG and echo to further assess  Assessment and Plan:   Assessment:  · Assessment		  Pt is a 65 Y/O Male with PMHx smoker 50+yr 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 Per family the patient had MR at [facility] 3 weeks ago showing diffuse leptomeningeal spread (films unavail). Family is still shrouding patient from diagnosis/prognosis. Exam: oriented x3, FC, distal LE 4/5, proximal LE 2-3/5, UE 4/5. SILT. Endorses back pain.      Problem/Plan - 1:  ·  Problem: Cord compression.  Plan: Seen on MRI  Neurosurg eval appreciated      Problem/Plan - 2:  ·  Problem: Pulmonary embolism.  Plan: COnt lovenox   Monitor Hgb level   O 2 supplement.     Problem/Plan - 3:  ·  Problem: Chest pain, atypical.  Plan: does not appear to be cardiac in nature. ?2/2 PE   Repeat EKG and echo to further assess    Problem/Plan - 4:  Problem: Prophylactic measure. Plan: DVT and gI PPX.      Advanced care planning was discussed with patient and family.  Advanced care planning  were reviewed and discussed.  Differential diagnosis and plan of care discussed with patient after the evaluation.   Counseling on Diet, exercise, and medication compliance was done.     Petey Roberson DO [facility]  Cardiovascular Medicine  [phone]

## 2019-12-07 NOTE — ED ADULT NURSE NOTE - NSIMPLEMENTINTERV_GEN_ALL_ED
Implemented All Fall Risk Interventions:  [place] to call system. Call bell, personal items and telephone within reach. Instruct patient to call for assistance. Room bathroom lighting operational. Non-slip footwear when patient is off stretcher. Physically safe environment: no spills, clutter or unnecessary equipment. Stretcher in lowest position, wheels locked, appropriate side rails in place. Provide visual cue, wrist band, yellow gown, etc. Monitor gait and stability. Monitor for mental status changes and reorient to person, place, and time. Review medications for side effects contributing to fall risk. Reinforce activity limits and safety measures with patient and family.

## 2019-12-07 NOTE — H&P ADULT - ASSESSMENT
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 Per family the patient had MR at [facility] 3 weeks ago showing diffuse leptomeningeal spread (films unavail). Family is still shrouding patient from diagnosis/prognosis. Exam: oriented x3, FC, distal LE 4/5, proximal LE 2-3/5, UE 4/5. SILT. Endorses back pain.

## 2019-12-07 NOTE — ED ADULT NURSE NOTE - OBJECTIVE STATEMENT
Patient  is  alert  and  oriented x3.  Color is  good  and  skin  warm to touch.  He  is  c/o  lower  back  and  generalized  body  pain.  Patient  is  c/o  anuria  and  increased  weakness  to  lower  legs.  He  has  increased   difficulty  to ambulate  and   decreased  po  intake.

## 2019-12-07 NOTE — ED PROVIDER NOTE - MUSCULOSKELETAL, MLM
Spine appears normal, range of motion is not limited, no muscle or joint tenderness. 3/5 strength in distal extremities b/l

## 2019-12-07 NOTE — ED ADULT NURSE REASSESSMENT NOTE - COMFORT CARE
plan of care explained/side rails up/treatment delay explained/wait time explained/warm blanket provided/darkened lights
side rails up/treatment delay explained/wait time explained/darkened lights/plan of care explained/warm blanket provided

## 2019-12-07 NOTE — H&P ADULT - NSHPLABSRESULTS_GEN_ALL_CORE
16.1   15.18 )-----------( 314      ( 07 Dec 2019 10:19 )             48.7               12-    138  |  98  |  34<H>  ----------------------------<  80  3.7   |  25  |  0.68    Ca    10.1      07 Dec 2019 10:19  Phos  2.7       Mg     2.1         TPro  7.2  /  Alb  4.5  /  TBili  0.5  /  DBili  x   /  AST  22  /  ALT  32  /  AlkPhos  61  12-    PT/INR - ( 07 Dec 2019 10:19 )   PT: 12.6 sec;   INR: 1.10 ratio         PTT - ( 07 Dec 2019 10:19 )  PTT:25.7 sec                   Urinalysis Basic - ( 07 Dec 2019 11:05 )    Color: Yellow / Appearance: Clear / S.026 / pH: x  Gluc: x / Ketone: Trace  / Bili: Negative / Urobili: Negative   Blood: x / Protein: Trace / Nitrite: Negative   Leuk Esterase: Negative / RBC: 5 /hpf / WBC 0 /HPF   Sq Epi: x / Non Sq Epi: 0 /hpf / Bacteria: Negative    < from: CT Head w/wo IV Cont (19 @ 12:16) >    IMPRESSION:  Postop changes at the level of the left frontal lobe.   Residual edema and mass effect remains. There is a focus of low   attenuation subjacent to the craniotomy with a higher attenuation   ringlike deeper component that is suspicious for residual neoplasm   identified on the patient's prior MR 2019. Close interval follow-up   recommended    < from: CT Chest No Cont (19 @ 22:47) >    Impression: Left lower lobe pneumonia.    < from: MR Lumbar Spine w/wo IV Cont (19 @ 15:46) >    IMPRESSION:    Enhancing dural metastasis in cervical, thoracic and lumbar spinal canal   involving lateral recesses, neural foramina, and exiting nerve roots   including sacralnerve roots, consistent with metastatic disease.   Flattening most pronounced ventrally at mid and upper thoracic spine,   with minimal dorsal CSF and dorsal epidural lipomatosis.    Faint hyperintense T2 signal in the spinal cord, may represent edema   and/or myelomalacia. Multilevel degenerative changes as above.

## 2019-12-07 NOTE — CONSULT NOTE ADULT - SUBJECTIVE AND OBJECTIVE BOX
p ([id number])     HPI: 65 y/o m with pmhx Smoker 50+pk yrs, 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 taking meds at home with no relief. no fever or chills. unable to get up and move around as he had recently done in the past. no cough. no shortness of breath. no new numbness. no headache. daughter / son in law and wife at the bedside.         Imaging:    Exam: oriented x3, FC, distal LE 4/5, proximal LE 2-3/5, UE 4/5. SILT. Endorses back pain.    --Anticoagulation:    =====================  PAST MEDICAL HISTORY   No pertinent past medical history    PAST SURGICAL HISTORY   Status post craniotomy  No significant past surgical history        MEDICATIONS:  Antibiotics:    Neuro:    Other:      SOCIAL HISTORY:   Occupation:   Marital Status:     FAMILY HISTORY:  No pertinent family history in first degree relatives      ROS: Negative except per HPI    LABS:  PT/INR - ( 07 Dec 2019 10:19 )   PT: 12.6 sec;   INR: 1.10 ratio         PTT - ( 07 Dec 2019 10:19 )  PTT:25.7 sec                        16.1   15.18 )-----------( 314      ( 07 Dec 2019 10:19 )             48.7     12-07    138  |  98  |  34<H>  ----------------------------<  80  3.7   |  25  |  0.68    Ca    10.1      07 Dec 2019 10:19  Phos  2.7     12-07  Mg     2.1     12-07    TPro  7.2  /  Alb  4.5  /  TBili  0.5  /  DBili  x   /  AST  22  /  ALT  32  /  AlkPhos  61  12-07

## 2019-12-07 NOTE — CONSULT NOTE ADULT - CONSULT REASON
INR is 1.6 with goal of 2.0 to 2.5. Patient ate lots of vegetables yesterday, including vegetable soup and a salad. This is unusual for her.  Warfarin 3.75 mg today and 2.5 mg tomorrow with INR recheck in 2 days, Fri, 1/12/18. Patient informed. Patient checks INR at home with [facility]. Outside provider informed.      Chest pain

## 2019-12-07 NOTE — ED ADULT NURSE REASSESSMENT NOTE - GENERAL PATIENT STATE
resting/sleeping/family/SO at bedside
resting/sleeping/comfortable appearance/cooperative/family/SO at bedside

## 2019-12-07 NOTE — CONSULT NOTE ADULT - ASSESSMENT
Gabrielle Andrews  64M PMHx smoker 50+yr 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 Per family the patient had MR at [facility] 3 weeks ago showing diffuse leptomeningeal spread (films unavail). Family is still shrouding patient from diagnosis/prognosis. Exam: oriented x3, FC, distal LE 4/5, proximal LE 2-3/5, UE 4/5. SILT. Endorses back pain.  - No neurosurgical intervention  - ED ordered CT total spine and MR total spine, agree for prognostication  - Discussed GOC with family, interested in pain control and recs from oncology  - Neuro-onc consult

## 2019-12-07 NOTE — ED ADULT NURSE REASSESSMENT NOTE - NS ED NURSE REASSESS COMMENT FT1
Pt has pos and equal sensation to extremities bilat, pos and equal strength to extremities x 3, strong peripheral pulses x 4, no numbness/tingling. Pt reports feeling weak.

## 2019-12-07 NOTE — H&P ADULT - NSHPREVIEWOFSYSTEMS_GEN_ALL_CORE
REVIEW OF SYSTEMS:    CONSTITUTIONAL: + weakness, + Pain   EYES/ENT: No visual changes;  No vertigo or throat pain   NECK: No pain or stiffness  RESPIRATORY: No cough, wheezing, hemoptysis; No shortness of breath  CARDIOVASCULAR: chest wall pain   GASTROINTESTINAL: No abdominal or epigastric pain  GENITOURINARY: loss control   NEUROLOGICAL: LE weakness   SKIN: No itching, burning, rashes, or lesions   All other review of systems is negative unless indicated above.

## 2019-12-08 LAB
ALBUMIN SERPL ELPH-MCNC: 1.4 G/DL — LOW (ref 3.3–5)
ALBUMIN SERPL ELPH-MCNC: 3.8 G/DL — SIGNIFICANT CHANGE UP (ref 3.3–5)
ALP SERPL-CCNC: 23 U/L — LOW (ref 40–120)
ALP SERPL-CCNC: 59 U/L — SIGNIFICANT CHANGE UP (ref 40–120)
ALT FLD-CCNC: 11 U/L — SIGNIFICANT CHANGE UP (ref 10–45)
ALT FLD-CCNC: 27 U/L — SIGNIFICANT CHANGE UP (ref 10–45)
ANION GAP SERPL CALC-SCNC: 16 MMOL/L — SIGNIFICANT CHANGE UP (ref 5–17)
ANION GAP SERPL CALC-SCNC: 7 MMOL/L — SIGNIFICANT CHANGE UP (ref 5–17)
AST SERPL-CCNC: 19 U/L — SIGNIFICANT CHANGE UP (ref 10–40)
AST SERPL-CCNC: 9 U/L — LOW (ref 10–40)
BILIRUB SERPL-MCNC: 0.3 MG/DL — SIGNIFICANT CHANGE UP (ref 0.2–1.2)
BILIRUB SERPL-MCNC: 0.8 MG/DL — SIGNIFICANT CHANGE UP (ref 0.2–1.2)
BUN SERPL-MCNC: 19 MG/DL — SIGNIFICANT CHANGE UP (ref 7–23)
BUN SERPL-MCNC: 35 MG/DL — HIGH (ref 7–23)
CALCIUM SERPL-MCNC: 4.1 MG/DL — CRITICAL LOW (ref 8.4–10.5)
CALCIUM SERPL-MCNC: 9.6 MG/DL — SIGNIFICANT CHANGE UP (ref 8.4–10.5)
CHLORIDE SERPL-SCNC: 125 MMOL/L — HIGH (ref 96–108)
CHLORIDE SERPL-SCNC: 98 MMOL/L — SIGNIFICANT CHANGE UP (ref 96–108)
CO2 SERPL-SCNC: 13 MMOL/L — LOW (ref 22–31)
CO2 SERPL-SCNC: 21 MMOL/L — LOW (ref 22–31)
CREAT SERPL-MCNC: 0.68 MG/DL — SIGNIFICANT CHANGE UP (ref 0.5–1.3)
CREAT SERPL-MCNC: <0.3 MG/DL — LOW (ref 0.5–1.3)
GLUCOSE SERPL-MCNC: 128 MG/DL — HIGH (ref 70–99)
GLUCOSE SERPL-MCNC: 65 MG/DL — LOW (ref 70–99)
HCT VFR BLD CALC: 46.8 % — SIGNIFICANT CHANGE UP (ref 39–50)
HGB BLD-MCNC: 15.2 G/DL — SIGNIFICANT CHANGE UP (ref 13–17)
MCHC RBC-ENTMCNC: 31.5 PG — SIGNIFICANT CHANGE UP (ref 27–34)
MCHC RBC-ENTMCNC: 32.5 GM/DL — SIGNIFICANT CHANGE UP (ref 32–36)
MCV RBC AUTO: 96.9 FL — SIGNIFICANT CHANGE UP (ref 80–100)
PLATELET # BLD AUTO: 208 K/UL — SIGNIFICANT CHANGE UP (ref 150–400)
POTASSIUM SERPL-MCNC: 2 MMOL/L — CRITICAL LOW (ref 3.5–5.3)
POTASSIUM SERPL-MCNC: 3.8 MMOL/L — SIGNIFICANT CHANGE UP (ref 3.5–5.3)
POTASSIUM SERPL-SCNC: 2 MMOL/L — CRITICAL LOW (ref 3.5–5.3)
POTASSIUM SERPL-SCNC: 3.8 MMOL/L — SIGNIFICANT CHANGE UP (ref 3.5–5.3)
PROT SERPL-MCNC: 2.8 G/DL — LOW (ref 6–8.3)
PROT SERPL-MCNC: 6.4 G/DL — SIGNIFICANT CHANGE UP (ref 6–8.3)
RBC # BLD: 4.83 M/UL — SIGNIFICANT CHANGE UP (ref 4.2–5.8)
RBC # FLD: 14 % — SIGNIFICANT CHANGE UP (ref 10.3–14.5)
SODIUM SERPL-SCNC: 135 MMOL/L — SIGNIFICANT CHANGE UP (ref 135–145)
SODIUM SERPL-SCNC: 145 MMOL/L — SIGNIFICANT CHANGE UP (ref 135–145)
TSH SERPL-MCNC: 0.33 UIU/ML — SIGNIFICANT CHANGE UP (ref 0.27–4.2)
WBC # BLD: 20.88 K/UL — HIGH (ref 3.8–10.5)
WBC # FLD AUTO: 20.88 K/UL — HIGH (ref 3.8–10.5)

## 2019-12-08 RX ORDER — FENTANYL CITRATE 50 UG/ML
1 INJECTION INTRAVENOUS
Refills: 0 | Status: DISCONTINUED | OUTPATIENT
Start: 2019-12-08 | End: 2019-12-09

## 2019-12-08 RX ORDER — SENNA PLUS 8.6 MG/1
2 TABLET ORAL AT BEDTIME
Refills: 0 | Status: DISCONTINUED | OUTPATIENT
Start: 2019-12-08 | End: 2019-12-21

## 2019-12-08 RX ORDER — SODIUM CHLORIDE 9 MG/ML
1000 INJECTION, SOLUTION INTRAVENOUS
Refills: 0 | Status: DISCONTINUED | OUTPATIENT
Start: 2019-12-08 | End: 2019-12-08

## 2019-12-08 RX ORDER — DEXAMETHASONE 0.5 MG/5ML
1 ELIXIR ORAL
Qty: 0 | Refills: 0 | DISCHARGE

## 2019-12-08 RX ORDER — SODIUM CHLORIDE 9 MG/ML
1000 INJECTION, SOLUTION INTRAVENOUS
Refills: 0 | Status: DISCONTINUED | OUTPATIENT
Start: 2019-12-08 | End: 2019-12-11

## 2019-12-08 RX ORDER — DIPHENHYDRAMINE HCL 50 MG
25 CAPSULE ORAL ONCE
Refills: 0 | Status: COMPLETED | OUTPATIENT
Start: 2019-12-08 | End: 2019-12-08

## 2019-12-08 RX ORDER — OXYCODONE AND ACETAMINOPHEN 5; 325 MG/1; MG/1
1 TABLET ORAL EVERY 6 HOURS
Refills: 0 | Status: DISCONTINUED | OUTPATIENT
Start: 2019-12-08 | End: 2019-12-08

## 2019-12-08 RX ORDER — ACETAMINOPHEN 500 MG
650 TABLET ORAL EVERY 6 HOURS
Refills: 0 | Status: DISCONTINUED | OUTPATIENT
Start: 2019-12-08 | End: 2019-12-21

## 2019-12-08 RX ORDER — SODIUM BICARBONATE 1 MEQ/ML
0.28 SYRINGE (ML) INTRAVENOUS
Qty: 150 | Refills: 0 | Status: DISCONTINUED | OUTPATIENT
Start: 2019-12-08 | End: 2019-12-08

## 2019-12-08 RX ORDER — LIDOCAINE 4 G/100G
1 CREAM TOPICAL DAILY
Refills: 0 | Status: DISCONTINUED | OUTPATIENT
Start: 2019-12-08 | End: 2019-12-10

## 2019-12-08 RX ORDER — ACETAMINOPHEN 500 MG
1000 TABLET ORAL ONCE
Refills: 0 | Status: COMPLETED | OUTPATIENT
Start: 2019-12-08 | End: 2019-12-08

## 2019-12-08 RX ORDER — HYDROMORPHONE HYDROCHLORIDE 2 MG/ML
1 INJECTION INTRAMUSCULAR; INTRAVENOUS; SUBCUTANEOUS EVERY 4 HOURS
Refills: 0 | Status: DISCONTINUED | OUTPATIENT
Start: 2019-12-08 | End: 2019-12-09

## 2019-12-08 RX ORDER — HYDROMORPHONE HYDROCHLORIDE 2 MG/ML
1 INJECTION INTRAMUSCULAR; INTRAVENOUS; SUBCUTANEOUS ONCE
Refills: 0 | Status: DISCONTINUED | OUTPATIENT
Start: 2019-12-08 | End: 2019-12-08

## 2019-12-08 RX ADMIN — FENTANYL CITRATE 1 PATCH: 50 INJECTION INTRAVENOUS at 23:08

## 2019-12-08 RX ADMIN — HYDROMORPHONE HYDROCHLORIDE 1 MILLIGRAM(S): 2 INJECTION INTRAMUSCULAR; INTRAVENOUS; SUBCUTANEOUS at 23:49

## 2019-12-08 RX ADMIN — Medication 4 MILLIGRAM(S): at 09:46

## 2019-12-08 RX ADMIN — LIDOCAINE 1 PATCH: 4 CREAM TOPICAL at 23:05

## 2019-12-08 RX ADMIN — HYDROMORPHONE HYDROCHLORIDE 1 MILLIGRAM(S): 2 INJECTION INTRAMUSCULAR; INTRAVENOUS; SUBCUTANEOUS at 12:05

## 2019-12-08 RX ADMIN — SODIUM CHLORIDE 100 MILLILITER(S): 9 INJECTION, SOLUTION INTRAVENOUS at 10:34

## 2019-12-08 RX ADMIN — HYDROMORPHONE HYDROCHLORIDE 1 MILLIGRAM(S): 2 INJECTION INTRAMUSCULAR; INTRAVENOUS; SUBCUTANEOUS at 08:33

## 2019-12-08 RX ADMIN — HYDROMORPHONE HYDROCHLORIDE 1 MILLIGRAM(S): 2 INJECTION INTRAMUSCULAR; INTRAVENOUS; SUBCUTANEOUS at 02:10

## 2019-12-08 RX ADMIN — LEVETIRACETAM 500 MILLIGRAM(S): 250 TABLET, FILM COATED ORAL at 19:01

## 2019-12-08 RX ADMIN — HYDROMORPHONE HYDROCHLORIDE 1 MILLIGRAM(S): 2 INJECTION INTRAMUSCULAR; INTRAVENOUS; SUBCUTANEOUS at 08:50

## 2019-12-08 RX ADMIN — Medication 4 MILLIGRAM(S): at 14:54

## 2019-12-08 RX ADMIN — PIPERACILLIN AND TAZOBACTAM 25 GRAM(S): 4; .5 INJECTION, POWDER, LYOPHILIZED, FOR SOLUTION INTRAVENOUS at 06:27

## 2019-12-08 RX ADMIN — Medication 4 MILLIGRAM(S): at 02:15

## 2019-12-08 RX ADMIN — LIDOCAINE 1 PATCH: 4 CREAM TOPICAL at 08:34

## 2019-12-08 RX ADMIN — PIPERACILLIN AND TAZOBACTAM 25 GRAM(S): 4; .5 INJECTION, POWDER, LYOPHILIZED, FOR SOLUTION INTRAVENOUS at 14:53

## 2019-12-08 RX ADMIN — SODIUM CHLORIDE 100 MILLILITER(S): 9 INJECTION, SOLUTION INTRAVENOUS at 14:22

## 2019-12-08 RX ADMIN — HYDROMORPHONE HYDROCHLORIDE 1 MILLIGRAM(S): 2 INJECTION INTRAMUSCULAR; INTRAVENOUS; SUBCUTANEOUS at 02:17

## 2019-12-08 RX ADMIN — SENNA PLUS 2 TABLET(S): 8.6 TABLET ORAL at 23:04

## 2019-12-08 RX ADMIN — Medication 1000 MILLIGRAM(S): at 18:25

## 2019-12-08 RX ADMIN — ENOXAPARIN SODIUM 80 MILLIGRAM(S): 100 INJECTION SUBCUTANEOUS at 13:10

## 2019-12-08 RX ADMIN — HYDROMORPHONE HYDROCHLORIDE 1 MILLIGRAM(S): 2 INJECTION INTRAMUSCULAR; INTRAVENOUS; SUBCUTANEOUS at 16:04

## 2019-12-08 RX ADMIN — Medication 4 MILLIGRAM(S): at 20:03

## 2019-12-08 RX ADMIN — Medication 400 MILLIGRAM(S): at 18:06

## 2019-12-08 RX ADMIN — QUETIAPINE FUMARATE 25 MILLIGRAM(S): 200 TABLET, FILM COATED ORAL at 23:04

## 2019-12-08 RX ADMIN — HYDROMORPHONE HYDROCHLORIDE 1 MILLIGRAM(S): 2 INJECTION INTRAMUSCULAR; INTRAVENOUS; SUBCUTANEOUS at 12:20

## 2019-12-08 RX ADMIN — LEVETIRACETAM 500 MILLIGRAM(S): 250 TABLET, FILM COATED ORAL at 06:28

## 2019-12-08 RX ADMIN — FENTANYL CITRATE 1 PATCH: 50 INJECTION INTRAVENOUS at 14:59

## 2019-12-08 RX ADMIN — HYDROMORPHONE HYDROCHLORIDE 1 MILLIGRAM(S): 2 INJECTION INTRAMUSCULAR; INTRAVENOUS; SUBCUTANEOUS at 16:19

## 2019-12-08 RX ADMIN — Medication 25 MILLIGRAM(S): at 23:04

## 2019-12-08 RX ADMIN — OXYCODONE AND ACETAMINOPHEN 1 TABLET(S): 5; 325 TABLET ORAL at 06:28

## 2019-12-08 RX ADMIN — FENTANYL CITRATE 1 PATCH: 50 INJECTION INTRAVENOUS at 15:00

## 2019-12-08 NOTE — CONSULT NOTE ADULT - ASSESSMENT
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    A/P:    Acidosis:  Non-AG  Change IVF D5 +1/2NS +75meq NaHCO3 100cc/hr  Monitor acidosis      Increased BUN:  Possible sec to malignancy  In view of high Urine sp gravity he is likely dehydrated  Continue current IVF and monitor    Hematuria:  UA has RBC 5  Possible sec to [place]'s cath  repeat UA  if persistent, get renal US Pt is a 65 y/o male with pmhx Smoker 50+pk yrs, 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    A/P:    Acidosis:  Non-AG  Change IVF D5 +1/2NS +75meq NaHCO3 100cc/hr  Monitor acidosis      Increased BUN:  Possible sec to malignancy  In view of high Urine sp gravity he is likely dehydrated  Steroid could cause increased BUN  Continue current IVF and monitor at present    Hematuria:  UA has RBC 5  Possible sec to [place]'s cath  repeat UA  if persistent, get renal US

## 2019-12-08 NOTE — PROGRESS NOTE ADULT - PROBLEM SELECTOR PLAN 1
Seen on MRI  Neurosurg eval appreciated  Onc eval appreciated  possible systemic treatment, poor candidate  Onc surg eval tomorrow   Decadron 4mg Q6hrs  pain control, aggressive   Neuro check

## 2019-12-08 NOTE — PROGRESS NOTE ADULT - SUBJECTIVE AND OBJECTIVE BOX
Patient is a 64y old  Male who presents with a chief complaint of LE weakness (08 Dec 2019 21:13)      INTERVAL HISTORY: feels ok      PHYSICAL EXAM:  T(C): 36.7 (12-08-19 @ 21:40), Max: 36.7 (12-08-19 @ 01:19)  HR: 84 (12-08-19 @ 21:40) (72 - 87)  BP: 110/76 (12-08-19 @ 21:40) (100/70 - 118/77)  RR: 18 (12-08-19 @ 21:40) (18 - 18)  SpO2: 94% (12-08-19 @ 21:40) (94% - 100%)  Wt(kg): --  I&O's Summary    07 Dec 2019 07:01  -  08 Dec 2019 07:00  --------------------------------------------------------  IN: 0 mL / OUT: 1000 mL / NET: -1000 mL    08 Dec 2019 07:01  -  09 Dec 2019 00:10  --------------------------------------------------------  IN: 0 mL / OUT: 900 mL / NET: -900 mL          Appearance: In no distress	  HEENT:    PERRL, EOMI	  Cardiovascular:  S1 S2, No JVD  Respiratory: Lungs clear to auscultation	  Gastrointestinal:  Soft, Non-tender, + BS	  Extremities:  No edema of LE                                15.2   20.88 )-----------( 208      ( 08 Dec 2019 09:27 )             46.8     12-08    135  |  98  |  35<H>  ----------------------------<  128<H>  3.8   |  21<L>  |  0.68    Ca    9.6      08 Dec 2019 09:07  Phos  2.7     12-07  Mg     2.1     12-07    TPro  6.4  /  Alb  3.8  /  TBili  0.8  /  DBili  x   /  AST  19  /  ALT  27  /  AlkPhos  59  12-08        Labs personally reviewed      Assessment and Plan:   Assessment:  · Assessment		  Pt is a 65 Y/O Male with PMHx smoker 50+yr 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 Per family the patient had MR at [facility] 3 weeks ago showing diffuse leptomeningeal spread (films unavail). Family is still shrouding patient from diagnosis/prognosis. Exam: oriented x3, FC, distal LE 4/5, proximal LE 2-3/5, UE 4/5. SILT. Endorses back pain.      Problem/Plan - 1:  ·  Problem: Cord compression.  Plan: Seen on MRI  Neurosurg eval appreciated      Problem/Plan - 2:  ·  Problem: Pulmonary embolism.  Plan: COnt lovenox   Monitor Hgb level   O 2 supplement.     Problem/Plan - 3:  ·  Problem: Chest pain, atypical.  Plan: does not appear to be cardiac in nature. ?2/2 PE   Repeat EKG and echo to further assess    Problem/Plan - 4:  Problem: Prophylactic measure. Plan: DVT and gI PPX.      Petey Roberson, DO [facility]  Cardiovascular Medicine  [phone]          Petey Roberson, DO [facility]  Cardiovascular Medicine  [phone]

## 2019-12-08 NOTE — PROGRESS NOTE ADULT - ASSESSMENT
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 Per family the patient had MR at [facility] 3 weeks ago showing diffuse leptomeningeal spread (films unavail). Family is still shrouding patient from diagnosis/prognosis. Exam: oriented x3, FC, distal LE 4/5, proximal LE 2-3/5, UE 4/5. SILT. Endorses back pain.

## 2019-12-08 NOTE — PROGRESS NOTE ADULT - PROBLEM SELECTOR PLAN 4
will hold abx now   monitor for respiratory worsneing   O2 supplement  Nebs   negative procalcitonin

## 2019-12-08 NOTE — PROGRESS NOTE ADULT - SUBJECTIVE AND OBJECTIVE BOX
[facility]    Subjective: Patient seen and examined. No new events except as noted.   cont to have severe back pain     REVIEW OF SYSTEMS:    CONSTITUTIONAL: + pain and weakness   EYES/ENT: No visual changes;  No vertigo or throat pain   NECK: No pain or stiffness  RESPIRATORY: No shortness of breath  CARDIOVASCULAR: No chest pain or palpitations  GASTROINTESTINAL: no bowel movement   GENITOURINARY: No dysuria, frequency or hematuria  NEUROLOGICAL: No numbness or weakness  SKIN: No itching, burning, rashes, or lesions   All other review of systems is negative unless indicated above.    MEDICATIONS:  MEDICATIONS  (STANDING):  dexAMETHasone     Tablet 4 milliGRAM(s) Oral every 6 hours  dextrose 5% + sodium chloride 0.45% 1000 milliLiter(s) (100 mL/Hr) IV Continuous <Continuous>  enoxaparin Injectable 80 milliGRAM(s) SubCutaneous daily  fentaNYL   Patch  12 MICROgram(s)/Hr 1 Patch Transdermal every 72 hours  fentaNYL   Patch  50 MICROgram(s)/Hr 1 Patch Transdermal every 72 hours  levETIRAcetam 500 milliGRAM(s) Oral two times a day  lidocaine   Patch 1 Patch Transdermal daily  piperacillin/tazobactam IVPB.. 3.375 Gram(s) IV Intermittent every 8 hours  QUEtiapine 25 milliGRAM(s) Oral at bedtime      PHYSICAL EXAM:  T(C): 36.5 (19 @ 15:51), Max: 36.7 (19 @ 01:19)  HR: 72 (19 @ 15:51) (72 - 87)  BP: 118/77 (19 @ 15:51) (100/70 - 118/77)  RR: 18 (19 @ 15:51) (18 - 18)  SpO2: 95% (19 @ 15:51) (94% - 100%)  Wt(kg): --  I&O's Summary    07 Dec 2019 07:  -  08 Dec 2019 07:00  --------------------------------------------------------  IN: 0 mL / OUT: 1000 mL / NET: -1000 mL    08 Dec 2019 07:01  -  08 Dec 2019 21:14  --------------------------------------------------------  IN: 0 mL / OUT: 600 mL / NET: -600 mL          Appearance: awake, frail   HEENT: dry OM   Lymphatic: no edema  Cardiovascular: Normal S1 S2  Respiratory: normal effort 	  Gastrointestinal:  Soft, Non-tender, + BS	  Skin: warm to touch  Musculoskeletal: B/L  LE weakness   Psychiatry:  Mood & affect appropriate  Ext: No edema      All labs, Imaging and EKGs personally reviewed                             15.2   20.88 )-----------( 208      ( 08 Dec 2019 09:27 )             46.8               12    135  |  98  |  35<H>  ----------------------------<  128<H>  3.8   |  21<L>  |  0.68    Ca    9.6      08 Dec 2019 09:07  Phos  2.7     12-  Mg     2.1     12-    TPro  6.4  /  Alb  3.8  /  TBili  0.8  /  DBili  x   /  AST  19  /  ALT  27  /  AlkPhos  59  12-08    PT/INR - ( 07 Dec 2019 10:19 )   PT: 12.6 sec;   INR: 1.10 ratio         PTT - ( 07 Dec 2019 10:19 )  PTT:25.7 sec                   Urinalysis Basic - ( 07 Dec 2019 11:05 )    Color: Yellow / Appearance: Clear / S.026 / pH: x  Gluc: x / Ketone: Trace  / Bili: Negative / Urobili: Negative   Blood: x / Protein: Trace / Nitrite: Negative   Leuk Esterase: Negative / RBC: 5 /hpf / WBC 0 /HPF   Sq Epi: x / Non Sq Epi: 0 /hpf / Bacteria: Negative

## 2019-12-08 NOTE — CONSULT NOTE ADULT - SUBJECTIVE AND OBJECTIVE BOX
patient seen in coverage for Dr. Fleming and he will see patient tomorrow to resume care.  Information gotten from family at bedside as patient on pain medication and is lethargic.    Chief Complaint:  Patient is a 64y old  Male who presents with a chief complaint of LE weakness (08 Dec 2019 12:35)      HPI: patient known to Dr. Fleming from a prior hospitalization.  He was here in September and at this time he had brain metastases due to high grade NE cancer.  he did have a craniotomy.  the family says that he had a PET/CT and it was negative so they did not start on systemic therapy, however, he has developed weakness and a spinal MRI done as out-pt and here notes metastatic disease throughout the whole spine.  He is getting decadron.  He was seen by NS and no plan for surgical intervention.  the family still wants to consider some treatment modalities including RT, immunotherapy, etc.    Medications:  acetaminophen   Tablet .. 650 milliGRAM(s) Oral every 6 hours PRN  dexAMETHasone     Tablet 4 milliGRAM(s) Oral every 6 hours  enoxaparin Injectable 80 milliGRAM(s) SubCutaneous daily  HYDROmorphone  Injectable 1 milliGRAM(s) IV Push every 4 hours PRN  levETIRAcetam 500 milliGRAM(s) Oral two times a day  piperacillin/tazobactam IVPB.. 3.375 Gram(s) IV Intermittent every 8 hours  QUEtiapine 25 milliGRAM(s) Oral at bedtime        Allergies:  No Known Allergies    FAMILY HISTORY:  No pertinent family history in first degree relatives    PAST MEDICAL & SURGICAL HISTORY:  No pertinent past medical history  Status post craniotomy      REVIEW OF SYSTEMS  Limited as stated above.  Appetite has been fair.  No weight loss.  he has back pain.  No CP or SOB.  No fevers.  No N/V.  last BM a couple of days ago.  No incontinence but he was having trouble urinating which is why a france was placed.    Vitals:  Vital Signs Last 24 Hrs  T(C): 36.4 (08 Dec 2019 11:38), Max: 36.9 (07 Dec 2019 16:19)  T(F): 97.5 (08 Dec 2019 11:38), Max: 98.4 (07 Dec 2019 16:19)  HR: 73 (08 Dec 2019 11:38) (73 - 93)  BP: 110/70 (08 Dec 2019 11:38) (93/74 - 110/70)  BP(mean): --  RR: 18 (08 Dec 2019 11:38) (18 - 19)  SpO2: 94% (08 Dec 2019 11:38) (94% - 100%)    Pex:  lethargic but opens eyes to verbal stimuli' NAD  EOMI anicteric sclera  Neck No LNA  Cv s1 S2 RRR  Lungs clear B/L anteriorly  abd soft NT ND +BS  No LE edema or tenderness    Labs:                        15.2   20.88 )-----------( 208      ( 08 Dec 2019 09:27 )             46.8     CBC Full  -  ( 08 Dec 2019 09:27 )  WBC Count : 20.88 K/uL  RBC Count : 4.83 M/uL  Hemoglobin : 15.2 g/dL  Hematocrit : 46.8 %  Platelet Count - Automated : 208 K/uL  Mean Cell Volume : 96.9 fl  Mean Cell Hemoglobin : 31.5 pg  Mean Cell Hemoglobin Concentration : 32.5 gm/dL  Auto Neutrophil # : x  Auto Lymphocyte # : x  Auto Monocyte # : x  Auto Eosinophil # : x  Auto Basophil # : x  Auto Neutrophil % : x  Auto Lymphocyte % : x  Auto Monocyte % : x  Auto Eosinophil % : x  Auto Basophil % : x    12-08    135  |  98  |  35<H>  ----------------------------<  128<H>  3.8   |  21<L>  |  0.68    Ca    9.6      08 Dec 2019 09:07  Phos  2.7     12-07  Mg     2.1     12-07    TPro  6.4  /  Alb  3.8  /  TBili  0.8  /  DBili  x   /  AST  19  /  ALT  27  /  AlkPhos  59  12-08    PT/INR - ( 07 Dec 2019 10:19 )   PT: 12.6 sec;   INR: 1.10 ratio         PTT - ( 07 Dec 2019 10:19 )  PTT:25.7 sec  [id number]

## 2019-12-08 NOTE — CONSULT NOTE ADULT - ASSESSMENT
Patient with metastatic carcinoma with neuro-endocrine features.  he did have a prior craniotomy for brain mets.  he has yet to start on systemic therapy.  he now has evidence of diffuse spinal metastases.  he has pain and weakness.  he is on Decadron.  NS does not plan on any interventions.  He will be seen by Neuro-onc in AM to consider options.  Family would still like to try something.  MS was stable.  PDL -1 is 15% positive.  Overall prognosis is poor.  Does not appear to be a good candidate for systemic therapy but perhaps if he is given RT and he has some improvement in functionality then could consider.  Patient to be seen by Dr. Fleming/[place] tomorrow to resume care.    leucocytosis is likely due to steroids. and reactive process.  He is afebrile.

## 2019-12-08 NOTE — CONSULT NOTE ADULT - SUBJECTIVE AND OBJECTIVE BOX
Dr. Welsh  Office [phone]  Cell [phone]  Gladys QUINTERO  Cell [phone]    HPI:  Pt is a 65 y/o male with pmhx Smoker 50+pk yrs, recently diagnosed high grade neuroendocrine tumor met to brain s/p resection , with unknown primary (suspected Lung) presents for worsening lower ext weakness and diffuse body pain. taking meds at home with no relief. no fever or chills. unable to get up and move around as he had recently done in the past. no cough. no shortness of breath. no new numbness. no headache. son in law at the bedside.       Allergies:  No Known Allergies      PAST MEDICAL & SURGICAL HISTORY:  No pertinent past medical history  Status post craniotomy      Home Medications Reviewed    Hospital Medications:   MEDICATIONS  (STANDING):  dexAMETHasone     Tablet 4 milliGRAM(s) Oral every 6 hours  dextrose 5% + sodium chloride 0.9%. 1000 milliLiter(s) (100 mL/Hr) IV Continuous <Continuous>  enoxaparin Injectable 80 milliGRAM(s) SubCutaneous daily  levETIRAcetam 500 milliGRAM(s) Oral two times a day  piperacillin/tazobactam IVPB.. 3.375 Gram(s) IV Intermittent every 8 hours  QUEtiapine 25 milliGRAM(s) Oral at bedtime      SOCIAL HISTORY:  Denies ETOh, Smoking,     FAMILY HISTORY:  No pertinent family history in first degree relatives      REVIEW OF SYSTEMS:  CONSTITUTIONAL: has weakness, no fevers or chills  EYES/ENT: No visual changes;  No vertigo or throat pain   NECK: No pain or stiffness  RESPIRATORY: No cough, wheezing, hemoptysis; No shortness of breath  CARDIOVASCULAR: No chest pain or palpitations.  GASTROINTESTINAL: No abdominal or epigastric pain. No nausea, vomiting, or hematemesis; No diarrhea or constipation. No melena or hematochezia.  GENITOURINARY: No dysuria, frequency, foamy urine, urinary urgency, incontinence or hematuria  NEUROLOGICAL: No numbness or weakness  SKIN: No itching, burning, rashes, or lesions   VASCULAR: No bilateral lower extremity edema.   All other review of systems is negative unless indicated above.    VITALS:  T(F): 97.5 (19 @ 11:38), Max: 98.4 (19 @ 16:19)  HR: 73 (19 @ 11:38)  BP: 110/70 (19 @ 11:38)  RR: 18 (19 @ 11:38)  SpO2: 94% (19 @ 11:38)  Wt(kg): --     @ 07:01  -   @ 07:00  --------------------------------------------------------  IN: 0 mL / OUT: 1000 mL / NET: -1000 mL          PHYSICAL EXAM:  Constitutional: NAD  HEENT: anicteric sclera, oropharynx clear, MMM  Neck: No JVD  Respiratory: CTAB, no wheezes, rales or rhonchi  Cardiovascular: S1, S2, RRR  Gastrointestinal: BS+, soft, NT/ND  Extremities: No cyanosis or clubbing. No peripheral edema  Neurological: A/O x 3   : No CVA tenderness. + france.   Skin: No rashes       LABS:      135  |  98  |  35<H>  ----------------------------<  128<H>  3.8   |  21<L>  |  0.68    Ca    9.6      08 Dec 2019 09:07  Phos  2.7       Mg     2.1         TPro  6.4  /  Alb  3.8  /  TBili  0.8  /  DBili      /  AST  19  /  ALT  27  /  AlkPhos  59      Creatinine Trend: 0.68 <--, <0.30 <--, 0.68 <--                        15.2   20.88 )-----------( 208      ( 08 Dec 2019 09:27 )             46.8     Urine Studies:  Urinalysis Basic - ( 07 Dec 2019 11:05 )    Color: Yellow / Appearance: Clear / S.026 / pH:   Gluc:  / Ketone: Trace  / Bili: Negative / Urobili: Negative   Blood:  / Protein: Trace / Nitrite: Negative   Leuk Esterase: Negative / RBC: 5 /hpf / WBC 0 /HPF   Sq Epi:  / Non Sq Epi: 0 /hpf / Bacteria: Negative          RADIOLOGY & ADDITIONAL STUDIES:

## 2019-12-09 DIAGNOSIS — Z71.89 OTHER SPECIFIED COUNSELING: ICD-10-CM

## 2019-12-09 DIAGNOSIS — Z51.5 ENCOUNTER FOR PALLIATIVE CARE: ICD-10-CM

## 2019-12-09 DIAGNOSIS — M54.9 DORSALGIA, UNSPECIFIED: ICD-10-CM

## 2019-12-09 PROCEDURE — 99223 1ST HOSP IP/OBS HIGH 75: CPT | Mod: GC

## 2019-12-09 RX ORDER — FENTANYL CITRATE 50 UG/ML
1 INJECTION INTRAVENOUS
Refills: 0 | Status: DISCONTINUED | OUTPATIENT
Start: 2019-12-09 | End: 2019-12-11

## 2019-12-09 RX ORDER — SALIVA SUBSTITUTE COMB NO.11 351 MG
5 POWDER IN PACKET (EA) MUCOUS MEMBRANE EVERY 6 HOURS
Refills: 0 | Status: DISCONTINUED | OUTPATIENT
Start: 2019-12-09 | End: 2019-12-21

## 2019-12-09 RX ORDER — HYDROMORPHONE HYDROCHLORIDE 2 MG/ML
1 INJECTION INTRAMUSCULAR; INTRAVENOUS; SUBCUTANEOUS ONCE
Refills: 0 | Status: DISCONTINUED | OUTPATIENT
Start: 2019-12-09 | End: 2019-12-09

## 2019-12-09 RX ORDER — HYDROMORPHONE HYDROCHLORIDE 2 MG/ML
1 INJECTION INTRAMUSCULAR; INTRAVENOUS; SUBCUTANEOUS
Refills: 0 | Status: DISCONTINUED | OUTPATIENT
Start: 2019-12-09 | End: 2019-12-12

## 2019-12-09 RX ORDER — ACETAMINOPHEN 500 MG
1000 TABLET ORAL ONCE
Refills: 0 | Status: COMPLETED | OUTPATIENT
Start: 2019-12-09 | End: 2019-12-09

## 2019-12-09 RX ORDER — GABAPENTIN 400 MG/1
600 CAPSULE ORAL
Refills: 0 | Status: DISCONTINUED | OUTPATIENT
Start: 2019-12-09 | End: 2019-12-12

## 2019-12-09 RX ORDER — ACETAMINOPHEN 500 MG
1000 TABLET ORAL EVERY 8 HOURS
Refills: 0 | Status: COMPLETED | OUTPATIENT
Start: 2019-12-09 | End: 2019-12-12

## 2019-12-09 RX ORDER — ACETAMINOPHEN 500 MG
1000 TABLET ORAL ONCE
Refills: 0 | Status: COMPLETED | OUTPATIENT
Start: 2019-12-09 | End: 2019-12-11

## 2019-12-09 RX ORDER — GABAPENTIN 400 MG/1
300 CAPSULE ORAL
Refills: 0 | Status: DISCONTINUED | OUTPATIENT
Start: 2019-12-09 | End: 2019-12-12

## 2019-12-09 RX ADMIN — Medication 4 MILLIGRAM(S): at 13:02

## 2019-12-09 RX ADMIN — Medication 400 MILLIGRAM(S): at 11:02

## 2019-12-09 RX ADMIN — LIDOCAINE 1 PATCH: 4 CREAM TOPICAL at 13:01

## 2019-12-09 RX ADMIN — FENTANYL CITRATE 1 PATCH: 50 INJECTION INTRAVENOUS at 17:30

## 2019-12-09 RX ADMIN — HYDROMORPHONE HYDROCHLORIDE 1 MILLIGRAM(S): 2 INJECTION INTRAMUSCULAR; INTRAVENOUS; SUBCUTANEOUS at 10:09

## 2019-12-09 RX ADMIN — HYDROMORPHONE HYDROCHLORIDE 1 MILLIGRAM(S): 2 INJECTION INTRAMUSCULAR; INTRAVENOUS; SUBCUTANEOUS at 07:03

## 2019-12-09 RX ADMIN — Medication 1000 MILLIGRAM(S): at 22:35

## 2019-12-09 RX ADMIN — Medication 1000 MILLIGRAM(S): at 11:32

## 2019-12-09 RX ADMIN — HYDROMORPHONE HYDROCHLORIDE 1 MILLIGRAM(S): 2 INJECTION INTRAMUSCULAR; INTRAVENOUS; SUBCUTANEOUS at 02:19

## 2019-12-09 RX ADMIN — HYDROMORPHONE HYDROCHLORIDE 1 MILLIGRAM(S): 2 INJECTION INTRAMUSCULAR; INTRAVENOUS; SUBCUTANEOUS at 07:18

## 2019-12-09 RX ADMIN — LIDOCAINE 1 PATCH: 4 CREAM TOPICAL at 10:12

## 2019-12-09 RX ADMIN — HYDROMORPHONE HYDROCHLORIDE 1 MILLIGRAM(S): 2 INJECTION INTRAMUSCULAR; INTRAVENOUS; SUBCUTANEOUS at 20:06

## 2019-12-09 RX ADMIN — HYDROMORPHONE HYDROCHLORIDE 1 MILLIGRAM(S): 2 INJECTION INTRAMUSCULAR; INTRAVENOUS; SUBCUTANEOUS at 15:58

## 2019-12-09 RX ADMIN — FENTANYL CITRATE 1 PATCH: 50 INJECTION INTRAVENOUS at 08:00

## 2019-12-09 RX ADMIN — HYDROMORPHONE HYDROCHLORIDE 1 MILLIGRAM(S): 2 INJECTION INTRAMUSCULAR; INTRAVENOUS; SUBCUTANEOUS at 16:30

## 2019-12-09 RX ADMIN — ENOXAPARIN SODIUM 80 MILLIGRAM(S): 100 INJECTION SUBCUTANEOUS at 13:02

## 2019-12-09 RX ADMIN — LIDOCAINE 1 PATCH: 4 CREAM TOPICAL at 19:32

## 2019-12-09 RX ADMIN — HYDROMORPHONE HYDROCHLORIDE 1 MILLIGRAM(S): 2 INJECTION INTRAMUSCULAR; INTRAVENOUS; SUBCUTANEOUS at 22:54

## 2019-12-09 RX ADMIN — HYDROMORPHONE HYDROCHLORIDE 1 MILLIGRAM(S): 2 INJECTION INTRAMUSCULAR; INTRAVENOUS; SUBCUTANEOUS at 10:39

## 2019-12-09 RX ADMIN — HYDROMORPHONE HYDROCHLORIDE 1 MILLIGRAM(S): 2 INJECTION INTRAMUSCULAR; INTRAVENOUS; SUBCUTANEOUS at 19:24

## 2019-12-09 RX ADMIN — LEVETIRACETAM 500 MILLIGRAM(S): 250 TABLET, FILM COATED ORAL at 17:30

## 2019-12-09 RX ADMIN — HYDROMORPHONE HYDROCHLORIDE 1 MILLIGRAM(S): 2 INJECTION INTRAMUSCULAR; INTRAVENOUS; SUBCUTANEOUS at 22:24

## 2019-12-09 RX ADMIN — Medication 5 MILLILITER(S): at 17:30

## 2019-12-09 RX ADMIN — HYDROMORPHONE HYDROCHLORIDE 1 MILLIGRAM(S): 2 INJECTION INTRAMUSCULAR; INTRAVENOUS; SUBCUTANEOUS at 02:34

## 2019-12-09 RX ADMIN — Medication 1000 MILLIGRAM(S): at 23:05

## 2019-12-09 RX ADMIN — LEVETIRACETAM 500 MILLIGRAM(S): 250 TABLET, FILM COATED ORAL at 06:51

## 2019-12-09 RX ADMIN — LIDOCAINE 1 PATCH: 4 CREAM TOPICAL at 11:00

## 2019-12-09 RX ADMIN — FENTANYL CITRATE 1 PATCH: 50 INJECTION INTRAVENOUS at 19:30

## 2019-12-09 RX ADMIN — Medication 4 MILLIGRAM(S): at 06:51

## 2019-12-09 RX ADMIN — HYDROMORPHONE HYDROCHLORIDE 1 MILLIGRAM(S): 2 INJECTION INTRAMUSCULAR; INTRAVENOUS; SUBCUTANEOUS at 00:04

## 2019-12-09 RX ADMIN — Medication 4 MILLIGRAM(S): at 17:30

## 2019-12-09 NOTE — CONSULT NOTE ADULT - SUBJECTIVE AND OBJECTIVE BOX
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 taking meds at home with no relief. no fever or chills. unable to get up and move around as he had recently done in the past. no cough. no shortness of breath. no new numbness. no headache. daughter / son in law and wife at the bedside. (07 Dec 2019 20:21)    PERTINENT PM/SXH:   No pertinent past medical history    Status post craniotomy  No significant past surgical history    FAMILY HISTORY:  No pertinent family history in first degree relatives    ITEMS NOT CHECKED ARE NOT PRESENT    SOCIAL HISTORY:   Significant other/partner[ ]  Children[ ]  Buddhism/Spirituality:  Substance hx:  [ ]   Tobacco hx:  [ ]   Alcohol hx: [ ]   Home Opioid hx:  [ ] I-Stop Reference No:  Living Situation: [ ]Home  [ ]Long term care  [ ]Rehab [ ]Other    ADVANCE DIRECTIVES:    DNR  MOLST  [ ]  Living Will  [ ]   DECISION MAKER(s):  [ ] Health Care Proxy(s)  [ ] Surrogate(s)  [ ] Guardian           Name(s): Phone Number(s):    BASELINE (I)ADL(s) (prior to admission):  [place]: [ ]Total  [ ] Moderate [ ]Dependent    Allergies    No Known Allergies    Intolerances    MEDICATIONS  (STANDING):  dexAMETHasone     Tablet 4 milliGRAM(s) Oral every 6 hours  dextrose 5% + sodium chloride 0.45% 1000 milliLiter(s) (100 mL/Hr) IV Continuous <Continuous>  enoxaparin Injectable 80 milliGRAM(s) SubCutaneous daily  fentaNYL   Patch  12 MICROgram(s)/Hr 1 Patch Transdermal every 72 hours  fentaNYL   Patch  50 MICROgram(s)/Hr 1 Patch Transdermal every 72 hours  levETIRAcetam 500 milliGRAM(s) Oral two times a day  lidocaine   Patch 1 Patch Transdermal daily  QUEtiapine 25 milliGRAM(s) Oral at bedtime  senna 2 Tablet(s) Oral at bedtime    MEDICATIONS  (PRN):  acetaminophen   Tablet .. 650 milliGRAM(s) Oral every 6 hours PRN Mild Pain (1 - 3)  HYDROmorphone  Injectable 1 milliGRAM(s) IV Push every 4 hours PRN Severe Pain (7 - 10)    PRESENT SYMPTOMS: [ ]Unable to obtain due to poor mentation   Source if other than patient:  [ ]Family   [ ]Team     Pain: [ ]yes [ ]no  QOL impact -   Location -                    Aggravating factors -  Quality -  Radiation -  Timing-  Severity (0-10 scale):  Minimal acceptable level (0-10 scale):     PAIN AD Score:     http://geriatrictoolkit.[email or web address]/cog/painad.pdf (press ctrl +  left click to view)    Dyspnea:                           [ ]Mild [ ]Moderate [ ]Severe  Anxiety:                             [ ]Mild [ ]Moderate [ ]Severe  Fatigue:                             [ ]Mild [ ]Moderate [ ]Severe  Nausea:                             [ ]Mild [ ]Moderate [ ]Severe  Loss of appetite:              [ ]Mild [ ]Moderate [ ]Severe  Constipation:                    [ ]Mild [ ]Moderate [ ]Severe    Other Symptoms:  [ ]All other review of systems negative     Palliative Performance Status Version 2:         %    http://[email or web address]/[email or web address]_performance_[email or web address]_[email or web address]  PHYSICAL EXAM:  Vital Signs Last 24 Hrs  T(C): 36.3 (09 Dec 2019 10:30), Max: 36.7 (08 Dec 2019 21:40)  T(F): 97.4 (09 Dec 2019 10:30), Max: 98.1 (08 Dec 2019 21:40)  HR: 74 (09 Dec 2019 10:30) (72 - 84)  BP: 136/87 (09 Dec 2019 10:30) (110/70 - 136/87)  BP(mean): --  RR: 18 (09 Dec 2019 10:30) (18 - 18)  SpO2: 94% (09 Dec 2019 10:30) (93% - 95%) I&O's Summary    08 Dec 2019 07:01  -  09 Dec 2019 07:00  --------------------------------------------------------  IN: 1360 mL / OUT: 1450 mL / NET: -90 mL      GENERAL:  [ ]Alert  [ ]Oriented x   [ ]Lethargic  [ ]Cachexia  [ ]Unarousable  [ ]Verbal  [ ]Non-Verbal  Behavioral:   [ ] Anxiety  [ ] Delirium [ ] Agitation [ ] Other  HEENT:  [ ]Normal   [ ]Dry mouth   [ ]ET Tube/Trach  [ ]Oral lesions  PULMONARY:   [ ]Clear [ ]Tachypnea  [ ]Audible excessive secretions   [ ]Rhonchi        [ ]Right [ ]Left [ ]Bilateral  [ ]Crackles        [ ]Right [ ]Left [ ]Bilateral  [ ]Wheezing     [ ]Right [ ]Left [ ]Bilatera  [ ]Diminished breath sounds [ ]right [ ]left [ ]bilateral  CARDIOVASCULAR:    [ ]Regular [ ]Irregular [ ]Tachy  [ ]Dusty [ ]Murmur [ ]Other  GASTROINTESTINAL:  [ ]Soft  [ ]Distended   [ ]+BS  [ ]Non tender [ ]Tender  [ ]PEG [ ]OGT/ NGT  Last BM:     GENITOURINARY:  [ ]Normal [ ] Incontinent   [ ]Oliguria/Anuria   [ ]Alvarez  MUSCULOSKELETAL:   [ ]Normal   [ ]Weakness  [ ]Bed/Wheelchair bound [ ]Edema  NEUROLOGIC:   [ ]No focal deficits  [ ]Cognitive impairment  [ ]Dysphagia [ ]Dysarthria [ ]Paresis [ ]Other   SKIN:   [ ]Normal    [ ]Rash  [ ]Pressure ulcer(s)       Present on admission [ ]y [ ]n    CRITICAL CARE:  [ ] Shock Present  [ ]Septic [ ]Cardiogenic [ ]Neurologic [ ]Hypovolemic  [ ]  Vasopressors [ ]  Inotropes   [ ]Respiratory failure present [ ]Mechanical ventilation [ ]Non-invasive ventilatory support [ ]High flow  [ ]Acute  [ ]Chronic [ ]Hypoxic  [ ]Hypercarbic [ ]Other  [ ]Other organ failure     LABS:                        15.2   20.88 )-----------( 208      ( 08 Dec 2019 09:27 )             46.8   12-08    135  |  98  |  35<H>  ----------------------------<  128<H>  3.8   |  21<L>  |  0.68    Ca    9.6      08 Dec 2019 09:07    TPro  6.4  /  Alb  3.8  /  TBili  0.8  /  DBili  x   /  AST  19  /  ALT  27  /  AlkPhos  59  12-08        RADIOLOGY & ADDITIONAL STUDIES:    PROTEIN CALORIE MALNUTRITION PRESENT: [ ]mild [ ]moderate [ ]severe [ ]underweight [ ]morbid obesity  https://www.andeal.org/vault/9373/web/files/ONC/Table_Clinical%20Characteristics%20to%20Document%20Malnutrition-White%20JV%20et%20al%202012.pdf    Height (cm): 170.18 (12-07-19 @ 09:03), 170.2 (09-24-19 @ 13:00), 170.18 (09-05-19 @ 11:47)  Weight (kg): 54.4 (12-07-19 @ 09:03), 54.7 (09-24-19 @ 13:00), 52.2 (09-05-19 @ 11:47)  BMI (kg/m2): 18.8 (12-07-19 @ 09:03), 18.9 (09-24-19 @ 13:00), 18 (09-05-19 @ 11:47)    [ ]PPSV2 < or = to 30% [ ]significant weight loss  [ ]poor nutritional intake  [ ]anasarca     Albumin, Serum: 3.8 g/dL (12-08-19 @ 09:07)   [ ]Artificial Nutrition      REFERRALS:   [ ]Chaplaincy  [ ]Hospice  [ ]Child Life  [ ]Social Work  [ ]Case management [ ]Holistic Therapy     Goals of Care Document: HPI:  Pt is a 63 y/o male with pmhx Smoker 50+pk yrs, 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 taking meds at home with no relief. no fever or chills. unable to get up and move around as he had recently done in the past. no cough. no shortness of breath. no new numbness. no headache. daughter / son in law and wife at the bedside. (07 Dec 2019 20:21)    On imaging, pt is found to have extensive spinal mets. Palliative was called to assist with pain control 2/2 to those metastases. Per pt's daughters, his back pain started about 2.5 weeks ago and at that time he was started on a regimen by his oncologist: 62 mcg fentanyl patch q72 hrs, oxy 30 mg q3-4 hrs, gabapentin 600 mg bid and 900 mg at night. Pt was briefly on dilaudid 2 mg q4 hrs initially which did not help him. Pt states that the pain regimen did not significantly improve his symptoms, however his daughter states he seemed to get some relief from the gabapentin. Since admission, pt has had continued to have sharp, burning pain along the entire spine. Pt states it radiates everywhere, and he has generalized body pain. Nothing makes the pain better or worse, it has been at an 8/10. He states he has occasional nausea. Pt also has pruritus. He had a BM today. Since admission to the hospital, pt has been bedbound and has been able to move his legs. He is also incontinent. Pt is currently on dilaudid 1 mg q4 hrs prn and has required x5/24 hrs. He is also on a fentanyl patch - 62 mcg q72 hrs.    PERTINENT PM/SXH:   No pertinent past medical history    Status post craniotomy  No significant past surgical history    FAMILY HISTORY:  No pertinent family history in first degree relatives    ITEMS NOT CHECKED ARE NOT PRESENT    SOCIAL HISTORY:   Significant other/partner[x ]  4 Children [x ]  Spiritism/Spirituality: not Yazdanism  Pt lives with his wife and one of his daughters.   Substance hx:  [ x]   Tobacco hx: 50 Pack yrs  [ ]   Alcohol hx: no [ ]   Home Opioid hx: per hpi [ ] I-Stop Reference No: [id number]   Living Situation: [x ]Home  [ ]Long term care  [ ]Rehab [ ]Other  Work: works as superintendent    ADVANCE DIRECTIVES:    DNR  MOLST  [ ]  Living Will  [ ]   DECISION MAKER(s):  [ x] Health Care Proxy(s): daughter Romulo [ ] Surrogate(s)  [ ] Guardian           Name(s): Phone Number(s):    BASELINE (I)ADL(s) (prior to admission):  [place]: [x ]Total  [ ] Moderate [ ]Dependent    Allergies  No Known Allergies    Intolerances    MEDICATIONS  (STANDING):  dexAMETHasone     Tablet 4 milliGRAM(s) Oral every 6 hours  dextrose 5% + sodium chloride 0.45% 1000 milliLiter(s) (100 mL/Hr) IV Continuous <Continuous>  enoxaparin Injectable 80 milliGRAM(s) SubCutaneous daily  fentaNYL   Patch  12 MICROgram(s)/Hr 1 Patch Transdermal every 72 hours  fentaNYL   Patch  50 MICROgram(s)/Hr 1 Patch Transdermal every 72 hours  levETIRAcetam 500 milliGRAM(s) Oral two times a day  lidocaine   Patch 1 Patch Transdermal daily  QUEtiapine 25 milliGRAM(s) Oral at bedtime  senna 2 Tablet(s) Oral at bedtime    MEDICATIONS  (PRN):  acetaminophen   Tablet .. 650 milliGRAM(s) Oral every 6 hours PRN Mild Pain (1 - 3)  HYDROmorphone  Injectable 1 milliGRAM(s) IV Push every 4 hours PRN Severe Pain (7 - 10)    PRESENT SYMPTOMS: [ ]Unable to obtain due to poor mentation   Source if other than patient:  [x ]Family and patient  [ ]Team     Pain: [x ]yes [ ]no  QOL impact - bedbound  Location - along entire back                   Aggravating factors - none  Quality - burning, sharp  Radiation - "everywhere"  Timing- all the time  Severity (0-10 scale): 8  Minimal acceptable level (0-10 scale):     PAIN AD Score:   http://geriatrictoolkit.[place].[email or web address]/cog/painad.pdf (press ctrl +  left click to view)    Dyspnea:                           [x ]Mild [ ]Moderate [ ]Severe  Anxiety:                             [x ]Mild [ ]Moderate [ ]Severe  Fatigue:                             [ ]Mild [x ]Moderate [ ]Severe  Nausea:                             [x ]Mild [ ]Moderate [ ]Severe  Loss of appetite:              [ ]Mild [x ]Moderate [ ]Severe  Constipation:                    [ ]Mild [ ]Moderate [ ]Severe    Other Symptoms:  [x ]All other review of systems negative     Palliative Performance Status Version 2:   10      %    http://[email or web address]/files/[email or web address]_performance_[email or web address]_[email or web address]    PHYSICAL EXAM:  Vital Signs Last 24 Hrs  T(C): 36.3 (09 Dec 2019 10:30), Max: 36.7 (08 Dec 2019 21:40)  T(F): 97.4 (09 Dec 2019 10:30), Max: 98.1 (08 Dec 2019 21:40)  HR: 74 (09 Dec 2019 10:30) (72 - 84)  BP: 136/87 (09 Dec 2019 10:30) (110/70 - 136/87)  BP(mean): --  RR: 18 (09 Dec 2019 10:30) (18 - 18)  SpO2: 94% (09 Dec 2019 10:30) (93% - 95%) I&O's Summary    08 Dec 2019 07:01  -  09 Dec 2019 07:00  --------------------------------------------------------  IN: 1360 mL / OUT: 1450 mL / NET: -90 mL      GENERAL:  [ ]Alert  [ x]Oriented x 1  [x ]Lethargic  [ ]Cachexia  [ ]Unarousable  [1 ]Verbal  [ ]Non-Verbal; + word finding difficulty  Behavioral:   [ ] Anxiety  [ ] Delirium [ ] Agitation [ ] Other  HEENT:  [x ]Normal   [ ]Dry mouth   [ ]ET Tube/Trach  [ ]Oral lesions  PULMONARY:   [x ]Clear [ ]Tachypnea  [ ]Audible excessive secretions   [ ]Rhonchi        [ ]Right [ ]Left [ ]Bilateral  [ ]Crackles        [ ]Right [ ]Left [ ]Bilateral  [ ]Wheezing     [ ]Right [ ]Left [ ]Bilatera  [ ]Diminished breath sounds [ ]right [ ]left [ ]bilateral  CARDIOVASCULAR:    [ ]Regular [ ]Irregular [ ]Tachy  [ ]Dusty [ ]Murmur [ ]Other  GASTROINTESTINAL:  [x ]Soft  [ ]Distended   [ ]+BS  [x ]Non tender [ ]Tender  [ ]PEG [ ]OGT/ NGT  Last BM: 12/9    GENITOURINARY:  [ ]Normal [ ] Incontinent   [ ]Oliguria/Anuria   [x ]Alvarez  MUSCULOSKELETAL:   [ ]Normal   [x ]Weakness  [x ]Bed/Wheelchair bound [ ]Edema  NEUROLOGIC:   [ ]No focal deficits  [x ]Cognitive impairment  [ ]Dysphagia [ ]Dysarthria [x ]Paresis - b/l LE weakness and numbness, R>L  SKIN:   [ ]Normal    [ ]Rash  [ ]Pressure ulcer(s)  ; + excoriations 2/2 scratching     Present on admission [ ]y [ ]n    CRITICAL CARE:  [ ] Shock Present  [ ]Septic [ ]Cardiogenic [ ]Neurologic [ ]Hypovolemic  [ ]  Vasopressors [ ]  Inotropes   [ ]Respiratory failure present [ ]Mechanical ventilation [ ]Non-invasive ventilatory support [ ]High flow  [ ]Acute  [ ]Chronic [ ]Hypoxic  [ ]Hypercarbic [ ]Other  [ ]Other organ failure     LABS:                        15.2   20.88 )-----------( 208      ( 08 Dec 2019 09:27 )             46.8   12-08    135  |  98  |  35<H>  ----------------------------<  128<H>  3.8   |  21<L>  |  0.68    Ca    9.6      08 Dec 2019 09:07    TPro  6.4  /  Alb  3.8  /  TBili  0.8  /  DBili  x   /  AST  19  /  ALT  27  /  AlkPhos  59  12-08        RADIOLOGY & ADDITIONAL STUDIES:    PROTEIN CALORIE MALNUTRITION PRESENT: [ ]mild [ ]moderate [ ]severe [ ]underweight [ ]morbid obesity  https://www.andeal.org/vault/8630/web/files/ONC/Table_Clinical%20Characteristics%20to%20Document%20Malnutrition-White%20JV%20et%20al%507457.pdf    Height (cm): 170.18 (12-07-19 @ 09:03), 170.2 (09-24-19 @ 13:00), 170.18 (09-05-19 @ 11:47)  Weight (kg): 54.4 (12-07-19 @ 09:03), 54.7 (09-24-19 @ 13:00), 52.2 (09-05-19 @ 11:47)  BMI (kg/m2): 18.8 (12-07-19 @ 09:03), 18.9 (09-24-19 @ 13:00), 18 (09-05-19 @ 11:47)    [ ]PPSV2 < or = to 30% [ ]significant weight loss  [ ]poor nutritional intake  [ ]anasarca     Albumin, Serum: 3.8 g/dL (12-08-19 @ 09:07)   [ ]Artificial Nutrition      REFERRALS:   [ ]Chaplaincy  [ ]Hospice  [ ]Child Life  [ ]Social Work  [ ]Case management [ ]Holistic Therapy     Goals of Care Document: HPI:  Pt is a 65 y/o male with pmhx Smoker 50+pk yrs, 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 taking meds at home with no relief. no fever or chills. unable to get up and move around as he had recently done in the past. no cough. no shortness of breath. no new numbness. no headache. daughter / son in law and wife at the bedside. (07 Dec 2019 20:21)    On imaging, pt is found to have extensive spinal mets. Palliative was called to assist with pain control 2/2 to those metastases. Per pt's daughters, his back pain started about 2.5 weeks ago and at that time he was started on a regimen by his oncologist: 62 mcg fentanyl patch q72 hrs, oxy 30 mg q3-4 hrs, gabapentin 300 mg bid and 900 mg at night. Pt was briefly on dilaudid 2 mg q4 hrs initially which did not help him. Pt states that the pain regimen did not significantly improve his symptoms, however his daughter states he seemed to get some relief from the gabapentin. Since admission, pt has had continued to have sharp, burning pain along the entire spine. Pt states it radiates everywhere, and he has generalized body pain. Nothing makes the pain better or worse, it has been at an 8/10. He states he has occasional nausea. Pt also has pruritus. He had a BM today. Since admission to the hospital, pt has been bedbound and has been able to move his legs. He is also incontinent. Pt is currently on dilaudid 1 mg q4 hrs prn and has required x5/24 hrs. He is also on a fentanyl patch - 62 mcg q72 hrs.    PERTINENT PM/SXH:   No pertinent past medical history    Status post craniotomy  No significant past surgical history    FAMILY HISTORY:  No pertinent family history in first degree relatives    ITEMS NOT CHECKED ARE NOT PRESENT    SOCIAL HISTORY:   Significant other/partner[x ]  4 Children [x ]  Sikh/Spirituality: not Taoism  Pt lives with his wife and one of his daughters.   Substance hx:  [ x]   Tobacco hx: 50 Pack yrs  [ ]   Alcohol hx: no [ ]   Home Opioid hx: per hpi [ ] I-Stop Reference No: [id number]   Living Situation: [x ]Home  [ ]Long term care  [ ]Rehab [ ]Other  Work: works as superintendent    ADVANCE DIRECTIVES:    DNR  MOLST  [ ]  Living Will  [ ]   DECISION MAKER(s):  [ x] Health Care Proxy(s): daughter Romulo [ ] Surrogate(s)  [ ] Guardian           Name(s): Phone Number(s):    BASELINE (I)ADL(s) (prior to admission):  [place]: [x ]Total  [ ] Moderate [ ]Dependent    Allergies  No Known Allergies    Intolerances    MEDICATIONS  (STANDING):  dexAMETHasone     Tablet 4 milliGRAM(s) Oral every 6 hours  dextrose 5% + sodium chloride 0.45% 1000 milliLiter(s) (100 mL/Hr) IV Continuous <Continuous>  enoxaparin Injectable 80 milliGRAM(s) SubCutaneous daily  fentaNYL   Patch  12 MICROgram(s)/Hr 1 Patch Transdermal every 72 hours  fentaNYL   Patch  50 MICROgram(s)/Hr 1 Patch Transdermal every 72 hours  levETIRAcetam 500 milliGRAM(s) Oral two times a day  lidocaine   Patch 1 Patch Transdermal daily  QUEtiapine 25 milliGRAM(s) Oral at bedtime  senna 2 Tablet(s) Oral at bedtime    MEDICATIONS  (PRN):  acetaminophen   Tablet .. 650 milliGRAM(s) Oral every 6 hours PRN Mild Pain (1 - 3)  HYDROmorphone  Injectable 1 milliGRAM(s) IV Push every 4 hours PRN Severe Pain (7 - 10)    PRESENT SYMPTOMS: [ ]Unable to obtain due to poor mentation   Source if other than patient:  [x ]Family and patient  [ ]Team     Pain: [x ]yes [ ]no  QOL impact - bedbound  Location - along entire back                   Aggravating factors - none  Quality - burning, sharp  Radiation - "everywhere"  Timing- all the time  Severity (0-10 scale): 8  Minimal acceptable level (0-10 scale):     PAIN AD Score:   http://geriatrictoolkit.[place].[facility]/cog/painad.pdf (press ctrl +  left click to view)    Dyspnea:                           [x ]Mild [ ]Moderate [ ]Severe  Anxiety:                             [x ]Mild [ ]Moderate [ ]Severe  Fatigue:                             [ ]Mild [x ]Moderate [ ]Severe  Nausea:                             [x ]Mild [ ]Moderate [ ]Severe  Loss of appetite:              [ ]Mild [x ]Moderate [ ]Severe  Constipation:                    [ ]Mild [ ]Moderate [ ]Severe    Other Symptoms:  [x ]All other review of systems negative     Palliative Performance Status Version 2:   20      %    [email or web address]/[email or web address]_performance_[email or web address]_[email or web address]    PHYSICAL EXAM:  Vital Signs Last 24 Hrs  T(C): 36.3 (09 Dec 2019 10:30), Max: 36.7 (08 Dec 2019 21:40)  T(F): 97.4 (09 Dec 2019 10:30), Max: 98.1 (08 Dec 2019 21:40)  HR: 74 (09 Dec 2019 10:30) (72 - 84)  BP: 136/87 (09 Dec 2019 10:30) (110/70 - 136/87)  BP(mean): --  RR: 18 (09 Dec 2019 10:30) (18 - 18)  SpO2: 94% (09 Dec 2019 10:30) (93% - 95%) I&O's Summary    08 Dec 2019 07:01  -  09 Dec 2019 07:00  --------------------------------------------------------  IN: 1360 mL / OUT: 1450 mL / NET: -90 mL      GENERAL:  [ ]Alert  [ x]Oriented x 1  [x ]Lethargic  [ ]Cachexia  [ ]Unarousable  [1 ]Verbal  [ ]Non-Verbal; + word finding difficulty  Behavioral:   [ ] Anxiety  [ ] Delirium [ ] Agitation [ ] Other  HEENT:  [x ]Normal   [ ]Dry mouth   [ ]ET Tube/Trach  [ ]Oral lesions  PULMONARY:   [x ]Clear [ ]Tachypnea  [ ]Audible excessive secretions   [ ]Rhonchi        [ ]Right [ ]Left [ ]Bilateral  [ ]Crackles        [ ]Right [ ]Left [ ]Bilateral  [ ]Wheezing     [ ]Right [ ]Left [ ]Bilatera  [ ]Diminished breath sounds [ ]right [ ]left [ ]bilateral  CARDIOVASCULAR:    [ ]Regular [ ]Irregular [ ]Tachy  [ ]Dusty [ ]Murmur [ ]Other  GASTROINTESTINAL:  [x ]Soft  [ ]Distended   [ ]+BS  [x ]Non tender [ ]Tender  [ ]PEG [ ]OGT/ NGT  Last BM: 12/9    GENITOURINARY:  [ ]Normal [ ] Incontinent   [ ]Oliguria/Anuria   [x ]Alvarez  MUSCULOSKELETAL:   [ ]Normal   [x ]Weakness  [x ]Bed/Wheelchair bound [ ]Edema  NEUROLOGIC:   [ ]No focal deficits  [x ]Cognitive impairment  [ ]Dysphagia [ ]Dysarthria [x ] Paraplegia - b/l LE numbness, R>L  SKIN:   [ ]Normal    [ ]Rash  [ ]Pressure ulcer(s)  ; + excoriations 2/2 scratching     Present on admission [ ]y [ ]n    CRITICAL CARE:  [ ] Shock Present  [ ]Septic [ ]Cardiogenic [ ]Neurologic [ ]Hypovolemic  [ ]  Vasopressors [ ]  Inotropes   [ ]Respiratory failure present [ ]Mechanical ventilation [ ]Non-invasive ventilatory support [ ]High flow  [ ]Acute  [ ]Chronic [ ]Hypoxic  [ ]Hypercarbic [ ]Other  [ ]Other organ failure     LABS:                        15.2   20.88 )-----------( 208      ( 08 Dec 2019 09:27 )             46.8   12-08    135  |  98  |  35<H>  ----------------------------<  128<H>  3.8   |  21<L>  |  0.68    Ca    9.6      08 Dec 2019 09:07    TPro  6.4  /  Alb  3.8  /  TBili  0.8  /  DBili  x   /  AST  19  /  ALT  27  /  AlkPhos  59  12-08        RADIOLOGY & ADDITIONAL STUDIES:  < from: MR Lumbar Spine w/wo IV Cont (12.07.19 @ 15:46) >  EXAM:  MR SPINE THORACIC WAW IC                          EXAM:  MR SPINE LUMBAR WAW IC                          EXAM:  MR SPINE CERVICAL WAW IC  IMPRESSION:    Enhancing dural metastasis in cervical, thoracic and lumbar spinal canal   involving lateral recesses, neural foramina, and exiting nerve roots   including sacralnerve roots, consistent with metastatic disease.   Flattening most pronounced ventrally at mid and upper thoracic spine,   with minimal dorsal CSF and dorsal epidural lipomatosis.    Faint hyperintense T2 signal in the spinal cord, may represent edema   and/or myelomalacia. Multilevel degenerative changes as above.    Findings discussed with Dr. Leslie at immediate time of review on 12/7/2019   at 6:26 PM.                    MARITZA SCHAFER M.D., ATTENDING RADIOLOGIST  This document has been electronically signed. Dec  7 2019  8:23PM    < end of copied text >    PROTEIN CALORIE MALNUTRITION PRESENT: [ ]mild [ ]moderate [ ]severe [ ]underweight [ ]morbid obesity  https://www.andeal.org/vault/9830/web/files/ONC/Table_Clinical%20Characteristics%20to%20Document%20Malnutrition-White%20JV%20et%20al%202012.pdf    Height (cm): 170.18 (12-07-19 @ 09:03), 170.2 (09-24-19 @ 13:00), 170.18 (09-05-19 @ 11:47)  Weight (kg): 54.4 (12-07-19 @ 09:03), 54.7 (09-24-19 @ 13:00), 52.2 (09-05-19 @ 11:47)  BMI (kg/m2): 18.8 (12-07-19 @ 09:03), 18.9 (09-24-19 @ 13:00), 18 (09-05-19 @ 11:47)    [ ]PPSV2 < or = to 30% [ ]significant weight loss  [ ]poor nutritional intake  [ ]anasarca     Albumin, Serum: 3.8 g/dL (12-08-19 @ 09:07)   [ ]Artificial Nutrition      REFERRALS:   [ ]Chaplaincy  [ ]Hospice  [ ]Child Life  [ ]Social Work  [ ]Case management [ ]Holistic Therapy     Goals of Care Document:

## 2019-12-09 NOTE — PROGRESS NOTE ADULT - ASSESSMENT
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 Per family the patient had MR at [facility] 3 weeks ago showing diffuse leptomeningeal spread (films unavail). Family is still shrouding patient from diagnosis/prognosis. Exam: oriented x3, FC, distal LE 4/5, proximal LE 2-3/5, UE 4/5. SILT. Endorses back pain.

## 2019-12-09 NOTE — CONSULT NOTE ADULT - PROBLEM SELECTOR RECOMMENDATION 2
Pt's daughter Romulo is HCP. Pt defers decision making to daughter. He is currently lethargic, confused, AAOX1, although it appears he may also have some word finding difficulty related to his brain mets. Family states they would like to hear from neuro onc and rad onc to determine what his prognosis is and what are the options for him going forward. GOC conversation ongoing

## 2019-12-09 NOTE — PROGRESS NOTE ADULT - SUBJECTIVE AND OBJECTIVE BOX
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 taking meds at home with no relief. no fever or chills. unable to get up and move around as he had recently done in the past. no cough. no shortness of breath. no new numbness. no headache. daughter / son in law and wife at the bedside. (07 Dec 2019 20:21)  Patient known from a prior hospitalization.  He was here in September and at this time he had brain metastases due to high grade NE cancer.  he did have a craniotomy.  the family says that he had a PET/CT and it was negative so they did not start on systemic therapy, however, he has developed weakness and a spinal MRI done as out-pt and here notes metastatic disease throughout the whole spine.  He is getting decadron.  He was seen by NS and no plan for surgical intervention.  The family still wants to consider some treatment modalities including RT, immunotherapy, etc.    PAST MEDICAL & SURGICAL HISTORY:  No pertinent past medical history  Status post craniotomy    Medications:  acetaminophen   Tablet .. 650 milliGRAM(s) Oral every 6 hours PRN Mild Pain (1 - 3)  dexAMETHasone     Tablet 4 milliGRAM(s) Oral every 6 hours  dextrose 5% + sodium chloride 0.45% 1000 milliLiter(s) IV Continuous <Continuous>  enoxaparin Injectable 80 milliGRAM(s) SubCutaneous daily  fentaNYL   Patch  12 MICROgram(s)/Hr 1 Patch Transdermal every 72 hours  fentaNYL   Patch  50 MICROgram(s)/Hr 1 Patch Transdermal every 72 hours  HYDROmorphone  Injectable 1 milliGRAM(s) IV Push every 4 hours PRN Severe Pain (7 - 10)  levETIRAcetam 500 milliGRAM(s) Oral two times a day  lidocaine   Patch 1 Patch Transdermal daily  QUEtiapine 25 milliGRAM(s) Oral at bedtime  senna 2 Tablet(s) Oral at bedtime    Labs:  CBC Full  -  ( 08 Dec 2019 09:27 )  WBC Count : 20.88 K/uL  Hemoglobin : 15.2 g/dL  Hematocrit : 46.8 %  Platelet Count - Automated : 208 K/uL  Mean Cell Volume : 96.9 fl  Mean Cell Hemoglobin : 31.5 pg  Mean Cell Hemoglobin Concentration : 32.5 gm/dL  Auto Neutrophil # : x  Auto Lymphocyte # : x  Auto Monocyte # : x  Auto Eosinophil # : x  Auto Basophil # : x  Auto Neutrophil % : x  Auto Lymphocyte % : x  Auto Monocyte % : x  Auto Eosinophil % : x  Auto Basophil % : x    12-08    135  |  98  |  35<H>  ----------------------------<  128<H>  3.8   |  21<L>  |  0.68    Ca    9.6      08 Dec 2019 09:07  Phos  2.7     12-07  Mg     2.1     12-07    TPro  6.4  /  Alb  3.8  /  TBili  0.8  /  DBili  x   /  AST  19  /  ALT  27  /  AlkPhos  59  12-08      Radiology:     < from: MR Lumbar Spine w/wo IV Cont (12.07.19 @ 15:46) >  Enhancing dural metastasis in cervical, thoracic and lumbar spinal canal   involving lateral recesses, neural foramina, and exiting nerve roots   including sacralnerve roots, consistent with metastatic disease.   Flattening most pronounced ventrally at mid and upper thoracic spine,   with minimal dorsal CSF and dorsal epidural lipomatosis.    Faint hyperintense T2 signal in the spinal cord, may represent edema   and/or myelomalacia. Multilevel degenerative changes as above.    < end of copied text >          ROS:  Patient comfortable without distress  No SOB or chest pain  No palpitation  No abdominal pain, diarrhaea or constipation  No weakness of extremities  No skin changes or swelling of legs    Vital Signs Last 24 Hrs  T(C): 36.5 (09 Dec 2019 06:44), Max: 36.7 (08 Dec 2019 21:40)  T(F): 97.7 (09 Dec 2019 06:44), Max: 98.1 (08 Dec 2019 21:40)  HR: 74 (09 Dec 2019 06:44) (72 - 84)  BP: 132/82 (09 Dec 2019 06:44) (110/70 - 132/82)  BP(mean): --  RR: 18 (09 Dec 2019 06:44) (18 - 18)  SpO2: 93% (09 Dec 2019 06:44) (93% - 95%)    Physical exam:  Patient alert and oriented  No distress  CVS: S1, S2 regular or murmur  Chest: bilateral breath sound without rales  Abdomen: soft, not tender, no organomegaly or masses  No focal neuro deficit  No edema      Assessment and Plan: HPI:  Pt is a 65 y/o male with pmhx Smoker 50+pk yrs,  diagnosed high grade 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 He did not have any systemic therapy after radiation therapy  He is getting decadron.  He was seen by NS and no plan for surgical intervention.      PAST MEDICAL & SURGICAL HISTORY:  No pertinent past medical history  Status post craniotomy    Medications:  acetaminophen   Tablet .. 650 milliGRAM(s) Oral every 6 hours PRN Mild Pain (1 - 3)  dexAMETHasone     Tablet 4 milliGRAM(s) Oral every 6 hours  dextrose 5% + sodium chloride 0.45% 1000 milliLiter(s) IV Continuous <Continuous>  enoxaparin Injectable 80 milliGRAM(s) SubCutaneous daily  fentaNYL   Patch  12 MICROgram(s)/Hr 1 Patch Transdermal every 72 hours  fentaNYL   Patch  50 MICROgram(s)/Hr 1 Patch Transdermal every 72 hours  HYDROmorphone  Injectable 1 milliGRAM(s) IV Push every 4 hours PRN Severe Pain (7 - 10)  levETIRAcetam 500 milliGRAM(s) Oral two times a day  lidocaine   Patch 1 Patch Transdermal daily  QUEtiapine 25 milliGRAM(s) Oral at bedtime  senna 2 Tablet(s) Oral at bedtime    Labs:  CBC Full  -  ( 08 Dec 2019 09:27 )  WBC Count : 20.88 K/uL  Hemoglobin : 15.2 g/dL  Hematocrit : 46.8 %  Platelet Count - Automated : 208 K/uL  Mean Cell Volume : 96.9 fl  Mean Cell Hemoglobin : 31.5 pg  Mean Cell Hemoglobin Concentration : 32.5 gm/dL  Auto Neutrophil # : x  Auto Lymphocyte # : x  Auto Monocyte # : x  Auto Eosinophil # : x  Auto Basophil # : x  Auto Neutrophil % : x  Auto Lymphocyte % : x  Auto Monocyte % : x  Auto Eosinophil % : x  Auto Basophil % : x    12-08    135  |  98  |  35<H>  ----------------------------<  128<H>  3.8   |  21<L>  |  0.68    Ca    9.6      08 Dec 2019 09:07  Phos  2.7     12-07  Mg     2.1     12-07    TPro  6.4  /  Alb  3.8  /  TBili  0.8  /  DBili  x   /  AST  19  /  ALT  27  /  AlkPhos  59  12-08      Radiology:     < from: MR Lumbar Spine w/wo IV Cont (12.07.19 @ 15:46) >  Enhancing dural metastasis in cervical, thoracic and lumbar spinal canal   involving lateral recesses, neural foramina, and exiting nerve roots   including sacralnerve roots, consistent with metastatic disease.   Flattening most pronounced ventrally at mid and upper thoracic spine,   with minimal dorsal CSF and dorsal epidural lipomatosis.    Faint hyperintense T2 signal in the spinal cord, may represent edema   and/or myelomalacia. Multilevel degenerative changes as above.    < end of copied text >          ROS:  Patient's daughter did not want patient to be disturbed.   She stated that he has diffuse pain  and leg weakness is 3-4 days old  Vital Signs Last 24 Hrs  T(C): 36.5 (09 Dec 2019 06:44), Max: 36.7 (08 Dec 2019 21:40)  T(F): 97.7 (09 Dec 2019 06:44), Max: 98.1 (08 Dec 2019 21:40)  HR: 74 (09 Dec 2019 06:44) (72 - 84)  BP: 132/82 (09 Dec 2019 06:44) (110/70 - 132/82)  BP(mean): --  RR: 18 (09 Dec 2019 06:44) (18 - 18)  SpO2: 93% (09 Dec 2019 06:44) (93% - 95%)    Physical exam:  Not examined as per patient's daughter's request      Assessment and Plan:

## 2019-12-09 NOTE — CONSULT NOTE ADULT - PROBLEM SELECTOR RECOMMENDATION 9
2/2 extensive spinal metastasis. Pain appears both somatic and neuropathic  - will increase dose of fentanyl patch to 75 mcg q 72 hrs  - tylenol 1000 mg q8 standing, 1000 mg IV prn if pt cannot tolerate po  - gabapentin 300 mg bid, 600 mg at night  - dilaudid frequency increased - 1 mg q2 hr prn for severe pain  - c/w decadron 4 mg q6 hr  - unlikely candidate for palliative radiation given extensive mets

## 2019-12-09 NOTE — PROGRESS NOTE ADULT - PROBLEM SELECTOR PLAN 1
Seen on MRI, mets to spine   Neurosurg eval appreciated  Onc eval appreciated  possible systemic treatment, poor candidate  Rad Onc eval pending   Decadron 4mg Q6hrs, taper as per surg team   pain control, aggressive   Neuro check  Palliative eval appreciated, pain control   poor candidate for palliative rads

## 2019-12-09 NOTE — PROGRESS NOTE ADULT - SUBJECTIVE AND OBJECTIVE BOX
Patient is a 64y old  Male who presents with a chief complaint of LE weakness (09 Dec 2019 15:17)      INTERVAL HISTORY: feels ok    PHYSICAL EXAM:  T(C): 36.6 (12-09-19 @ 21:34), Max: 36.9 (12-09-19 @ 18:23)  HR: 73 (12-09-19 @ 21:34) (73 - 80)  BP: 121/77 (12-09-19 @ 21:34) (117/73 - 136/87)  RR: 18 (12-09-19 @ 21:34) (18 - 18)  SpO2: 93% (12-09-19 @ 21:34) (93% - 94%)  Wt(kg): --  I&O's Summary    08 Dec 2019 07:01  -  09 Dec 2019 07:00  --------------------------------------------------------  IN: 1360 mL / OUT: 1450 mL / NET: -90 mL    09 Dec 2019 07:01  -  09 Dec 2019 23:42  --------------------------------------------------------  IN: 1850 mL / OUT: 1050 mL / NET: 800 mL          Appearance: In no distress	  HEENT:    PERRL, EOMI	  Cardiovascular:  S1 S2, No JVD  Respiratory: Lungs clear to auscultation	  Gastrointestinal:  Soft, Non-tender, + BS	  Extremities:  No edema of LE                                15.2   20.88 )-----------( 208      ( 08 Dec 2019 09:27 )             46.8     12-08    135  |  98  |  35<H>  ----------------------------<  128<H>  3.8   |  21<L>  |  0.68    Ca    9.6      08 Dec 2019 09:07    TPro  6.4  /  Alb  3.8  /  TBili  0.8  /  DBili  x   /  AST  19  /  ALT  27  /  AlkPhos  59  12-08        Labs personally reviewed      Assessment and Plan:   Assessment:  · Assessment		  Pt is a 63 Y/O Male with PMHx smoker 50+yr 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 Per family the patient had MR at [facility] 3 weeks ago showing diffuse leptomeningeal spread (films unavail). Family is still shrouding patient from diagnosis/prognosis. Exam: oriented x3, FC, distal LE 4/5, proximal LE 2-3/5, UE 4/5. SILT. Endorses back pain.      Problem/Plan - 1:  ·  Problem: Cord compression.  Plan: Seen on MRI  Neurosurg eval appreciated  [facility] records reviewed likely with metastatic small cell lung ca      Problem/Plan - 2:  ·  Problem: Pulmonary embolism.  Plan: COnt lovenox   Monitor Hgb level   O 2 supplement.     Problem/Plan - 3:  ·  Problem: Chest pain, atypical.  Plan: does not appear to be cardiac in nature. ?2/2 PE   Repeat EKG and echo ordered to further assess    Problem/Plan - 4:  Problem: Prophylactic measure. Plan: DVT and gI PPX.    d/w pt and daufghter at bedside    Petey Roberson DO [facility]  Cardiovascular Medicine  [phone]

## 2019-12-09 NOTE — PROGRESS NOTE ADULT - ASSESSMENT
Patient with metastatic carcinoma with neuro-endocrine features.  he did have a prior craniotomy for brain mets.  he has yet to start on systemic therapy.  he now has evidence of diffuse spinal metastases.  he has pain and weakness.  he is on Decadron.  NS does not plan on any interventions.  He will be seen by Neuro-onc in AM to consider options.  Family would still like to try something.  MS was stable.  PDL -1 is 15% positive.  Overall prognosis is poor.  Does not appear to be a good candidate for systemic therapy but perhaps if he is given RT and he has some improvement in functionality then could consider.  Patient to be seen by Dr. Fleming/Basil tomorrow to resume care.    leucocytosis is likely due to steroids. and reactive process.  He is afebrile. Pt is a 63 y/o male with pmhx Smoker 50+pk yrs,  diagnosed high grade 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 He did not have any systemic therapy after radiation therapy  He is getting decadron.  He was seen by NS and no plan for surgical intervention.    He will be seen by Neuro-onc to consider options including radiation therapy if feasible. At present family is only interested in neuro-oncology visit and further follow-up will be when necessary as acceptable to family. Overall prognosis is poor.  Does not appear to be a good candidate for systemic therapy. All this was discussed with patient's daughter at length  Leucocytosis is likely due to steroids. and reactive process.  He is afebrile.

## 2019-12-09 NOTE — CONSULT NOTE ADULT - ASSESSMENT
Pt is a 65 yo M with a neuroendocrine tumor (unknown primary) with mets to the brain and spine. Palliative called for GOC and assistance with pain control 2/2 extensive spinal mets. Pt is a 63 yo M with a neuroendocrine tumor (unknown primary) with mets to the brain and spine now with severe functional impairment. Palliative called for GOC and assistance with pain control 2/2 extensive spinal mets.

## 2019-12-09 NOTE — PROGRESS NOTE ADULT - SUBJECTIVE AND OBJECTIVE BOX
[facility] [place] [facility]   MD Bijal Chao D.O. Fatima Sheikh, D.O. Ruoru Wong, PA    From 7 AM - 5 PM:  OFFICE: [phone]  Dr. Welsh cell: [phone]  Dr. Chatman cell: [phone]  Dr. Velasquez cell: [phone]  INGRID Banuelos cell: [phone]    From 5 PM - 7 AM: Answering Service: [phone]        RENAL FOLLOW UP NOTE  --------------------------------------------------------------------------------  HPI: Pt seen and examined. Has severe back pain. Cannot feel anything below his waist. Does not answer questions due to severe pain. Daughter at bedside reports he is not getting better w/ pain meds and she would like to speak to neuro-onc team to discuss options        PAST HISTORY  --------------------------------------------------------------------------------  No significant changes to PMH, PSH, FHx, SHx, unless otherwise noted    ALLERGIES & MEDICATIONS  --------------------------------------------------------------------------------  Allergies    No Known Allergies    Intolerances      Standing Inpatient Medications  acetaminophen   Tablet .. 1000 milliGRAM(s) Oral every 8 hours  Biotene Dry Mouth Oral Rinse 5 milliLiter(s) Swish and Spit every 6 hours  dexAMETHasone     Tablet 4 milliGRAM(s) Oral every 6 hours  dextrose 5% + sodium chloride 0.45% 1000 milliLiter(s) IV Continuous <Continuous>  enoxaparin Injectable 80 milliGRAM(s) SubCutaneous daily  fentaNYL   Patch  75 MICROgram(s)/Hr 1 Patch Transdermal every 72 hours  gabapentin 300 milliGRAM(s) Oral <User Schedule>  gabapentin 600 milliGRAM(s) Oral <User Schedule>  levETIRAcetam 500 milliGRAM(s) Oral two times a day  lidocaine   Patch 1 Patch Transdermal daily  QUEtiapine 25 milliGRAM(s) Oral at bedtime  senna 2 Tablet(s) Oral at bedtime    PRN Inpatient Medications  acetaminophen   Tablet .. 650 milliGRAM(s) Oral every 6 hours PRN  acetaminophen  IVPB .. 1000 milliGRAM(s) IV Intermittent once PRN  HYDROmorphone  Injectable 1 milliGRAM(s) IV Push every 2 hours PRN      REVIEW OF SYSTEMS  --------------------------------------------------------------------------------  General: no fever. severe back pain  CVS: no chest pain  RESP: no sob, no cough  ABD: no abdominal pain  : no dysuria,  MSK: no edema     VITALS/PHYSICAL EXAM  --------------------------------------------------------------------------------  T(C): 36.5 (12-09-19 @ 13:28), Max: 36.7 (12-08-19 @ 21:40)  HR: 78 (12-09-19 @ 13:28) (72 - 84)  BP: 127/78 (12-09-19 @ 13:28) (110/76 - 136/87)  RR: 18 (12-09-19 @ 13:28) (18 - 18)  SpO2: 93% (12-09-19 @ 13:28) (93% - 95%)  Wt(kg): --        12-08-19 @ 07:01  -  12-09-19 @ 07:00  --------------------------------------------------------  IN: 1360 mL / OUT: 1450 mL / NET: -90 mL    12-09-19 @ 07:01  -  12-09-19 @ 15:17  --------------------------------------------------------  IN: 480 mL / OUT: 450 mL / NET: 30 mL      Physical Exam:  	Gen: in severe painful distress  	HEENT: MMM  	Pulm: CTA B/L  	CV: S1S2  	Abd: Soft, +BS  	Ext: No LE edema B/L              Neuro: Awake   	Skin: Warm and Dry   	  LABS/STUDIES  --------------------------------------------------------------------------------              15.2   20.88 >-----------<  208      [12-08-19 @ 09:27]              46.8     135  |  98  |  35  ----------------------------<  128      [12-08-19 @ 09:07]  3.8   |  21  |  0.68        Ca     9.6     [12-08-19 @ 09:07]    TPro  6.4  /  Alb  3.8  /  TBili  0.8  /  DBili  x   /  AST  19  /  ALT  27  /  AlkPhos  59  [12-08-19 @ 09:07]          Creatinine Trend:  SCr 0.68 [12-08 @ 09:07]  SCr <0.30 [12-08 @ 06:47]  SCr 0.68 [12-07 @ 10:19]    Urinalysis - [12-07-19 @ 11:05]      Color Yellow / Appearance Clear / SG 1.026 / pH 6.0      Gluc Negative / Ketone Trace  / Bili Negative / Urobili Negative       Blood Negative / Protein Trace / Leuk Est Negative / Nitrite Negative      RBC 5 / WBC 0 / Hyaline 0 / Gran  / Sq Epi  / Non Sq Epi 0 / Bacteria Negative      TSH 0.33      [12-08-19 @ 09:35]

## 2019-12-09 NOTE — PROGRESS NOTE ADULT - SUBJECTIVE AND OBJECTIVE BOX
[facility]    Subjective: Patient seen and examined. No new events except as noted.   daughter at the bedside   pain and discomfort improved with pain control   diet as tolerated     REVIEW OF SYSTEMS:    CONSTITUTIONAL: + weakness   EYES/ENT: dry OM   NECK: No pain or stiffness  RESPIRATORY: No cough, wheezing, hemoptysis; No shortness of breath  CARDIOVASCULAR: No chest pain or palpitations  GASTROINTESTINAL: Loss of control   GENITOURINARY: No dysuria, frequency or hematuria  NEUROLOGICAL: + pain adn weakness   SKIN: No itching, burning, rashes, or lesions   All other review of systems is negative unless indicated above.    MEDICATIONS:  MEDICATIONS  (STANDING):  acetaminophen   Tablet .. 1000 milliGRAM(s) Oral every 8 hours  Biotene Dry Mouth Oral Rinse 5 milliLiter(s) Swish and Spit every 6 hours  dexAMETHasone     Tablet 4 milliGRAM(s) Oral every 6 hours  dextrose 5% + sodium chloride 0.45% 1000 milliLiter(s) (100 mL/Hr) IV Continuous <Continuous>  enoxaparin Injectable 80 milliGRAM(s) SubCutaneous daily  fentaNYL   Patch  75 MICROgram(s)/Hr 1 Patch Transdermal every 72 hours  gabapentin 300 milliGRAM(s) Oral <User Schedule>  gabapentin 600 milliGRAM(s) Oral <User Schedule>  levETIRAcetam 500 milliGRAM(s) Oral two times a day  lidocaine   Patch 1 Patch Transdermal daily  QUEtiapine 25 milliGRAM(s) Oral at bedtime  senna 2 Tablet(s) Oral at bedtime      PHYSICAL EXAM:  T(C): 36.5 (12-09-19 @ 13:28), Max: 36.7 (12-08-19 @ 21:40)  HR: 78 (12-09-19 @ 13:28) (74 - 84)  BP: 127/78 (12-09-19 @ 13:28) (110/76 - 136/87)  RR: 18 (12-09-19 @ 13:28) (18 - 18)  SpO2: 93% (12-09-19 @ 13:28) (93% - 94%)  Wt(kg): --  I&O's Summary    08 Dec 2019 07:01  -  09 Dec 2019 07:00  --------------------------------------------------------  IN: 1360 mL / OUT: 1450 mL / NET: -90 mL    09 Dec 2019 07:01  -  09 Dec 2019 18:13  --------------------------------------------------------  IN: 1650 mL / OUT: 450 mL / NET: 1200 mL          Appearance: awake, frail   HEENT: dry OM I	  Lymphatic: No lymphadenopathy , no edema  Cardiovascular: Normal S1 S2,  Respiratory: Lungs clear to auscultation, normal effort 	  Gastrointestinal:  Soft, Non-tender, + BS	  Skin: warm to touch   Musculoskeletal: LE weakness   Psychiatry:  Mood & affect appropriate  Ext: No edema      All labs, Imaging and EKGs personally reviewed                           15.2   20.88 )-----------( 208      ( 08 Dec 2019 09:27 )             46.8               12-08    135  |  98  |  35<H>  ----------------------------<  128<H>  3.8   |  21<L>  |  0.68    Ca    9.6      08 Dec 2019 09:07    TPro  6.4  /  Alb  3.8  /  TBili  0.8  /  DBili  x   /  AST  19  /  ALT  27  /  AlkPhos  59  12-08

## 2019-12-09 NOTE — PROGRESS NOTE ADULT - ASSESSMENT
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    Acidosis:  Non-AG  received D5 +1/2NS +75meq NaHCO3 100cc/hr yesterday  repeat labs today are pending  Monitor acidosis w/ BMP and VBG/ABG    Increased BUN:  Sec to Steroids    Hematuria:  UA has RBC 5  Possible sec to france catheter  repeat UA  check renal US

## 2019-12-09 NOTE — PROGRESS NOTE ADULT - PROBLEM SELECTOR PLAN 4
will hold abx now   monitor for respiratory worsening   O2 supplement  Nebs   negative procalcitonin

## 2019-12-10 DIAGNOSIS — R68.2 DRY MOUTH, UNSPECIFIED: ICD-10-CM

## 2019-12-10 DIAGNOSIS — D3A.8 OTHER BENIGN NEUROENDOCRINE TUMORS: ICD-10-CM

## 2019-12-10 LAB
ANION GAP SERPL CALC-SCNC: 13 MMOL/L — SIGNIFICANT CHANGE UP (ref 5–17)
BUN SERPL-MCNC: 19 MG/DL — SIGNIFICANT CHANGE UP (ref 7–23)
CALCIUM SERPL-MCNC: 9.6 MG/DL — SIGNIFICANT CHANGE UP (ref 8.4–10.5)
CHLORIDE SERPL-SCNC: 102 MMOL/L — SIGNIFICANT CHANGE UP (ref 96–108)
CO2 SERPL-SCNC: 26 MMOL/L — SIGNIFICANT CHANGE UP (ref 22–31)
CREAT SERPL-MCNC: 0.62 MG/DL — SIGNIFICANT CHANGE UP (ref 0.5–1.3)
GLUCOSE SERPL-MCNC: 121 MG/DL — HIGH (ref 70–99)
HCT VFR BLD CALC: 40.4 % — SIGNIFICANT CHANGE UP (ref 39–50)
HGB BLD-MCNC: 13 G/DL — SIGNIFICANT CHANGE UP (ref 13–17)
MCHC RBC-ENTMCNC: 31.5 PG — SIGNIFICANT CHANGE UP (ref 27–34)
MCHC RBC-ENTMCNC: 32.2 GM/DL — SIGNIFICANT CHANGE UP (ref 32–36)
MCV RBC AUTO: 97.8 FL — SIGNIFICANT CHANGE UP (ref 80–100)
NRBC # BLD: 0 /100 WBCS — SIGNIFICANT CHANGE UP (ref 0–0)
PLATELET # BLD AUTO: 171 K/UL — SIGNIFICANT CHANGE UP (ref 150–400)
POTASSIUM SERPL-MCNC: 4.3 MMOL/L — SIGNIFICANT CHANGE UP (ref 3.5–5.3)
POTASSIUM SERPL-SCNC: 4.3 MMOL/L — SIGNIFICANT CHANGE UP (ref 3.5–5.3)
RBC # BLD: 4.13 M/UL — LOW (ref 4.2–5.8)
RBC # FLD: 14 % — SIGNIFICANT CHANGE UP (ref 10.3–14.5)
SODIUM SERPL-SCNC: 141 MMOL/L — SIGNIFICANT CHANGE UP (ref 135–145)
WBC # BLD: 22.34 K/UL — HIGH (ref 3.8–10.5)
WBC # FLD AUTO: 22.34 K/UL — HIGH (ref 3.8–10.5)

## 2019-12-10 PROCEDURE — 99222 1ST HOSP IP/OBS MODERATE 55: CPT

## 2019-12-10 PROCEDURE — 99233 SBSQ HOSP IP/OBS HIGH 50: CPT | Mod: GC

## 2019-12-10 RX ORDER — LIDOCAINE 4 G/100G
3 CREAM TOPICAL DAILY
Refills: 0 | Status: DISCONTINUED | OUTPATIENT
Start: 2019-12-10 | End: 2019-12-12

## 2019-12-10 RX ORDER — DULOXETINE HYDROCHLORIDE 30 MG/1
30 CAPSULE, DELAYED RELEASE ORAL DAILY
Refills: 0 | Status: DISCONTINUED | OUTPATIENT
Start: 2019-12-10 | End: 2019-12-12

## 2019-12-10 RX ORDER — HYDROMORPHONE HYDROCHLORIDE 2 MG/ML
1 INJECTION INTRAMUSCULAR; INTRAVENOUS; SUBCUTANEOUS ONCE
Refills: 0 | Status: DISCONTINUED | OUTPATIENT
Start: 2019-12-10 | End: 2019-12-10

## 2019-12-10 RX ORDER — CALCIUM CARBONATE 500(1250)
1 TABLET ORAL ONCE
Refills: 0 | Status: COMPLETED | OUTPATIENT
Start: 2019-12-10 | End: 2019-12-10

## 2019-12-10 RX ORDER — HYDROMORPHONE HYDROCHLORIDE 2 MG/ML
0.5 INJECTION INTRAMUSCULAR; INTRAVENOUS; SUBCUTANEOUS ONCE
Refills: 0 | Status: DISCONTINUED | OUTPATIENT
Start: 2019-12-10 | End: 2019-12-10

## 2019-12-10 RX ADMIN — HYDROMORPHONE HYDROCHLORIDE 1 MILLIGRAM(S): 2 INJECTION INTRAMUSCULAR; INTRAVENOUS; SUBCUTANEOUS at 18:35

## 2019-12-10 RX ADMIN — HYDROMORPHONE HYDROCHLORIDE 1 MILLIGRAM(S): 2 INJECTION INTRAMUSCULAR; INTRAVENOUS; SUBCUTANEOUS at 07:45

## 2019-12-10 RX ADMIN — HYDROMORPHONE HYDROCHLORIDE 1 MILLIGRAM(S): 2 INJECTION INTRAMUSCULAR; INTRAVENOUS; SUBCUTANEOUS at 09:46

## 2019-12-10 RX ADMIN — Medication 1000 MILLIGRAM(S): at 06:10

## 2019-12-10 RX ADMIN — GABAPENTIN 300 MILLIGRAM(S): 400 CAPSULE ORAL at 13:04

## 2019-12-10 RX ADMIN — LIDOCAINE 3 PATCH: 4 CREAM TOPICAL at 19:58

## 2019-12-10 RX ADMIN — HYDROMORPHONE HYDROCHLORIDE 1 MILLIGRAM(S): 2 INJECTION INTRAMUSCULAR; INTRAVENOUS; SUBCUTANEOUS at 22:20

## 2019-12-10 RX ADMIN — HYDROMORPHONE HYDROCHLORIDE 1 MILLIGRAM(S): 2 INJECTION INTRAMUSCULAR; INTRAVENOUS; SUBCUTANEOUS at 02:14

## 2019-12-10 RX ADMIN — HYDROMORPHONE HYDROCHLORIDE 1 MILLIGRAM(S): 2 INJECTION INTRAMUSCULAR; INTRAVENOUS; SUBCUTANEOUS at 05:55

## 2019-12-10 RX ADMIN — Medication 4 MILLIGRAM(S): at 05:18

## 2019-12-10 RX ADMIN — LEVETIRACETAM 500 MILLIGRAM(S): 250 TABLET, FILM COATED ORAL at 05:18

## 2019-12-10 RX ADMIN — FENTANYL CITRATE 1 PATCH: 50 INJECTION INTRAVENOUS at 19:58

## 2019-12-10 RX ADMIN — Medication 5 MILLILITER(S): at 13:02

## 2019-12-10 RX ADMIN — HYDROMORPHONE HYDROCHLORIDE 1 MILLIGRAM(S): 2 INJECTION INTRAMUSCULAR; INTRAVENOUS; SUBCUTANEOUS at 18:50

## 2019-12-10 RX ADMIN — HYDROMORPHONE HYDROCHLORIDE 1 MILLIGRAM(S): 2 INJECTION INTRAMUSCULAR; INTRAVENOUS; SUBCUTANEOUS at 17:10

## 2019-12-10 RX ADMIN — FENTANYL CITRATE 1 PATCH: 50 INJECTION INTRAVENOUS at 09:41

## 2019-12-10 RX ADMIN — HYDROMORPHONE HYDROCHLORIDE 1 MILLIGRAM(S): 2 INJECTION INTRAMUSCULAR; INTRAVENOUS; SUBCUTANEOUS at 01:44

## 2019-12-10 RX ADMIN — DULOXETINE HYDROCHLORIDE 30 MILLIGRAM(S): 30 CAPSULE, DELAYED RELEASE ORAL at 17:26

## 2019-12-10 RX ADMIN — Medication 4 MILLIGRAM(S): at 13:03

## 2019-12-10 RX ADMIN — LIDOCAINE 3 PATCH: 4 CREAM TOPICAL at 18:32

## 2019-12-10 RX ADMIN — HYDROMORPHONE HYDROCHLORIDE 1 MILLIGRAM(S): 2 INJECTION INTRAMUSCULAR; INTRAVENOUS; SUBCUTANEOUS at 12:15

## 2019-12-10 RX ADMIN — HYDROMORPHONE HYDROCHLORIDE 1 MILLIGRAM(S): 2 INJECTION INTRAMUSCULAR; INTRAVENOUS; SUBCUTANEOUS at 07:14

## 2019-12-10 RX ADMIN — ENOXAPARIN SODIUM 80 MILLIGRAM(S): 100 INJECTION SUBCUTANEOUS at 13:03

## 2019-12-10 RX ADMIN — Medication 4 MILLIGRAM(S): at 23:12

## 2019-12-10 RX ADMIN — LIDOCAINE 1 PATCH: 4 CREAM TOPICAL at 01:48

## 2019-12-10 RX ADMIN — QUETIAPINE FUMARATE 25 MILLIGRAM(S): 200 TABLET, FILM COATED ORAL at 23:11

## 2019-12-10 RX ADMIN — HYDROMORPHONE HYDROCHLORIDE 0.5 MILLIGRAM(S): 2 INJECTION INTRAMUSCULAR; INTRAVENOUS; SUBCUTANEOUS at 23:27

## 2019-12-10 RX ADMIN — Medication 4 MILLIGRAM(S): at 17:14

## 2019-12-10 RX ADMIN — Medication 1 TABLET(S): at 23:12

## 2019-12-10 RX ADMIN — Medication 1000 MILLIGRAM(S): at 22:04

## 2019-12-10 RX ADMIN — LEVETIRACETAM 500 MILLIGRAM(S): 250 TABLET, FILM COATED ORAL at 17:14

## 2019-12-10 RX ADMIN — Medication 5 MILLILITER(S): at 17:14

## 2019-12-10 RX ADMIN — HYDROMORPHONE HYDROCHLORIDE 1 MILLIGRAM(S): 2 INJECTION INTRAMUSCULAR; INTRAVENOUS; SUBCUTANEOUS at 22:05

## 2019-12-10 RX ADMIN — GABAPENTIN 600 MILLIGRAM(S): 400 CAPSULE ORAL at 22:04

## 2019-12-10 RX ADMIN — Medication 5 MILLILITER(S): at 23:12

## 2019-12-10 RX ADMIN — HYDROMORPHONE HYDROCHLORIDE 1 MILLIGRAM(S): 2 INJECTION INTRAMUSCULAR; INTRAVENOUS; SUBCUTANEOUS at 17:48

## 2019-12-10 RX ADMIN — HYDROMORPHONE HYDROCHLORIDE 1 MILLIGRAM(S): 2 INJECTION INTRAMUSCULAR; INTRAVENOUS; SUBCUTANEOUS at 05:25

## 2019-12-10 RX ADMIN — Medication 1000 MILLIGRAM(S): at 06:40

## 2019-12-10 RX ADMIN — HYDROMORPHONE HYDROCHLORIDE 1 MILLIGRAM(S): 2 INJECTION INTRAMUSCULAR; INTRAVENOUS; SUBCUTANEOUS at 12:45

## 2019-12-10 RX ADMIN — GABAPENTIN 300 MILLIGRAM(S): 400 CAPSULE ORAL at 05:20

## 2019-12-10 RX ADMIN — HYDROMORPHONE HYDROCHLORIDE 0.5 MILLIGRAM(S): 2 INJECTION INTRAMUSCULAR; INTRAVENOUS; SUBCUTANEOUS at 23:12

## 2019-12-10 RX ADMIN — HYDROMORPHONE HYDROCHLORIDE 1 MILLIGRAM(S): 2 INJECTION INTRAMUSCULAR; INTRAVENOUS; SUBCUTANEOUS at 10:16

## 2019-12-10 NOTE — PROGRESS NOTE ADULT - PROBLEM SELECTOR PLAN 1
2/2 extensive spinal metastasis. Pain appears both somatic and neuropathic  - c/w fentanyl patch to 75 mcg q 72 hrs. will consider increasing fentanyl dose if pt continues to require frequent dilaudid prns  - tylenol 1000 mg q8 standing, 1000 mg IV prn if pt cannot tolerate po  - gabapentin 300 mg bid, 600 mg at night  - c/w dilaudid 1 mg q2 hr prn for severe pain. pt has been requiring frequent prns  - c/w decadron 4 mg q6 hr  - per rad onc note, not likely to benefit from palliative radiation  - based on interview today with patient and his daughter, appears that there is likely a component of depression in his symptoms. will start duloxetine 30 mg qd today 2/2 extensive spinal metastasis. Pain appears both somatic and neuropathic  - c/w fentanyl patch to 75 mcg q 72 hrs. will consider increasing fentanyl dose if pt continues to require frequent Dilaudid prns  - tylenol 1000 mg q8 standing, 1000 mg IV prn if pt cannot tolerate po  - gabapentin 300 mg bid, 600 mg at night  - c/w dilaudid 1 mg q2 hr prn for severe pain.  - c/w decadron 4 mg q6 hr  - per rad onc note, not likely to benefit from palliative radiation  - based on interview today with patient and his daughter, appears that there is likely a component of depression in his symptoms. will start duloxetine 30 mg qd today

## 2019-12-10 NOTE — PROGRESS NOTE ADULT - PROBLEM SELECTOR PLAN 1
Seen on MRI, mets to spine   Neurosurg eval appreciated  Onc eval appreciated  possible systemic treatment, poor candidate  Rad Onc eval noted   Decadron 4mg Q6hrs, taper as per surg team   pain control, aggressive   Neuro check  Palliative eval appreciated, pain control   poor candidate for palliative rads  discussed in detail with family at the bedside

## 2019-12-10 NOTE — PHYSICAL THERAPY INITIAL EVALUATION ADULT - ADDITIONAL COMMENTS
Per chart review, pt lives with spouse & daughter in an apartment with 7 steps down to enter. Pt was independent with mobility prior to admit.

## 2019-12-10 NOTE — PROGRESS NOTE ADULT - PROBLEM SELECTOR PLAN 4
Biotin oral rinse Pt's daughter Romulo is HCP. Pt defers decision making to daughter. He is currently lethargic, confused, AAOX1, although it appears he may also have some word finding difficulty related to his brain mets. Family states they would like to hear from neuro onc to determine what his prognosis is and what are the options for him going forward. However, based on conversation today with daughter and wife, family understands that prognosis is poor but want to know how much time he has left. GOC conversation ongoing.

## 2019-12-10 NOTE — CONSULT NOTE ADULT - SUBJECTIVE AND OBJECTIVE BOX
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, with unknown primary (suspected Lung) presents for worsening lower ext weakness and diffuse body pain. Taking meds at home with no relief. no fever or chills. Since about 5 days ago, he has be unable to get up and move around as he had recently done in the past. He also has had at least 3-4 days of pelvic/leg numbness and  incontinence.no cough. no shortness of breath. no new numbness. no headache. Family is  at the bedside. (07 Dec 2019 20:21)      EXAM:  MRI CERVICAL/LUMBAR /THORACIC WAW IC                                       PROCEDURE DATE:  12/07/2019      I  COMPARISON: Head CT 9/24/2019, 9/8/2019, MRI brain with gadolinium,   9/7/2019, CT chest, 9/25/2019, and prior      CERVICAL SPINE:    Multiple too numerous to count enhancing dural metastasis or spinal   canal, involving the lateral recesses with extension into multiple neural   foramina abutting and flattening the cervical spinal cord at multiple   levels, with foci of CSF at margins, without complete cord compression.   Severely narrowed AP canal at C3-C4 secondary to disc osteophyte ridge   ventrally, dural metastasis dorsally, with AP canal to 6.4 mm, and CSF at   lateral margins, and at C4-5, flattens the left lateral cord, AP canal is   5.9 mm, with CSF at right lateral canal margin. Faint hyperintense T2   signal in the cervical cord, at C3-4 level, (3:9, 6:11) may represent   edema and/or myelomalacia.    Degenerative change, straight cervical lordosis, may be positioning or   muscle spasm. Vertebrae demonstrate preserved height height, and fatty   degenerative change at C3-4 endplates, disc osteophyte ridges, and facet   hypertrophic changes with dural enhancing metastasis cause multilevel   canal and foraminal narrowing.    Findings at specific levels as follows:    C2-3, right uncovertebral hypertrophy and facet hypertrophic changes   cause mild/moderate right foraminal narrowing, dural based metastasis   along the left dorsal lateral canal, without severe canal or foraminal   narrowing.    C3-4, disc osteophyte ridge, uncovertebral and facet hypertrophy cause   moderate foraminal narrowing, enhancing dural metastases at right   dorsolateral canal, with 6.4 mm AP canal diameter as noted above.    C4-5 disc osteophyte ridge, left paramedian disc extension facet   hypertrophy, enhancing left canal metastasis narrow AP canal to 5.9 mm,   marked left foraminal narrowing, and mild to moderate right foraminal   narrowing.    C5-6, disc osteophyte ridge,  uncovertebral hypertrophy, dural metastases   throughout canal margins, AP canal is 8.6 mm, there is moderate bilateral   foraminal narrowing.    C6-7 disc osteophyte ridge, left paramedian and lateral deformity,   enhancing dural metastasis in the lateral recesses, causing lateral   recess narrowing, and mild to moderate foraminal narrowing without canal   narrowing.    C7-T1, enhancing metastatic foci in the lateral recesses into the neural   foramina, left side greater than right with extension along the exiting   left nerve root    THORACIC SPINE:    Enhancing leptomeningeal metastasis, predominantly along the ventral   canal extending to the neural foramina and flattening the bilateral   ventral upper and mid thoracic spinal cord, with minimal CSF, and   epidural lipomatosis at the dorsal margin. Compression of the of the   bilateral ventral cord is most pronounced from T3-T9, with hyperintense   T2 signal in the thoracic cord, that may reflect edema and/or   myelomalacia.      LUMBAR SPINE:    Partially seen rotary dextrocurvature of the lower thoracic and upper   lumbar spine with compensatory levocurvature of the lower lumbar spine.,   There is 3 mm retrolisthesis of L5 on S1.    Extensive enhancing dural metastasis, extending to neuroforamina, and   along ventral spinal canal, left side than right, from T12-L3, small   enhancing metastasis along the cauda equina, and also along exiting   sacral nerve roots, with abnormal hyperintense T2 signal in left greater   than right lumbar sacral plexus. (6:44).    Degenerative change with loss of posterior disc height and signal from   the L1-S1 level. Findings at specific levels are as follows:    L1-2, left side of the right enhancing dural metastasis extending to the   lateral recess and neural foramina without severe canal or foraminal   narrowing.    L2-3, left greater than right dural metastasis with extension to the   lateral recesses, with disc bulge without severe canal or foraminal   narrowing.    L3-4, broad-based disc bulge with lateral extension and facet   hypertrophic changes with minimal inferior foraminal narrowing and no   significant central canal narrowing.    L4-5, broad-based is bulge with far right lateral disc herniation and   deformity, and facet hypertrophic changes with minimal inferior foraminal   narrowing and no central canal narrowing.    L5-S1, small central midline disc deformity, disc calcified ridge with   lateral extension causing mild right greater than left inferior foraminal   narrowing without central canal narrowing.    Partially seen cystic lesions in the kidneys,, please see prior dedicated   reports the chest and abdomen for postoperative soft tissue findings.    IMPRESSION:    Enhancing dural metastasis in cervical, thoracic and lumbar spinal canal   involving lateral recesses, neural foramina, and exiting nerve roots   including sacralnerve roots, consistent with metastatic disease.   Flattening most pronounced ventrally at mid and upper thoracic spine,   with minimal dorsal CSF and dorsal epidural lipomatosis.    Faint hyperintense T2 signal in the spinal cord, may represent edema   and/or myelomalacia. Multilevel degenerative changes as above.    Findings discussed with Dr. Leslie at immediate time of review on 12/7/2019   at 6:26 PM.      No Known Allergies    Intolerances    ROS: [  ] Fever  [  ] Chills  [  ]Chest Pain [  ] SOB  [  ]Cough [  ] N/V  [  ] Diarrhea [  ]Constipation [  ]Other ROS:  [  ] ROS otherwise negative    PAST MEDICAL & SURGICAL HISTORY:  No pertinent past medical history  Status post craniotomy  No significant past surgical history      FAMILY HISTORY:  No pertinent family history in first degree relatives      MEDICATIONS  (STANDING):  acetaminophen   Tablet .. 1000 milliGRAM(s) Oral every 8 hours  Biotene Dry Mouth Oral Rinse 5 milliLiter(s) Swish and Spit every 6 hours  dexAMETHasone     Tablet 4 milliGRAM(s) Oral every 6 hours  dextrose 5% + sodium chloride 0.45% 1000 milliLiter(s) (100 mL/Hr) IV Continuous <Continuous>  enoxaparin Injectable 80 milliGRAM(s) SubCutaneous daily  fentaNYL   Patch  75 MICROgram(s)/Hr 1 Patch Transdermal every 72 hours  gabapentin 300 milliGRAM(s) Oral <User Schedule>  gabapentin 600 milliGRAM(s) Oral <User Schedule>  levETIRAcetam 500 milliGRAM(s) Oral two times a day  lidocaine   Patch 1 Patch Transdermal daily  lidocaine   Patch 3 Patch Transdermal daily  QUEtiapine 25 milliGRAM(s) Oral at bedtime  senna 2 Tablet(s) Oral at bedtime    MEDICATIONS  (PRN):  acetaminophen   Tablet .. 650 milliGRAM(s) Oral every 6 hours PRN Mild Pain (1 - 3)  acetaminophen  IVPB .. 1000 milliGRAM(s) IV Intermittent once PRN Mild Pain (1 - 3)  HYDROmorphone  Injectable 1 milliGRAM(s) IV Push every 2 hours PRN Severe Pain (7 - 10)      PHYSICAL EXAM  Vital Signs Last 24 Hrs  T(C): 36.7 (10 Dec 2019 09:17), Max: 36.9 (09 Dec 2019 18:23)  T(F): 98.1 (10 Dec 2019 09:17), Max: 98.5 (09 Dec 2019 18:23)  HR: 71 (10 Dec 2019 09:17) (59 - 80)  BP: 148/88 (10 Dec 2019 09:17) (117/73 - 151/76)  BP(mean): --  RR: 18 (10 Dec 2019 09:17) (18 - 18)  SpO2: 97% (10 Dec 2019 09:17) (93% - 97%)    General: Cachectic, no acute distress- slow affect but responsive, appropriate  HEENT: NC/AT; EOMI, PERRL, sclera nonicteric; external ears normal; no rhinorrhea or epistaxis; mucous membranes moist; oropharynx clear and without erythema  CV: NR, RR; no appreciable r/m/g  Lungs: bilateral wheezing, no stridor or cyanosis  Lymph: no adenopathy  Abdomen: Bowel sounds present; soft, NTND  MSK: moderate diffuse spine tenderness with palpation  Neuro: AAOx3; cranial nerves II-XII intact; strength 3/5 in upper  extremities, 0/5 in lower extermities; depressed knee and ankle reflexes- some sensation to light touch in both legs  Psych: slow affect; mood congruent  Skin: no visible rashes on limited examination    IMAGING/LABS/PATHOLOGY: I have personally reviewed the relevant labs, pathology, and imaging as noted in the HPI.  In addition,                          13.0   22.34 )-----------( 171      ( 10 Dec 2019 07:22 )             40.4     12-10    141  |  102  |  19  ----------------------------<  121<H>  4.3   |  26  |  0.62    Ca    9.6      10 Dec 2019 07:22            ASSESSMENT/PLAN    MARCOS KOO is a 64y man with metastatic neuroendocrine carcinoma, s/p surgery and radiosurgery for brain mets at [facility] in 9/19, now with about 2 months of spine pain, one week of leg numbness/weakness/ incontinence. Spine  MRI showing diffuse leptomeningeal disease. I have reviewed the radiologic findings with neuroradiology. Given the patients' compromised motor function and diffuse leptomeningeal disease, it is unlikely that spine radiation will have any significant potential benefit in restoring motor function. I have had extensive discussion with the patients' daughter and family regarding possible radiation for pain control, although even in that situation, given the extensive spinal/leptomeningeal disease, treatment would be technically difficult and probably of minimal potential benefit.  I have asked the patients' daughter to call me later today, if there are additional questions. 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 As per my discussion with his daughter, he has recieved no systemic therapy.  Taking meds at home with no relief. no fever or chills. Since about 5 days ago, he has be unable to get up and move around as he had recently done in the past. He also has had at least 3-4 days of pelvic/leg numbness and  incontinence. No cough. no shortness of breath.,no headache. Family is  at the bedside. (07 Dec 2019 20:21)      EXAM:  MRI CERVICAL/LUMBAR /THORACIC WAW IC                                       PROCEDURE DATE:  12/07/2019      COMPARISON: Head CT 9/24/2019, 9/8/2019, MRI brain with gadolinium,   9/7/2019, CT chest, 9/25/2019, and prior      CERVICAL SPINE:    Multiple too numerous to count enhancing dural metastasis or spinal   canal, involving the lateral recesses with extension into multiple neural   foramina abutting and flattening the cervical spinal cord at multiple   levels, with foci of CSF at margins, without complete cord compression.   Severely narrowed AP canal at C3-C4 secondary to disc osteophyte ridge   ventrally, dural metastasis dorsally, with AP canal to 6.4 mm, and CSF at   lateral margins, and at C4-5, flattens the left lateral cord, AP canal is   5.9 mm, with CSF at right lateral canal margin. Faint hyperintense T2   signal in the cervical cord, at C3-4 level, (3:9, 6:11) may represent   edema and/or myelomalacia.    Degenerative change, straight cervical lordosis, may be positioning or   muscle spasm. Vertebrae demonstrate preserved height height, and fatty   degenerative change at C3-4 endplates, disc osteophyte ridges, and facet   hypertrophic changes with dural enhancing metastasis cause multilevel   canal and foraminal narrowing.    Findings at specific levels as follows:    C2-3, right uncovertebral hypertrophy and facet hypertrophic changes   cause mild/moderate right foraminal narrowing, dural based metastasis   along the left dorsal lateral canal, without severe canal or foraminal   narrowing.    C3-4, disc osteophyte ridge, uncovertebral and facet hypertrophy cause   moderate foraminal narrowing, enhancing dural metastases at right   dorsolateral canal, with 6.4 mm AP canal diameter as noted above.    C4-5 disc osteophyte ridge, left paramedian disc extension facet   hypertrophy, enhancing left canal metastasis narrow AP canal to 5.9 mm,   marked left foraminal narrowing, and mild to moderate right foraminal   narrowing.    C5-6, disc osteophyte ridge,  uncovertebral hypertrophy, dural metastases   throughout canal margins, AP canal is 8.6 mm, there is moderate bilateral   foraminal narrowing.    C6-7 disc osteophyte ridge, left paramedian and lateral deformity,   enhancing dural metastasis in the lateral recesses, causing lateral   recess narrowing, and mild to moderate foraminal narrowing without canal   narrowing.    C7-T1, enhancing metastatic foci in the lateral recesses into the neural   foramina, left side greater than right with extension along the exiting   left nerve root    THORACIC SPINE:    Enhancing leptomeningeal metastasis, predominantly along the ventral   canal extending to the neural foramina and flattening the bilateral   ventral upper and mid thoracic spinal cord, with minimal CSF, and   epidural lipomatosis at the dorsal margin. Compression of the of the   bilateral ventral cord is most pronounced from T3-T9, with hyperintense   T2 signal in the thoracic cord, that may reflect edema and/or   myelomalacia.      LUMBAR SPINE:    Partially seen rotary dextrocurvature of the lower thoracic and upper   lumbar spine with compensatory levocurvature of the lower lumbar spine.,   There is 3 mm retrolisthesis of L5 on S1.    Extensive enhancing dural metastasis, extending to neuroforamina, and   along ventral spinal canal, left side than right, from T12-L3, small   enhancing metastasis along the cauda equina, and also along exiting   sacral nerve roots, with abnormal hyperintense T2 signal in left greater   than right lumbar sacral plexus. (6:44).    Degenerative change with loss of posterior disc height and signal from   the L1-S1 level. Findings at specific levels are as follows:    L1-2, left side of the right enhancing dural metastasis extending to the   lateral recess and neural foramina without severe canal or foraminal   narrowing.    L2-3, left greater than right dural metastasis with extension to the   lateral recesses, with disc bulge without severe canal or foraminal   narrowing.    L3-4, broad-based disc bulge with lateral extension and facet   hypertrophic changes with minimal inferior foraminal narrowing and no   significant central canal narrowing.    L4-5, broad-based is bulge with far right lateral disc herniation and   deformity, and facet hypertrophic changes with minimal inferior foraminal   narrowing and no central canal narrowing.    L5-S1, small central midline disc deformity, disc calcified ridge with   lateral extension causing mild right greater than left inferior foraminal   narrowing without central canal narrowing.    Partially seen cystic lesions in the kidneys,, please see prior dedicated   reports the chest and abdomen for postoperative soft tissue findings.    IMPRESSION:    Enhancing dural metastasis in cervical, thoracic and lumbar spinal canal   involving lateral recesses, neural foramina, and exiting nerve roots   including sacralnerve roots, consistent with metastatic disease.   Flattening most pronounced ventrally at mid and upper thoracic spine,   with minimal dorsal CSF and dorsal epidural lipomatosis.    Faint hyperintense T2 signal in the spinal cord, may represent edema   and/or myelomalacia. Multilevel degenerative changes as above.    Findings discussed with Dr. Leslie at immediate time of review on 12/7/2019   at 6:26 PM.      No Known Allergies    Intolerances    ROS: [  ] Fever  [  ] Chills  [  ]Chest Pain [  ] SOB  [  ]Cough [  ] N/V  [  ] Diarrhea [  ]Constipation [  ]Other ROS:  [  ] ROS otherwise negative    PAST MEDICAL & SURGICAL HISTORY:  No pertinent past medical history  Status post craniotomy  No significant past surgical history      FAMILY HISTORY:  No pertinent family history in first degree relatives      MEDICATIONS  (STANDING):  acetaminophen   Tablet .. 1000 milliGRAM(s) Oral every 8 hours  Biotene Dry Mouth Oral Rinse 5 milliLiter(s) Swish and Spit every 6 hours  dexAMETHasone     Tablet 4 milliGRAM(s) Oral every 6 hours  dextrose 5% + sodium chloride 0.45% 1000 milliLiter(s) (100 mL/Hr) IV Continuous <Continuous>  enoxaparin Injectable 80 milliGRAM(s) SubCutaneous daily  fentaNYL   Patch  75 MICROgram(s)/Hr 1 Patch Transdermal every 72 hours  gabapentin 300 milliGRAM(s) Oral <User Schedule>  gabapentin 600 milliGRAM(s) Oral <User Schedule>  levETIRAcetam 500 milliGRAM(s) Oral two times a day  lidocaine   Patch 1 Patch Transdermal daily  lidocaine   Patch 3 Patch Transdermal daily  QUEtiapine 25 milliGRAM(s) Oral at bedtime  senna 2 Tablet(s) Oral at bedtime    MEDICATIONS  (PRN):  acetaminophen   Tablet .. 650 milliGRAM(s) Oral every 6 hours PRN Mild Pain (1 - 3)  acetaminophen  IVPB .. 1000 milliGRAM(s) IV Intermittent once PRN Mild Pain (1 - 3)  HYDROmorphone  Injectable 1 milliGRAM(s) IV Push every 2 hours PRN Severe Pain (7 - 10)      PHYSICAL EXAM  Vital Signs Last 24 Hrs  T(C): 36.7 (10 Dec 2019 09:17), Max: 36.9 (09 Dec 2019 18:23)  T(F): 98.1 (10 Dec 2019 09:17), Max: 98.5 (09 Dec 2019 18:23)  HR: 71 (10 Dec 2019 09:17) (59 - 80)  BP: 148/88 (10 Dec 2019 09:17) (117/73 - 151/76)  BP(mean): --  RR: 18 (10 Dec 2019 09:17) (18 - 18)  SpO2: 97% (10 Dec 2019 09:17) (93% - 97%)    General: Cachectic, no acute distress- slow affect but responsive, appropriate  HEENT: NC/AT; EOMI, PERRL, sclera nonicteric; external ears normal; no rhinorrhea or epistaxis; mucous membranes moist; oropharynx clear and without erythema  CV: NR, RR; no appreciable r/m/g  Lungs: bilateral wheezing, no stridor or cyanosis  Lymph: no adenopathy  Abdomen: Bowel sounds present; soft, NTND  MSK: moderate diffuse spine tenderness with palpation  Neuro: AAOx3; cranial nerves II-XII intact; strength 3/5 in upper  extremities, 0/5 in lower extermities; depressed knee and ankle reflexes- some sensation to light touch in both legs  Psych: slow affect; mood congruent  Skin: no visible rashes on limited examination    IMAGING/LABS/PATHOLOGY: I have personally reviewed the relevant labs, pathology, and imaging as noted in the HPI.  In addition,                          13.0   22.34 )-----------( 171      ( 10 Dec 2019 07:22 )             40.4     12-10    141  |  102  |  19  ----------------------------<  121<H>  4.3   |  26  |  0.62    Ca    9.6      10 Dec 2019 07:22            ASSESSMENT/PLAN    MARCOS ANDREWS is a 64y man with metastatic neuroendocrine carcinoma, s/p surgery and radiosurgery for brain mets at [facility] in 9/19, now with about 2 months of spine pain, one week of leg numbness/weakness/ incontinence. Spine  MRI showing diffuse leptomeningeal disease. I have reviewed the radiologic findings with Dr Yolanda Jerry. Given the patients' compromised motor function and very extensive/diffuse leptomeningeal disease, it is unlikely that spine radiation will have any significant potential benefit in restoring motor function. I have had extensive discussion with the patients' daughter and family regarding possible radiation for pain control, although even in that situation, given the extensive spinal/leptomeningeal disease, treatment would be technically difficult and probably of minimal potential benefit.  I have asked the patients' daughter to call me later today, if there are additional questions. At this point, no immediate radiation is being planned.    Jeronimo Benítez MD  cell

## 2019-12-10 NOTE — CHART NOTE - NSCHARTNOTEFT_GEN_A_CORE
Upon Nutritional Assessment by the Registered Dietitian your patient was determined to meet criteria / has evidence of the following diagnosis/diagnoses:          [ ]  Mild Protein Calorie Malnutrition        [ ]  Moderate Protein Calorie Malnutrition        [ ] Severe Protein Calorie Malnutrition        [ ] Unspecified Protein Calorie Malnutrition        [ x] Underweight / BMI <19        [ ] Morbid Obesity / BMI > 40      Findings as based on:  [x ] Comprehensive nutrition assessment   [ ] Nutrition Focused Physical Exam  [x ] Other: BMI 18.8, wt los 3 pounds x 2 months.      Nutrition Plan/Recommendations:  Regular diet, suggest Ensure Enlive 8 ounces x1        PROVIDER Section:     By signing this assessment you are acknowledging and agree with the diagnosis/diagnoses assigned by the Registered Dietitian    Comments:

## 2019-12-10 NOTE — CONSULT NOTE ADULT - SUBJECTIVE AND OBJECTIVE BOX
HPI:  64M PMHx smoker 50+yr known metastatic high grade neuroendocrine tumor s/p resection of brain met 19 and stereotactic RT, presents to ED with new onset weakness in BLE and difficulty urinating. MRI spine showing diffuse leptomeningeal disease. NSG evaluated patient and rec no intervention, and evaluated by rad-onc whom also rec no further intervention given the compromised motor function and diffuse leptomeningeal disease, though are entertaining the possibility of radiation therapy for pain control. Neurology consulted for pain control and any additional recommendations.     Patient currently being medication w/:   -Fentanyl patch 75mcg/hr transdermal patch q72h  -Lidocaine patch 12h on 12h off  -Hydromorphone 1mg IV q2h  -Gabapentin 300 BID, 600 QD    During my examination he appears in no distress. States he has been unable to move or feel his legs since Thursday, w/ significant difficulty ambulating.     A 10-system ROS was performed and is negative except for those items noted above and/or in the HPI.    PAST MEDICAL & SURGICAL HISTORY:  No pertinent past medical history  Status post craniotomy    FAMILY HISTORY:  No pertinent family history in first degree relatives    SOCIAL HISTORY:   T/E/D: No smoke, drink, drugs    every 4 hours (09 Dec 2019 10:55)  Pepcid 40 mg oral tablet: 1 tab(s) orally 2 times a day (09 Dec 2019 10:55)  polyethylene glycol 3350 oral powder for reconstitution: 17 gram(s) orally once a day (09 Dec 2019 10:55)  Zofran ODT 4 mg oral tablet, disintegratin tab(s) orally 3 times a day, As Needed (09 Dec 2019 10:55)    Exam: Exam limited due to patients mental status and heavy sedation, as well as his poor cooperation with the exam.   MS: A&Ox3. Language fluent, comprensive, w/ intact repetition. Attentive.   CN: VFF. TOMAS. EOMI. V1-V3 intact. Face symmetric. T/u midline. Normal shrug.   Motor: 4+/5 in bilateral upper extremities, At least 1/5 bilateral LE  Sensation: Decreased sensation to PP and LT up to at least T10   Coordination: No dysmetria on FNF or H2S bilaterally   Gait: Deferred     STUDIES & IMAGIN-10 Na 141  12-10 K 4.3  12-10 P --  12-10 Mg --  12-10 Ca 9.6  12-10 Cr 0.62 HPI:  64M PMHx smoker 50+yr known metastatic high grade neuroendocrine tumor s/p resection of brain met 19 and stereotactic RT, presents to ED with new onset weakness in BLE and difficulty urinating. MRI spine showing diffuse leptomeningeal disease. NSG evaluated patient and rec no intervention, and evaluated by rad-onc whom also rec no further intervention given the compromised motor function and diffuse leptomeningeal disease, though are entertaining the possibility of radiation therapy for pain control. Neurology consulted for pain control and any additional recommendations.     Malignancy history:   Presented for complaints of personality change, generalized weakness, poor appetite and "staring off" episodes on 2019. He was found to have L. frontal solitary heterogenous cystic mass 3.4x3.3cm, 1.2cm hepatic lesion, renal cyst lesions, and 1.6cm R. acetabular shoulder lesion. S/p resection on . Prelim dx was known to be metastatic carcinoma w/ neuroendocrine features, unknown primary but presumed to be lung. Patient underwent radiosurgery therapy, however w/ no systemic chemotherapy.   Patient currently being medication w/:   -Fentanyl patch 75mcg/hr transdermal patch q72h  -Lidocaine patch 12h on 12h off  -Hydromorphone 1mg IV q2h  -Gabapentin 300 BID, 600 QD    During my examination he appears in no distress. States he has been unable to move or feel his legs since Thursday, w/ significant difficulty ambulating.     A 10-system ROS was performed and is negative except for those items noted above and/or in the HPI.    PAST MEDICAL & SURGICAL HISTORY:  No pertinent past medical history  Status post craniotomy    FAMILY HISTORY:  No pertinent family history in first degree relatives    SOCIAL HISTORY:   T/E/D: No smoke, drink, drugs    every 4 hours (09 Dec 2019 10:55)  Pepcid 40 mg oral tablet: 1 tab(s) orally 2 times a day (09 Dec 2019 10:55)  polyethylene glycol 3350 oral powder for reconstitution: 17 gram(s) orally once a day (09 Dec 2019 10:55)  Zofran ODT 4 mg oral tablet, disintegratin tab(s) orally 3 times a day, As Needed (09 Dec 2019 10:55)    Exam: Exam limited due to patients mental status and heavy sedation, as well as his poor cooperation with the exam.   Gen: Cachexia   MS: A&Ox3. Lethargic. Language fluent, comprehensive, w/ intact repetition.  CN: VFF. TOMAS. EOMI. V1-V3 intact. Face symmetric. T/u midline. Normal shrug.   Motor: 4+/5 in bilateral upper extremities, At least 1/5 bilateral LE though difficult to assess due to poor cooperation   Sensation: Decreased sensation to PP and LT up to at least T10   Coordination: No dysmetria on FNF or H2S bilaterally   Reflexes: 3+ diffusely throughout, toes mute    Gait: Deferred     STUDIES & IMAGIN-10 Na 141  12-10 K 4.3  12-10 P --  12-10 Mg --  12-10 Ca 9.6  12-10 Cr 0.62 HPI:  64M PMHx smoker 50+yr known metastatic high grade neuroendocrine tumor s/p resection of brain met 19 and stereotactic RT, presents to ED with new onset weakness in BLE and difficulty urinating. MRI spine showing diffuse leptomeningeal disease. NSG evaluated patient and rec no intervention, and evaluated by rad-onc whom also rec no further intervention given the compromised motor function and diffuse leptomeningeal disease, though are entertaining the possibility of radiation therapy for pain control. Neurology consulted for pain control and any additional recommendations.     Malignancy history:   Presented for complaints of personality change, generalized weakness, poor appetite and "staring off" episodes on 2019. He was found to have L. frontal solitary heterogenous cystic mass 3.4x3.3cm, 1.2cm hepatic lesion, renal cyst lesions, and 1.6cm R. Acetabular shoulder lesion. S/p resection on  w/ Dr. May. Prelim dx was known to be metastatic carcinoma w/ neuroendocrine features, unknown primary but presumed to be lung. Patient underwent radiosurgery therapy w/ Dr. Meier and Dr. iVllaseñor, however w/ no systemic chemotherapy.   Patient currently being medication w/:   -Fentanyl patch 75mcg/hr transdermal patch q72h  -Lidocaine patch 12h on 12h off  -Hydromorphone 1mg IV q2h  -Gabapentin 300 BID, 600 QD    During my examination he appears in no distress. States he has been unable to move or feel his legs since Thursday, w/ significant difficulty ambulating.     A 10-system ROS was performed and is negative except for those items noted above and/or in the HPI.    PAST MEDICAL & SURGICAL HISTORY:  No pertinent past medical history  Status post craniotomy    FAMILY HISTORY:  No pertinent family history in first degree relatives    SOCIAL HISTORY:   T/E/D: No smoke, drink, drugs    every 4 hours (09 Dec 2019 10:55)  Pepcid 40 mg oral tablet: 1 tab(s) orally 2 times a day (09 Dec 2019 10:55)  polyethylene glycol 3350 oral powder for reconstitution: 17 gram(s) orally once a day (09 Dec 2019 10:55)  Zofran ODT 4 mg oral tablet, disintegratin tab(s) orally 3 times a day, As Needed (09 Dec 2019 10:55)    Exam: Exam limited due to patients mental status and heavy sedation, as well as his poor cooperation with the exam.   Gen: Cachexia   MS: A&Ox3. Lethargic. Language fluent, comprehensive, w/ intact repetition.  CN: VFF. TOMAS. EOMI. V1-V3 intact. Face symmetric. T/u midline. Normal shrug.   Motor: 4+/5 in bilateral upper extremities, At least 1/5 bilateral LE though difficult to assess due to poor cooperation   Sensation: Decreased sensation to PP and LT up to at least T10   Coordination: No dysmetria on FNF or H2S bilaterally   Reflexes: 3+ diffusely throughout, toes mute    Gait: Deferred     STUDIES & IMAGIN-10 Na 141  12-10 K 4.3  12-10 P --  12-10 Mg --  12-10 Ca 9.6  12-10 Cr 0.62

## 2019-12-10 NOTE — DIETITIAN INITIAL EVALUATION ADULT. - ENERGY NEEDS
HT 67 inches,  pounds,  pounds, BMI 18.8,  pounds  Dxd with Cord compression 2/2 mets neuroendocrine tumor, on decadron.Mets small lung ca with extensive spinal mets.   Skin intact.

## 2019-12-10 NOTE — PROGRESS NOTE ADULT - SUBJECTIVE AND OBJECTIVE BOX
[facility]    Subjective: Patient seen and examined. No new events except as noted.   worsenign LE weakness now from midchest down numbness and loss of motor   no pain     REVIEW OF SYSTEMS:    CONSTITUTIONAL: + weakness   EYES/ENT: No visual changes;  No vertigo or throat pain   NECK: No pain or stiffness  RESPIRATORY: No cough, wheezing, hemoptysis; No shortness of breath  CARDIOVASCULAR: No chest pain or palpitations  GASTROINTESTINAL: No abdominal or epigastric pain. No nausea, vomiting, or hematemesis; No diarrhea or constipation. No melena or hematochezia.  GENITOURINARY: No dysuria, frequency or hematuria  NEUROLOGICAL: worsening motor and sensory  SKIN: No itching, burning, rashes, or lesions   All other review of systems is negative unless indicated above.    MEDICATIONS:  MEDICATIONS  (STANDING):  acetaminophen   Tablet .. 1000 milliGRAM(s) Oral every 8 hours  Biotene Dry Mouth Oral Rinse 5 milliLiter(s) Swish and Spit every 6 hours  dexAMETHasone     Tablet 4 milliGRAM(s) Oral every 6 hours  dextrose 5% + sodium chloride 0.45% 1000 milliLiter(s) (100 mL/Hr) IV Continuous <Continuous>  DULoxetine 30 milliGRAM(s) Oral daily  enoxaparin Injectable 80 milliGRAM(s) SubCutaneous daily  fentaNYL   Patch  75 MICROgram(s)/Hr 1 Patch Transdermal every 72 hours  gabapentin 300 milliGRAM(s) Oral <User Schedule>  gabapentin 600 milliGRAM(s) Oral <User Schedule>  levETIRAcetam 500 milliGRAM(s) Oral two times a day  lidocaine   Patch 3 Patch Transdermal daily  QUEtiapine 25 milliGRAM(s) Oral at bedtime  senna 2 Tablet(s) Oral at bedtime      PHYSICAL EXAM:  T(C): 36.2 (12-10-19 @ 16:46), Max: 36.9 (12-09-19 @ 18:23)  HR: 61 (12-10-19 @ 16:46) (59 - 80)  BP: 174/87 (12-10-19 @ 16:46) (117/73 - 174/87)  RR: 18 (12-10-19 @ 16:46) (18 - 18)  SpO2: 96% (12-10-19 @ 16:46) (93% - 97%)  Wt(kg): --  I&O's Summary    09 Dec 2019 07:01  -  10 Dec 2019 07:00  --------------------------------------------------------  IN: 3330 mL / OUT: 1550 mL / NET: 1780 mL    10 Dec 2019 07:01  -  10 Dec 2019 17:37  --------------------------------------------------------  IN: 360 mL / OUT: 1100 mL / NET: -740 mL          Appearance: awake, flair   HEENT: dry OM   Lymphatic: No lymphadenopathy , no edema  Cardiovascular: Normal S1 S2  Respiratory: Lungs clear to auscultation, normal effort 	  Gastrointestinal:  Soft, Non-tender, + BS	  Skin: warm to touch   Musculoskeletal: oss motor and sensory midchest down, UE intact motor and sensory   Psychiatry:  Mood & affect appropriate  Ext: No edema      All labs, Imaging and EKGs personally reviewed                           13.0   22.34 )-----------( 171      ( 10 Dec 2019 07:22 )             40.4               12-10    141  |  102  |  19  ----------------------------<  121<H>  4.3   |  26  |  0.62    Ca    9.6      10 Dec 2019 07:22

## 2019-12-10 NOTE — DIETITIAN INITIAL EVALUATION ADULT. - OTHER INFO
Patient seen for BMI <19.  Upon visit, Patient surrounded by wife and daughters and mother.  Family providing patient with back rub to alleviate pain.  Patient denies any problems except pain issues.  Appetite and intake good, "very good".  Ate >75% of breakfast meal.  Denies N/V or difficulty chewing or swallowing.  Positive BM today. Patient appears thin and bony but wife and family state that he has always been thin. Noted with some muscle loss to upper body but family did not wish to consent to nfpe.  Family and Patient dismissive of RDN remains available for follow up but polite.  Palliative following for GOC. UBW Sept 2019 -123 pounds and currently 120 pounds.   As per chart, Patient has been bed bound related to poor sensation in  Reinforced importance of Pro and calories and family receptive.

## 2019-12-10 NOTE — PROGRESS NOTE ADULT - ASSESSMENT
Pt is a 63 yo M with a neuroendocrine tumor (unknown primary) with mets to the brain and spine now with severe functional impairment. Palliative called for GOC and assistance with pain control 2/2 extensive spinal mets.

## 2019-12-10 NOTE — PHYSICAL THERAPY INITIAL EVALUATION ADULT - PERTINENT HX OF CURRENT PROBLEM, REHAB EVAL
65 yo M with a neuroendocrine tumor (unknown primary) with mets to the brain and spine now with severe functional impairment. Palliative called for GOC and assistance with pain control 2/2 extensive spinal mets.

## 2019-12-10 NOTE — PROGRESS NOTE ADULT - ASSESSMENT
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 Per family the patient had MR at [facility] 3 weeks ago showing diffuse leptomeningeal spread (films unavail). Family is still shrouding patient from diagnosis/prognosis. Exam: oriented x3, FC, distal LE 4/5, proximal LE 2-3/5, UE 4/5. SILT. Endorses back pain.

## 2019-12-10 NOTE — PROGRESS NOTE ADULT - PROBLEM SELECTOR PLAN 5
Per daughters pt is currently full code. Awaiting neuro onc input re prognosis to guide further conversation

## 2019-12-10 NOTE — PROGRESS NOTE ADULT - SUBJECTIVE AND OBJECTIVE BOX
Patient is a 64y old  Male who presents with a chief complaint of LE weakness (10 Dec 2019 15:51)      INTERVAL HISTORY: progressive weaknes      PHYSICAL EXAM:  T(C): 36.4 (12-10-19 @ 21:35), Max: 36.7 (12-10-19 @ 09:17)  HR: 58 (12-10-19 @ 21:35) (58 - 71)  BP: 152/86 (12-10-19 @ 21:35) (148/88 - 174/87)  RR: 18 (12-10-19 @ 21:35) (18 - 18)  SpO2: 93% (12-10-19 @ 21:35) (93% - 97%)  Wt(kg): --  I&O's Summary    09 Dec 2019 07:01  -  10 Dec 2019 07:00  --------------------------------------------------------  IN: 3330 mL / OUT: 1550 mL / NET: 1780 mL    10 Dec 2019 07:01  -  10 Dec 2019 23:31  --------------------------------------------------------  IN: 1650 mL / OUT: 2050 mL / NET: -400 mL          Appearance: In no distress	  HEENT:    PERRL, EOMI	  Cardiovascular:  S1 S2, No JVD  Respiratory: Lungs clear to auscultation	  Gastrointestinal:  Soft, Non-tender, + BS	  Extremities:  No edema of LE                                13.0   22.34 )-----------( 171      ( 10 Dec 2019 07:22 )             40.4     12-10    141  |  102  |  19  ----------------------------<  121<H>  4.3   |  26  |  0.62    Ca    9.6      10 Dec 2019 07:22          Labs personally reviewed      Assessment and Plan:   Assessment:  · Assessment		  Pt is a 63 Y/O Male with PMHx smoker 50+yr 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 Per family the patient had MR at [facility] 3 weeks ago showing diffuse leptomeningeal spread (films unavail). Family is still shrouding patient from diagnosis/prognosis. Exam: oriented x3, FC, distal LE 4/5, proximal LE 2-3/5, UE 4/5. SILT. Endorses back pain.      Problem/Plan - 1:  ·  Problem: Cord compression.  Plan: Seen on MRI  Neurosurg eval appreciated  [facility] records reviewed likely with metastatic small cell lung ca  Onc and rad onc follow up noted    Problem/Plan - 2:  ·  Problem: Pulmonary embolism.  Plan: COnt lovenox   Monitor Hgb level   O 2 supplement.     Problem/Plan - 3:  ·  Problem: Chest pain, atypical.  Plan: does not appear to be cardiac in nature. ?2/2 PE   Repeat EKG ordered to further assess    Problem/Plan - 4:  Problem: Prophylactic measure. Plan: DVT and gI PPX.        Petey Roberson DO [facility]  Cardiovascular Medicine  [phone]

## 2019-12-10 NOTE — PROGRESS NOTE ADULT - SUBJECTIVE AND OBJECTIVE BOX
SUBJECTIVE AND OBJECTIVE:  INTERVAL HPI/OVERNIGHT EVENTS: Pt seen this am with large family at bedside. His daughters state he appeared more calm last night and was able to sleep. His good friends visited him last night so they believe that had a calming effect on him. However, pt states that his pain has not changed from yesterday. He is receiving q2 hr dilaudid prns around the clock. However, he states that he has pain "in general" and it is not just physical pain. He states that "everyday is the same" and is frustrated at his loss of functionality. His daughter Basilia states that after his diagnosis two months ago, the pt would frequently be tearful and would sit silently shaking his head.      DNR on chart:   Allergies    No Known Allergies    Intolerances    MEDICATIONS  (STANDING):  acetaminophen   Tablet .. 1000 milliGRAM(s) Oral every 8 hours  Biotene Dry Mouth Oral Rinse 5 milliLiter(s) Swish and Spit every 6 hours  dexAMETHasone     Tablet 4 milliGRAM(s) Oral every 6 hours  dextrose 5% + sodium chloride 0.45% 1000 milliLiter(s) (100 mL/Hr) IV Continuous <Continuous>  DULoxetine 30 milliGRAM(s) Oral daily  enoxaparin Injectable 80 milliGRAM(s) SubCutaneous daily  fentaNYL   Patch  75 MICROgram(s)/Hr 1 Patch Transdermal every 72 hours  gabapentin 300 milliGRAM(s) Oral <User Schedule>  gabapentin 600 milliGRAM(s) Oral <User Schedule>  levETIRAcetam 500 milliGRAM(s) Oral two times a day  lidocaine   Patch 3 Patch Transdermal daily  QUEtiapine 25 milliGRAM(s) Oral at bedtime  senna 2 Tablet(s) Oral at bedtime    MEDICATIONS  (PRN):  acetaminophen   Tablet .. 650 milliGRAM(s) Oral every 6 hours PRN Mild Pain (1 - 3)  acetaminophen  IVPB .. 1000 milliGRAM(s) IV Intermittent once PRN Mild Pain (1 - 3)  HYDROmorphone  Injectable 1 milliGRAM(s) IV Push every 2 hours PRN Severe Pain (7 - 10)      ITEMS UNCHECKED ARE NOT PRESENT    PRESENT SYMPTOMS: [ ]Unable to obtain due to poor mentation   Source if other than patient:  [ ]Family   [ ]Team     Pain:  [ ]yes [ ]no  QOL impact -   Location -                    Aggravating factors -  Quality -  Radiation -  Timing-  Severity (0-10 scale):  Minimal acceptable level (0-10 scale):     Dyspnea:                           [ ]Mild [ ]Moderate [ ]Severe  Anxiety:                             [ ]Mild [ ]Moderate [ ]Severe  Fatigue:                             [ ]Mild [ ]Moderate [ ]Severe  Nausea:                             [ ]Mild [ ]Moderate [ ]Severe  Loss of appetite:              [ ]Mild [ ]Moderate [ ]Severe  Constipation:                    [ ]Mild [ ]Moderate [ ]Severe    PAIN AD Score:	  [email or web address] (Ctrl + left click to view)    Other Symptoms:  [ ]All other review of systems negative     Palliative Performance Status Version 2:         %      [email or web address]/[email or web address]  PHYSICAL EXAM:  Vital Signs Last 24 Hrs  T(C): 36.7 (10 Dec 2019 09:17), Max: 36.9 (09 Dec 2019 18:23)  T(F): 98.1 (10 Dec 2019 09:17), Max: 98.5 (09 Dec 2019 18:23)  HR: 71 (10 Dec 2019 09:17) (59 - 80)  BP: 148/88 (10 Dec 2019 09:17) (117/73 - 151/76)  BP(mean): --  RR: 18 (10 Dec 2019 09:17) (18 - 18)  SpO2: 97% (10 Dec 2019 09:17) (93% - 97%) I&O's Summary    09 Dec 2019 07:01  -  10 Dec 2019 07:00  --------------------------------------------------------  IN: 3330 mL / OUT: 1550 mL / NET: 1780 mL    10 Dec 2019 07:01  -  10 Dec 2019 15:51  --------------------------------------------------------  IN: 360 mL / OUT: 650 mL / NET: -290 mL       GENERAL:  [ ]Alert  [ ]Oriented x   [ ]Lethargic  [ ]Cachexia  [ ]Unarousable  [ ]Verbal  [ ]Non-Verbal  Behavioral:   [ ]Anxiety  [ ]Delirium [ ]Agitation [ ]Other  HEENT:  [ ]Normal   [ ]Dry mouth   [ ]ET Tube/Trach  [ ]Oral lesions  PULMONARY:   [ ]Clear [ ]Tachypnea  [ ]Audible excessive secretions   [ ]Rhonchi        [ ]Right [ ]Left [ ]Bilateral  [ ]Crackles        [ ]Right [ ]Left [ ]Bilateral  [ ]Wheezing     [ ]Right [ ]Left [ ]Bilateral  [ ]Diminished BS [ ] Right [ ]Left [ ]Bilateral  CARDIOVASCULAR:    [ ]Regular [ ]Irregular [ ]Tachy  [ ]Dusty [ ]Murmur [ ]Other  GASTROINTESTINAL:  [ ]Soft  [ ]Distended   [ ]+BS  [ ]Non tender [ ]Tender  [ ]PEG [ ]OGT/ NGT   Last BM:      GENITOURINARY:  [ ]Normal [ ]Incontinent   [ ]Oliguria/Anuria   [ ]Alvarez  MUSCULOSKELETAL:   [ ]Normal   [ ]Weakness  [ ]Bed/Wheelchair bound [ ]Edema  NEUROLOGIC:   [ ]No focal deficits  [ ] Cognitive impairment  [ ] Dysphagia [ ]Dysarthria [ ] Paresis [ ]Other   SKIN:   [ ]Normal  [ ]Rash   [ ]Pressure ulcer(s) [ ]y [ ]n present on admission    CRITICAL CARE:  [ ]Shock Present  [ ]Septic [ ]Cardiogenic [ ]Neurologic [ ]Hypovolemic  [ ]Vasopressors [ ]Inotropes  [ ]Respiratory failure present [ ]Mechanical Ventilation [ ]Non-invasive ventilatory support [ ]High-Flow  [ ]Acute  [ ]Chronic [ ]Hypoxic  [ ]Hypercarbic [ ]Other  [ ]Other organ failure     LABS:                        13.0   22.34 )-----------( 171      ( 10 Dec 2019 07:22 )             40.4   12-10    141  |  102  |  19  ----------------------------<  121<H>  4.3   |  26  |  0.62    Ca    9.6      10 Dec 2019 07:22          RADIOLOGY & ADDITIONAL STUDIES:    Protein Calorie Malnutrition Present: [ ]mild [ ]moderate [ ]severe [ ]underweight [ ]morbid obesity  https://www.andeal.org/vault/5340/web/files/ONC/Table_Clinical%20Characteristics%20to%20Document%20Malnutrition-White%20JV%20et%20al%559738.pdf    Height (cm): 170.18 (12-07-19 @ 09:03), 170.2 (09-24-19 @ 13:00), 170.18 (09-05-19 @ 11:47)  Weight (kg): 54.4 (12-07-19 @ 09:03), 54.7 (09-24-19 @ 13:00), 52.2 (09-05-19 @ 11:47)  BMI (kg/m2): 18.8 (12-07-19 @ 09:03), 18.9 (09-24-19 @ 13:00), 18 (09-05-19 @ 11:47)    [ ]PPSV2 < or = 30%  [ ]significant weight loss [ ]poor nutritional intake [ ]anasarca   Albumin, Serum: 3.8 g/dL (12-08-19 @ 09:07)   [ ]Artificial Nutrition    REFERRALS:   [ ]Chaplaincy  [ ]Hospice  [ ]Child Life  [ ]Social Work  [ ]Case management [ ]Holistic Therapy     Goals of Care Document: SUBJECTIVE AND OBJECTIVE:  INTERVAL HPI/OVERNIGHT EVENTS: Pt seen this am with large family at bedside. His daughters state he appeared more calm last night and was able to sleep. His good friends visited him last night so they believe that had a calming effect on him. However, pt states that his pain has not changed from yesterday. He is receiving q2 hr dilaudid prns around the clock. However, he states that he has pain "in general" and it is not just physical pain. He states that "everyday is the same" and is frustrated at his loss of functionality. His daughter Basilia states that after his diagnosis two months ago, the pt would frequently be tearful and would sit silently shaking his head.      DNR on chart:   Allergies    No Known Allergies    Intolerances    MEDICATIONS  (STANDING):  acetaminophen   Tablet .. 1000 milliGRAM(s) Oral every 8 hours  Biotene Dry Mouth Oral Rinse 5 milliLiter(s) Swish and Spit every 6 hours  dexAMETHasone     Tablet 4 milliGRAM(s) Oral every 6 hours  dextrose 5% + sodium chloride 0.45% 1000 milliLiter(s) (100 mL/Hr) IV Continuous <Continuous>  DULoxetine 30 milliGRAM(s) Oral daily  enoxaparin Injectable 80 milliGRAM(s) SubCutaneous daily  fentaNYL   Patch  75 MICROgram(s)/Hr 1 Patch Transdermal every 72 hours  gabapentin 300 milliGRAM(s) Oral <User Schedule>  gabapentin 600 milliGRAM(s) Oral <User Schedule>  levETIRAcetam 500 milliGRAM(s) Oral two times a day  lidocaine   Patch 3 Patch Transdermal daily  QUEtiapine 25 milliGRAM(s) Oral at bedtime  senna 2 Tablet(s) Oral at bedtime    MEDICATIONS  (PRN):  acetaminophen   Tablet .. 650 milliGRAM(s) Oral every 6 hours PRN Mild Pain (1 - 3)  acetaminophen  IVPB .. 1000 milliGRAM(s) IV Intermittent once PRN Mild Pain (1 - 3)  HYDROmorphone  Injectable 1 milliGRAM(s) IV Push every 2 hours PRN Severe Pain (7 - 10)      ITEMS UNCHECKED ARE NOT PRESENT    PRESENT SYMPTOMS: [ ]Unable to obtain due to poor mentation   Source if other than patient:  [x ]Family and patient  [ ]Team     Pain: [x ]yes [ ]no  QOL impact - bedbound  Location - along entire back                   Aggravating factors - none  Quality - burning, sharp  Radiation - "everywhere"  Timing- all the time  Severity (0-10 scale): 8  Minimal acceptable level (0-10 scale):     PAIN AD Score:   [email or web address]/painad.pdf (press ctrl +  left click to view)    Dyspnea:                           [x ]Mild [ ]Moderate [ ]Severe  Anxiety:                             [x ]Mild [ ]Moderate [ ]Severe  Fatigue:                             [ ]Mild [x ]Moderate [ ]Severe  Nausea:                             [x ]Mild [ ]Moderate [ ]Severe  Loss of appetite:              [ ]Mild [x ]Moderate [ ]Severe  Constipation:                    [ ]Mild [ ]Moderate [ ]Severe    Other Symptoms:  [x ]All other review of systems negative     Palliative Performance Status Version 2:   20      %    http://npcrc.org/files/news/palliative_performance_scale_[email or web address].pdf    PHYSICAL EXAM:  Vital Signs Last 24 Hrs  T(C): 36.2 (10 Dec 2019 16:46), Max: 36.9 (09 Dec 2019 18:23)  T(F): 97.2 (10 Dec 2019 16:46), Max: 98.5 (09 Dec 2019 18:23)  HR: 61 (10 Dec 2019 16:46) (59 - 80)  BP: 174/87 (10 Dec 2019 16:46) (117/73 - 174/87)  BP(mean): --  RR: 18 (10 Dec 2019 16:46) (18 - 18)  SpO2: 96% (10 Dec 2019 16:46) (93% - 97%)      GENERAL:  [ ]Alert  [ x]Oriented x 1  [x ]Lethargic  [ ]Cachexia  [ ]Unarousable  [1 ]Verbal  [ ]Non-Verbal; + word finding difficulty  Behavioral:   [x ] Anxiety  [ ] Delirium [ ] Agitation [x ] Other: Depressed mood   HEENT:  [x ]Normal   [ ]Dry mouth   [ ]ET Tube/Trach  [ ]Oral lesions  PULMONARY:   [x ]Clear [ ]Tachypnea  [ ]Audible excessive secretions   [ ]Rhonchi        [ ]Right [ ]Left [ ]Bilateral  [ ]Crackles        [ ]Right [ ]Left [ ]Bilateral  [ ]Wheezing     [ ]Right [ ]Left [ ]Bilatera  [ ]Diminished breath sounds [ ]right [ ]left [ ]bilateral  CARDIOVASCULAR:    [ ]Regular [ ]Irregular [ ]Tachy  [ ]Dusty [ ]Murmur [ ]Other  GASTROINTESTINAL:  [x ]Soft  [ ]Distended   [ ]+BS  [x ]Non tender [ ]Tender  [ ]PEG [ ]OGT/ NGT  Last BM: 12/10    GENITOURINARY:  [ ]Normal [ ] Incontinent   [ ]Oliguria/Anuria   [x ]Alvarez  MUSCULOSKELETAL:   [ ]Normal   [x ]Weakness  [x ]Bed/Wheelchair bound [ ]Edema  NEUROLOGIC:   [ ]No focal deficits  [x ]Cognitive impairment  [ ]Dysphagia [ ]Dysarthria [x ] Paraplegia - b/l LE numbness, R>L  SKIN:   [ ]Normal    [ ]Rash  [ ]Pressure ulcer(s)  ; + excoriations 2/2 scratching     Present on admission [ ]y [ ]n      CRITICAL CARE:  [ ]Shock Present  [ ]Septic [ ]Cardiogenic [ ]Neurologic [ ]Hypovolemic  [ ]Vasopressors [ ]Inotropes  [ ]Respiratory failure present [ ]Mechanical Ventilation [ ]Non-invasive ventilatory support [ ]High-Flow  [ ]Acute  [ ]Chronic [ ]Hypoxic  [ ]Hypercarbic [ ]Other  [ ]Other organ failure     LABS:                        13.0   22.34 )-----------( 171      ( 10 Dec 2019 07:22 )             40.4   12-10    141  |  102  |  19  ----------------------------<  121<H>  4.3   |  26  |  0.62    Ca    9.6      10 Dec 2019 07:22          RADIOLOGY & ADDITIONAL STUDIES: reviewed     Protein Calorie Malnutrition Present: [ ]mild [ ]moderate [ ]severe [ ]underweight [ ]morbid obesity  https://www.andeal.org/vault/2440/web/files/ONC/Table_Clinical%20Characteristics%20to%20Document%20Malnutrition-White%20JV%20et%20al%202012.pdf    Height (cm): 170.18 (12-07-19 @ 09:03), 170.2 (09-24-19 @ 13:00), 170.18 (09-05-19 @ 11:47)  Weight (kg): 54.4 (12-07-19 @ 09:03), 54.7 (09-24-19 @ 13:00), 52.2 (09-05-19 @ 11:47)  BMI (kg/m2): 18.8 (12-07-19 @ 09:03), 18.9 (09-24-19 @ 13:00), 18 (09-05-19 @ 11:47)    [ ]PPSV2 < or = 30%  [ ]significant weight loss [ ]poor nutritional intake [ ]anasarca   Albumin, Serum: 3.8 g/dL (12-08-19 @ 09:07)   [ ]Artificial Nutrition    REFERRALS:   [ ]Chaplaincy  [ ]Hospice  [ ]Child Life  [ ]Social Work  [ ]Case management [ ]Holistic Therapy     Goals of Care Document:

## 2019-12-10 NOTE — CONSULT NOTE ADULT - ASSESSMENT
64M PMHx smoker 50+yr known metastatic high grade neuroendocrine tumor s/p resection of brain met 9/5/19 and stereotactic RT, presents to ED with new onset weakness in BLE and difficulty urinating. MRI spine showing diffuse leptomeningeal disease. NSG evaluated patient and rec no intervention, and evaluated by rad-onc whom also rec no further intervention given the compromised motor function and diffuse leptomeningeal disease, though are entertaining the possibility of radiation therapy for pain control. Neurology consulted for pain control and any additional recommendations.     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 Golden (Neuro-Onc) regarding further treatment   [] 64M PMHx smoker 50+yr known metastatic high grade neuroendocrine tumor s/p resection of brain met 9/5/19 and stereotactic RT, presents to ED with new onset weakness in BLE and difficulty urinating. MRI spine showing diffuse leptomeningeal disease. NSG evaluated patient and rec no intervention, and evaluated by rad-onc whom also rec no further intervention given the compromised motor function and diffuse leptomeningeal disease, though are entertaining the possibility of radiation therapy for pain control. Neurology consulted for pain control and any additional recommendations.     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 Golden (Neuro-Onc) regarding further treatment   [] Continue 4mg q6h Decadron  [] Keppra 500mg BID Split-Thickness Skin Graft Text: The defect edges were debeveled with a #15 scalpel blade.  Given the location of the defect, shape of the defect and the proximity to free margins a split thickness skin graft was deemed most appropriate.  Using a sterile surgical marker, the primary defect shape was transferred to the donor site. The split thickness graft was then harvested.  The skin graft was then placed in the primary defect and oriented appropriately.

## 2019-12-10 NOTE — DIETITIAN INITIAL EVALUATION ADULT. - PERTINENT MEDS FT
MEDICATIONS  (STANDING):  acetaminophen   Tablet .. 1000 milliGRAM(s) Oral every 8 hours  Biotene Dry Mouth Oral Rinse 5 milliLiter(s) Swish and Spit every 6 hours  dexAMETHasone     Tablet 4 milliGRAM(s) Oral every 6 hours  dextrose 5% + sodium chloride 0.45% 1000 milliLiter(s) (100 mL/Hr) IV Continuous <Continuous>  enoxaparin Injectable 80 milliGRAM(s) SubCutaneous daily  fentaNYL   Patch  75 MICROgram(s)/Hr 1 Patch Transdermal every 72 hours  gabapentin 300 milliGRAM(s) Oral <User Schedule>  gabapentin 600 milliGRAM(s) Oral <User Schedule>  levETIRAcetam 500 milliGRAM(s) Oral two times a day  lidocaine   Patch 1 Patch Transdermal daily  QUEtiapine 25 milliGRAM(s) Oral at bedtime  senna 2 Tablet(s) Oral at bedtime    MEDICATIONS  (PRN):  acetaminophen   Tablet .. 650 milliGRAM(s) Oral every 6 hours PRN Mild Pain (1 - 3)  acetaminophen  IVPB .. 1000 milliGRAM(s) IV Intermittent once PRN Mild Pain (1 - 3)  HYDROmorphone  Injectable 1 milliGRAM(s) IV Push every 2 hours PRN Severe Pain (7 - 10)

## 2019-12-10 NOTE — PROGRESS NOTE ADULT - PROBLEM SELECTOR PLAN 2
Pt's daughter Romulo is HCP. Pt defers decision making to daughter. He is currently lethargic, confused, AAOX1, although it appears he may also have some word finding difficulty related to his brain mets. Family states they would like to hear from neuro onc to determine what his prognosis is and what are the options for him going forward. However, based on conversation today with daughter and wife, family understands that prognosis is poor but want to know how much time he has left. GOC conversation ongoing. f/U Neuro Onc input in order to better define GOC.

## 2019-12-11 DIAGNOSIS — L29.9 PRURITUS, UNSPECIFIED: ICD-10-CM

## 2019-12-11 LAB
ANION GAP SERPL CALC-SCNC: 11 MMOL/L — SIGNIFICANT CHANGE UP (ref 5–17)
ANION GAP SERPL CALC-SCNC: 11 MMOL/L — SIGNIFICANT CHANGE UP (ref 5–17)
ANION GAP SERPL CALC-SCNC: 15 MMOL/L — SIGNIFICANT CHANGE UP (ref 5–17)
BUN SERPL-MCNC: 14 MG/DL — SIGNIFICANT CHANGE UP (ref 7–23)
BUN SERPL-MCNC: 15 MG/DL — SIGNIFICANT CHANGE UP (ref 7–23)
BUN SERPL-MCNC: 15 MG/DL — SIGNIFICANT CHANGE UP (ref 7–23)
CALCIUM SERPL-MCNC: 9.1 MG/DL — SIGNIFICANT CHANGE UP (ref 8.4–10.5)
CALCIUM SERPL-MCNC: 9.6 MG/DL — SIGNIFICANT CHANGE UP (ref 8.4–10.5)
CALCIUM SERPL-MCNC: 9.8 MG/DL — SIGNIFICANT CHANGE UP (ref 8.4–10.5)
CHLORIDE SERPL-SCNC: 92 MMOL/L — LOW (ref 96–108)
CHLORIDE SERPL-SCNC: 93 MMOL/L — LOW (ref 96–108)
CHLORIDE SERPL-SCNC: 97 MMOL/L — SIGNIFICANT CHANGE UP (ref 96–108)
CHLORIDE UR-SCNC: 72 MMOL/L — SIGNIFICANT CHANGE UP
CO2 SERPL-SCNC: 24 MMOL/L — SIGNIFICANT CHANGE UP (ref 22–31)
CO2 SERPL-SCNC: 24 MMOL/L — SIGNIFICANT CHANGE UP (ref 22–31)
CO2 SERPL-SCNC: 25 MMOL/L — SIGNIFICANT CHANGE UP (ref 22–31)
CREAT SERPL-MCNC: 0.44 MG/DL — LOW (ref 0.5–1.3)
CREAT SERPL-MCNC: 0.46 MG/DL — LOW (ref 0.5–1.3)
CREAT SERPL-MCNC: 0.5 MG/DL — SIGNIFICANT CHANGE UP (ref 0.5–1.3)
GLUCOSE SERPL-MCNC: 109 MG/DL — HIGH (ref 70–99)
GLUCOSE SERPL-MCNC: 118 MG/DL — HIGH (ref 70–99)
GLUCOSE SERPL-MCNC: 126 MG/DL — HIGH (ref 70–99)
HCT VFR BLD CALC: 40.4 % — SIGNIFICANT CHANGE UP (ref 39–50)
HGB BLD-MCNC: 13.3 G/DL — SIGNIFICANT CHANGE UP (ref 13–17)
MCHC RBC-ENTMCNC: 31.1 PG — SIGNIFICANT CHANGE UP (ref 27–34)
MCHC RBC-ENTMCNC: 32.9 GM/DL — SIGNIFICANT CHANGE UP (ref 32–36)
MCV RBC AUTO: 94.6 FL — SIGNIFICANT CHANGE UP (ref 80–100)
NRBC # BLD: 0 /100 WBCS — SIGNIFICANT CHANGE UP (ref 0–0)
OSMOLALITY SERPL: 280 MOSMOL/KG — SIGNIFICANT CHANGE UP (ref 280–301)
OSMOLALITY UR: 321 MOS/KG — SIGNIFICANT CHANGE UP (ref 300–900)
PLATELET # BLD AUTO: 185 K/UL — SIGNIFICANT CHANGE UP (ref 150–400)
POTASSIUM SERPL-MCNC: 4.2 MMOL/L — SIGNIFICANT CHANGE UP (ref 3.5–5.3)
POTASSIUM SERPL-MCNC: 4.2 MMOL/L — SIGNIFICANT CHANGE UP (ref 3.5–5.3)
POTASSIUM SERPL-MCNC: 4.7 MMOL/L — SIGNIFICANT CHANGE UP (ref 3.5–5.3)
POTASSIUM SERPL-SCNC: 4.2 MMOL/L — SIGNIFICANT CHANGE UP (ref 3.5–5.3)
POTASSIUM SERPL-SCNC: 4.2 MMOL/L — SIGNIFICANT CHANGE UP (ref 3.5–5.3)
POTASSIUM SERPL-SCNC: 4.7 MMOL/L — SIGNIFICANT CHANGE UP (ref 3.5–5.3)
RBC # BLD: 4.27 M/UL — SIGNIFICANT CHANGE UP (ref 4.2–5.8)
RBC # FLD: 13.4 % — SIGNIFICANT CHANGE UP (ref 10.3–14.5)
SODIUM SERPL-SCNC: 128 MMOL/L — LOW (ref 135–145)
SODIUM SERPL-SCNC: 131 MMOL/L — LOW (ref 135–145)
SODIUM SERPL-SCNC: 133 MMOL/L — LOW (ref 135–145)
SODIUM UR-SCNC: 109 MMOL/L — SIGNIFICANT CHANGE UP
WBC # BLD: 16.49 K/UL — HIGH (ref 3.8–10.5)
WBC # FLD AUTO: 16.49 K/UL — HIGH (ref 3.8–10.5)

## 2019-12-11 PROCEDURE — 99233 SBSQ HOSP IP/OBS HIGH 50: CPT | Mod: GC

## 2019-12-11 PROCEDURE — 99255 IP/OBS CONSLTJ NEW/EST HI 80: CPT | Mod: GC

## 2019-12-11 PROCEDURE — 93010 ELECTROCARDIOGRAM REPORT: CPT

## 2019-12-11 RX ORDER — FAMOTIDINE 10 MG/ML
10 INJECTION INTRAVENOUS ONCE
Refills: 0 | Status: COMPLETED | OUTPATIENT
Start: 2019-12-11 | End: 2019-12-11

## 2019-12-11 RX ORDER — NALOXONE HYDROCHLORIDE 4 MG/.1ML
0.1 SPRAY NASAL
Refills: 0 | Status: DISCONTINUED | OUTPATIENT
Start: 2019-12-11 | End: 2019-12-21

## 2019-12-11 RX ORDER — LISINOPRIL 2.5 MG/1
10 TABLET ORAL DAILY
Refills: 0 | Status: DISCONTINUED | OUTPATIENT
Start: 2019-12-11 | End: 2019-12-21

## 2019-12-11 RX ORDER — CALAMINE AND ZINC OXIDE AND PHENOL 160; 10 MG/ML; MG/ML
1 LOTION TOPICAL
Refills: 0 | Status: DISCONTINUED | OUTPATIENT
Start: 2019-12-11 | End: 2019-12-21

## 2019-12-11 RX ORDER — DIPHENHYDRAMINE HCL 50 MG
25 CAPSULE ORAL EVERY 4 HOURS
Refills: 0 | Status: DISCONTINUED | OUTPATIENT
Start: 2019-12-11 | End: 2019-12-12

## 2019-12-11 RX ORDER — HYDROMORPHONE HYDROCHLORIDE 2 MG/ML
0.5 INJECTION INTRAMUSCULAR; INTRAVENOUS; SUBCUTANEOUS EVERY 6 HOURS
Refills: 0 | Status: DISCONTINUED | OUTPATIENT
Start: 2019-12-11 | End: 2019-12-12

## 2019-12-11 RX ORDER — ACETAMINOPHEN 500 MG
1000 TABLET ORAL ONCE
Refills: 0 | Status: COMPLETED | OUTPATIENT
Start: 2019-12-11 | End: 2019-12-13

## 2019-12-11 RX ORDER — PANTOPRAZOLE SODIUM 20 MG/1
40 TABLET, DELAYED RELEASE ORAL
Refills: 0 | Status: DISCONTINUED | OUTPATIENT
Start: 2019-12-11 | End: 2019-12-12

## 2019-12-11 RX ORDER — DIPHENHYDRAMINE HCL 50 MG
25 CAPSULE ORAL ONCE
Refills: 0 | Status: COMPLETED | OUTPATIENT
Start: 2019-12-11 | End: 2019-12-11

## 2019-12-11 RX ORDER — METHADONE HYDROCHLORIDE 40 MG/1
5 TABLET ORAL
Refills: 0 | Status: DISCONTINUED | OUTPATIENT
Start: 2019-12-11 | End: 2019-12-12

## 2019-12-11 RX ORDER — PANTOPRAZOLE SODIUM 20 MG/1
40 TABLET, DELAYED RELEASE ORAL ONCE
Refills: 0 | Status: COMPLETED | OUTPATIENT
Start: 2019-12-11 | End: 2019-12-11

## 2019-12-11 RX ADMIN — HYDROMORPHONE HYDROCHLORIDE 1 MILLIGRAM(S): 2 INJECTION INTRAMUSCULAR; INTRAVENOUS; SUBCUTANEOUS at 16:52

## 2019-12-11 RX ADMIN — Medication 400 MILLIGRAM(S): at 03:12

## 2019-12-11 RX ADMIN — HYDROMORPHONE HYDROCHLORIDE 1 MILLIGRAM(S): 2 INJECTION INTRAMUSCULAR; INTRAVENOUS; SUBCUTANEOUS at 21:37

## 2019-12-11 RX ADMIN — LEVETIRACETAM 500 MILLIGRAM(S): 250 TABLET, FILM COATED ORAL at 05:14

## 2019-12-11 RX ADMIN — QUETIAPINE FUMARATE 25 MILLIGRAM(S): 200 TABLET, FILM COATED ORAL at 23:56

## 2019-12-11 RX ADMIN — Medication 1000 MILLIGRAM(S): at 07:36

## 2019-12-11 RX ADMIN — DULOXETINE HYDROCHLORIDE 30 MILLIGRAM(S): 30 CAPSULE, DELAYED RELEASE ORAL at 12:26

## 2019-12-11 RX ADMIN — HYDROMORPHONE HYDROCHLORIDE 1 MILLIGRAM(S): 2 INJECTION INTRAMUSCULAR; INTRAVENOUS; SUBCUTANEOUS at 12:55

## 2019-12-11 RX ADMIN — CALAMINE AND ZINC OXIDE AND PHENOL 1 APPLICATION(S): 160; 10 LOTION TOPICAL at 17:21

## 2019-12-11 RX ADMIN — HYDROMORPHONE HYDROCHLORIDE 1 MILLIGRAM(S): 2 INJECTION INTRAMUSCULAR; INTRAVENOUS; SUBCUTANEOUS at 12:25

## 2019-12-11 RX ADMIN — ENOXAPARIN SODIUM 80 MILLIGRAM(S): 100 INJECTION SUBCUTANEOUS at 12:23

## 2019-12-11 RX ADMIN — LIDOCAINE 3 PATCH: 4 CREAM TOPICAL at 12:34

## 2019-12-11 RX ADMIN — LIDOCAINE 3 PATCH: 4 CREAM TOPICAL at 05:00

## 2019-12-11 RX ADMIN — Medication 4 MILLIGRAM(S): at 12:26

## 2019-12-11 RX ADMIN — Medication 25 MILLIGRAM(S): at 03:48

## 2019-12-11 RX ADMIN — HYDROMORPHONE HYDROCHLORIDE 1 MILLIGRAM(S): 2 INJECTION INTRAMUSCULAR; INTRAVENOUS; SUBCUTANEOUS at 17:22

## 2019-12-11 RX ADMIN — PANTOPRAZOLE SODIUM 40 MILLIGRAM(S): 20 TABLET, DELAYED RELEASE ORAL at 23:56

## 2019-12-11 RX ADMIN — FAMOTIDINE 10 MILLIGRAM(S): 10 INJECTION INTRAVENOUS at 21:05

## 2019-12-11 RX ADMIN — GABAPENTIN 600 MILLIGRAM(S): 400 CAPSULE ORAL at 21:04

## 2019-12-11 RX ADMIN — LIDOCAINE 3 PATCH: 4 CREAM TOPICAL at 18:49

## 2019-12-11 RX ADMIN — Medication 1000 MILLIGRAM(S): at 23:56

## 2019-12-11 RX ADMIN — GABAPENTIN 300 MILLIGRAM(S): 400 CAPSULE ORAL at 05:14

## 2019-12-11 RX ADMIN — LISINOPRIL 10 MILLIGRAM(S): 2.5 TABLET ORAL at 12:30

## 2019-12-11 RX ADMIN — Medication 4 MILLIGRAM(S): at 17:21

## 2019-12-11 RX ADMIN — METHADONE HYDROCHLORIDE 5 MILLIGRAM(S): 40 TABLET ORAL at 14:22

## 2019-12-11 RX ADMIN — HYDROMORPHONE HYDROCHLORIDE 1 MILLIGRAM(S): 2 INJECTION INTRAMUSCULAR; INTRAVENOUS; SUBCUTANEOUS at 03:12

## 2019-12-11 RX ADMIN — HYDROMORPHONE HYDROCHLORIDE 1 MILLIGRAM(S): 2 INJECTION INTRAMUSCULAR; INTRAVENOUS; SUBCUTANEOUS at 03:27

## 2019-12-11 RX ADMIN — Medication 1000 MILLIGRAM(S): at 03:27

## 2019-12-11 RX ADMIN — GABAPENTIN 300 MILLIGRAM(S): 400 CAPSULE ORAL at 14:21

## 2019-12-11 RX ADMIN — HYDROMORPHONE HYDROCHLORIDE 1 MILLIGRAM(S): 2 INJECTION INTRAMUSCULAR; INTRAVENOUS; SUBCUTANEOUS at 21:22

## 2019-12-11 RX ADMIN — HYDROMORPHONE HYDROCHLORIDE 1 MILLIGRAM(S): 2 INJECTION INTRAMUSCULAR; INTRAVENOUS; SUBCUTANEOUS at 07:51

## 2019-12-11 RX ADMIN — Medication 25 MILLIGRAM(S): at 17:21

## 2019-12-11 RX ADMIN — HYDROMORPHONE HYDROCHLORIDE 1 MILLIGRAM(S): 2 INJECTION INTRAMUSCULAR; INTRAVENOUS; SUBCUTANEOUS at 15:10

## 2019-12-11 RX ADMIN — LEVETIRACETAM 500 MILLIGRAM(S): 250 TABLET, FILM COATED ORAL at 17:21

## 2019-12-11 RX ADMIN — HYDROMORPHONE HYDROCHLORIDE 1 MILLIGRAM(S): 2 INJECTION INTRAMUSCULAR; INTRAVENOUS; SUBCUTANEOUS at 05:14

## 2019-12-11 RX ADMIN — METHADONE HYDROCHLORIDE 5 MILLIGRAM(S): 40 TABLET ORAL at 21:04

## 2019-12-11 RX ADMIN — HYDROMORPHONE HYDROCHLORIDE 1 MILLIGRAM(S): 2 INJECTION INTRAMUSCULAR; INTRAVENOUS; SUBCUTANEOUS at 07:36

## 2019-12-11 RX ADMIN — Medication 25 MILLIGRAM(S): at 08:38

## 2019-12-11 RX ADMIN — HYDROMORPHONE HYDROCHLORIDE 1 MILLIGRAM(S): 2 INJECTION INTRAMUSCULAR; INTRAVENOUS; SUBCUTANEOUS at 10:07

## 2019-12-11 RX ADMIN — Medication 30 MILLILITER(S): at 17:21

## 2019-12-11 RX ADMIN — HYDROMORPHONE HYDROCHLORIDE 0.5 MILLIGRAM(S): 2 INJECTION INTRAMUSCULAR; INTRAVENOUS; SUBCUTANEOUS at 18:52

## 2019-12-11 RX ADMIN — Medication 5 MILLILITER(S): at 05:14

## 2019-12-11 RX ADMIN — HYDROMORPHONE HYDROCHLORIDE 1 MILLIGRAM(S): 2 INJECTION INTRAMUSCULAR; INTRAVENOUS; SUBCUTANEOUS at 09:37

## 2019-12-11 RX ADMIN — Medication 4 MILLIGRAM(S): at 05:14

## 2019-12-11 RX ADMIN — HYDROMORPHONE HYDROCHLORIDE 0.5 MILLIGRAM(S): 2 INJECTION INTRAMUSCULAR; INTRAVENOUS; SUBCUTANEOUS at 23:55

## 2019-12-11 RX ADMIN — HYDROMORPHONE HYDROCHLORIDE 1 MILLIGRAM(S): 2 INJECTION INTRAMUSCULAR; INTRAVENOUS; SUBCUTANEOUS at 14:40

## 2019-12-11 RX ADMIN — Medication 4 MILLIGRAM(S): at 23:56

## 2019-12-11 RX ADMIN — HYDROMORPHONE HYDROCHLORIDE 0.5 MILLIGRAM(S): 2 INJECTION INTRAMUSCULAR; INTRAVENOUS; SUBCUTANEOUS at 19:15

## 2019-12-11 RX ADMIN — Medication 25 MILLIGRAM(S): at 21:04

## 2019-12-11 RX ADMIN — PANTOPRAZOLE SODIUM 40 MILLIGRAM(S): 20 TABLET, DELAYED RELEASE ORAL at 03:35

## 2019-12-11 NOTE — PROGRESS NOTE ADULT - ASSESSMENT
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 Per family the patient had MR at [facility] 3 weeks ago showing diffuse leptomeningeal spread (films unavail). Family is still shrouding patient from diagnosis/prognosis. Exam: oriented x3, FC, distal LE 4/5, proximal LE 2-3/5, UE 4/5. SILT. Endorses back pain.

## 2019-12-11 NOTE — CHART NOTE - NSCHARTNOTEFT_GEN_A_CORE
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  REVIEWED CASE WITH RADIATION ONCOLOGY  CALLED HCP, DAUGHTER GERBER -[phone]  FAMILY IDENTIFIES GEORGI VILLASEÑOR MD AS HIS MEDICAL ONCOLOGIST ([phone])    Briefly, history is as follows:    63yo right handed man with PMHx metastatic adenocarcinoma, likely of lung origin, now presenting with pain in the chest, legs, thoracic back and difficulty walking.    His cancer initial presentation included “staring off into space,” which was later followed by a generalize seizure witnessed by [facility] ED team. He was diagnosed with and then underwent resection of very large left frontal brain metastasis by Yadira on 9/5/19. Disease workup at that time disclosed lesions in the right acetabulum as well as the liver. He was evaluated by Dr Fleming (heme-onc) during that admission, later went to [facility] and received stereotactic RT.     He now presents to ED with new onset weakness in BLE and difficulty urinating. MRI spine showing diffuse dural enhancement suspicious for neoplastic involvement. He has been seen by Dr May’s team radiation oncology, neither of whom had specific treatment options.      His daughter informs me that he is followed at [facility] by Dr Villaseñor, who has not thought chemotherapy appropriate since the underlying disease is poorly defined. 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 As the TTF-1 immunostain was positive, lung source was favored.    At present, he admits to pain and inability to feel or move either leg. He denies headache, nausea, or vomiting. There are no complaints of increased ICP. There are no seizure-like complaints.    Of note, he previously saw pulmonology (Dr. Delonte Galvan) at [facility] on 2/23/2017, [employer] notes document, “In summary he is a 61-year-old smoker with some hemoptysis since October of 2016. The hemoptysis is mostly intermittent. There are no other significant symptoms. CT examination of the chest performed in October showed hazy, ground glass opacification in the left upper lobe. No nodules or masses were seen. On examination today his lungs were clear. A followup CT will be obtained to see if he ground glass opacity in the left upper lobe is persistent.Bronchoscopic examination of the tracheobronchial tree will be performed.The procedure was explained to him and his daughter who is present and is a registered nurse. Blood work will also be obtained. Smoking cessation was advised and nicotine replacement therapy was suggested. ”    A CXR was performed and further discussions with patient and his daughter occurred in December 2017. The original CT was dated 10/9/2016 and a repeat CT was performed on 3/1/17 demonstrating resolution of the patchy ground glass opacities previously seen.      Patient currently being medication w/:   -Fentanyl patch 75mcg/hr transdermal patch q72h  -Lidocaine patch 12h on 12h off  -Hydromorphone 1mg IV q2h  -Gabapentin 300 BID, 600 QD    EXAMINATION    During my examination he appears in no distress. States he has been unable to move or feel his legs since Thursday, w/ significant difficulty ambulating.     He is oriented to his sister and daughter, but does not really attend the conversation with me well. He is easily distracted. There is no grasp. He knows it is December (“the twelfth month”), but thinks it is 1013.   He says he cannot move either leg. He has a ‘windswept” appearance of the legs, with both feet plantiflexed. There is no Babinski. There is minimal clonus bilaterally and DTRs are present. Arm strength is grossly normal, although he does not attend to the examination very well for detailed examination.    IMAGING  I personally reviewed MR imaging dated  12/7/19 (C/T/L SPINE)	9/2/19 (BRAIN)	9/7/19 (BRAIN)	    In reviewing these cranial pre- and post-op images, I find A VERY LARGE LEFT FRONTAL SOLID AND CYSTIC ENHANCING MASS LESION WITH COPIOUS HYPERINTENSITY on the T2 weighted images, consistent with cerebral edema and underlying neoplasm. Later Brain imaging demonstrates resection of the anterior third of the disease, including most of the cyst, but significant enhancing disease remains posteriorly that is NOT bright on the unenhanced T1 imaging.  The spinal imaging is curious. There is no Leptomeningeal disease. There is diffuse cervical and thoracic  DURAL enhancing disease that is nodular and DOES NOT arise from the vertebral bodies. There is no caudae equinae compression. Prior sagittal cranial imaging does NOT show disease, although the present imaging demonstrates disease as high up as C2. Some of the disease is anterior to the spinal cord and some is posterior cord.    IMPRESSION/PLAN    SOCIAL SITUATION – His daughter asks to speak with me outside the presence of the patient. I asked him if it was acceptable for me to speak with her about his condition without him hearing and he said yes. He is not fully competent to make decisions, but his mental status was adequate enough to provide assent that I speak with her separately.  She informs me that her sister, Gerber, lives with her father (the patient) and is the HCP. She can be reached at [phone]. She tells me that they have not wanted to “add to his stress” by telling him all the details of his condition. He knows he has cancer. They believe it is incurable and that he is not strong enough to have chemotherapy. I expressed empathy and suggested that sometimes it becomes less stressful to know all the details of decision making. I explained that I would call her sister and discuss things with her, but that they should strongly consider informing their father of his options so he may guide them regarding the care he desires.    DURALLY BASED SPINAL DISEASE – Although neuroendocrine tumors are often highly responsive to radiotherapy, there is so much area to treat, that side effects (including myelosuppression) may be excessive. The location of the disease is dural, so it is NOT behind the blood brain barrier. Conventional chemotherapy, like carboplatin, etoposide and immunotherapies, would access the spinal canal disease.    PROGNOSIS – He has never had systemic therapy and his tumor exrpresses >1% of PDL1. Occasionally immunotherapy produces dramatic responses. I think without treatment, pneumonia, DVT, or decubitus ulcer is likely to prove fatal within a few months. Perhaps with treatment, his disease will improve and his strength will return. Whether to try therapy or not really depends on his wishes, in addition to practical considerations like whether he can physically comply with the travel back and forth for infusions.    DISPO – I am happy to see him as an outpatient. They have my business card and know they may call my office to coordinate future care. If no further treatment is desired, then perhaps his code status should be DNR.    Total time spent in counselling and coordiantion of care was 90 minutes. 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  REVIEWED CASE WITH RADIATION ONCOLOGY  CALLED HCP, DAUGHTER GERBER -[phone]  FAMILY IDENTIFIES GEORGI VILLASEÑOR MD AS HIS MEDICAL ONCOLOGIST ([phone])    Briefly, history is as follows:    63yo right handed man with PMHx metastatic adenocarcinoma, likely of lung origin, now presenting with pain in the chest, legs, thoracic back and difficulty walking.    His cancer initial presentation included “staring off into space,” which was later followed by a generalize seizure witnessed by [facility] ED team. He was diagnosed with and then underwent resection of very large left frontal brain metastasis by Yadira on 9/5/19. Disease workup at that time disclosed lesions in the right acetabulum as well as the liver. He was evaluated by Dr Fleming (heme-onc) during that admission, later went to [facility] and received stereotactic RT.     He now presents to ED with new onset weakness in BLE and difficulty urinating. MRI spine showing diffuse dural enhancement suspicious for neoplastic involvement. He has been seen by Dr May’s team radiation oncology, neither of whom had specific treatment options.      His daughter informs me that he is followed at [facility] by Dr Villaseñor, who has not thought chemotherapy appropriate since the underlying disease is poorly defined. 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 As the TTF-1 immunostain was positive, lung source was favored.    At present, he admits to pain and inability to feel or move either leg. He denies headache, nausea, or vomiting. There are no complaints of increased ICP. There are no seizure-like complaints.    Of note, he previously saw pulmonology (Dr. Delonte Galvan) at [facility] on 2/23/2017, [employer] notes document, “In summary he is a 61-year-old smoker with some hemoptysis since October of 2016. The hemoptysis is mostly intermittent. There are no other significant symptoms. CT examination of the chest performed in October showed hazy, ground glass opacification in the left upper lobe. No nodules or masses were seen. On examination today his lungs were clear. A followup CT will be obtained to see if he ground glass opacity in the left upper lobe is persistent.Bronchoscopic examination of the tracheobronchial tree will be performed.The procedure was explained to him and his daughter who is present and is a registered nurse. Blood work will also be obtained. Smoking cessation was advised and nicotine replacement therapy was suggested. ”    A CXR was performed and further discussions with patient and his daughter occurred in December 2017. The original CT was dated 10/9/2016 and a repeat CT was performed on 3/1/17 demonstrating resolution of the patchy ground glass opacities previously seen.      Patient currently being medication w/:   -Fentanyl patch 75mcg/hr transdermal patch q72h  -Lidocaine patch 12h on 12h off  -Hydromorphone 1mg IV q2h  -Gabapentin 300 BID, 600 QD    EXAMINATION    During my examination he appears in no distress. States he has been unable to move or feel his legs since Thursday, w/ significant difficulty ambulating.     He is oriented to his sister and daughter, but does not really attend the conversation with me well. He is easily distracted. There is no grasp. He knows it is December (“the twelfth month”), but thinks it is 1013.   He says he cannot move either leg. He has a ‘windswept” appearance of the legs, with both feet plantiflexed. There is no Babinski. There is minimal clonus bilaterally and DTRs are present. Arm strength is grossly normal, although he does not attend to the examination very well for detailed examination.    IMAGING  I personally reviewed MR imaging dated  12/7/19 (C/T/L SPINE)	9/2/19 (BRAIN)	9/7/19 (BRAIN)	    In reviewing these cranial pre- and post-op images, I find A VERY LARGE LEFT FRONTAL SOLID AND CYSTIC ENHANCING MASS LESION WITH COPIOUS HYPERINTENSITY on the T2 weighted images, consistent with cerebral edema and underlying neoplasm. Later Brain imaging demonstrates resection of the anterior third of the disease, including most of the cyst, but significant enhancing disease remains posteriorly that is NOT bright on the unenhanced T1 imaging.  The spinal imaging is curious. There is no Leptomeningeal disease. There is diffuse cervical and thoracic  DURAL enhancing disease that is nodular and DOES NOT arise from the vertebral bodies. There is no caudae equinae compression. Prior sagittal cranial imaging does NOT show disease, although the present imaging demonstrates disease as high up as C2. Some of the disease is anterior to the spinal cord and some is posterior cord.    IMPRESSION/PLAN    SOCIAL SITUATION – His daughter asks to speak with me outside the presence of the patient. I asked him if it was acceptable for me to speak with her about his condition without him hearing and he said yes. He is not fully competent to make decisions, but his mental status was adequate enough to provide assent that I speak with her separately.  She informs me that her sister, Gerber, lives with her father (the patient) and is the HCP. She can be reached at [phone]. She tells me that they have not wanted to “add to his stress” by telling him all the details of his condition. He knows he has cancer. They believe it is incurable and that he is not strong enough to have chemotherapy. I expressed empathy and suggested that sometimes it becomes less stressful to know all the details of decision making. I explained that I would call her sister and discuss things with her, but that they should strongly consider informing their father of his options so he may guide them regarding the care he desires.    DURALLY BASED SPINAL DISEASE – Although neuroendocrine tumors are often highly responsive to radiotherapy, there is so much area to treat, that side effects (including myelosuppression) may be excessive. The location of the disease is dural, so it is NOT behind the blood brain barrier. Conventional chemotherapy, like carboplatin, etoposide and immunotherapies, would access the spinal canal disease.    PROGNOSIS – He has never had systemic therapy and his tumor expresses >1% of PDL1. Occasionally immunotherapy produces dramatic responses. I think without treatment, pneumonia, DVT, or decubitus ulcer is likely to prove fatal within a few months. Perhaps with treatment, his disease will improve and his strength will return. Whether to try therapy or not really depends on his wishes, in addition to practical considerations like whether he can physically comply with the travel back and forth for infusions.    PT EDUCATION -- I spoke with his HCP (daughter) and she informed me that he received WBXRT at [facility], not [facility]. She also states that she requested immunotherapy as monotherapy, but it was not given. She asks for a new oncologist. She does not want Dr Fleming's team, citing a poor experience in the past. Please consult house heme-onc.    DISPO – I am happy to see him as an outpatient. They have my business card and know they may call my office to coordinate future care. If no further treatment is desired, then perhaps his code status should be DNR.    Total time spent in counselling and coordiantion of care was 90 minutes.

## 2019-12-11 NOTE — PROGRESS NOTE ADULT - SUBJECTIVE AND OBJECTIVE BOX
[facility]    Subjective: Patient seen and examined. No new events except as noted.   sleeping  family at the bedside  long discussion with daughter and family outside the room     REVIEW OF SYSTEMS:  LE weakness and pain and muscle cramp       MEDICATIONS:  MEDICATIONS  (STANDING):  acetaminophen   Tablet .. 1000 milliGRAM(s) Oral every 8 hours  acetaminophen  IVPB .. 1000 milliGRAM(s) IV Intermittent once  Biotene Dry Mouth Oral Rinse 5 milliLiter(s) Swish and Spit every 6 hours  calamine Lotion 1 Application(s) Topical two times a day  dexAMETHasone     Tablet 4 milliGRAM(s) Oral every 6 hours  DULoxetine 30 milliGRAM(s) Oral daily  enoxaparin Injectable 80 milliGRAM(s) SubCutaneous daily  gabapentin 300 milliGRAM(s) Oral <User Schedule>  gabapentin 600 milliGRAM(s) Oral <User Schedule>  HYDROmorphone  Injectable 0.5 milliGRAM(s) IV Push every 6 hours  levETIRAcetam 500 milliGRAM(s) Oral two times a day  lidocaine   Patch 3 Patch Transdermal daily  lisinopril 10 milliGRAM(s) Oral daily  methadone    Tablet 5 milliGRAM(s) Oral <User Schedule>  QUEtiapine 25 milliGRAM(s) Oral at bedtime  senna 2 Tablet(s) Oral at bedtime      PHYSICAL EXAM:  T(C): 36.5 (12-11-19 @ 14:25), Max: 36.9 (12-11-19 @ 07:01)  HR: 68 (12-11-19 @ 14:25) (58 - 69)  BP: 187/98 (12-11-19 @ 14:25) (152/86 - 187/98)  RR: 17 (12-11-19 @ 14:25) (16 - 18)  SpO2: 95% (12-11-19 @ 14:25) (93% - 96%)  Wt(kg): --  I&O's Summary    10 Dec 2019 07:01  -  11 Dec 2019 07:00  --------------------------------------------------------  IN: 3430 mL / OUT: 3450 mL / NET: -20 mL    11 Dec 2019 07:01  -  11 Dec 2019 17:18  --------------------------------------------------------  IN: 900 mL / OUT: 1750 mL / NET: -850 mL          Appearance: sleepy and flair   HEENT: dry OM 	  Lymphatic: No lymphadenopathy , no edema  Cardiovascular: Normal S1 S2  Respiratory: Lungs clear to auscultation, normal effort 	  Gastrointestinal:  Soft, Non-tender, + BS	  Skin: No rashes, No ecchymoses, No cyanosis, warm to touch  Musculoskeletal: LE weakness   Psychiatry:  Mood & affect appropriate  Ext: No edema      All labs, Imaging and EKGs personally reviewed                           13.3   16.49 )-----------( 185      ( 11 Dec 2019 07:52 )             40.4               12-11    128<L>  |  93<L>  |  15  ----------------------------<  126<H>  4.7   |  24  |  0.46<L>    Ca    9.1      11 Dec 2019 13:31

## 2019-12-11 NOTE — PROGRESS NOTE ADULT - SUBJECTIVE AND OBJECTIVE BOX
Patient is a 64y old  Male who presents with a chief complaint of LE weakness (11 Dec 2019 17:18)      INTERVAL HISTORY: feels ok  	  MEDICATIONS:  lisinopril 10 milliGRAM(s) Oral daily        PHYSICAL EXAM:  T(C): 36.7 (12-11-19 @ 21:04), Max: 36.9 (12-11-19 @ 07:01)  HR: 66 (12-11-19 @ 21:04) (62 - 69)  BP: 151/91 (12-11-19 @ 21:04) (151/91 - 187/98)  RR: 18 (12-11-19 @ 21:04) (16 - 18)  SpO2: 95% (12-11-19 @ 21:04) (93% - 96%)  Wt(kg): --  I&O's Summary    10 Dec 2019 07:01  -  11 Dec 2019 07:00  --------------------------------------------------------  IN: 3430 mL / OUT: 3450 mL / NET: -20 mL    11 Dec 2019 07:01  -  11 Dec 2019 22:22  --------------------------------------------------------  IN: 1140 mL / OUT: 3450 mL / NET: -2310 mL          Appearance: In no distress	  HEENT:    PERRL, EOMI	  Cardiovascular:  S1 S2, No JVD  Respiratory: Lungs clear to auscultation	  Gastrointestinal:  Soft, Non-tender, + BS	  Extremities:  No edema of LE                                13.3   16.49 )-----------( 185      ( 11 Dec 2019 07:52 )             40.4     12-11    131<L>  |  92<L>  |  15  ----------------------------<  109<H>  4.2   |  24  |  0.44<L>    Ca    9.8      11 Dec 2019 19:58          Labs personally reviewed      Assessment and Plan:   Assessment:  · Assessment		  Pt is a 65 Y/O Male with PMHx smoker 50+yr 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 Per family the patient had MR at [facility] 3 weeks ago showing diffuse leptomeningeal spread (films unavail). Family is still shrouding patient from diagnosis/prognosis. Exam: oriented x3, FC, distal LE 4/5, proximal LE 2-3/5, UE 4/5. SILT. Endorses back pain.      Problem/Plan - 1:  ·  Problem: Cord compression.  Plan: Seen on MRI  Neurosurg eval appreciated  [facility] records reviewed likely ?with metastatic small cell lung ca  Onc and rad onc follow up noted    Problem/Plan - 2:  ·  Problem: Pulmonary embolism.  Plan: COnt lovenox   Monitor Hgb level   O 2 supplement.     Problem/Plan - 3:  ·  Problem: Chest pain, atypical.  Plan: does not appear to be cardiac in nature. ?2/2 PE   Repeat EKG ordered to further assess    Problem/Plan - 4:  Problem: Prophylactic measure. Plan: DVT and gI PPX.        Petey Roberson, DO [facility]  Cardiovascular Medicine  [phone]          Petey Roberson, DO [facility]  Cardiovascular Medicine  [phone]

## 2019-12-11 NOTE — PROGRESS NOTE ADULT - ASSESSMENT
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 Has lung cancer w/ mets to spine.     Hyponatremia  Appears euvolemic and an appropriate ADH response to pain and nausea    - would stop D5 +1/2NS +75meq NaHCO3 100cc/hr in view of worsening hyponatremia  - 1L fluid restricted diet  - pain control, palliative care is following and titrating meds today  - anti-emetics for control of nausea    - repeat BMP, urine osm, urine Na, urine Cl, and serum uric acid level  - repeat BMP Q6hrs    Acidosis:  Non-AG  on D5 +1/2NS +75meq NaHCO3 100cc/hr   would stop this drip w/ worsening hyponatremia and improved acidosis  continue to monitor    Increased BUN:  Sec to Steroids    Hematuria:  UA has RBC 5  Possible sec to france catheter  repeat UA  check renal US

## 2019-12-11 NOTE — PROGRESS NOTE ADULT - PROBLEM SELECTOR PLAN 3
- per rad onc, pt not candidate for palliative radiation 2/2 diffuse spinal mets  - family states neuro onc stated palliative chemotherapy might be an option but they are concerned that he would not tolerate this. awaiting neuro onc note

## 2019-12-11 NOTE — PROGRESS NOTE ADULT - SUBJECTIVE AND OBJECTIVE BOX
SUBJECTIVE AND OBJECTIVE:  INTERVAL HPI/OVERNIGHT EVENTS: Pt pulled out his IV overnight, replaced around 3 am per daughter at bedside. Pt has been requiring q2 hr prns. He sleeps after dilaudid administration but begins to groan as soon as he wakes up. Pt lethargic this am but arousable for brief period. He ate breakfast, does not appear to have n/v. He had a BM today. Pt has been having diffuse pruritus 2/2 opioids for which he was started on benadryl today.     DNR on chart:   Allergies    No Known Allergies    Intolerances    MEDICATIONS  (STANDING):  acetaminophen   Tablet .. 1000 milliGRAM(s) Oral every 8 hours  Biotene Dry Mouth Oral Rinse 5 milliLiter(s) Swish and Spit every 6 hours  calamine Lotion 1 Application(s) Topical two times a day  dexAMETHasone     Tablet 4 milliGRAM(s) Oral every 6 hours  dextrose 5% + sodium chloride 0.45% 1000 milliLiter(s) (100 mL/Hr) IV Continuous <Continuous>  DULoxetine 30 milliGRAM(s) Oral daily  enoxaparin Injectable 80 milliGRAM(s) SubCutaneous daily  gabapentin 300 milliGRAM(s) Oral <User Schedule>  gabapentin 600 milliGRAM(s) Oral <User Schedule>  levETIRAcetam 500 milliGRAM(s) Oral two times a day  lidocaine   Patch 3 Patch Transdermal daily  lisinopril 10 milliGRAM(s) Oral daily  methadone    Tablet 5 milliGRAM(s) Oral <User Schedule>  QUEtiapine 25 milliGRAM(s) Oral at bedtime  senna 2 Tablet(s) Oral at bedtime    MEDICATIONS  (PRN):  acetaminophen   Tablet .. 650 milliGRAM(s) Oral every 6 hours PRN Mild Pain (1 - 3)  diphenhydrAMINE 25 milliGRAM(s) Oral every 4 hours PRN Rash and/or Itching  HYDROmorphone  Injectable 1 milliGRAM(s) IV Push every 2 hours PRN Severe Pain (7 - 10)  naloxone Injectable 0.1 milliGRAM(s) IV Push every 3 minutes PRN sedation or respiratory depression due to opioids      ITEMS UNCHECKED ARE NOT PRESENT    PRESENT SYMPTOMS: [ ]Unable to obtain due to poor mentation   Source if other than patient:  [ ]Family   [ ]Team     Pain:  [ x]yes [ ]no  QOL impact -   Location -   back, chest                 Aggravating factors -   Quality - burning  Radiation - everywhere  Timing- constant  Severity (0-10 scale): 8, 2-3 immediately after dilaudid administration  Minimal acceptable level (0-10 scale):     Dyspnea:                           [ ]Mild [ ]Moderate [ ]Severe  Anxiety:                             [ ]Mild [x ]Moderate [ ]Severe  Fatigue:                             [ ]Mild [ ]Moderate [x ]Severe  Nausea:                             [ ]Mild [ ]Moderate [ ]Severe  Loss of appetite:              [ ]Mild [x ]Moderate [ ]Severe  Constipation:                    [ ]Mild [ ]Moderate [ ]Severe    PAIN AD Score:	  [email or web address] (Ctrl + left click to view)    Other Symptoms:  [ ]All other review of systems negative     Palliative Performance Status Version 2:         %      http://npcrc.org/files/news/palliative_performance_scale_ppsv2.pdf  PHYSICAL EXAM:  Vital Signs Last 24 Hrs  T(C): 36.8 (11 Dec 2019 09:05), Max: 36.9 (11 Dec 2019 07:01)  T(F): 98.2 (11 Dec 2019 09:05), Max: 98.4 (11 Dec 2019 07:01)  HR: 65 (11 Dec 2019 09:05) (58 - 69)  BP: 183/96 (11 Dec 2019 09:05) (152/86 - 186/93)  BP(mean): --  RR: 17 (11 Dec 2019 09:05) (16 - 18)  SpO2: 93% (11 Dec 2019 09:05) (93% - 96%) I&O's Summary    10 Dec 2019 07:01  -  11 Dec 2019 07:00  --------------------------------------------------------  IN: 3430 mL / OUT: 3450 mL / NET: -20 mL    11 Dec 2019 07:01  -  11 Dec 2019 11:53  --------------------------------------------------------  IN: 180 mL / OUT: 350 mL / NET: -170 mL       GENERAL:  [ ]Alert  [ x]Oriented x 1  [x ]Lethargic  [ ]Cachexia  [ ]Unarousable  x[ ]Verbal  [ ]Non-Verbal  Behavioral:   [ x]Anxiety  [ ]Delirium [ ]Agitation [ ]Other  HEENT:  [x ]Normal   [ ]Dry mouth   [ ]ET Tube/Trach  [ ]Oral lesions  PULMONARY:   x[ ]Clear [ ]Tachypnea  [ ]Audible excessive secretions   [ ]Rhonchi        [ ]Right [ ]Left [ ]Bilateral  [ ]Crackles        [ ]Right [ ]Left [ ]Bilateral  [ ]Wheezing     [ ]Right [ ]Left [ ]Bilateral  [ ]Diminished BS [ ] Right [ ]Left [ ]Bilateral  CARDIOVASCULAR:    [x ]Regular [ ]Irregular [ ]Tachy  [ ]Dusty [ ]Murmur [ ]Other  GASTROINTESTINAL:  [x ]Soft  [ ]Distended   [ ]+BS  [x ]Non tender [ ]Tender  [ ]PEG [ ]OGT/ NGT   Last BM: 12/11     GENITOURINARY:  [ ]Normal [ ]Incontinent   [ ]Oliguria/Anuria   [x ]Alvarez  MUSCULOSKELETAL:   [ ]Normal   [x ]Weakness  [x ]Bed/Wheelchair bound [ ]Edema  NEUROLOGIC:   [ ]No focal deficits  [ ] Cognitive impairment  [ ] Dysphagia [ ]Dysarthria [x ] Paresis [ ]Other   SKIN:   [ ]Normal  [ ]Rash   [ ]Pressure ulcer(s) [ ]y [ ]n present on admission  [x] excoriations 2/2 itching due to pruritus    CRITICAL CARE:  [ ]Shock Present  [ ]Septic [ ]Cardiogenic [ ]Neurologic [ ]Hypovolemic  [ ]Vasopressors [ ]Inotropes  [ ]Respiratory failure present [ ]Mechanical Ventilation [ ]Non-invasive ventilatory support [ ]High-Flow  [ ]Acute  [ ]Chronic [ ]Hypoxic  [ ]Hypercarbic [ ]Other  [ ]Other organ failure     LABS:                        13.3   16.49 )-----------( 185      ( 11 Dec 2019 07:52 )             40.4   12-11    133<L>  |  97  |  14  ----------------------------<  118<H>  4.2   |  25  |  0.50    Ca    9.6      11 Dec 2019 07:52          RADIOLOGY & ADDITIONAL STUDIES: reviewed    Protein Calorie Malnutrition Present: [ ]mild [ ]moderate [x ]severe [ ]underweight [ ]morbid obesity  https://www.andeal.org/vault/1130/web/files/ONC/Table_Clinical%20Characteristics%20to%20Document%20Malnutrition-White%20JV%20et%20al%202012.pdf    Height (cm): 170.18 (12-07-19 @ 09:03), 170.2 (09-24-19 @ 13:00), 170.18 (09-05-19 @ 11:47)  Weight (kg): 54.4 (12-07-19 @ 09:03), 54.7 (09-24-19 @ 13:00), 52.2 (09-05-19 @ 11:47)  BMI (kg/m2): 18.8 (12-07-19 @ 09:03), 18.9 (09-24-19 @ 13:00), 18 (09-05-19 @ 11:47)    [ ]PPSV2 < or = 30%  [x ]significant weight loss [ ]poor nutritional intake [ ]anasarca   Albumin, Serum: 3.8 g/dL (12-08-19 @ 09:07)   [ ]Artificial Nutrition    REFERRALS:   [x ]Chaplaincy  [ ]Hospice  [ ]Child Life  [ ]Social Work  [ ]Case management [ ]Holistic Therapy     Goals of Care Document:  pending SUBJECTIVE AND OBJECTIVE:  INTERVAL HPI/OVERNIGHT EVENTS: Pt pulled out his IV overnight, and as per his daughter, it was replaced around 3 am . Pt has been requiring q2 hr prns of Dilaudid IV. Per his daughter, he sleeps after Dilaudid administration but begins to groan as soon as he wakes up. Pt lethargic this am but arousal for brief period. He ate breakfast, does not appear to have n/v. He had a BM today. Pt has been having diffuse pruritus 2/2 opioids for which he was started on benadryl.     DNR on chart:   Allergies    No Known Allergies    Intolerances    MEDICATIONS  (STANDING):  acetaminophen   Tablet .. 1000 milliGRAM(s) Oral every 8 hours  Biotene Dry Mouth Oral Rinse 5 milliLiter(s) Swish and Spit every 6 hours  calamine Lotion 1 Application(s) Topical two times a day  dexAMETHasone     Tablet 4 milliGRAM(s) Oral every 6 hours  dextrose 5% + sodium chloride 0.45% 1000 milliLiter(s) (100 mL/Hr) IV Continuous <Continuous>  DULoxetine 30 milliGRAM(s) Oral daily  enoxaparin Injectable 80 milliGRAM(s) SubCutaneous daily  gabapentin 300 milliGRAM(s) Oral <User Schedule>  gabapentin 600 milliGRAM(s) Oral <User Schedule>  levETIRAcetam 500 milliGRAM(s) Oral two times a day  lidocaine   Patch 3 Patch Transdermal daily  lisinopril 10 milliGRAM(s) Oral daily  methadone    Tablet 5 milliGRAM(s) Oral <User Schedule>  QUEtiapine 25 milliGRAM(s) Oral at bedtime  senna 2 Tablet(s) Oral at bedtime    MEDICATIONS  (PRN):  acetaminophen   Tablet .. 650 milliGRAM(s) Oral every 6 hours PRN Mild Pain (1 - 3)  diphenhydrAMINE 25 milliGRAM(s) Oral every 4 hours PRN Rash and/or Itching  HYDROmorphone  Injectable 1 milliGRAM(s) IV Push every 2 hours PRN Severe Pain (7 - 10)  naloxone Injectable 0.1 milliGRAM(s) IV Push every 3 minutes PRN sedation or respiratory depression due to opioids      ITEMS UNCHECKED ARE NOT PRESENT    PRESENT SYMPTOMS: [x ]Unable to obtain due to poor mentation   Source if other than patient:  [ ]Family   [ ]Team     Pain:  [ x]yes [ ]no  QOL impact -   Location -   back, chest                 Aggravating factors -   Quality - burning  Radiation - everywhere  Timing- constant  Severity (0-10 scale): 8, 2-3 immediately after dilaudid administration  Minimal acceptable level (0-10 scale):     Dyspnea:                           [ ]Mild [ ]Moderate [ ]Severe  Anxiety:                             [ ]Mild [x ]Moderate [ ]Severe  Fatigue:                             [ ]Mild [ ]Moderate [x ]Severe  Nausea:                             [ ]Mild [ ]Moderate [ ]Severe  Loss of appetite:              [ ]Mild [x ]Moderate [ ]Severe  Constipation:                    [ ]Mild [ ]Moderate [ ]Severe    PAIN AD Score:	  [email or web address]/[email or web address].pdf (Ctrl + left click to view)    Other Symptoms:  [ ]All other review of systems negative     Palliative Performance Status Version 2:    20     %      http://npcrc.org/files/news/palliative_performance_scale_ppsv2.pdf  PHYSICAL EXAM:  Vital Signs Last 24 Hrs  T(C): 36.8 (11 Dec 2019 09:05), Max: 36.9 (11 Dec 2019 07:01)  T(F): 98.2 (11 Dec 2019 09:05), Max: 98.4 (11 Dec 2019 07:01)  HR: 65 (11 Dec 2019 09:05) (58 - 69)  BP: 183/96 (11 Dec 2019 09:05) (152/86 - 186/93)  BP(mean): --  RR: 17 (11 Dec 2019 09:05) (16 - 18)  SpO2: 93% (11 Dec 2019 09:05) (93% - 96%) I&O's Summary    10 Dec 2019 07:01  -  11 Dec 2019 07:00  --------------------------------------------------------  IN: 3430 mL / OUT: 3450 mL / NET: -20 mL    11 Dec 2019 07:01  -  11 Dec 2019 11:53  --------------------------------------------------------  IN: 180 mL / OUT: 350 mL / NET: -170 mL       GENERAL:  [ ]Alert  [ x]Oriented x 1  [x ]Lethargic  [ ]Cachexia  [ ]Unarousable  x[ ]Verbal  [ ]Non-Verbal  Behavioral:   [ x]Anxiety  [ ]Delirium [ ]Agitation [ ]Other  HEENT:  [x ]Normal   [ ]Dry mouth   [ ]ET Tube/Trach  [ ]Oral lesions  PULMONARY:   x[ ]Clear [ ]Tachypnea  [ ]Audible excessive secretions   [ ]Rhonchi        [ ]Right [ ]Left [ ]Bilateral  [ ]Crackles        [ ]Right [ ]Left [ ]Bilateral  [ ]Wheezing     [ ]Right [ ]Left [ ]Bilateral  [ ]Diminished BS [ ] Right [ ]Left [ ]Bilateral  CARDIOVASCULAR:    [x ]Regular [ ]Irregular [ ]Tachy  [ ]Dusty [ ]Murmur [ ]Other  GASTROINTESTINAL:  [x ]Soft  [ ]Distended   [ ]+BS  [x ]Non tender [ ]Tender  [ ]PEG [ ]OGT/ NGT   Last BM: 12/11     GENITOURINARY:  [ ]Normal [ ]Incontinent   [ ]Oliguria/Anuria   [x ]Alvarez  MUSCULOSKELETAL:   [ ]Normal   [x ]Weakness  [x ]Bed/Wheelchair bound [ ]Edema  NEUROLOGIC:   [ ]No focal deficits  [ ] Cognitive impairment  [ ] Dysphagia [ ]Dysarthria [x ] Paresis [ ]Other   SKIN:   [ ]Normal  [ ]Rash   [ ]Pressure ulcer(s) [ ]y [ ]n present on admission  [x] excoriations 2/2 itching due to pruritus    CRITICAL CARE:  [ ]Shock Present  [ ]Septic [ ]Cardiogenic [ ]Neurologic [ ]Hypovolemic  [ ]Vasopressors [ ]Inotropes  [ ]Respiratory failure present [ ]Mechanical Ventilation [ ]Non-invasive ventilatory support [ ]High-Flow  [ ]Acute  [ ]Chronic [ ]Hypoxic  [ ]Hypercarbic [ ]Other  [ ]Other organ failure     LABS:                        13.3   16.49 )-----------( 185      ( 11 Dec 2019 07:52 )             40.4   12-11    133<L>  |  97  |  14  ----------------------------<  118<H>  4.2   |  25  |  0.50    Ca    9.6      11 Dec 2019 07:52          RADIOLOGY & ADDITIONAL STUDIES: reviewed    Protein Calorie Malnutrition Present: [ ]mild [ ]moderate [x ]severe [ ]underweight [ ]morbid obesity  https://www.andeal.org/vault/9640/web/files/ONC/Table_Clinical%20Characteristics%20to%20Document%20Malnutrition-White%20JV%20et%20al%202012.pdf    Height (cm): 170.18 (12-07-19 @ 09:03), 170.2 (09-24-19 @ 13:00), 170.18 (09-05-19 @ 11:47)  Weight (kg): 54.4 (12-07-19 @ 09:03), 54.7 (09-24-19 @ 13:00), 52.2 (09-05-19 @ 11:47)  BMI (kg/m2): 18.8 (12-07-19 @ 09:03), 18.9 (09-24-19 @ 13:00), 18 (09-05-19 @ 11:47)    [ ]PPSV2 < or = 30%  [x ]significant weight loss [ ]poor nutritional intake [ ]anasarca   Albumin, Serum: 3.8 g/dL (12-08-19 @ 09:07)   [ ]Artificial Nutrition    REFERRALS:   [x ]Chaplaincy  [ ]Hospice  [ ]Child Life  [ ]Social Work  [ ]Case management [ ]Holistic Therapy     Goals of Care Document:  pending

## 2019-12-11 NOTE — PROGRESS NOTE ADULT - SUBJECTIVE AND OBJECTIVE BOX
[facility]   MD Bijal Chao D.O. Fatima Sheikh, D.O. Ruoru Wong, PA    From 7 AM - 5 PM:  OFFICE: [phone]  Dr. Welsh cell: [phone]  Dr. Chatman cell: [phone]  Dr. Velasquez cell: [phone]  INGRID Banuelos cell: [phone]    From 5 PM - 7 AM: Answering Service: [phone]        RENAL FOLLOW UP NOTE  --------------------------------------------------------------------------------  HPI: Pt seen and examined. Remains in pain this afternoon but is improved after starting different pain meds per daughter. Poor PO intake per daughter.         PAST HISTORY  --------------------------------------------------------------------------------  No significant changes to PMH, PSH, FHx, SHx, unless otherwise noted    ALLERGIES & MEDICATIONS  --------------------------------------------------------------------------------  Allergies    No Known Allergies    Intolerances      Standing Inpatient Medications  acetaminophen   Tablet .. 1000 milliGRAM(s) Oral every 8 hours  acetaminophen  IVPB .. 1000 milliGRAM(s) IV Intermittent once  Biotene Dry Mouth Oral Rinse 5 milliLiter(s) Swish and Spit every 6 hours  calamine Lotion 1 Application(s) Topical two times a day  dexAMETHasone     Tablet 4 milliGRAM(s) Oral every 6 hours  DULoxetine 30 milliGRAM(s) Oral daily  enoxaparin Injectable 80 milliGRAM(s) SubCutaneous daily  gabapentin 300 milliGRAM(s) Oral <User Schedule>  gabapentin 600 milliGRAM(s) Oral <User Schedule>  HYDROmorphone  Injectable 0.5 milliGRAM(s) IV Push every 6 hours  levETIRAcetam 500 milliGRAM(s) Oral two times a day  lidocaine   Patch 3 Patch Transdermal daily  lisinopril 10 milliGRAM(s) Oral daily  methadone    Tablet 5 milliGRAM(s) Oral <User Schedule>  QUEtiapine 25 milliGRAM(s) Oral at bedtime  senna 2 Tablet(s) Oral at bedtime    PRN Inpatient Medications  acetaminophen   Tablet .. 650 milliGRAM(s) Oral every 6 hours PRN  aluminum hydroxide/magnesium hydroxide/simethicone Suspension 30 milliLiter(s) Oral once PRN  diphenhydrAMINE 25 milliGRAM(s) Oral every 4 hours PRN  HYDROmorphone  Injectable 1 milliGRAM(s) IV Push every 2 hours PRN  naloxone Injectable 0.1 milliGRAM(s) IV Push every 3 minutes PRN      REVIEW OF SYSTEMS  --------------------------------------------------------------------------------  General: no fever  CVS: no chest pain  RESP: no sob, no cough  ABD: no abdominal pain  : no dysuria,  MSK: no edema     VITALS/PHYSICAL EXAM  --------------------------------------------------------------------------------  T(C): 36.5 (12-11-19 @ 14:25), Max: 36.9 (12-11-19 @ 07:01)  HR: 68 (12-11-19 @ 14:25) (58 - 69)  BP: 187/98 (12-11-19 @ 14:25) (152/86 - 187/98)  RR: 17 (12-11-19 @ 14:25) (16 - 18)  SpO2: 95% (12-11-19 @ 14:25) (93% - 96%)  Wt(kg): --        12-10-19 @ 07:01  -  12-11-19 @ 07:00  --------------------------------------------------------  IN: 3430 mL / OUT: 3450 mL / NET: -20 mL    12-11-19 @ 07:01  -  12-11-19 @ 17:04  --------------------------------------------------------  IN: 900 mL / OUT: 1750 mL / NET: -850 mL      Physical Exam:  	Gen: NAD  	HEENT: MMM  	Pulm: CTA B/L  	CV: S1S2  	Abd: Soft, +BS  	Ext: No LE edema B/L              Neuro: Awake   	Skin: Warm and Dry       LABS/STUDIES  --------------------------------------------------------------------------------              13.3   16.49 >-----------<  185      [12-11-19 @ 07:52]              40.4     128  |  93  |  15  ----------------------------<  126      [12-11-19 @ 13:31]  4.7   |  24  |  0.46        Ca     9.1     [12-11-19 @ 13:31]            Creatinine Trend:  SCr 0.46 [12-11 @ 13:31]  SCr 0.50 [12-11 @ 07:52]  SCr 0.62 [12-10 @ 07:22]  SCr 0.68 [12-08 @ 09:07]  SCr <0.30 [12-08 @ 06:47]    Urinalysis - [12-07-19 @ 11:05]      Color Yellow / Appearance Clear / SG 1.026 / pH 6.0      Gluc Negative / Ketone Trace  / Bili Negative / Urobili Negative       Blood Negative / Protein Trace / Leuk Est Negative / Nitrite Negative      RBC 5 / WBC 0 / Hyaline 0 / Gran  / Sq Epi  / Non Sq Epi 0 / Bacteria Negative      TSH 0.33      [12-08-19 @ 09:35]

## 2019-12-11 NOTE — PROGRESS NOTE ADULT - PROBLEM SELECTOR PLAN 1
2/2 extensive spinal metastasis. Pain appears both somatic and neuropathic  - pt dose not appear to have improvement with increasing doses of fentanyl patch, concern that might not be adequately absorbing. will d/c patch   - will start methadone 5 mg tid  - tylenol 1000 mg q8 standing, 1000 mg IV prn if pt cannot tolerate po  - gabapentin 300 mg bid, 600 mg at night  - c/w dilaudid 1 mg q2 hr prn for severe pain.  - c/w decadron 4 mg q6 hr  - per rad onc note, not likely to benefit from palliative radiation  - duloxetine 30 mg qd today 2/2 extensive spinal metastasis. Pain appears both somatic and neuropathic  - pt dose not appear to have improvement with increasing doses of fentanyl patch, concern that might not be adequately absorbing. will d/c patch   - will start methadone 5 mg tid  - tylenol 1000 mg q8 standing, 1000 mg IV prn if pt cannot tolerate po  - gabapentin 300 mg bid, 600 mg at night  - will add standing dilaudid 0.5 mg IV q6 hr   - c/w dilaudid 1 mg q2 hr prn for severe pain  - c/w decadron 4 mg q6 hr  - per rad onc note, not likely to benefit from palliative radiation  - duloxetine 30 mg qd today 2/2 extensive spinal metastasis. Pain appears both somatic and neuropathic  - pt dose not appear to have improvement with increasing doses of fentanyl patch, concern that might not be adequately absorbing. will d/c patch and will start methadone 5 mg q 8 hours.   - tylenol 1000 mg q8 standing, 1000 mg IV prn if pt cannot tolerate po  - gabapentin 300 mg bid, 600 mg at night  - will add standing dilaudid 0.5 mg IV q6 hr ATC  - c/w dilaudid 1 mg q2 hr prn for moderate to severe pain  - c/w decadron 4 mg q6 hr  - per rad onc note, not likely to benefit from palliative radiation. Dr. Winn also d/w Dr Benítez that actually indicated RT was not an option.   - duloxetine 30 mg qd was started

## 2019-12-12 LAB
ANION GAP SERPL CALC-SCNC: 14 MMOL/L — SIGNIFICANT CHANGE UP (ref 5–17)
BASOPHILS # BLD AUTO: 0.05 K/UL — SIGNIFICANT CHANGE UP (ref 0–0.2)
BASOPHILS NFR BLD AUTO: 0.3 % — SIGNIFICANT CHANGE UP (ref 0–2)
BUN SERPL-MCNC: 26 MG/DL — HIGH (ref 7–23)
CALCIUM SERPL-MCNC: 9.9 MG/DL — SIGNIFICANT CHANGE UP (ref 8.4–10.5)
CHLORIDE SERPL-SCNC: 93 MMOL/L — LOW (ref 96–108)
CO2 SERPL-SCNC: 25 MMOL/L — SIGNIFICANT CHANGE UP (ref 22–31)
CREAT SERPL-MCNC: 0.5 MG/DL — SIGNIFICANT CHANGE UP (ref 0.5–1.3)
EOSINOPHIL # BLD AUTO: 0 K/UL — SIGNIFICANT CHANGE UP (ref 0–0.5)
EOSINOPHIL NFR BLD AUTO: 0 % — SIGNIFICANT CHANGE UP (ref 0–6)
GLUCOSE SERPL-MCNC: 95 MG/DL — SIGNIFICANT CHANGE UP (ref 70–99)
HCT VFR BLD CALC: 46.5 % — SIGNIFICANT CHANGE UP (ref 39–50)
HGB BLD-MCNC: 15.2 G/DL — SIGNIFICANT CHANGE UP (ref 13–17)
IMM GRANULOCYTES NFR BLD AUTO: 3.3 % — HIGH (ref 0–1.5)
LYMPHOCYTES # BLD AUTO: 0.93 K/UL — LOW (ref 1–3.3)
LYMPHOCYTES # BLD AUTO: 5.9 % — LOW (ref 13–44)
MCHC RBC-ENTMCNC: 31.7 PG — SIGNIFICANT CHANGE UP (ref 27–34)
MCHC RBC-ENTMCNC: 32.7 GM/DL — SIGNIFICANT CHANGE UP (ref 32–36)
MCV RBC AUTO: 97.1 FL — SIGNIFICANT CHANGE UP (ref 80–100)
MONOCYTES # BLD AUTO: 0.94 K/UL — HIGH (ref 0–0.9)
MONOCYTES NFR BLD AUTO: 6 % — SIGNIFICANT CHANGE UP (ref 2–14)
NEUTROPHILS # BLD AUTO: 13.26 K/UL — HIGH (ref 1.8–7.4)
NEUTROPHILS NFR BLD AUTO: 84.5 % — HIGH (ref 43–77)
NRBC # BLD: 0 /100 WBCS — SIGNIFICANT CHANGE UP (ref 0–0)
PLATELET # BLD AUTO: 200 K/UL — SIGNIFICANT CHANGE UP (ref 150–400)
POTASSIUM SERPL-MCNC: 4.7 MMOL/L — SIGNIFICANT CHANGE UP (ref 3.5–5.3)
POTASSIUM SERPL-SCNC: 4.7 MMOL/L — SIGNIFICANT CHANGE UP (ref 3.5–5.3)
RBC # BLD: 4.79 M/UL — SIGNIFICANT CHANGE UP (ref 4.2–5.8)
RBC # FLD: 13.4 % — SIGNIFICANT CHANGE UP (ref 10.3–14.5)
SODIUM SERPL-SCNC: 132 MMOL/L — LOW (ref 135–145)
URATE UR-MCNC: 9.9 MG/DL — SIGNIFICANT CHANGE UP
WBC # BLD: 15.69 K/UL — HIGH (ref 3.8–10.5)
WBC # FLD AUTO: 15.69 K/UL — HIGH (ref 3.8–10.5)

## 2019-12-12 PROCEDURE — 99223 1ST HOSP IP/OBS HIGH 75: CPT | Mod: GC

## 2019-12-12 PROCEDURE — 99233 SBSQ HOSP IP/OBS HIGH 50: CPT

## 2019-12-12 RX ORDER — PANTOPRAZOLE SODIUM 20 MG/1
40 TABLET, DELAYED RELEASE ORAL DAILY
Refills: 0 | Status: DISCONTINUED | OUTPATIENT
Start: 2019-12-12 | End: 2019-12-21

## 2019-12-12 RX ORDER — METHADONE HYDROCHLORIDE 40 MG/1
2.5 TABLET ORAL
Refills: 0 | Status: DISCONTINUED | OUTPATIENT
Start: 2019-12-12 | End: 2019-12-16

## 2019-12-12 RX ORDER — HYDROMORPHONE HYDROCHLORIDE 2 MG/ML
0.5 INJECTION INTRAMUSCULAR; INTRAVENOUS; SUBCUTANEOUS EVERY 6 HOURS
Refills: 0 | Status: DISCONTINUED | OUTPATIENT
Start: 2019-12-12 | End: 2019-12-16

## 2019-12-12 RX ORDER — GABAPENTIN 400 MG/1
300 CAPSULE ORAL EVERY 8 HOURS
Refills: 0 | Status: DISCONTINUED | OUTPATIENT
Start: 2019-12-12 | End: 2019-12-20

## 2019-12-12 RX ORDER — DIPHENHYDRAMINE HCL 50 MG
12.5 CAPSULE ORAL EVERY 4 HOURS
Refills: 0 | Status: DISCONTINUED | OUTPATIENT
Start: 2019-12-12 | End: 2019-12-21

## 2019-12-12 RX ORDER — FAMOTIDINE 10 MG/ML
10 INJECTION INTRAVENOUS ONCE
Refills: 0 | Status: COMPLETED | OUTPATIENT
Start: 2019-12-12 | End: 2019-12-12

## 2019-12-12 RX ORDER — HYDROMORPHONE HYDROCHLORIDE 2 MG/ML
0.5 INJECTION INTRAMUSCULAR; INTRAVENOUS; SUBCUTANEOUS
Refills: 0 | Status: DISCONTINUED | OUTPATIENT
Start: 2019-12-12 | End: 2019-12-16

## 2019-12-12 RX ADMIN — LISINOPRIL 10 MILLIGRAM(S): 2.5 TABLET ORAL at 05:41

## 2019-12-12 RX ADMIN — HYDROMORPHONE HYDROCHLORIDE 0.5 MILLIGRAM(S): 2 INJECTION INTRAMUSCULAR; INTRAVENOUS; SUBCUTANEOUS at 05:42

## 2019-12-12 RX ADMIN — LIDOCAINE 3 PATCH: 4 CREAM TOPICAL at 10:15

## 2019-12-12 RX ADMIN — HYDROMORPHONE HYDROCHLORIDE 0.5 MILLIGRAM(S): 2 INJECTION INTRAMUSCULAR; INTRAVENOUS; SUBCUTANEOUS at 12:45

## 2019-12-12 RX ADMIN — ENOXAPARIN SODIUM 80 MILLIGRAM(S): 100 INJECTION SUBCUTANEOUS at 12:48

## 2019-12-12 RX ADMIN — Medication 5 MILLILITER(S): at 12:48

## 2019-12-12 RX ADMIN — Medication 4 MILLIGRAM(S): at 05:41

## 2019-12-12 RX ADMIN — Medication 25 MILLIGRAM(S): at 05:42

## 2019-12-12 RX ADMIN — HYDROMORPHONE HYDROCHLORIDE 0.5 MILLIGRAM(S): 2 INJECTION INTRAMUSCULAR; INTRAVENOUS; SUBCUTANEOUS at 13:15

## 2019-12-12 RX ADMIN — Medication 4 MILLIGRAM(S): at 12:49

## 2019-12-12 RX ADMIN — LIDOCAINE 3 PATCH: 4 CREAM TOPICAL at 00:08

## 2019-12-12 RX ADMIN — LIDOCAINE 3 PATCH: 4 CREAM TOPICAL at 15:56

## 2019-12-12 RX ADMIN — LEVETIRACETAM 500 MILLIGRAM(S): 250 TABLET, FILM COATED ORAL at 05:42

## 2019-12-12 RX ADMIN — METHADONE HYDROCHLORIDE 5 MILLIGRAM(S): 40 TABLET ORAL at 15:26

## 2019-12-12 RX ADMIN — SENNA PLUS 2 TABLET(S): 8.6 TABLET ORAL at 22:24

## 2019-12-12 RX ADMIN — Medication 1000 MILLIGRAM(S): at 15:23

## 2019-12-12 RX ADMIN — FAMOTIDINE 10 MILLIGRAM(S): 10 INJECTION INTRAVENOUS at 20:22

## 2019-12-12 RX ADMIN — GABAPENTIN 300 MILLIGRAM(S): 400 CAPSULE ORAL at 15:47

## 2019-12-12 RX ADMIN — METHADONE HYDROCHLORIDE 2.5 MILLIGRAM(S): 40 TABLET ORAL at 22:24

## 2019-12-12 RX ADMIN — HYDROMORPHONE HYDROCHLORIDE 1 MILLIGRAM(S): 2 INJECTION INTRAMUSCULAR; INTRAVENOUS; SUBCUTANEOUS at 16:40

## 2019-12-12 RX ADMIN — GABAPENTIN 300 MILLIGRAM(S): 400 CAPSULE ORAL at 22:24

## 2019-12-12 RX ADMIN — Medication 25 MILLIGRAM(S): at 15:41

## 2019-12-12 RX ADMIN — PANTOPRAZOLE SODIUM 40 MILLIGRAM(S): 20 TABLET, DELAYED RELEASE ORAL at 05:41

## 2019-12-12 RX ADMIN — HYDROMORPHONE HYDROCHLORIDE 1 MILLIGRAM(S): 2 INJECTION INTRAMUSCULAR; INTRAVENOUS; SUBCUTANEOUS at 02:50

## 2019-12-12 RX ADMIN — HYDROMORPHONE HYDROCHLORIDE 0.5 MILLIGRAM(S): 2 INJECTION INTRAMUSCULAR; INTRAVENOUS; SUBCUTANEOUS at 22:55

## 2019-12-12 RX ADMIN — PANTOPRAZOLE SODIUM 40 MILLIGRAM(S): 20 TABLET, DELAYED RELEASE ORAL at 16:18

## 2019-12-12 RX ADMIN — GABAPENTIN 300 MILLIGRAM(S): 400 CAPSULE ORAL at 05:41

## 2019-12-12 RX ADMIN — Medication 1000 MILLIGRAM(S): at 05:41

## 2019-12-12 RX ADMIN — HYDROMORPHONE HYDROCHLORIDE 1 MILLIGRAM(S): 2 INJECTION INTRAMUSCULAR; INTRAVENOUS; SUBCUTANEOUS at 03:05

## 2019-12-12 RX ADMIN — QUETIAPINE FUMARATE 25 MILLIGRAM(S): 200 TABLET, FILM COATED ORAL at 22:24

## 2019-12-12 RX ADMIN — HYDROMORPHONE HYDROCHLORIDE 1 MILLIGRAM(S): 2 INJECTION INTRAMUSCULAR; INTRAVENOUS; SUBCUTANEOUS at 16:04

## 2019-12-12 RX ADMIN — METHADONE HYDROCHLORIDE 5 MILLIGRAM(S): 40 TABLET ORAL at 05:41

## 2019-12-12 RX ADMIN — HYDROMORPHONE HYDROCHLORIDE 0.5 MILLIGRAM(S): 2 INJECTION INTRAMUSCULAR; INTRAVENOUS; SUBCUTANEOUS at 22:24

## 2019-12-12 RX ADMIN — LIDOCAINE 3 PATCH: 4 CREAM TOPICAL at 02:00

## 2019-12-12 RX ADMIN — CALAMINE AND ZINC OXIDE AND PHENOL 1 APPLICATION(S): 160; 10 LOTION TOPICAL at 05:42

## 2019-12-12 NOTE — PROGRESS NOTE ADULT - SUBJECTIVE AND OBJECTIVE BOX
[facility]    Subjective: Patient seen and examined. No new events except as noted.   awake denies of any pain  poor appetite     REVIEW OF SYSTEMS:  + weakness     MEDICATIONS:  MEDICATIONS  (STANDING):  acetaminophen  IVPB .. 1000 milliGRAM(s) IV Intermittent once  Biotene Dry Mouth Oral Rinse 5 milliLiter(s) Swish and Spit every 6 hours  calamine Lotion 1 Application(s) Topical two times a day  dexAMETHasone     Tablet 4 milliGRAM(s) Oral every 6 hours  enoxaparin Injectable 80 milliGRAM(s) SubCutaneous daily  gabapentin 300 milliGRAM(s) Oral every 8 hours  HYDROmorphone  Injectable 0.5 milliGRAM(s) IV Push every 6 hours  levETIRAcetam 500 milliGRAM(s) Oral two times a day  lisinopril 10 milliGRAM(s) Oral daily  methadone    Tablet 2.5 milliGRAM(s) Oral <User Schedule>  pantoprazole  Injectable 40 milliGRAM(s) IV Push daily  QUEtiapine 25 milliGRAM(s) Oral at bedtime  senna 2 Tablet(s) Oral at bedtime      PHYSICAL EXAM:  T(C): 36.6 (12-12-19 @ 12:55), Max: 36.7 (12-11-19 @ 21:04)  HR: 84 (12-12-19 @ 12:55) (62 - 84)  BP: 131/81 (12-12-19 @ 12:55) (131/81 - 165/94)  RR: 16 (12-12-19 @ 12:55) (16 - 18)  SpO2: 93% (12-12-19 @ 12:55) (93% - 95%)  Wt(kg): --  I&O's Summary    11 Dec 2019 07:01  -  12 Dec 2019 07:00  --------------------------------------------------------  IN: 1680 mL / OUT: 4125 mL / NET: -2445 mL    12 Dec 2019 07:01  -  12 Dec 2019 17:12  --------------------------------------------------------  IN: 200 mL / OUT: 650 mL / NET: -450 mL          Appearance: awake, flair, cachectic   HEENT:  dry OM 	  Lymphatic: No lymphadenopathy , no edema  Cardiovascular: Normal S1 S2,  Respiratory: Lungs clear to auscultation, normal effort 	  Gastrointestinal:  Soft, Non-tender, + BS	  Skin: No rashes, No ecchymoses, No cyanosis, warm to touch  Musculoskeletal: LE motor loss, incontinence , decrease motor UE   Psychiatry:  Mood & affect appropriate  Ext: No edema      All labs, Imaging and EKGs personally reviewed                           15.2   15.69 )-----------( 200      ( 12 Dec 2019 08:59 )             46.5               12-12    132<L>  |  93<L>  |  26<H>  ----------------------------<  95  4.7   |  25  |  0.50    Ca    9.9      12 Dec 2019 08:59

## 2019-12-12 NOTE — PROGRESS NOTE ADULT - PROBLEM SELECTOR PLAN 5
He is currently lethargic, confused, AAOX1, and appears to have some word finding difficulty probably related to his brain mets, all these factors impair his decision making capacity. Pt's daughter Romulo is HCP; however, she makes decisions with her four siblings.   - Basilia is to ask Romulo to bring HCP documentation He is currently lethargic, confused, AAOX1, and appears to have some word finding difficulty probably related to his brain mets, all these factors impair his decision making capacity. Pt's Romulo Cui is HCP; however, she makes decisions with her four siblings. The patient's wife differs decision making to her daughters.   - Basilia is to ask Romulo to bring HCP documentation

## 2019-12-12 NOTE — PROGRESS NOTE ADULT - PROBLEM SELECTOR PLAN 6
Per daughters pt is currently full code, GOC conversation ongoing Per daughters (Romulo and Basilia) pt is currently full code, GOC conversation ongoing

## 2019-12-12 NOTE — CHART NOTE - NSCHARTNOTEFT_GEN_A_CORE
NEURO-ONCOLOGY: CARTER     I SPOKE WITH THE PATIENT'S ONCOLOGIST AT [facility], DR COMBS ([phone])    He informs me that following diagnosis of the metastatic brain tumor, the patient came to see him at [facility]. 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 He presented the case at their lung tumor board and there was general agreement, although all attending were surprised that there was little to no lung disease to lead to the intracerebral metastases. 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 The patient declined chemotherapy at that time. He had several discussions, with the patient and his family, including a daughter who was a nurse. Later, the patient re-presented with leg weakness and pains and imaging was performed at [facility]. 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 He recommended hospice.    I reviewed with him the dural nature of the disease and that it was outside the BBB and might still respond to systemic chemotherapy. The patient, of course, has declined chemotherapy in the past.     Dr. Combs has reached out to the family and will continue to do so. He was thankful for the communication. He knows how to reach me if needed.    total time spent was 26 minutes, of which half in coordination of care

## 2019-12-12 NOTE — PROGRESS NOTE ADULT - PROBLEM SELECTOR PLAN 2
S/P Craniotomy  Neurosurg F/U appreciated   pain control  Palliative eval appreciated  worsening motor function, increased to UE

## 2019-12-12 NOTE — CONSULT NOTE ADULT - SUBJECTIVE AND OBJECTIVE BOX
64y man with metastatic neuroendocrine carcinoma, s/p surgery and WBRT for brain mets at [facility] in 9/19, now with about 2 months of spine pain, one week of leg numbness/weakness/ incontinence. Spine  MRI showing diffuse metastatic dural disease through out the spine . Pt was evaluated by neurosurgery, no surgical intervention was recommended. Pt was also evaluated by radiation oncology. Given the patients' compromised motor function and very extensive/diffuse disease throughout the spine, it is unlikely that spine radiation will have any significant potential benefit in restoring motor function with risk of myelosuppression. Pt previously refused chemotherapy. Dr. Villaseñor recommended hospice during previous evaluation. Pt is AAOx3 but currently deferred his decision making to his daughter, Romulo (HCP). He does not know the whole extent of his current disease status.     House oncology consulted for 2nd opionion.        Allergies  No Known Allergies      PAST MEDICAL & SURGICAL HISTORY:  No pertinent past medical history  Status post craniotomy    FAMILY HISTORY:  No pertinent family history in first degree relatives      Social History:  former smoker. Lives with wife and daughter.    REVIEW OF SYSTEMS:    CONSTITUTIONAL: +weakness,+ pain, No fevers or chills  EYES/ENT: No visual changes, no throat pain   RESPIRATORY: No cough, wheezing, hemoptysis; No shortness of breath  CARDIOVASCULAR: No chest pain or palpitations  GASTROINTESTINAL: No abdominal pain, nausea, vomiting, or hematemesis; No diarrhea or constipation. No melena or hematochezia.  GENITOURINARY: No dysuria, frequency or hematuria  MUSCULOSKELETAL: No joint pain.  NEUROLOGICAL: +weakness, + incontinence  SKIN: No itching, burning, rashes, or lesions   All other review of systems is negative unless indicated above.    VITAL SIGNS:  Vital Signs Last 24 Hrs  T(C): 36.6 (12 Dec 2019 05:14), Max: 36.7 (11 Dec 2019 21:04)  T(F): 97.9 (12 Dec 2019 05:14), Max: 98 (11 Dec 2019 21:04)  HR: 69 (12 Dec 2019 05:14) (62 - 69)  BP: 147/91 (12 Dec 2019 05:14) (147/91 - 187/98)  BP(mean): --  RR: 17 (12 Dec 2019 05:14) (17 - 18)  SpO2: 93% (12 Dec 2019 05:14) (93% - 95%)      PHYSICAL EXAM:     GENERAL: no acute distress  HEENT: EOMI, neck supple  RESPIRATORY: LCTAB/L, no rhonchi, rales, or wheezing  CARDIOVASCULAR: RRR, no murmurs, gallops, rubs  ABDOMINAL: soft, non-tender, non-distended, positive bowel sounds   EXTREMITIES: no clubbing, cyanosis, or edema  NEUROLOGICAL: alert and oriented x 3, non-focal  SKIN: no rashes or lesions   MUSCULOSKELETAL: no gross joint deformity                          15.2   15.69 )-----------( 200      ( 12 Dec 2019 08:59 )             46.5     12-12    132<L>  |  93<L>  |  26<H>  ----------------------------<  95  4.7   |  25  |  0.50    Ca    9.9      12 Dec 2019 08:59        MEDICATIONS  (STANDING):  acetaminophen   Tablet .. 1000 milliGRAM(s) Oral every 8 hours  acetaminophen  IVPB .. 1000 milliGRAM(s) IV Intermittent once  Biotene Dry Mouth Oral Rinse 5 milliLiter(s) Swish and Spit every 6 hours  calamine Lotion 1 Application(s) Topical two times a day  dexAMETHasone     Tablet 4 milliGRAM(s) Oral every 6 hours  DULoxetine 30 milliGRAM(s) Oral daily  enoxaparin Injectable 80 milliGRAM(s) SubCutaneous daily  gabapentin 300 milliGRAM(s) Oral <User Schedule>  gabapentin 600 milliGRAM(s) Oral <User Schedule>  HYDROmorphone  Injectable 0.5 milliGRAM(s) IV Push every 6 hours  levETIRAcetam 500 milliGRAM(s) Oral two times a day  lidocaine   Patch 3 Patch Transdermal daily  lisinopril 10 milliGRAM(s) Oral daily  methadone    Tablet 5 milliGRAM(s) Oral <User Schedule>  pantoprazole    Tablet 40 milliGRAM(s) Oral before breakfast  QUEtiapine 25 milliGRAM(s) Oral at bedtime  senna 2 Tablet(s) Oral at bedtime    < from: MR Lumbar Spine w/wo IV Cont (12.07.19 @ 15:46) >  IMPRESSION:    Enhancing dural metastasis in cervical, thoracic and lumbar spinal canal   involving lateral recesses, neural foramina, and exiting nerve roots   including sacralnerve roots, consistent with metastatic disease.   Flattening most pronounced ventrally at mid and upper thoracic spine,   with minimal dorsal CSF and dorsal epidural lipomatosis.    Faint hyperintense T2 signal in the spinal cord, may represent edema   and/or myelomalacia. Multilevel degenerative changes as above.    Findings discussed with Dr. Leslie at immediate time of review on 12/7/2019   at 6:26 PM.    < end of copied text >        Surgical Pathology Report:   ACCESSION No:  [id number]    MARCOS KOO                       4        Addendum Report          Addendum  This test was performed by an outside laboratory. For all patient  care and clinical decision making purposes refer solely to  originallaboratory report.  The information transcribed here is  not the entire report.  This shortened version has been placed  here for the purpose of the electronic record.  Tumor molecular testing for detecting genomic alterations and  integrated clinical genomic profiling:  About this test: FoundationOne is a next-generation sequencing  (NGS) based assay which identifies genomic alterations within  hundreds of cancer-related genes  Performing Laboratory: [place], [place]  Specimen #: I Case # [id number]  Date Reported by Outside Laboratory: 10/3/19  Date Report Faxed to Submitting Physician: 10/3/19  Submitting Physician: Jose Sebastian MD  Body Site: Brain  Specimen Type:  Slides  Block ID:  [id number]  [id number]    NO REPORTABLE ALTERATIONS WITH  DIAGNOSTIC (CDx) CLAIMS  See professional services section for additional information.    OTHER ALTERATIONS & BIOMARKERS IDENTIFIED  Results reported in this section are not prescriptive or  conclusive for labeled use of any specific therapeutic product.  See  professional services section for additional information.  Microsatellite status MS-Stable §  Tumor Mutational [facility] 6 Muts/Mb §  RB1 RB1([id number]) rearrangement intron 17 §  TP53 E298*  § Referto appendix for limitation statements related to  detection of any copy number alterations, gene rearrangements,  BRCA1/2 alterations, TREMAINE, MSI, or TMB results in  this section.    Outside report electronically signed by:  TASIA Ugarte MD  10/3/19    This test was performed and interpreted by [facility] [place], [address] [place] [address], [place] [address]. [phone].              MARCOS KOO                       4        Addendum Report          _    Verified by: Jose Sebastian MD PhD  (Electronic Signature)  Reported on: 10/08/19 13:03 EDT, [address] [facility]. [address] [place] [id number]  _________________________________________________________________          Addendum  PD-L1 Scoring Result  PD-L1 Scoring: Positive - 15% staining    (PDLI scoring: enumerates the percentage of tumor cells with  membrane positivity for PD-L1 at any intensity above background  staining as noted on the corresponding negative isotype control.  PD-L1 expression level is reported as a whole number)    Antibody: VENTANA PD-L1 () Assay (VENTANA PD-L1 () is a  rabbit monoclonal primary antibody produced against programmed  death-ligand 1 (PD-L1) B7 homolog 1 (B7-H1, ). It recognizes  a transmembrane bound glycoprotein that has a molecular mass of  45-55 kDa.    Please note: VENTANA -PD-L1 () Assay is intended for the  qualitative detection of the Programmed Death-Ligand 1 (PD-L1)  protein in formalin-fixed, paraffin-embedded (FFPE) non-small  cell lung carcinoma (NSCLC) tissue stained with OptiView DAB IHC  Detection Kit on a VENTANA BenchMark Series automated staining  instrument. PD-L1 expression in tumor cell membrane as detected  by VENTANA PD-L1 () Assay in NSCLC may be associated with  enhanced survival from OPDIVO® (nivolumab).    Slide(s) with built in immunohistochemical study control(s) and  negative control associated with this case has/have been verified  by the sign out pathologist.  For slide(s) without built in controls positive control slides  has/have been reviewed and approved by immunohistochemistry lab  These immunohistochemical/ in-situ hybridization tests have been  developed and their performance characteristics determined by  [facility], Department of  Pathology, Division of Immunopathology, [address]  [place],  [place], [address].  It has not been cleared or approved by the  U.S. Food and Drug Administration.  The FDA has determined that  such clearance or approval is not necessary.  This              MARCOS KOO                       4        Addendum Report          test is used for clinical purposes.  The laboratory is certified  under the CLIA-88 as qualified to perform high  complexity clinical testing.    Verified by: Jose Sebastian MD PhD  (Electronic Signature)  Reported on: 09/18/19 17:55 EDT, [address] [place]  [address]  _________________________________________________________________      Surgical Final Report          Final Diagnosis  Left frontal tumor  - Metastatic poorly differentiated carcinoma with  neuroendocrine features - see comment    Comment: high grade cellular neoplasm with necrosis, mitosis  (PHH3 stain >10 mitosis/10hpf), molding and neuroendocrine  features (withsuggestive of small cell type). Immunostains are  positive for CK7, TTF-1 and Neuroendocrine markers  (Synaptophysin, chromogranin, CD56). High Ki-67 proliferation  labeling index (>90%) noted. Immunostains are negative PAX-8,  CK19, CDX2, CK20, HEPAR1, GFAP, ARGINASE, PSA, EGFR, IDH1. ATRX  and p53 (focal) are positive. Immunohistochemical features are  suggestive of primary lung origin if all other sites have been  ruled out.    Molecular studies have been ordered and will be reported as an  addendum    Slide(s) with built in immunohistochemical study control(s)  associated and negative control with this case has/have been  verified by the sign out pathologist.  For slide(s) without built in controls positive control slides  has/have been reviewed and approved by immunohistochemistry lab  These immunohistochemical/ in-situ hybridization tests have been  developed and their performance characteristics determined by  [facility] / [facility], Department of  Pathology, Division of Immunopathology, [phone] [place] [address].  It has not been cleared or approved by the  U.S. Food and Drug Administration.  The FDA has determined that  such clearance or approval is not necessary.              MARCOS KOO                       4        Surgical Final Report          This test is used for clinical purposes.  The laboratory is  certified under the CLIA-88 as qualified to perform high  complexity clinical testing.    Verified by: Jose Sebastian MD PhD  (Electronic Signature)  Reported on: 09/13/19 17:02 EDT, [address] [place]  [address]  _________________________________________________________________    Intraoperative Consultation  1. Left brain tumor  - Brain, frontal lobe, left biopsy -markedly necrotic malignant  neoplasm consistent with metastatic carcinoma  By Dr. TING Dumas  _  Frozen Section Performed at [facility],  Department of Pathology, [address] [place] [address].    Clinical History  Left brain tumor    Specimen(s) Submitted  1     Left frontal tumor    Gross Description  The specimen is received fresh for intraoperative consultation  and the specimen container is labeled: Left frontal tumor. It  consists of a 2.0 x 3.0 x 1.5 cm, red-tan, soft, oval, round  mass.  Cut section reveals variable appearance and a 2.0 x 1.8 x  1.5 cm gray-tan necrotic nodule with foci of possible necrosis.  Also present is a 3.5 x 1.0 x 0.3 cm additional, red-tan,  hemorrhagic, soft tissue. Imprint is made. Frozen section is  performed. Entirely submitted in 12  cassettes.  FSA = frozen section remnant  A-K = remaining tissue    In addition to other data that may appear on the specimen  container, the label has been inspected to confirm the presence  of the patient's name and date of birth.  So Hall 09/05/19 20:47 (09.05.19 @ 15:45)

## 2019-12-12 NOTE — CONSULT NOTE ADULT - ATTENDING COMMENTS
Pt's KPS of 20-30 mitigate against any anti-neoplastic treatment.Have d/w family options of hospice care and agree w initial opinion.
patient seen and examined on 12/11/19.  met with housestaff and discussed this case for 30 minutes on 12/11/19.    Please note that patient does NOT have carcinomatous meningitis, rather there is diffuse, probably neoplastic, infiltration of the DURA.    Please see my full note dated 12/11/19.

## 2019-12-12 NOTE — PROGRESS NOTE ADULT - PROBLEM SELECTOR PLAN 3
Rad ONC, Medical Onc, and Neuro Onc inputs noted. At this point his family seem to be focused on a symptoms driven approach; however, they are still having discussions among them in order to confirm that decision. As I indicated above, symptoms Rx is a priority.   His HCP and his family are also discussing about code status. For now he is full code. Rad ONC, Medical Onc, and Neuro Onc inputs noted. At this point his family seem to be focused on a symptoms driven approach; however, they are still having discussions among them in order to confirm that decision. As I indicated above, symptoms Rx is a priority. Wife and daughters understand his poor prognosis.   His HCP and his family are also discussing about code status. For now he is full code.

## 2019-12-12 NOTE — PROGRESS NOTE ADULT - SUBJECTIVE AND OBJECTIVE BOX
SUBJECTIVE AND OBJECTIVE: Patient used Dilaudid 4 mg total since 2 pm yesterday. As per his family his pain appears to be better controlled but his now lethargic.   INTERVAL HPI/OVERNIGHT EVENTS: Worsening Lethargy. Starting to have issues swallowing medications.   DNR on chart: No   Allergies    No Known Allergies    Intolerances    MEDICATIONS  (STANDING):  acetaminophen  IVPB .. 1000 milliGRAM(s) IV Intermittent once  Biotene Dry Mouth Oral Rinse 5 milliLiter(s) Swish and Spit every 6 hours  calamine Lotion 1 Application(s) Topical two times a day  dexAMETHasone     Tablet 4 milliGRAM(s) Oral every 6 hours  enoxaparin Injectable 80 milliGRAM(s) SubCutaneous daily  gabapentin 300 milliGRAM(s) Oral every 8 hours  HYDROmorphone  Injectable 0.5 milliGRAM(s) IV Push every 6 hours  levETIRAcetam 500 milliGRAM(s) Oral two times a day  lidocaine   Patch 3 Patch Transdermal daily  lisinopril 10 milliGRAM(s) Oral daily  methadone    Tablet 2.5 milliGRAM(s) Oral <User Schedule>  pantoprazole  Injectable 40 milliGRAM(s) IV Push daily  QUEtiapine 25 milliGRAM(s) Oral at bedtime  senna 2 Tablet(s) Oral at bedtime    MEDICATIONS  (PRN):  acetaminophen   Tablet .. 650 milliGRAM(s) Oral every 6 hours PRN Mild Pain (1 - 3)  diphenhydrAMINE   Injectable 12.5 milliGRAM(s) IV Push every 4 hours PRN pruritus  HYDROmorphone  Injectable 0.5 milliGRAM(s) IV Push every 2 hours PRN Severe Pain (7 - 10)  naloxone Injectable 0.1 milliGRAM(s) IV Push every 3 minutes PRN sedation or respiratory depression due to opioids      ITEMS UNCHECKED ARE NOT PRESENT    PRESENT SYMPTOMS: [x ]Unable to obtain due to poor mentation   Source if other than patient:  [ ]Family   [ ]Team     Pain:  [ x]yes [ ]no  QOL impact -   Location -   neck, chest                  Aggravating factors -   Quality - burning  Radiation - everywhere  Timing- constant  Severity (0-10 scale): Up to 8 but decreasing to  2-3 immediately after Dilaudid administration  Minimal acceptable level (0-10 scale):     Dyspnea:                           [ ]Mild [ ]Moderate [ ]Severe  Anxiety:                             [ ]Mild [x ]Moderate [ ]Severe  Fatigue:                             [ ]Mild [ ]Moderate [x ]Severe  Nausea:                             [ ]Mild [ ]Moderate [ ]Severe  Loss of appetite:              [ ]Mild [ ]Moderate [x ]Severe  Constipation:                    [ ]Mild [ ]Moderate [ ]Severe    PAIN AD Score:	  http://geriatrictoolkit.[place].[email or web address]/cog/painad.pdf (Ctrl + left click to view)    Other Symptoms:  [ ]All other review of systems negative     Palliative Performance Status Version 2:    20     %      http://[facility].org/files/news/palliative_performance_scale_[email or web address].pdf  PHYSICAL EXAM:  Vital Signs Last 24 Hrs  T(C): 36.6 (12 Dec 2019 12:55), Max: 36.7 (11 Dec 2019 21:04)  T(F): 97.9 (12 Dec 2019 12:55), Max: 98 (11 Dec 2019 21:04)  HR: 84 (12 Dec 2019 12:55) (62 - 84)  BP: 131/81 (12 Dec 2019 12:55) (131/81 - 165/94)  BP(mean): --  RR: 14 (12 Dec 2019 12:55) (16 - 18)  SpO2: 93% (12 Dec 2019 12:55) (93% - 95%)       GENERAL:  [ ]Alert  [ x]Oriented x 1  [x ]Lethargic  [ ]Cachexia  [ ]Unarousable  [ x]Verbal  [ ]Non-Verbal  Behavioral:   [ ]Anxiety  [ ]Delirium [ ]Agitation [ ]Other  HEENT:  [x ]Normal   [ ]Dry mouth   [ ]ET Tube/Trach  [ ]Oral lesions  [x] No pinpoint pupils   PULMONARY:   x[ ]Clear [ ]Tachypnea  [ ]Audible excessive secretions   [ ]Rhonchi        [ ]Right [ ]Left [ ]Bilateral  [ ]Crackles        [ ]Right [ ]Left [ ]Bilateral  [ ]Wheezing     [ ]Right [ ]Left [ ]Bilateral  [ ]Diminished BS [ ] Right [ ]Left [ ]Bilateral  CARDIOVASCULAR:    [x ]Regular [ ]Irregular [ ]Tachy  [ ]Dusty [ ]Murmur [ ]Other  GASTROINTESTINAL:  [x ]Soft  [ ]Distended   [ ]+BS  [x ]Non tender [ ]Tender  [ ]PEG [ ]OGT/ NGT   Last BM: 12/11     GENITOURINARY:  [ ]Normal [ ]Incontinent   [ ]Oliguria/Anuria   [x ]Alvarez  MUSCULOSKELETAL:   [ ]Normal   [x ]Weakness  [x ]Bed/Wheelchair bound [ ]Edema  NEUROLOGIC:   [ ]No focal deficits  [ ] Cognitive impairment  [ ] Dysphagia [ ]Dysarthria [x ] Paresis [ ]Other   SKIN:   [ ]Normal  [ ]Rash   [ ]Pressure ulcer(s) [ ]y [ ]n present on admission  [x] excoriations 2/2 itching due to pruritus    CRITICAL CARE:  [ ]Shock Present  [ ]Septic [ ]Cardiogenic [ ]Neurologic [ ]Hypovolemic  [ ]Vasopressors [ ]Inotropes  [ ]Respiratory failure present [ ]Mechanical Ventilation [ ]Non-invasive ventilatory support [ ]High-Flow  [ ]Acute  [ ]Chronic [ ]Hypoxic  [ ]Hypercarbic [ ]Other  [ ]Other organ failure     LABS:                                        15.2   15.69 )-----------( 200      ( 12 Dec 2019 08:59 )             46.5   12-12    132<L>  |  93<L>  |  26<H>  ----------------------------<  95  4.7   |  25  |  0.50    Ca    9.9      12 Dec 2019 08:59            RADIOLOGY & ADDITIONAL STUDIES: reviewed    Protein Calorie Malnutrition Present: [ ]mild [ ]moderate [x ]severe [ ]underweight [ ]morbid obesity  https://www.andeal.org/vault/2440/web/files/ONC/Table_Clinical%20Characteristics%20to%20Document%20Malnutrition-White%20JV%20et%20al%363028.pdf    Height (cm): 170.18 (12-07-19 @ 09:03), 170.2 (09-24-19 @ 13:00), 170.18 (09-05-19 @ 11:47)  Weight (kg): 54.4 (12-07-19 @ 09:03), 54.7 (09-24-19 @ 13:00), 52.2 (09-05-19 @ 11:47)  BMI (kg/m2): 18.8 (12-07-19 @ 09:03), 18.9 (09-24-19 @ 13:00), 18 (09-05-19 @ 11:47)    [ ]PPSV2 < or = 30%  [x ]significant weight loss [ ]poor nutritional intake [ ]anasarca   Albumin, Serum: 3.8 g/dL (12-08-19 @ 09:07)   [ ]Artificial Nutrition    REFERRALS:   [x ]Chaplaincy  [ ]Hospice  [ ]Child Life  [ ]Social Work  [ ]Case management [ ]Holistic Therapy     Goals of Care Document:  pending

## 2019-12-12 NOTE — PROGRESS NOTE ADULT - PROBLEM SELECTOR PLAN 1
2/2 extensive spinal metastasis. Pain appears both somatic and neuropathic  Although patient is using less PRNs, he is now more lethargic. Spoke with the patient's daughter, Basilia, that indicated as she has discussed with her sister, Romulo, goals are for improving symptoms Rx while trying to preserve alertness; however, that symptoms management is a priority.   Will decrease Methadone to 2.5 mg PO 06:00/14:00/22:00  Will decrease Gabapentin to 300 mg PO q 8 hours.   Will keep Dilaudid 0.5 mg IV q 6 ATC and will decrease Dilaudid PRN to 0.5 mg IV q 2mg.   Will DC Duloxetine.  Will DC Lidoderm patch that as per nursing was actually increasing pain when being removed.'  If sedation is persistent, a trial of Modafinil can be considered.   PCU is an options; however, his family has not yet approved a transferring there.

## 2019-12-12 NOTE — PROGRESS NOTE ADULT - SUBJECTIVE AND OBJECTIVE BOX
[facility]   MD Bijal Chao D.O. Fatima Sheikh, D.O. Ruoru Wong, PA    From 7 AM - 5 PM:  OFFICE: [phone]  Dr. Welsh cell: [phone]  Dr. Chatman cell: [phone]  Dr. Velasquez cell: [phone]  INGRID Banuelos cell: [phone]    From 5 PM - 7 AM: Answering Service: [phone]        RENAL FOLLOW UP NOTE  --------------------------------------------------------------------------------  HPI: Pt seen and examined. States he feels better today. States he still has mild pain but it is much better than in the past few days. Family at bedside are trying to encourage him to eat but pt states his appetite is not great. Denies any N/V, abd pain, SOB, CP.        PAST HISTORY  --------------------------------------------------------------------------------  No significant changes to PMH, PSH, FHx, SHx, unless otherwise noted    ALLERGIES & MEDICATIONS  --------------------------------------------------------------------------------  Allergies    No Known Allergies    Intolerances      Standing Inpatient Medications  acetaminophen   Tablet .. 1000 milliGRAM(s) Oral every 8 hours  acetaminophen  IVPB .. 1000 milliGRAM(s) IV Intermittent once  Biotene Dry Mouth Oral Rinse 5 milliLiter(s) Swish and Spit every 6 hours  calamine Lotion 1 Application(s) Topical two times a day  dexAMETHasone     Tablet 4 milliGRAM(s) Oral every 6 hours  DULoxetine 30 milliGRAM(s) Oral daily  enoxaparin Injectable 80 milliGRAM(s) SubCutaneous daily  gabapentin 300 milliGRAM(s) Oral <User Schedule>  gabapentin 600 milliGRAM(s) Oral <User Schedule>  HYDROmorphone  Injectable 0.5 milliGRAM(s) IV Push every 6 hours  levETIRAcetam 500 milliGRAM(s) Oral two times a day  lidocaine   Patch 3 Patch Transdermal daily  lisinopril 10 milliGRAM(s) Oral daily  methadone    Tablet 5 milliGRAM(s) Oral <User Schedule>  pantoprazole    Tablet 40 milliGRAM(s) Oral before breakfast  QUEtiapine 25 milliGRAM(s) Oral at bedtime  senna 2 Tablet(s) Oral at bedtime    PRN Inpatient Medications  acetaminophen   Tablet .. 650 milliGRAM(s) Oral every 6 hours PRN  diphenhydrAMINE 25 milliGRAM(s) Oral every 4 hours PRN  HYDROmorphone  Injectable 1 milliGRAM(s) IV Push every 2 hours PRN  naloxone Injectable 0.1 milliGRAM(s) IV Push every 3 minutes PRN      REVIEW OF SYSTEMS  --------------------------------------------------------------------------------  General: no fever  CVS: no chest pain  RESP: no sob, no cough  ABD: no abdominal pain  : no dysuria,  MSK: no edema     VITALS/PHYSICAL EXAM  --------------------------------------------------------------------------------  T(C): 36.6 (12-12-19 @ 12:55), Max: 36.7 (12-11-19 @ 21:04)  HR: 84 (12-12-19 @ 12:55) (62 - 84)  BP: 131/81 (12-12-19 @ 12:55) (131/81 - 187/98)  RR: 16 (12-12-19 @ 12:55) (16 - 18)  SpO2: 93% (12-12-19 @ 12:55) (93% - 95%)  Wt(kg): --        12-11-19 @ 07:01  -  12-12-19 @ 07:00  --------------------------------------------------------  IN: 1680 mL / OUT: 4125 mL / NET: -2445 mL    12-12-19 @ 07:01  -  12-12-19 @ 13:10  --------------------------------------------------------  IN: 0 mL / OUT: 250 mL / NET: -250 mL      Physical Exam:  	Gen: NAD  	HEENT: MMM  	Pulm: CTA B/L  	CV: S1S2  	Abd: Soft, +BS  	Ext: No LE edema B/L              Neuro: Awake   	Skin: Warm and Dry       LABS/STUDIES  --------------------------------------------------------------------------------              15.2   15.69 >-----------<  200      [12-12-19 @ 08:59]              46.5     132  |  93  |  26  ----------------------------<  95      [12-12-19 @ 08:59]  4.7   |  25  |  0.50        Ca     9.9     [12-12-19 @ 08:59]          Serum Osmolality 280      [12-11-19 @ 19:58]    Creatinine Trend:  SCr 0.50 [12-12 @ 08:59]  SCr 0.44 [12-11 @ 19:58]  SCr 0.46 [12-11 @ 13:31]  SCr 0.50 [12-11 @ 07:52]  SCr 0.62 [12-10 @ 07:22]    Urinalysis - [12-07-19 @ 11:05]      Color Yellow / Appearance Clear / SG 1.026 / pH 6.0      Gluc Negative / Ketone Trace  / Bili Negative / Urobili Negative       Blood Negative / Protein Trace / Leuk Est Negative / Nitrite Negative      RBC 5 / WBC 0 / Hyaline 0 / Gran  / Sq Epi  / Non Sq Epi 0 / Bacteria Negative    Urine Sodium 109      [12-11-19 @ 16:56]  Urine Chloride 72      [12-11-19 @ 16:56]  Urine Osmolality 321      [12-11-19 @ 16:56]    TSH 0.33      [12-08-19 @ 09:35]

## 2019-12-12 NOTE — CONSULT NOTE ADULT - ASSESSMENT
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 Oncology consulted for 2nd opinion regarding treatment option.    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  -per Radonc RT is technically difficult and unlikely to provide meaningful recovery of neurologic fxn given the extent of diffuse metastatic dz along the spine.  -Pt is not a good candidate for chemotherapy  -immunotherapy in combination with chemotherapy is the standard of care in small cell lung cancer, however the efficacy of immunotherapy alone in  NET of unknown origin is unclear.  -Pt would benefit from palliative/comfort care  -discuss with pt's daughter GERBER ([phone])      Poncho Saxena MD. 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 Oncology consulted for 2nd opinion regarding treatment option.    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  -per Radonc RT is technically difficult and unlikely to provide meaningful recovery of neurologic fxn given the extent of diffuse metastatic dz along the spine.  -Pt is not a good candidate for chemotherapy  -immunotherapy in combination with chemotherapy is the standard of care in small cell lung cancer, however the efficacy of immunotherapy alone in  NET of unknown origin is unclear.   -Would not recommend systemic treatment with current clinical condition  -Pt would benefit from palliative/comfort care  -discuss with pt's daughter GERBER ([phone])      Poncho Saxena MD. PGY 5  Heme-Onc fellow  [phone]; [id number]

## 2019-12-12 NOTE — PROGRESS NOTE ADULT - SUBJECTIVE AND OBJECTIVE BOX
Patient is a 64y old  Male who presents with a chief complaint of LE weakness (12 Dec 2019 16:46)      INTERVAL HISTORY: in pain    	  MEDICATIONS:  lisinopril 10 milliGRAM(s) Oral daily        PHYSICAL EXAM:  T(C): 36.4 (12-12-19 @ 21:33), Max: 36.6 (12-12-19 @ 05:14)  HR: 68 (12-12-19 @ 21:33) (68 - 84)  BP: 134/80 (12-12-19 @ 21:33) (131/81 - 147/91)  RR: 16 (12-12-19 @ 21:33) (16 - 17)  SpO2: 93% (12-12-19 @ 21:33) (93% - 93%)  Wt(kg): --  I&O's Summary    11 Dec 2019 07:01  -  12 Dec 2019 07:00  --------------------------------------------------------  IN: 1680 mL / OUT: 4125 mL / NET: -2445 mL    12 Dec 2019 07:01  -  12 Dec 2019 23:10  --------------------------------------------------------  IN: 250 mL / OUT: 1200 mL / NET: -950 mL          Appearance: In no distress	  HEENT:    PERRL, EOMI	  Cardiovascular:  S1 S2, No JVD  Respiratory: Lungs clear to auscultation	  Gastrointestinal:  Soft, Non-tender, + BS	  Extremities:  No edema of LE                                15.2   15.69 )-----------( 200      ( 12 Dec 2019 08:59 )             46.5     12-12    132<L>  |  93<L>  |  26<H>  ----------------------------<  95  4.7   |  25  |  0.50    Ca    9.9      12 Dec 2019 08:59          Labs personally reviewed      Assessment and Plan:   Assessment:  · Assessment		  Pt is a 65 Y/O Male with PMHx smoker 50+yr 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 Per family the patient had MR at [facility] 3 weeks ago showing diffuse leptomeningeal spread (films unavail). Family is still shrouding patient from diagnosis/prognosis. Exam: oriented x3, FC, distal LE 4/5, proximal LE 2-3/5, UE 4/5. SILT. Endorses back pain.      Problem/Plan - 1:  ·  Problem: Cord compression.  Plan: Seen on MRI  Neurosurg eval appreciated  [facility] records reviewed likely with metastatic small cell lung ca  Onc and rad onc follow up noted    Problem/Plan - 2:  ·  Problem: Pulmonary embolism.  Plan: COnt lovenox   Monitor Hgb level   O 2 supplement.     Problem/Plan - 3:  ·  Problem: Chest pain, atypical.  Plan: does not appear to be cardiac in nature. ?2/2 PE   EKG with nsr and no ST changes    Problem/Plan - 4:  Problem: Prophylactic measure. Plan: DVT and gI PPX.        Petey Roberson, DO [facility]  Cardiovascular Medicine  [phone]          Petey Roberson, DO [facility]  Cardiovascular Medicine  [phone]

## 2019-12-12 NOTE — PROGRESS NOTE ADULT - ASSESSMENT
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 Per family the patient had MR at [facility] 3 weeks ago showing diffuse leptomeningeal spread (films unavail). Family is still shrouding patient from diagnosis/prognosis. Exam: oriented x3, FC, distal LE 4/5, proximal LE 2-3/5, UE 4/5. SILT. Endorses back pain.

## 2019-12-12 NOTE — PROGRESS NOTE ADULT - ASSESSMENT
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 Has lung cancer w/ mets to spine.     Hyponatremia  Appears euvolemic; hyponatremia is an appropriate ADH response to pain and nausea    - Na is improved off IVF and w/ fluid restriction  - continue 1L fluid restricted diet  - continue pain control, palliative care is following   - anti-emetics for control of nausea as needed    - UOP is increased to 4.1 L yesterday, likely decreased ADH from decreased pain/nausea  - hyponatremia is expected to improve w/ polyuria/lower ADH levels    - repeat BMP Q6hrs  - Na should not change by more than 8 mEq in 24 hrs; please call nephrology if it does    Acidosis:  Non-AG; likely related to N/V previously  improved  continue to monitor    Increased BUN:  Sec to Steroids    Hematuria:  UA has RBC 5  Possible sec to france catheter  repeat UA  check renal US

## 2019-12-12 NOTE — PROGRESS NOTE ADULT - ASSESSMENT
Pt is a 65 yo M with a neuroendocrine tumor (unknown primary) with mets to the brain and spine now with severe functional impairment. Palliative called for GOC and assistance with pain control 2/2 extensive spinal mets.

## 2019-12-13 LAB
ANION GAP SERPL CALC-SCNC: 11 MMOL/L — SIGNIFICANT CHANGE UP (ref 5–17)
ANION GAP SERPL CALC-SCNC: 11 MMOL/L — SIGNIFICANT CHANGE UP (ref 5–17)
BUN SERPL-MCNC: 32 MG/DL — HIGH (ref 7–23)
BUN SERPL-MCNC: 33 MG/DL — HIGH (ref 7–23)
CALCIUM SERPL-MCNC: 10 MG/DL — SIGNIFICANT CHANGE UP (ref 8.4–10.5)
CALCIUM SERPL-MCNC: 9.9 MG/DL — SIGNIFICANT CHANGE UP (ref 8.4–10.5)
CHLORIDE SERPL-SCNC: 94 MMOL/L — LOW (ref 96–108)
CHLORIDE SERPL-SCNC: 94 MMOL/L — LOW (ref 96–108)
CO2 SERPL-SCNC: 25 MMOL/L — SIGNIFICANT CHANGE UP (ref 22–31)
CO2 SERPL-SCNC: 26 MMOL/L — SIGNIFICANT CHANGE UP (ref 22–31)
CREAT SERPL-MCNC: 0.54 MG/DL — SIGNIFICANT CHANGE UP (ref 0.5–1.3)
CREAT SERPL-MCNC: 0.58 MG/DL — SIGNIFICANT CHANGE UP (ref 0.5–1.3)
GLUCOSE SERPL-MCNC: 91 MG/DL — SIGNIFICANT CHANGE UP (ref 70–99)
GLUCOSE SERPL-MCNC: 98 MG/DL — SIGNIFICANT CHANGE UP (ref 70–99)
HCT VFR BLD CALC: 47.9 % — SIGNIFICANT CHANGE UP (ref 39–50)
HGB BLD-MCNC: 15.6 G/DL — SIGNIFICANT CHANGE UP (ref 13–17)
MCHC RBC-ENTMCNC: 31 PG — SIGNIFICANT CHANGE UP (ref 27–34)
MCHC RBC-ENTMCNC: 32.6 GM/DL — SIGNIFICANT CHANGE UP (ref 32–36)
MCV RBC AUTO: 95 FL — SIGNIFICANT CHANGE UP (ref 80–100)
NRBC # BLD: 0 /100 WBCS — SIGNIFICANT CHANGE UP (ref 0–0)
PLATELET # BLD AUTO: 230 K/UL — SIGNIFICANT CHANGE UP (ref 150–400)
POTASSIUM SERPL-MCNC: 4.5 MMOL/L — SIGNIFICANT CHANGE UP (ref 3.5–5.3)
POTASSIUM SERPL-MCNC: 4.7 MMOL/L — SIGNIFICANT CHANGE UP (ref 3.5–5.3)
POTASSIUM SERPL-SCNC: 4.5 MMOL/L — SIGNIFICANT CHANGE UP (ref 3.5–5.3)
POTASSIUM SERPL-SCNC: 4.7 MMOL/L — SIGNIFICANT CHANGE UP (ref 3.5–5.3)
RBC # BLD: 5.04 M/UL — SIGNIFICANT CHANGE UP (ref 4.2–5.8)
RBC # FLD: 13.2 % — SIGNIFICANT CHANGE UP (ref 10.3–14.5)
SODIUM SERPL-SCNC: 130 MMOL/L — LOW (ref 135–145)
SODIUM SERPL-SCNC: 131 MMOL/L — LOW (ref 135–145)
WBC # BLD: 16.91 K/UL — HIGH (ref 3.8–10.5)
WBC # FLD AUTO: 16.91 K/UL — HIGH (ref 3.8–10.5)

## 2019-12-13 PROCEDURE — 99233 SBSQ HOSP IP/OBS HIGH 50: CPT

## 2019-12-13 RX ADMIN — GABAPENTIN 300 MILLIGRAM(S): 400 CAPSULE ORAL at 06:17

## 2019-12-13 RX ADMIN — LEVETIRACETAM 500 MILLIGRAM(S): 250 TABLET, FILM COATED ORAL at 06:02

## 2019-12-13 RX ADMIN — HYDROMORPHONE HYDROCHLORIDE 0.5 MILLIGRAM(S): 2 INJECTION INTRAMUSCULAR; INTRAVENOUS; SUBCUTANEOUS at 11:00

## 2019-12-13 RX ADMIN — HYDROMORPHONE HYDROCHLORIDE 0.5 MILLIGRAM(S): 2 INJECTION INTRAMUSCULAR; INTRAVENOUS; SUBCUTANEOUS at 06:59

## 2019-12-13 RX ADMIN — Medication 5 MILLILITER(S): at 06:03

## 2019-12-13 RX ADMIN — LEVETIRACETAM 500 MILLIGRAM(S): 250 TABLET, FILM COATED ORAL at 18:30

## 2019-12-13 RX ADMIN — HYDROMORPHONE HYDROCHLORIDE 0.5 MILLIGRAM(S): 2 INJECTION INTRAMUSCULAR; INTRAVENOUS; SUBCUTANEOUS at 23:03

## 2019-12-13 RX ADMIN — HYDROMORPHONE HYDROCHLORIDE 0.5 MILLIGRAM(S): 2 INJECTION INTRAMUSCULAR; INTRAVENOUS; SUBCUTANEOUS at 04:16

## 2019-12-13 RX ADMIN — Medication 4 MILLIGRAM(S): at 18:29

## 2019-12-13 RX ADMIN — HYDROMORPHONE HYDROCHLORIDE 0.5 MILLIGRAM(S): 2 INJECTION INTRAMUSCULAR; INTRAVENOUS; SUBCUTANEOUS at 20:56

## 2019-12-13 RX ADMIN — HYDROMORPHONE HYDROCHLORIDE 0.5 MILLIGRAM(S): 2 INJECTION INTRAMUSCULAR; INTRAVENOUS; SUBCUTANEOUS at 00:42

## 2019-12-13 RX ADMIN — HYDROMORPHONE HYDROCHLORIDE 0.5 MILLIGRAM(S): 2 INJECTION INTRAMUSCULAR; INTRAVENOUS; SUBCUTANEOUS at 07:15

## 2019-12-13 RX ADMIN — GABAPENTIN 300 MILLIGRAM(S): 400 CAPSULE ORAL at 22:33

## 2019-12-13 RX ADMIN — HYDROMORPHONE HYDROCHLORIDE 0.5 MILLIGRAM(S): 2 INJECTION INTRAMUSCULAR; INTRAVENOUS; SUBCUTANEOUS at 21:26

## 2019-12-13 RX ADMIN — HYDROMORPHONE HYDROCHLORIDE 0.5 MILLIGRAM(S): 2 INJECTION INTRAMUSCULAR; INTRAVENOUS; SUBCUTANEOUS at 18:50

## 2019-12-13 RX ADMIN — HYDROMORPHONE HYDROCHLORIDE 0.5 MILLIGRAM(S): 2 INJECTION INTRAMUSCULAR; INTRAVENOUS; SUBCUTANEOUS at 11:20

## 2019-12-13 RX ADMIN — CALAMINE AND ZINC OXIDE AND PHENOL 1 APPLICATION(S): 160; 10 LOTION TOPICAL at 06:03

## 2019-12-13 RX ADMIN — Medication 400 MILLIGRAM(S): at 18:36

## 2019-12-13 RX ADMIN — HYDROMORPHONE HYDROCHLORIDE 0.5 MILLIGRAM(S): 2 INJECTION INTRAMUSCULAR; INTRAVENOUS; SUBCUTANEOUS at 04:31

## 2019-12-13 RX ADMIN — Medication 5 MILLILITER(S): at 11:07

## 2019-12-13 RX ADMIN — ENOXAPARIN SODIUM 80 MILLIGRAM(S): 100 INJECTION SUBCUTANEOUS at 11:15

## 2019-12-13 RX ADMIN — HYDROMORPHONE HYDROCHLORIDE 0.5 MILLIGRAM(S): 2 INJECTION INTRAMUSCULAR; INTRAVENOUS; SUBCUTANEOUS at 15:10

## 2019-12-13 RX ADMIN — SENNA PLUS 2 TABLET(S): 8.6 TABLET ORAL at 22:34

## 2019-12-13 RX ADMIN — Medication 4 MILLIGRAM(S): at 00:42

## 2019-12-13 RX ADMIN — METHADONE HYDROCHLORIDE 2.5 MILLIGRAM(S): 40 TABLET ORAL at 06:02

## 2019-12-13 RX ADMIN — LISINOPRIL 10 MILLIGRAM(S): 2.5 TABLET ORAL at 06:03

## 2019-12-13 RX ADMIN — QUETIAPINE FUMARATE 25 MILLIGRAM(S): 200 TABLET, FILM COATED ORAL at 22:33

## 2019-12-13 RX ADMIN — HYDROMORPHONE HYDROCHLORIDE 0.5 MILLIGRAM(S): 2 INJECTION INTRAMUSCULAR; INTRAVENOUS; SUBCUTANEOUS at 00:57

## 2019-12-13 RX ADMIN — GABAPENTIN 300 MILLIGRAM(S): 400 CAPSULE ORAL at 14:49

## 2019-12-13 RX ADMIN — METHADONE HYDROCHLORIDE 2.5 MILLIGRAM(S): 40 TABLET ORAL at 14:49

## 2019-12-13 RX ADMIN — CALAMINE AND ZINC OXIDE AND PHENOL 1 APPLICATION(S): 160; 10 LOTION TOPICAL at 18:31

## 2019-12-13 RX ADMIN — HYDROMORPHONE HYDROCHLORIDE 0.5 MILLIGRAM(S): 2 INJECTION INTRAMUSCULAR; INTRAVENOUS; SUBCUTANEOUS at 14:49

## 2019-12-13 RX ADMIN — Medication 12.5 MILLIGRAM(S): at 04:32

## 2019-12-13 RX ADMIN — HYDROMORPHONE HYDROCHLORIDE 0.5 MILLIGRAM(S): 2 INJECTION INTRAMUSCULAR; INTRAVENOUS; SUBCUTANEOUS at 18:23

## 2019-12-13 RX ADMIN — HYDROMORPHONE HYDROCHLORIDE 0.5 MILLIGRAM(S): 2 INJECTION INTRAMUSCULAR; INTRAVENOUS; SUBCUTANEOUS at 22:33

## 2019-12-13 RX ADMIN — METHADONE HYDROCHLORIDE 2.5 MILLIGRAM(S): 40 TABLET ORAL at 22:33

## 2019-12-13 RX ADMIN — Medication 1000 MILLIGRAM(S): at 18:55

## 2019-12-13 RX ADMIN — Medication 4 MILLIGRAM(S): at 06:03

## 2019-12-13 RX ADMIN — PANTOPRAZOLE SODIUM 40 MILLIGRAM(S): 20 TABLET, DELAYED RELEASE ORAL at 11:11

## 2019-12-13 RX ADMIN — Medication 5 MILLILITER(S): at 18:29

## 2019-12-13 RX ADMIN — Medication 4 MILLIGRAM(S): at 11:05

## 2019-12-13 NOTE — PROGRESS NOTE ADULT - ASSESSMENT
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 Has lung cancer w/ mets to spine.     Hyponatremia  Appears euvolemic; hyponatremia is an appropriate ADH response to pain and nausea    - continue 1L fluid restricted diet  - liberalize salt intake in diet    - continue pain control, palliative care is following   - anti-emetics for control of nausea as needed    - UOP 1.5L yesterday  - continue to monitor I/O's    - repeat BMP Q8-12hrs  - check SPEP, UPEP, free light chains, serum osm w/ next BMP    - Na should not change by more than 8 mEq in 24 hrs; please call nephrology if it does    Acidosis:  Non-AG; likely related to N/V previously  improved  continue to monitor    Increased BUN:  Sec to Steroids    Hematuria:  UA has RBC 5  Possible sec to france catheter  repeat UA  check renal US

## 2019-12-13 NOTE — PROGRESS NOTE ADULT - PROBLEM SELECTOR PLAN 6
Per daughters (Romulo and Basilia) pt is currently full code, GOC conversation ongoing.     Family still needs more time to talk about code status Per daughters (Romulo and Basilia) pt is currently full code, GOC conversation ongoing.     Family still needs more time to talk about code status.

## 2019-12-13 NOTE — PROGRESS NOTE ADULT - SUBJECTIVE AND OBJECTIVE BOX
Patient is a 64y old  Male who presents with a chief complaint of LE weakness (13 Dec 2019 16:23)      INTERVAL HISTORY: feels ok  	  MEDICATIONS:  lisinopril 10 milliGRAM(s) Oral daily        PHYSICAL EXAM:  T(C): 36.4 (12-13-19 @ 14:37), Max: 36.4 (12-12-19 @ 21:33)  HR: 74 (12-13-19 @ 14:37) (67 - 74)  BP: 115/77 (12-13-19 @ 14:37) (115/77 - 134/80)  RR: 18 (12-13-19 @ 14:37) (16 - 18)  SpO2: 94% (12-13-19 @ 14:37) (93% - 97%)  Wt(kg): --  I&O's Summary    12 Dec 2019 07:01  -  13 Dec 2019 07:00  --------------------------------------------------------  IN: 250 mL / OUT: 1525 mL / NET: -1275 mL    13 Dec 2019 07:01  -  13 Dec 2019 21:20  --------------------------------------------------------  IN: 350 mL / OUT: 675 mL / NET: -325 mL          Appearance: In no distress	  HEENT:    PERRL, EOMI	  Cardiovascular:  S1 S2, No JVD  Respiratory: Lungs clear to auscultation	  Gastrointestinal:  Soft, Non-tender, + BS	  Extremities:  No edema of LE                                15.6   16.91 )-----------( 230      ( 13 Dec 2019 07:05 )             47.9     12-13    130<L>  |  94<L>  |  33<H>  ----------------------------<  98  4.7   |  25  |  0.54    Ca    9.9      13 Dec 2019 08:34          Labs personally reviewed      Assessment and Plan:   Assessment:  · Assessment		  Pt is a 65 Y/O Male with PMHx smoker 50+yr 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 Per family the patient had MR at [facility] 3 weeks ago showing diffuse leptomeningeal spread (films unavail). Family is still shrouding patient from diagnosis/prognosis. Exam: oriented x3, FC, distal LE 4/5, proximal LE 2-3/5, UE 4/5. SILT. Endorses back pain.      Problem/Plan - 1:  ·  Problem: Cord compression.  Plan: Seen on MRI  Neurosurg eval appreciated  [facility] records reviewed likely with metastatic small cell lung ca  Onc and rad onc follow up noted    Problem/Plan - 2:  ·  Problem: Pulmonary embolism.  Plan: COnt lovenox   Monitor Hgb level   O 2 supplement.     Problem/Plan - 3:  ·  Problem: Chest pain, atypical.  Plan: does not appear to be cardiac in nature. ?2/2 PE   EKG with nsr and no ST changes    Problem/Plan - 4:  Problem: Prophylactic measure. Plan: DVT and gI PPX.    Discussed GOC with daughter at bedside, she is to discuss with Neuro-onc what the success rate of chemo is and will decide on chemo vs pall care    Petey Roberson, DO [facility]  Cardiovascular Medicine  [phone]            Petey Roberson, DO [facility]  Cardiovascular Medicine  [phone]

## 2019-12-13 NOTE — PROGRESS NOTE ADULT - PROBLEM SELECTOR PROBLEM 1
Please return in 6 months for your annual gyne visit or sooner if having abnormal vaginal bleeding or severe pelvic pain Cord compression

## 2019-12-13 NOTE — PROGRESS NOTE ADULT - SUBJECTIVE AND OBJECTIVE BOX
SUBJECTIVE AND OBJECTIVE: Patient used Dilaudid 2 mg total in last 24 hours. As per his family his pain is under control with current medication regimen and lethargy is improving. He is little more alert and is able to open eyes and had half a bowl of rice as per daughter.     INTERVAL HPI/OVERNIGHT EVENTS:  Lethargy improving. Starting to have issues swallowing medications - as per daughter he was able to swallow 6 pills at one time and now the medications need to be crushed.      DNR on chart: No   Allergies    No Known Allergies    Intolerances    MEDICATIONS  (STANDING):  acetaminophen  IVPB .. 1000 milliGRAM(s) IV Intermittent once  Biotene Dry Mouth Oral Rinse 5 milliLiter(s) Swish and Spit every 6 hours  calamine Lotion 1 Application(s) Topical two times a day  dexAMETHasone     Tablet 4 milliGRAM(s) Oral every 6 hours  enoxaparin Injectable 80 milliGRAM(s) SubCutaneous daily  gabapentin 300 milliGRAM(s) Oral every 8 hours  HYDROmorphone  Injectable 0.5 milliGRAM(s) IV Push every 6 hours  levETIRAcetam 500 milliGRAM(s) Oral two times a day  lidocaine   Patch 3 Patch Transdermal daily  lisinopril 10 milliGRAM(s) Oral daily  methadone    Tablet 2.5 milliGRAM(s) Oral <User Schedule>  pantoprazole  Injectable 40 milliGRAM(s) IV Push daily  QUEtiapine 25 milliGRAM(s) Oral at bedtime  senna 2 Tablet(s) Oral at bedtime    MEDICATIONS  (PRN):  acetaminophen   Tablet .. 650 milliGRAM(s) Oral every 6 hours PRN Mild Pain (1 - 3)  diphenhydrAMINE   Injectable 12.5 milliGRAM(s) IV Push every 4 hours PRN pruritus  HYDROmorphone  Injectable 0.5 milliGRAM(s) IV Push every 2 hours PRN Severe Pain (7 - 10)  naloxone Injectable 0.1 milliGRAM(s) IV Push every 3 minutes PRN sedation or respiratory depression due to opioids      ITEMS UNCHECKED ARE NOT PRESENT    PRESENT SYMPTOMS: [x ]Unable to obtain due to poor mentation   Source if other than patient:  [ ]Family   [ ]Team     Pain:  [ x]yes [ ]no  QOL impact -   Location -   neck, chest                  Aggravating factors -   Quality - burning  Radiation - everywhere  Timing- constant  Severity (0-10 scale): Up to 8 but decreasing to  2-3 immediately after Dilaudid administration  Minimal acceptable level (0-10 scale):     Dyspnea:                           [ ]Mild [ ]Moderate [ ]Severe  Anxiety:                             [ ]Mild [x ]Moderate [ ]Severe  Fatigue:                             [ ]Mild [ ]Moderate [x ]Severe  Nausea:                             [ ]Mild [ ]Moderate [ ]Severe  Loss of appetite:              [ ]Mild [ ]Moderate [x ]Severe  Constipation:                    [ ]Mild [ ]Moderate [ ]Severe    PAIN AD Score:	  [email or web address]/painad.pdf (Ctrl + left click to view)    Other Symptoms:  [ ]All other review of systems negative     Palliative Performance Status Version 2:    20     %      [email or web address]/files/[email or web address]_[email or web address]  PHYSICAL EXAM:  Vital Signs Last 24 Hrs  T(F): 97.2   HR: 67   BP: 133/89  RR: 18  SpO2: 97%       GENERAL:  [ ]Alert  [ x]Oriented x 1  [x ]Lethargic  [ ]Cachexia  [ ]Unarousable  [ x]Verbal  [ ]Non-Verbal  Patient sleeping comfortably and no signs of distress or pain noted  Behavioral:   [ ]Anxiety  [ ]Delirium [ ]Agitation [ ]Other  HEENT:  [x ]Normal   [ ]Dry mouth   [ ]ET Tube/Trach  [ ]Oral lesions  [x] No pinpoint pupils   PULMONARY:   x[ ]Clear [ ]Tachypnea  [ ]Audible excessive secretions   [ ]Rhonchi        [ ]Right [ ]Left [ ]Bilateral  [ ]Crackles        [ ]Right [ ]Left [ ]Bilateral  [ ]Wheezing     [ ]Right [ ]Left [ ]Bilateral  [ ]Diminished BS [ ] Right [ ]Left [ ]Bilateral  CARDIOVASCULAR:    [x ]Regular [ ]Irregular [ ]Tachy  [ ]Dusty [ ]Murmur [ ]Other  GASTROINTESTINAL:  [x ]Soft  [ ]Distended   [ ]+BS  [x ]Non tender [ ]Tender  [ ]PEG [ ]OGT/ NGT   Last BM: 12/11   GENITOURINARY:  [ ]Normal [ ]Incontinent   [ ]Oliguria/Anuria   [x ]Alvarez  MUSCULOSKELETAL:   [ ]Normal   [x ]Weakness  [x ]Bed/Wheelchair bound [ ]Edema  NEUROLOGIC:   [ ]No focal deficits  [ ] Cognitive impairment  [ ] Dysphagia [ ]Dysarthria [x ] Paresis [ ]Other   SKIN:   [ ]Normal  [ ]Rash   [ ]Pressure ulcer(s) [ ]y [ ]n present on admission  [x] excoriations 2/2 itching due to pruritus    CRITICAL CARE:  [ ]Shock Present  [ ]Septic [ ]Cardiogenic [ ]Neurologic [ ]Hypovolemic  [ ]Vasopressors [ ]Inotropes  [ ]Respiratory failure present [ ]Mechanical Ventilation [ ]Non-invasive ventilatory support [ ]High-Flow  [ ]Acute  [ ]Chronic [ ]Hypoxic  [ ]Hypercarbic [ ]Other  [ ]Other organ failure     LABS:                                        15.2   15.69 )-----------( 200      ( 12 Dec 2019 08:59 )             46.5   12-12    132<L>  |  93<L>  |  26<H>  ----------------------------<  95  4.7   |  25  |  0.50    Ca    9.9      12 Dec 2019 08:59            RADIOLOGY & ADDITIONAL STUDIES: reviewed    Protein Calorie Malnutrition Present: [ ]mild [ ]moderate [x ]severe [ ]underweight [ ]morbid obesity  https://www.andeal.org/vault/2440/web/files/ONC/Table_Clinical%20Characteristics%20to%20Document%20Malnutrition-White%20JV%20et%20al%558073.pdf    Height (cm): 170.18 (12-07-19 @ 09:03), 170.2 (09-24-19 @ 13:00), 170.18 (09-05-19 @ 11:47)  Weight (kg): 54.4 (12-07-19 @ 09:03), 54.7 (09-24-19 @ 13:00), 52.2 (09-05-19 @ 11:47)  BMI (kg/m2): 18.8 (12-07-19 @ 09:03), 18.9 (09-24-19 @ 13:00), 18 (09-05-19 @ 11:47)    [ ]PPSV2 < or = 30%  [x ]significant weight loss [ ]poor nutritional intake [ ]anasarca   Albumin, Serum: 3.8 g/dL (12-08-19 @ 09:07)   [ ]Artificial Nutrition    REFERRALS:   [x ]Chaplaincy  [ ]Hospice  [ ]Child Life  [ ]Social Work  [ ]Case management [ ]Holistic Therapy     Goals of Care Document:  pending SUBJECTIVE AND OBJECTIVE: Patient used Dilaudid 2 mg total in last 24 hours. As per his family his pain is under control with current medication regimen and lethargy is improving. He is little more alert and is able to open eyes and had half a bowl of rice as per daughter.     INTERVAL HPI/OVERNIGHT EVENTS:  Lethargy improving. Starting to have issues swallowing medications - as per daughter he was able to swallow 6 pills at one time and now the medications need to be crushed.      DNR on chart: No   Allergies    No Known Allergies    Intolerances    MEDICATIONS  (STANDING):  acetaminophen  IVPB .. 1000 milliGRAM(s) IV Intermittent once  Biotene Dry Mouth Oral Rinse 5 milliLiter(s) Swish and Spit every 6 hours  calamine Lotion 1 Application(s) Topical two times a day  dexAMETHasone     Tablet 4 milliGRAM(s) Oral every 6 hours  enoxaparin Injectable 80 milliGRAM(s) SubCutaneous daily  gabapentin 300 milliGRAM(s) Oral every 8 hours  HYDROmorphone  Injectable 0.5 milliGRAM(s) IV Push every 6 hours  levETIRAcetam 500 milliGRAM(s) Oral two times a day  lidocaine   Patch 3 Patch Transdermal daily  lisinopril 10 milliGRAM(s) Oral daily  methadone    Tablet 2.5 milliGRAM(s) Oral <User Schedule>  pantoprazole  Injectable 40 milliGRAM(s) IV Push daily  QUEtiapine 25 milliGRAM(s) Oral at bedtime  senna 2 Tablet(s) Oral at bedtime    MEDICATIONS  (PRN):  acetaminophen   Tablet .. 650 milliGRAM(s) Oral every 6 hours PRN Mild Pain (1 - 3)  diphenhydrAMINE   Injectable 12.5 milliGRAM(s) IV Push every 4 hours PRN pruritus  HYDROmorphone  Injectable 0.5 milliGRAM(s) IV Push every 2 hours PRN Severe Pain (7 - 10)  naloxone Injectable 0.1 milliGRAM(s) IV Push every 3 minutes PRN sedation or respiratory depression due to opioids      ITEMS UNCHECKED ARE NOT PRESENT    PRESENT SYMPTOMS: [x ]Unable to obtain due to poor mentation   Source if other than patient:  [ ]Family   [ ]Team     Pain:  [ x]yes [ ]no  QOL impact -   Location -   neck, chest                  Aggravating factors -   Quality - burning  Radiation - everywhere  Timing- constant  Severity (0-10 scale): Up to 8 but decreasing to  2-3 immediately after Dilaudid administration  Minimal acceptable level (0-10 scale):     Dyspnea:                           [ ]Mild [ ]Moderate [ ]Severe  Anxiety:                             [ ]Mild [x ]Moderate [ ]Severe  Fatigue:                             [ ]Mild [ ]Moderate [x ]Severe  Nausea:                             [ ]Mild [ ]Moderate [ ]Severe  Loss of appetite:              [ ]Mild [ ]Moderate [x ]Severe  Constipation:                    [ ]Mild [ ]Moderate [ ]Severe    PAIN AD Score:	  http://geriatrictoolkit.[email or web address]/painad.pdf (Ctrl + left click to view)    Other Symptoms:  [ ]All other review of systems negative     Palliative Performance Status Version 2:    20     %      http://[email or web address]/[email or web address]_performance_[email or web address]_[email or web address].pdf  PHYSICAL EXAM:  Vital Signs Last 24 Hrs  T(C): 36.4 (13 Dec 2019 14:37), Max: 36.4 (12 Dec 2019 21:33)  T(F): 97.6 (13 Dec 2019 14:37), Max: 97.6 (13 Dec 2019 14:37)  HR: 74 (13 Dec 2019 14:37) (67 - 74)  BP: 115/77 (13 Dec 2019 14:37) (115/77 - 134/80)  BP(mean): --  RR: 18 (13 Dec 2019 14:37) (16 - 18)  SpO2: 94% (13 Dec 2019 14:37) (93% - 97%)     GENERAL:  [ ]Alert  [ x]Oriented x 1  [x ]Lethargic  [ ]Cachexia  [ ]Unarousable  [ x]Verbal  [ ]Non-Verbal  Patient sleeping comfortably and no signs of distress or pain noted  Behavioral:   [ ]Anxiety  [ ]Delirium [ ]Agitation [ ]Other  HEENT:  [x ]Normal   [ ]Dry mouth   [ ]ET Tube/Trach  [ ]Oral lesions  [x] No pinpoint pupils   PULMONARY:   x[ ]Clear [ ]Tachypnea  [ ]Audible excessive secretions   [ ]Rhonchi        [ ]Right [ ]Left [ ]Bilateral  [ ]Crackles        [ ]Right [ ]Left [ ]Bilateral  [ ]Wheezing     [ ]Right [ ]Left [ ]Bilateral  [ ]Diminished BS [ ] Right [ ]Left [ ]Bilateral  CARDIOVASCULAR:    [x ]Regular [ ]Irregular [ ]Tachy  [ ]Dusty [ ]Murmur [ ]Other  GASTROINTESTINAL:  [x ]Soft  [ ]Distended   [ ]+BS  [x ]Non tender [ ]Tender  [ ]PEG [ ]OGT/ NGT   Last BM: 12/11   GENITOURINARY:  [ ]Normal [ ]Incontinent   [ ]Oliguria/Anuria   [x ]Alvarez  MUSCULOSKELETAL:   [ ]Normal   [x ]Weakness  [x ]Bed/Wheelchair bound [ ]Edema  NEUROLOGIC:   [ ]No focal deficits  [ ] Cognitive impairment  [ ] Dysphagia [ ]Dysarthria [x ] Paresis [ ]Other   SKIN:   [ ]Normal  [ ]Rash   [ ]Pressure ulcer(s) [ ]y [ ]n present on admission  [x] excoriations 2/2 itching due to pruritus    CRITICAL CARE:  [ ]Shock Present  [ ]Septic [ ]Cardiogenic [ ]Neurologic [ ]Hypovolemic  [ ]Vasopressors [ ]Inotropes  [ ]Respiratory failure present [ ]Mechanical Ventilation [ ]Non-invasive ventilatory support [ ]High-Flow  [ ]Acute  [ ]Chronic [ ]Hypoxic  [ ]Hypercarbic [ ]Other  [ ]Other organ failure     LABS:                          15.6   16.91 )-----------( 230      ( 13 Dec 2019 07:05 )             47.9   12-13    130<L>  |  94<L>  |  33<H>  ----------------------------<  98  4.7   |  25  |  0.54    Ca    9.9      13 Dec 2019 08:34            RADIOLOGY & ADDITIONAL STUDIES: reviewed    Protein Calorie Malnutrition Present: [ ]mild [ ]moderate [x ]severe [ ]underweight [ ]morbid obesity  https://www.andeal.org/vault/2440/web/files/ONC/Table_Clinical%20Characteristics%20to%20Document%20Malnutrition-White%20JV%20et%20al%202012.pdf    Height (cm): 170.18 (12-07-19 @ 09:03), 170.2 (09-24-19 @ 13:00), 170.18 (09-05-19 @ 11:47)  Weight (kg): 54.4 (12-07-19 @ 09:03), 54.7 (09-24-19 @ 13:00), 52.2 (09-05-19 @ 11:47)  BMI (kg/m2): 18.8 (12-07-19 @ 09:03), 18.9 (09-24-19 @ 13:00), 18 (09-05-19 @ 11:47)    [ ]PPSV2 < or = 30%  [x ]significant weight loss [ ]poor nutritional intake [ ]anasarca   Albumin, Serum: 3.8 g/dL (12-08-19 @ 09:07)   [ ]Artificial Nutrition    REFERRALS:   [x ]Chaplaincy  [ ]Hospice  [ ]Child Life  [ ]Social Work  [ ]Case management [ ]Holistic Therapy     Goals of Care Document:  pending

## 2019-12-13 NOTE — PROGRESS NOTE ADULT - ASSESSMENT
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 Per family the patient had MR at [facility] 3 weeks ago showing diffuse leptomeningeal spread (films unavail). Family is still shrouding patient from diagnosis/prognosis. Exam: oriented x3, FC, distal LE 4/5, proximal LE 2-3/5, UE 4/5. SILT. Endorses back pain.

## 2019-12-13 NOTE — PROGRESS NOTE ADULT - PROBLEM SELECTOR PLAN 5
He is currently lethargic, confused, AAOX1, and appears to have some word finding difficulty probably related to his brain mets, all these factors impair his decision making capacity. Pt's Romulo Cui is HCP; however, she makes decisions with her four siblings. The patient's wife deffers decision making to her daughters.   - Basilia is to ask Romulo to bring HCP documentation

## 2019-12-13 NOTE — PROGRESS NOTE ADULT - PROBLEM SELECTOR PLAN 1
2/2 extensive spinal metastasis. Pain appears both somatic and neuropathic  Although patient is using less PRNs, he is now more lethargic. Spoke with the patient's daughter, Basilia, that indicated as she has discussed with her sister, Romulo, goals are for improving symptoms Rx while trying to preserve alertness; however, that symptoms management is a priority.   Methadone was decreased yesterday to 2.5 mg PO 06:00/14:00/22:00  Gabapentin was also decreased to 300 mg PO q 8 hours.   C/w Dilaudid 0.5 mg IV q 6 ATC  C/w Dilaudid PRN to 0.5 mg IV q 2 Hours  If sedation is persistent, a trial of Modafinil can be considered.   PCU is an options; however, his family has not yet approved a transferring there.

## 2019-12-13 NOTE — PROGRESS NOTE ADULT - SUBJECTIVE AND OBJECTIVE BOX
[facility]    Subjective: Patient seen and examined. No new events except as noted.   feeling weak     REVIEW OF SYSTEMS:    CONSTITUTIONAL: + weakness   EYES/ENT: No visual changes;  No vertigo or throat pain   NECK: No pain or stiffness  RESPIRATORY: No cough, wheezing, hemoptysis; No shortness of breath  CARDIOVASCULAR: No chest pain or palpitations  GASTROINTESTINAL: No abdominal or epigastric pain.  GENITOURINARY: No dysuria, frequency or hematuria  NEUROLOGICAL: pain improved   SKIN: No itching, burning, rashes, or lesions   All other review of systems is negative unless indicated above.    MEDICATIONS:  MEDICATIONS  (STANDING):  acetaminophen  IVPB .. 1000 milliGRAM(s) IV Intermittent once  Biotene Dry Mouth Oral Rinse 5 milliLiter(s) Swish and Spit every 6 hours  calamine Lotion 1 Application(s) Topical two times a day  dexAMETHasone     Tablet 4 milliGRAM(s) Oral every 6 hours  enoxaparin Injectable 80 milliGRAM(s) SubCutaneous daily  gabapentin 300 milliGRAM(s) Oral every 8 hours  HYDROmorphone  Injectable 0.5 milliGRAM(s) IV Push every 6 hours  levETIRAcetam 500 milliGRAM(s) Oral two times a day  lisinopril 10 milliGRAM(s) Oral daily  methadone    Tablet 2.5 milliGRAM(s) Oral <User Schedule>  pantoprazole  Injectable 40 milliGRAM(s) IV Push daily  QUEtiapine 25 milliGRAM(s) Oral at bedtime  senna 2 Tablet(s) Oral at bedtime      PHYSICAL EXAM:  T(C): 36.4 (12-13-19 @ 14:37), Max: 36.4 (12-12-19 @ 21:33)  HR: 74 (12-13-19 @ 14:37) (67 - 74)  BP: 115/77 (12-13-19 @ 14:37) (115/77 - 134/80)  RR: 18 (12-13-19 @ 14:37) (16 - 18)  SpO2: 94% (12-13-19 @ 14:37) (93% - 97%)  Wt(kg): --  I&O's Summary    12 Dec 2019 07:01  -  13 Dec 2019 07:00  --------------------------------------------------------  IN: 250 mL / OUT: 1525 mL / NET: -1275 mL    13 Dec 2019 07:01  -  13 Dec 2019 16:24  --------------------------------------------------------  IN: 250 mL / OUT: 475 mL / NET: -225 mL          Appearance: sleepy and frail   HEENT:  dry OM   Lymphatic: No lymphadenopathy , no edema  Cardiovascular: Normal S1 S2  Respiratory: Lungs clear to auscultation, normal effort 	  Gastrointestinal:  Soft, Non-tender, + BS	  Skin: No rashes, No ecchymoses, No cyanosis, warm to touch  Musculoskeletal: LE motor and sensory loss  Psychiatry:  Mood & affect appropriate  Ext: No edema      All labs, Imaging and EKGs personally reviewed                           15.6   16.91 )-----------( 230      ( 13 Dec 2019 07:05 )             47.9               12-13    130<L>  |  94<L>  |  33<H>  ----------------------------<  98  4.7   |  25  |  0.54    Ca    9.9      13 Dec 2019 08:34

## 2019-12-13 NOTE — PROGRESS NOTE ADULT - SUBJECTIVE AND OBJECTIVE BOX
[facility]   MD Bijal Chao D.O. Fatima Sheikh, D.O. Ruoru Wong, PA    From 7 AM - 5 PM:  OFFICE: [phone]  Dr. Welsh cell: [phone]  Dr. Chatman cell: [phone]  Dr. Velasquez cell: [phone]  INGRID Banuelos cell: [phone]    From 5 PM - 7 AM: Answering Service: [phone]        RENAL FOLLOW UP NOTE  --------------------------------------------------------------------------------  HPI: Pt seen and examined. States pain is still present today but is improved. Reports he does not have much of an appetite. Daughter is concerned that he is not getting IV fluids yesterday or today. Explained why and she understood and agreed w/ plan.        PAST HISTORY  --------------------------------------------------------------------------------  No significant changes to PMH, PSH, FHx, SHx, unless otherwise noted    ALLERGIES & MEDICATIONS  --------------------------------------------------------------------------------  Allergies    No Known Allergies    Intolerances      Standing Inpatient Medications  acetaminophen  IVPB .. 1000 milliGRAM(s) IV Intermittent once  Biotene Dry Mouth Oral Rinse 5 milliLiter(s) Swish and Spit every 6 hours  calamine Lotion 1 Application(s) Topical two times a day  dexAMETHasone     Tablet 4 milliGRAM(s) Oral every 6 hours  enoxaparin Injectable 80 milliGRAM(s) SubCutaneous daily  gabapentin 300 milliGRAM(s) Oral every 8 hours  HYDROmorphone  Injectable 0.5 milliGRAM(s) IV Push every 6 hours  levETIRAcetam 500 milliGRAM(s) Oral two times a day  lisinopril 10 milliGRAM(s) Oral daily  methadone    Tablet 2.5 milliGRAM(s) Oral <User Schedule>  pantoprazole  Injectable 40 milliGRAM(s) IV Push daily  QUEtiapine 25 milliGRAM(s) Oral at bedtime  senna 2 Tablet(s) Oral at bedtime    PRN Inpatient Medications  acetaminophen   Tablet .. 650 milliGRAM(s) Oral every 6 hours PRN  diphenhydrAMINE   Injectable 12.5 milliGRAM(s) IV Push every 4 hours PRN  HYDROmorphone  Injectable 0.5 milliGRAM(s) IV Push every 2 hours PRN  naloxone Injectable 0.1 milliGRAM(s) IV Push every 3 minutes PRN      REVIEW OF SYSTEMS  --------------------------------------------------------------------------------  General: no fever  CVS: no chest pain  RESP: no sob, no cough  ABD: no abdominal pain  : no dysuria,  MSK: no edema     VITALS/PHYSICAL EXAM  --------------------------------------------------------------------------------  T(C): 36.2 (12-13-19 @ 05:57), Max: 36.6 (12-12-19 @ 12:55)  HR: 67 (12-13-19 @ 05:57) (67 - 84)  BP: 133/89 (12-13-19 @ 05:57) (131/81 - 134/80)  RR: 18 (12-13-19 @ 05:57) (16 - 18)  SpO2: 97% (12-13-19 @ 05:57) (93% - 97%)  Wt(kg): --        12-12-19 @ 07:01  -  12-13-19 @ 07:00  --------------------------------------------------------  IN: 250 mL / OUT: 1525 mL / NET: -1275 mL    12-13-19 @ 07:01  -  12-13-19 @ 10:46  --------------------------------------------------------  IN: 0 mL / OUT: 300 mL / NET: -300 mL      Physical Exam:  	Gen: NAD  	HEENT: MMM  	Pulm: CTA B/L  	CV: S1S2  	Abd: Soft, +BS  	Ext: No LE edema B/L              Neuro: Awake   	Skin: Warm and Dry   	    LABS/STUDIES  --------------------------------------------------------------------------------              15.6   16.91 >-----------<  230      [12-13-19 @ 07:05]              47.9     130  |  94  |  33  ----------------------------<  98      [12-13-19 @ 08:34]  4.7   |  25  |  0.54        Ca     9.9     [12-13-19 @ 08:34]          Serum Osmolality 280      [12-11-19 @ 19:58]    Creatinine Trend:  SCr 0.54 [12-13 @ 08:34]  SCr 0.58 [12-13 @ 07:04]  SCr 0.50 [12-12 @ 08:59]  SCr 0.44 [12-11 @ 19:58]  SCr 0.46 [12-11 @ 13:31]    Urinalysis - [12-07-19 @ 11:05]      Color Yellow / Appearance Clear / SG 1.026 / pH 6.0      Gluc Negative / Ketone Trace  / Bili Negative / Urobili Negative       Blood Negative / Protein Trace / Leuk Est Negative / Nitrite Negative      RBC 5 / WBC 0 / Hyaline 0 / Gran  / Sq Epi  / Non Sq Epi 0 / Bacteria Negative    Urine Sodium 109      [12-11-19 @ 16:56]  Urine Chloride 72      [12-11-19 @ 16:56]  Urine Osmolality 321      [12-11-19 @ 16:56]    TSH 0.33      [12-08-19 @ 09:35]

## 2019-12-13 NOTE — PROGRESS NOTE ADULT - PROBLEM SELECTOR PLAN 1
Seen on MRI, mets to spine   Neurosurg eval appreciated  Onc eval appreciated  possible systemic treatment, poor candidate  Rad Onc eval noted   Decadron 4mg Q6hrs, taper as per surg team   pain control, aggressive   Neuro check  Palliative eval appreciated, pain control   poor candidate for palliative rads  seen by Onc/Rad today   may benefits form chemo however family refusing and stating that he has no power to get chemo   discussed in detail with family at the bedside  pain control and muscle relaxant  possible morphin pump   second opinion Onc eval appreciated

## 2019-12-14 DIAGNOSIS — R40.0 SOMNOLENCE: ICD-10-CM

## 2019-12-14 DIAGNOSIS — R52 PAIN, UNSPECIFIED: ICD-10-CM

## 2019-12-14 LAB
ANION GAP SERPL CALC-SCNC: 10 MMOL/L — SIGNIFICANT CHANGE UP (ref 5–17)
APPEARANCE UR: CLEAR — SIGNIFICANT CHANGE UP
BILIRUB UR-MCNC: NEGATIVE — SIGNIFICANT CHANGE UP
BUN SERPL-MCNC: 41 MG/DL — HIGH (ref 7–23)
CALCIUM SERPL-MCNC: 10.1 MG/DL — SIGNIFICANT CHANGE UP (ref 8.4–10.5)
CHLORIDE SERPL-SCNC: 95 MMOL/L — LOW (ref 96–108)
CHLORIDE UR-SCNC: <35 MMOL/L — SIGNIFICANT CHANGE UP
CO2 SERPL-SCNC: 25 MMOL/L — SIGNIFICANT CHANGE UP (ref 22–31)
COLOR SPEC: YELLOW — SIGNIFICANT CHANGE UP
CREAT SERPL-MCNC: 0.51 MG/DL — SIGNIFICANT CHANGE UP (ref 0.5–1.3)
DIFF PNL FLD: NEGATIVE — SIGNIFICANT CHANGE UP
GLUCOSE SERPL-MCNC: 103 MG/DL — HIGH (ref 70–99)
GLUCOSE UR QL: NEGATIVE — SIGNIFICANT CHANGE UP
KETONES UR-MCNC: NEGATIVE — SIGNIFICANT CHANGE UP
LEUKOCYTE ESTERASE UR-ACNC: NEGATIVE — SIGNIFICANT CHANGE UP
NITRITE UR-MCNC: NEGATIVE — SIGNIFICANT CHANGE UP
OSMOLALITY UR: 969 MOS/KG — HIGH (ref 300–900)
PH UR: 5.5 — SIGNIFICANT CHANGE UP (ref 5–8)
POTASSIUM SERPL-MCNC: 4.6 MMOL/L — SIGNIFICANT CHANGE UP (ref 3.5–5.3)
POTASSIUM SERPL-SCNC: 4.6 MMOL/L — SIGNIFICANT CHANGE UP (ref 3.5–5.3)
PROT UR-MCNC: ABNORMAL
SODIUM SERPL-SCNC: 130 MMOL/L — LOW (ref 135–145)
SODIUM UR-SCNC: <35 MMOL/L — SIGNIFICANT CHANGE UP
SP GR SPEC: 1.03 — HIGH (ref 1.01–1.02)
UROBILINOGEN FLD QL: NEGATIVE — SIGNIFICANT CHANGE UP

## 2019-12-14 PROCEDURE — 99497 ADVNCD CARE PLAN 30 MIN: CPT

## 2019-12-14 PROCEDURE — 99233 SBSQ HOSP IP/OBS HIGH 50: CPT

## 2019-12-14 RX ORDER — KETOROLAC TROMETHAMINE 30 MG/ML
15 SYRINGE (ML) INJECTION ONCE
Refills: 0 | Status: DISCONTINUED | OUTPATIENT
Start: 2019-12-14 | End: 2019-12-14

## 2019-12-14 RX ORDER — SODIUM CHLORIDE 9 MG/ML
1000 INJECTION INTRAMUSCULAR; INTRAVENOUS; SUBCUTANEOUS
Refills: 0 | Status: DISCONTINUED | OUTPATIENT
Start: 2019-12-14 | End: 2019-12-21

## 2019-12-14 RX ADMIN — SODIUM CHLORIDE 75 MILLILITER(S): 9 INJECTION INTRAMUSCULAR; INTRAVENOUS; SUBCUTANEOUS at 18:25

## 2019-12-14 RX ADMIN — METHADONE HYDROCHLORIDE 2.5 MILLIGRAM(S): 40 TABLET ORAL at 13:32

## 2019-12-14 RX ADMIN — Medication 4 MILLIGRAM(S): at 13:35

## 2019-12-14 RX ADMIN — HYDROMORPHONE HYDROCHLORIDE 0.5 MILLIGRAM(S): 2 INJECTION INTRAMUSCULAR; INTRAVENOUS; SUBCUTANEOUS at 10:12

## 2019-12-14 RX ADMIN — Medication 4 MILLIGRAM(S): at 18:27

## 2019-12-14 RX ADMIN — Medication 5 MILLILITER(S): at 05:13

## 2019-12-14 RX ADMIN — LEVETIRACETAM 500 MILLIGRAM(S): 250 TABLET, FILM COATED ORAL at 18:27

## 2019-12-14 RX ADMIN — HYDROMORPHONE HYDROCHLORIDE 0.5 MILLIGRAM(S): 2 INJECTION INTRAMUSCULAR; INTRAVENOUS; SUBCUTANEOUS at 21:01

## 2019-12-14 RX ADMIN — Medication 4 MILLIGRAM(S): at 05:12

## 2019-12-14 RX ADMIN — LISINOPRIL 10 MILLIGRAM(S): 2.5 TABLET ORAL at 05:13

## 2019-12-14 RX ADMIN — ENOXAPARIN SODIUM 80 MILLIGRAM(S): 100 INJECTION SUBCUTANEOUS at 13:36

## 2019-12-14 RX ADMIN — HYDROMORPHONE HYDROCHLORIDE 0.5 MILLIGRAM(S): 2 INJECTION INTRAMUSCULAR; INTRAVENOUS; SUBCUTANEOUS at 07:46

## 2019-12-14 RX ADMIN — HYDROMORPHONE HYDROCHLORIDE 0.5 MILLIGRAM(S): 2 INJECTION INTRAMUSCULAR; INTRAVENOUS; SUBCUTANEOUS at 05:11

## 2019-12-14 RX ADMIN — Medication 15 MILLIGRAM(S): at 23:22

## 2019-12-14 RX ADMIN — HYDROMORPHONE HYDROCHLORIDE 0.5 MILLIGRAM(S): 2 INJECTION INTRAMUSCULAR; INTRAVENOUS; SUBCUTANEOUS at 20:45

## 2019-12-14 RX ADMIN — CALAMINE AND ZINC OXIDE AND PHENOL 1 APPLICATION(S): 160; 10 LOTION TOPICAL at 18:30

## 2019-12-14 RX ADMIN — HYDROMORPHONE HYDROCHLORIDE 0.5 MILLIGRAM(S): 2 INJECTION INTRAMUSCULAR; INTRAVENOUS; SUBCUTANEOUS at 13:16

## 2019-12-14 RX ADMIN — QUETIAPINE FUMARATE 25 MILLIGRAM(S): 200 TABLET, FILM COATED ORAL at 22:18

## 2019-12-14 RX ADMIN — GABAPENTIN 300 MILLIGRAM(S): 400 CAPSULE ORAL at 13:09

## 2019-12-14 RX ADMIN — SENNA PLUS 2 TABLET(S): 8.6 TABLET ORAL at 22:18

## 2019-12-14 RX ADMIN — LEVETIRACETAM 500 MILLIGRAM(S): 250 TABLET, FILM COATED ORAL at 05:13

## 2019-12-14 RX ADMIN — HYDROMORPHONE HYDROCHLORIDE 0.5 MILLIGRAM(S): 2 INJECTION INTRAMUSCULAR; INTRAVENOUS; SUBCUTANEOUS at 16:14

## 2019-12-14 RX ADMIN — METHADONE HYDROCHLORIDE 2.5 MILLIGRAM(S): 40 TABLET ORAL at 22:17

## 2019-12-14 RX ADMIN — CALAMINE AND ZINC OXIDE AND PHENOL 1 APPLICATION(S): 160; 10 LOTION TOPICAL at 05:13

## 2019-12-14 RX ADMIN — Medication 4 MILLIGRAM(S): at 23:07

## 2019-12-14 RX ADMIN — Medication 5 MILLILITER(S): at 23:07

## 2019-12-14 RX ADMIN — GABAPENTIN 300 MILLIGRAM(S): 400 CAPSULE ORAL at 22:18

## 2019-12-14 RX ADMIN — Medication 5 MILLILITER(S): at 18:27

## 2019-12-14 RX ADMIN — HYDROMORPHONE HYDROCHLORIDE 0.5 MILLIGRAM(S): 2 INJECTION INTRAMUSCULAR; INTRAVENOUS; SUBCUTANEOUS at 08:15

## 2019-12-14 RX ADMIN — HYDROMORPHONE HYDROCHLORIDE 0.5 MILLIGRAM(S): 2 INJECTION INTRAMUSCULAR; INTRAVENOUS; SUBCUTANEOUS at 13:40

## 2019-12-14 RX ADMIN — PANTOPRAZOLE SODIUM 40 MILLIGRAM(S): 20 TABLET, DELAYED RELEASE ORAL at 13:09

## 2019-12-14 RX ADMIN — Medication 5 MILLILITER(S): at 13:34

## 2019-12-14 RX ADMIN — HYDROMORPHONE HYDROCHLORIDE 0.5 MILLIGRAM(S): 2 INJECTION INTRAMUSCULAR; INTRAVENOUS; SUBCUTANEOUS at 18:50

## 2019-12-14 RX ADMIN — Medication 15 MILLIGRAM(S): at 23:07

## 2019-12-14 RX ADMIN — METHADONE HYDROCHLORIDE 2.5 MILLIGRAM(S): 40 TABLET ORAL at 05:12

## 2019-12-14 RX ADMIN — GABAPENTIN 300 MILLIGRAM(S): 400 CAPSULE ORAL at 05:13

## 2019-12-14 RX ADMIN — HYDROMORPHONE HYDROCHLORIDE 0.5 MILLIGRAM(S): 2 INJECTION INTRAMUSCULAR; INTRAVENOUS; SUBCUTANEOUS at 05:41

## 2019-12-14 RX ADMIN — HYDROMORPHONE HYDROCHLORIDE 0.5 MILLIGRAM(S): 2 INJECTION INTRAMUSCULAR; INTRAVENOUS; SUBCUTANEOUS at 18:23

## 2019-12-14 NOTE — PROGRESS NOTE ADULT - SUBJECTIVE AND OBJECTIVE BOX
[facility]    Subjective: Patient seen and examined. No new events except as noted.   feeling weakness   improved pain       MEDICATIONS:  MEDICATIONS  (STANDING):  Biotene Dry Mouth Oral Rinse 5 milliLiter(s) Swish and Spit every 6 hours  calamine Lotion 1 Application(s) Topical two times a day  dexAMETHasone     Tablet 4 milliGRAM(s) Oral every 6 hours  enoxaparin Injectable 80 milliGRAM(s) SubCutaneous daily  gabapentin 300 milliGRAM(s) Oral every 8 hours  HYDROmorphone  Injectable 0.5 milliGRAM(s) IV Push every 6 hours  levETIRAcetam 500 milliGRAM(s) Oral two times a day  lisinopril 10 milliGRAM(s) Oral daily  methadone    Tablet 2.5 milliGRAM(s) Oral <User Schedule>  pantoprazole  Injectable 40 milliGRAM(s) IV Push daily  QUEtiapine 25 milliGRAM(s) Oral at bedtime  senna 2 Tablet(s) Oral at bedtime  sodium chloride 0.9%. 1000 milliLiter(s) (75 mL/Hr) IV Continuous <Continuous>      PHYSICAL EXAM:  T(C): 36.3 (19 @ 13:28), Max: 36.4 (19 @ 21:44)  HR: 67 (19 @ 13:28) (67 - 74)  BP: 98/71 (19 @ 13:28) (98/71 - 114/80)  RR: 18 (19 @ 13:28) (16 - 18)  SpO2: 96% (19 @ 13:28) (92% - 96%)  Wt(kg): --  I&O's Summary    13 Dec 2019 07:  -  14 Dec 2019 07:00  --------------------------------------------------------  IN: 530 mL / OUT: 925 mL / NET: -395 mL    14 Dec 2019 07:  -  14 Dec 2019 19:57  --------------------------------------------------------  IN: 360 mL / OUT: 950 mL / NET: -590 mL          Appearance: awake, weak, frail   HEENT: dry OM 	  Lymphatic: No lymphadenopathy , no edema  Cardiovascular: Normal S1 S2  Respiratory: Lungs clear to auscultation,  Gastrointestinal:  Soft, Non-tender, + BS	  Skin: No rashes, No ecchymoses, No cyanosis, warm to touch  Musculoskeletal: motpor and sensory loss LE, motor weakness UE   Psychiatry:  Mood & affect appropriate  Ext: No edema      All labs, Imaging and EKGs personally reviewed                           15.   16.91 )-----------( 230      ( 13 Dec 2019 07:05 )             47.9               12-14    130<L>  |  95<L>  |  41<H>  ----------------------------<  103<H>  4.6   |  25  |  0.51    Ca    10.1      14 Dec 2019 08:42                         Urinalysis Basic - ( 14 Dec 2019 18:47 )    Color: Yellow / Appearance: Clear / S.032 / pH: x  Gluc: x / Ketone: Negative  / Bili: Negative / Urobili: Negative   Blood: x / Protein: Trace / Nitrite: Negative   Leuk Esterase: Negative / RBC: 2 /hpf / WBC 1 /HPF   Sq Epi: x / Non Sq Epi: 0 /hpf / Bacteria: 0.0

## 2019-12-14 NOTE — PROGRESS NOTE ADULT - PROBLEM SELECTOR PLAN 5
12/14: As above, I spoke about home with hospice with the patient's daughter. She is considering it and wants to discuss it with the rest of her family Biotin oral rinse

## 2019-12-14 NOTE — PROGRESS NOTE ADULT - SUBJECTIVE AND OBJECTIVE BOX
[facility]   MD Bijal Chao D.O. Fatima Sheikh, D.O. Ruoru Wong, PA    From 7 AM - 5 PM:  OFFICE: [phone]  Dr. Welsh cell: [phone]  Dr. Chatman cell: [phone]  Dr. Velasquez cell: [phone]  INGRID Banuelos cell: [phone]    From 5 PM - 7 AM: Answering Service: [phone]        RENAL FOLLOW UP NOTE  --------------------------------------------------------------------------------  HPI: Pt seen and examined. States he has no pain today. Has no appetite. Daughter, Romulo at bedside, states he barely ate or drank anything yesterday and she is concerned he is getting dehydrated        PAST HISTORY  --------------------------------------------------------------------------------  No significant changes to PMH, PSH, FHx, SHx, unless otherwise noted    ALLERGIES & MEDICATIONS  --------------------------------------------------------------------------------  Allergies    No Known Allergies    Intolerances      Standing Inpatient Medications  Biotene Dry Mouth Oral Rinse 5 milliLiter(s) Swish and Spit every 6 hours  calamine Lotion 1 Application(s) Topical two times a day  dexAMETHasone     Tablet 4 milliGRAM(s) Oral every 6 hours  enoxaparin Injectable 80 milliGRAM(s) SubCutaneous daily  gabapentin 300 milliGRAM(s) Oral every 8 hours  HYDROmorphone  Injectable 0.5 milliGRAM(s) IV Push every 6 hours  levETIRAcetam 500 milliGRAM(s) Oral two times a day  lisinopril 10 milliGRAM(s) Oral daily  methadone    Tablet 2.5 milliGRAM(s) Oral <User Schedule>  pantoprazole  Injectable 40 milliGRAM(s) IV Push daily  QUEtiapine 25 milliGRAM(s) Oral at bedtime  senna 2 Tablet(s) Oral at bedtime    PRN Inpatient Medications  acetaminophen   Tablet .. 650 milliGRAM(s) Oral every 6 hours PRN  diphenhydrAMINE   Injectable 12.5 milliGRAM(s) IV Push every 4 hours PRN  HYDROmorphone  Injectable 0.5 milliGRAM(s) IV Push every 2 hours PRN  naloxone Injectable 0.1 milliGRAM(s) IV Push every 3 minutes PRN      REVIEW OF SYSTEMS  --------------------------------------------------------------------------------  General: no fever  CVS: no chest pain  RESP: no sob, no cough  ABD: no abdominal pain  : no dysuria,  MSK: no edema     VITALS/PHYSICAL EXAM  --------------------------------------------------------------------------------  T(C): 36.4 (12-14-19 @ 05:07), Max: 36.4 (12-13-19 @ 14:37)  HR: 72 (12-14-19 @ 05:07) (72 - 74)  BP: 114/80 (12-14-19 @ 05:07) (104/68 - 115/77)  RR: 18 (12-14-19 @ 05:07) (16 - 18)  SpO2: 93% (12-14-19 @ 05:07) (92% - 94%)  Wt(kg): --        12-13-19 @ 07:01  -  12-14-19 @ 07:00  --------------------------------------------------------  IN: 530 mL / OUT: 925 mL / NET: -395 mL      Physical Exam:  	Gen: NAD  	HEENT: MMM  	Pulm: CTA B/L  	CV: S1S2  	Abd: Soft, +BS  	Ext: No LE edema B/L              Neuro: Awake   	Skin: Warm and Dry   	    LABS/STUDIES  --------------------------------------------------------------------------------              15.6   16.91 >-----------<  230      [12-13-19 @ 07:05]              47.9     130  |  95  |  41  ----------------------------<  103      [12-14-19 @ 08:42]  4.6   |  25  |  0.51        Ca     10.1     [12-14-19 @ 08:42]            Creatinine Trend:  SCr 0.51 [12-14 @ 08:42]  SCr 0.54 [12-13 @ 08:34]  SCr 0.58 [12-13 @ 07:04]  SCr 0.50 [12-12 @ 08:59]  SCr 0.44 [12-11 @ 19:58]    Urinalysis - [12-07-19 @ 11:05]      Color Yellow / Appearance Clear / SG 1.026 / pH 6.0      Gluc Negative / Ketone Trace  / Bili Negative / Urobili Negative       Blood Negative / Protein Trace / Leuk Est Negative / Nitrite Negative      RBC 5 / WBC 0 / Hyaline 0 / Gran  / Sq Epi  / Non Sq Epi 0 / Bacteria Negative    Urine Sodium 109      [12-11-19 @ 16:56]  Urine Chloride 72      [12-11-19 @ 16:56]  Urine Osmolality 321      [12-11-19 @ 16:56]    TSH 0.33      [12-08-19 @ 09:35]

## 2019-12-14 NOTE — PROGRESS NOTE ADULT - SUBJECTIVE AND OBJECTIVE BOX
Patient is a 64y old  Male who presents with a chief complaint of LE weakness (14 Dec 2019 13:32)      INTERVAL HISTORY: feels ok    	  MEDICATIONS:  lisinopril 10 milliGRAM(s) Oral daily        PHYSICAL EXAM:  T(C): 36.8 (12-14-19 @ 21:56), Max: 36.8 (12-14-19 @ 21:56)  HR: 68 (12-14-19 @ 22:56) (67 - 76)  BP: 88/61 (12-14-19 @ 22:56) (88/61 - 114/80)  RR: 18 (12-14-19 @ 21:56) (18 - 18)  SpO2: 92% (12-14-19 @ 21:56) (92% - 96%)  Wt(kg): --  I&O's Summary    13 Dec 2019 07:01  -  14 Dec 2019 07:00  --------------------------------------------------------  IN: 530 mL / OUT: 925 mL / NET: -395 mL    14 Dec 2019 07:01  -  14 Dec 2019 23:55  --------------------------------------------------------  IN: 860 mL / OUT: 1050 mL / NET: -190 mL          Appearance: In no distress	  HEENT:    PERRL, EOMI	  Cardiovascular:  S1 S2, No JVD  Respiratory: Lungs clear to auscultation	  Gastrointestinal:  Soft, Non-tender, + BS	  Extremities:  No edema of LE                                15.6   16.91 )-----------( 230      ( 13 Dec 2019 07:05 )             47.9     12-14    130<L>  |  95<L>  |  41<H>  ----------------------------<  103<H>  4.6   |  25  |  0.51    Ca    10.1      14 Dec 2019 08:42          Labs personally reviewed      Assessment and Plan:   Assessment:  · Assessment		  Pt is a 63 Y/O Male with PMHx smoker 50+yr 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 Per family the patient had MR at [facility] 3 weeks ago showing diffuse leptomeningeal spread (films unavail). Family is still shrouding patient from diagnosis/prognosis. Exam: oriented x3, FC, distal LE 4/5, proximal LE 2-3/5, UE 4/5. SILT. Endorses back pain.      Problem/Plan - 1:  ·  Problem: Cord compression.  Plan: Seen on MRI  Neurosurg eval appreciated  [facility] records reviewed likely with metastatic small cell lung ca  Onc and rad onc follow up noted    Problem/Plan - 2:  ·  Problem: Pulmonary embolism.  Plan: COnt lovenox   Monitor Hgb level   O 2 supplement.     Problem/Plan - 3:  ·  Problem: Chest pain, atypical.  Plan: does not appear to be cardiac in nature. ?2/2 PE   EKG with nsr and no ST changes    Problem/Plan - 4:  Problem: Prophylactic measure. Plan: DVT and gI PPX.    Discussed GOC with daughters at bedside 12/14, they are considering home hospice        Petey Roberson DO [facility]  Cardiovascular Medicine  [address], [address]  Office: [phone]  Cell: [phone]

## 2019-12-14 NOTE — PROGRESS NOTE ADULT - PROBLEM SELECTOR PLAN 7
Thank you for allowing us to participate in your patient's care. We will continue to follow with you. Please page for any q's or c's.     Bahman Staples  Palliative Medicine

## 2019-12-14 NOTE — PROGRESS NOTE ADULT - PROBLEM SELECTOR PLAN 1
.  Current meds:  Gabapentin 300mg PO q8h  Methadone 2.5g PO TID (decreased 12/13)  Dilaudid 0.5mg IV q6h ATC  Dilaudid 0.5mg IV q2h PRN  Dexamethasone 4mg PO q6h    > Ideally, the patient should be on a PCA (if he is still awake enough to give himself the demand dose). This will afford pain relief while allowing the patient to self-regulate his somnolence. I brought this up with the daughter, who is considering it.   > I will monitor his symptoms for a day w/ the medication changes from yesterday and make recommendations destin if needed

## 2019-12-14 NOTE — PROGRESS NOTE ADULT - PROBLEM SELECTOR PLAN 6
Thank you for allowing us to participate in your patient's care. We will continue to follow with you. Please page for any q's or c's.     Bahman Staples  Palliative Medicine 12/14: As above, I spoke about home with hospice with the patient's daughter. She is considering it and wants to discuss it with the rest of her family 12/14: As above, I spoke about home with hospice with the patient's daughter. She is considering it and wants to discuss it with the rest of her family    Total face to face time discussing goals of care, advance care planning, code status and hospice = 15 mins

## 2019-12-14 NOTE — PROGRESS NOTE ADULT - PROBLEM SELECTOR PLAN 3
Rad ONC, Medical Onc, and Neuro Onc inputs noted. At this point his family seem to be focused on a symptoms driven approach; however, they are still having discussions among them in order to confirm that decision. As indicated above, symptoms Rx is a priority. Wife and daughters understand his poor prognosis.   His HCP and his family are also discussing about code status. For now he is full code. - c/w Benadryl to 12.5 mg IV q 4 PRN   - calamine lotion bid

## 2019-12-14 NOTE — PROGRESS NOTE ADULT - ASSESSMENT
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 Per family the patient had MR at [facility] 3 weeks ago showing diffuse leptomeningeal spread (films unavail). Family is still shrouding patient from diagnosis/prognosis. Exam: oriented x3, FC, distal LE 4/5, proximal LE 2-3/5, UE 4/5. SILT. Endorses back pain.

## 2019-12-14 NOTE — PROGRESS NOTE ADULT - PROBLEM SELECTOR PLAN 4
Biotin oral rinse Rad ONC, Medical Onc, and Neuro Onc inputs noted. At this point his family seem to be focused on a symptoms driven approach; however, they are still having discussions among them in order to confirm that decision. As indicated above, symptoms Rx is a priority. Wife and daughters understand his poor prognosis.   His HCP and his family are also discussing about code status. For now he is full code.

## 2019-12-14 NOTE — PROGRESS NOTE ADULT - PROBLEM SELECTOR PLAN 2
- c/w Benadryl to 12.5 mg IV q 4 PRN   - calamine lotion bid .  > Consider cutting back on Seroquel dose at bedtime to 12.5mg

## 2019-12-14 NOTE — PROGRESS NOTE ADULT - ASSESSMENT
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 Has lung cancer w/ mets to spine.     Hyponatremia  initially euvolemic; hyponatremia is appropriate ADH response to pain and nausea    - may be becoming hypovolemic now w/ poor PO intake per family    - check urine Na, urine Cl, urine osm today  - check SPEP, UPEP, free light chains, serum osm w/ next BMP  - please call me w/ results to decide next steps    - continue 1L fluid restricted diet  - liberalize salt intake in diet    - continue pain control, palliative care is following   - anti-emetics for control of nausea as needed    - UOP dropping, made only 925 mL yesterday compared to 1.5L the day prior  - continue to monitor I/O's    - repeat BMP Q8-12hrs    - Na should not change by more than 8 mEq in 24 hrs; please call nephrology if it does    Acidosis:  Non-AG; likely related to N/V previously  improved  continue to monitor    Increased BUN:  Sec to Steroids    Hematuria:  UA has RBC 5  Possible sec to france catheter  repeat UA  check renal US Pt is a 63 y/o male with pmhx Smoker 50+pk yrs, 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 Has lung cancer w/ mets to spine.     Hyponatremia  initially euvolemic; hyponatremia is appropriate ADH response to pain and nausea    - may be becoming hypovolemic now w/ poor PO intake per family    - consider remeron to help w/ appetite    - check urine Na, urine Cl, urine osm today  - check SPEP, UPEP, free light chains, serum osm w/ next BMP  - please call me w/ results to decide next steps    - continue 1L fluid restricted diet  - liberalize salt intake in diet    - continue pain control, palliative care is following   - anti-emetics for control of nausea as needed    - UOP dropping, made only 925 mL yesterday compared to 1.5L the day prior  - continue to monitor I/O's    - repeat BMP Q8-12hrs    - Na should not change by more than 8 mEq in 24 hrs; please call nephrology if it does    Acidosis:  Non-AG; likely related to N/V previously  improved  continue to monitor    Increased BUN:  Sec to Steroids    Hematuria:  UA has RBC 5  Possible sec to france catheter  repeat UA  check renal US

## 2019-12-14 NOTE — PROGRESS NOTE ADULT - SUBJECTIVE AND OBJECTIVE BOX
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 Palliative has been following for symptom management    ===============================================================  12/14  - The patient was unable to have a meaningful interaction with me  - I spoke to his family at bedside, and again with his daughter (Basilia) outside the room. She understand we are trying to find a balance of pain relief and wakefulness and she also understands this may be hard to achieve. We talked about their goals of care for him, but this is still not determined. The daughter is interest in home with hospice, which I discussed with her. I also gave him an estimated prognosis of days to weeks (more of days).   - I told the family that since the patient had medication changes yesterday, includingdecreasing his dose of Methadone and Gabapentin, that I would not make changes to his regimen as he looked comfortable during my visit      ===============================================================  DNR on chart: No   Allergies    No Known Allergies    Intolerances    MEDICATIONS  (STANDING):  acetaminophen  IVPB .. 1000 milliGRAM(s) IV Intermittent once  Biotene Dry Mouth Oral Rinse 5 milliLiter(s) Swish and Spit every 6 hours  calamine Lotion 1 Application(s) Topical two times a day  dexAMETHasone     Tablet 4 milliGRAM(s) Oral every 6 hours  enoxaparin Injectable 80 milliGRAM(s) SubCutaneous daily  gabapentin 300 milliGRAM(s) Oral every 8 hours  HYDROmorphone  Injectable 0.5 milliGRAM(s) IV Push every 6 hours  levETIRAcetam 500 milliGRAM(s) Oral two times a day  lidocaine   Patch 3 Patch Transdermal daily  lisinopril 10 milliGRAM(s) Oral daily  methadone    Tablet 2.5 milliGRAM(s) Oral <User Schedule>  pantoprazole  Injectable 40 milliGRAM(s) IV Push daily  QUEtiapine 25 milliGRAM(s) Oral at bedtime  senna 2 Tablet(s) Oral at bedtime    MEDICATIONS  (PRN):  acetaminophen   Tablet .. 650 milliGRAM(s) Oral every 6 hours PRN Mild Pain (1 - 3)  diphenhydrAMINE   Injectable 12.5 milliGRAM(s) IV Push every 4 hours PRN pruritus  HYDROmorphone  Injectable 0.5 milliGRAM(s) IV Push every 2 hours PRN Severe Pain (7 - 10)  naloxone Injectable 0.1 milliGRAM(s) IV Push every 3 minutes PRN sedation or respiratory depression due to opioids    ===========================================================  PHYSICAL EXAM  Vital Signs Last 24 Hrs  Vital Signs Last 24 Hrs  T(C): 36.3 (14 Dec 2019 13:28), Max: 36.4 (13 Dec 2019 14:37)  T(F): 97.3 (14 Dec 2019 13:28), Max: 97.6 (13 Dec 2019 14:37)  HR: 67 (14 Dec 2019 13:28) (67 - 74)  BP: 98/71 (14 Dec 2019 13:28) (98/71 - 115/77)  BP(mean): --  RR: 18 (14 Dec 2019 13:28) (16 - 18)  SpO2: 96% (14 Dec 2019 13:28) (92% - 96%)     GEN: +Lethargic, arousable, NAD, lying in bed  HEENT: Grossly normal, no secretions, anicteric sclerae  CV: Regular rate and rhythm, no murmurs, regular pulse  PULM: Comfortable work of breathing. no cough  ABD: Soft, not distended, non-tender  EXT: No edema  NEURO: No tremors, no gross deficits  PSYCH: Unable to assess  SKIN: No rashes or lesions on exposed skin

## 2019-12-15 LAB
ANION GAP SERPL CALC-SCNC: 11 MMOL/L — SIGNIFICANT CHANGE UP (ref 5–17)
ANION GAP SERPL CALC-SCNC: 13 MMOL/L — SIGNIFICANT CHANGE UP (ref 5–17)
BUN SERPL-MCNC: 67 MG/DL — HIGH (ref 7–23)
BUN SERPL-MCNC: 67 MG/DL — HIGH (ref 7–23)
CALCIUM SERPL-MCNC: 9.6 MG/DL — SIGNIFICANT CHANGE UP (ref 8.4–10.5)
CALCIUM SERPL-MCNC: 9.7 MG/DL — SIGNIFICANT CHANGE UP (ref 8.4–10.5)
CHLORIDE SERPL-SCNC: 100 MMOL/L — SIGNIFICANT CHANGE UP (ref 96–108)
CHLORIDE SERPL-SCNC: 100 MMOL/L — SIGNIFICANT CHANGE UP (ref 96–108)
CO2 SERPL-SCNC: 23 MMOL/L — SIGNIFICANT CHANGE UP (ref 22–31)
CO2 SERPL-SCNC: 23 MMOL/L — SIGNIFICANT CHANGE UP (ref 22–31)
CREAT SERPL-MCNC: 0.69 MG/DL — SIGNIFICANT CHANGE UP (ref 0.5–1.3)
CREAT SERPL-MCNC: 0.7 MG/DL — SIGNIFICANT CHANGE UP (ref 0.5–1.3)
GLUCOSE SERPL-MCNC: 115 MG/DL — HIGH (ref 70–99)
GLUCOSE SERPL-MCNC: 115 MG/DL — HIGH (ref 70–99)
HCT VFR BLD CALC: 45.4 % — SIGNIFICANT CHANGE UP (ref 39–50)
HGB BLD-MCNC: 14.9 G/DL — SIGNIFICANT CHANGE UP (ref 13–17)
MCHC RBC-ENTMCNC: 31.7 PG — SIGNIFICANT CHANGE UP (ref 27–34)
MCHC RBC-ENTMCNC: 32.8 GM/DL — SIGNIFICANT CHANGE UP (ref 32–36)
MCV RBC AUTO: 96.6 FL — SIGNIFICANT CHANGE UP (ref 80–100)
NRBC # BLD: 0 /100 WBCS — SIGNIFICANT CHANGE UP (ref 0–0)
OSMOLALITY SERPL: 299 MOSMOL/KG — SIGNIFICANT CHANGE UP (ref 280–301)
OSMOLALITY SERPL: 304 MOSMOL/KG — HIGH (ref 280–301)
PLATELET # BLD AUTO: 241 K/UL — SIGNIFICANT CHANGE UP (ref 150–400)
POTASSIUM SERPL-MCNC: 4.8 MMOL/L — SIGNIFICANT CHANGE UP (ref 3.5–5.3)
POTASSIUM SERPL-MCNC: 4.8 MMOL/L — SIGNIFICANT CHANGE UP (ref 3.5–5.3)
POTASSIUM SERPL-SCNC: 4.8 MMOL/L — SIGNIFICANT CHANGE UP (ref 3.5–5.3)
POTASSIUM SERPL-SCNC: 4.8 MMOL/L — SIGNIFICANT CHANGE UP (ref 3.5–5.3)
RBC # BLD: 4.7 M/UL — SIGNIFICANT CHANGE UP (ref 4.2–5.8)
RBC # FLD: 13.6 % — SIGNIFICANT CHANGE UP (ref 10.3–14.5)
SODIUM SERPL-SCNC: 134 MMOL/L — LOW (ref 135–145)
SODIUM SERPL-SCNC: 136 MMOL/L — SIGNIFICANT CHANGE UP (ref 135–145)
TRIGL SERPL-MCNC: 154 MG/DL — HIGH (ref 10–149)
WBC # BLD: 21.98 K/UL — HIGH (ref 3.8–10.5)
WBC # FLD AUTO: 21.98 K/UL — HIGH (ref 3.8–10.5)

## 2019-12-15 PROCEDURE — 99233 SBSQ HOSP IP/OBS HIGH 50: CPT

## 2019-12-15 PROCEDURE — 99497 ADVNCD CARE PLAN 30 MIN: CPT

## 2019-12-15 RX ORDER — METHYLPHENIDATE HCL 5 MG
5 TABLET ORAL
Refills: 0 | Status: DISCONTINUED | OUTPATIENT
Start: 2019-12-15 | End: 2019-12-16

## 2019-12-15 RX ORDER — SODIUM CHLORIDE 9 MG/ML
1000 INJECTION INTRAMUSCULAR; INTRAVENOUS; SUBCUTANEOUS
Refills: 0 | Status: DISCONTINUED | OUTPATIENT
Start: 2019-12-15 | End: 2019-12-21

## 2019-12-15 RX ADMIN — HYDROMORPHONE HYDROCHLORIDE 0.5 MILLIGRAM(S): 2 INJECTION INTRAMUSCULAR; INTRAVENOUS; SUBCUTANEOUS at 02:06

## 2019-12-15 RX ADMIN — HYDROMORPHONE HYDROCHLORIDE 0.5 MILLIGRAM(S): 2 INJECTION INTRAMUSCULAR; INTRAVENOUS; SUBCUTANEOUS at 14:51

## 2019-12-15 RX ADMIN — METHADONE HYDROCHLORIDE 2.5 MILLIGRAM(S): 40 TABLET ORAL at 22:21

## 2019-12-15 RX ADMIN — Medication 5 MILLILITER(S): at 17:14

## 2019-12-15 RX ADMIN — LEVETIRACETAM 500 MILLIGRAM(S): 250 TABLET, FILM COATED ORAL at 17:14

## 2019-12-15 RX ADMIN — LEVETIRACETAM 500 MILLIGRAM(S): 250 TABLET, FILM COATED ORAL at 06:07

## 2019-12-15 RX ADMIN — SODIUM CHLORIDE 75 MILLILITER(S): 9 INJECTION INTRAMUSCULAR; INTRAVENOUS; SUBCUTANEOUS at 13:41

## 2019-12-15 RX ADMIN — HYDROMORPHONE HYDROCHLORIDE 0.5 MILLIGRAM(S): 2 INJECTION INTRAMUSCULAR; INTRAVENOUS; SUBCUTANEOUS at 22:22

## 2019-12-15 RX ADMIN — METHADONE HYDROCHLORIDE 2.5 MILLIGRAM(S): 40 TABLET ORAL at 06:07

## 2019-12-15 RX ADMIN — GABAPENTIN 300 MILLIGRAM(S): 400 CAPSULE ORAL at 06:07

## 2019-12-15 RX ADMIN — Medication 4 MILLIGRAM(S): at 06:07

## 2019-12-15 RX ADMIN — HYDROMORPHONE HYDROCHLORIDE 0.5 MILLIGRAM(S): 2 INJECTION INTRAMUSCULAR; INTRAVENOUS; SUBCUTANEOUS at 09:03

## 2019-12-15 RX ADMIN — HYDROMORPHONE HYDROCHLORIDE 0.5 MILLIGRAM(S): 2 INJECTION INTRAMUSCULAR; INTRAVENOUS; SUBCUTANEOUS at 19:58

## 2019-12-15 RX ADMIN — HYDROMORPHONE HYDROCHLORIDE 0.5 MILLIGRAM(S): 2 INJECTION INTRAMUSCULAR; INTRAVENOUS; SUBCUTANEOUS at 04:27

## 2019-12-15 RX ADMIN — HYDROMORPHONE HYDROCHLORIDE 0.5 MILLIGRAM(S): 2 INJECTION INTRAMUSCULAR; INTRAVENOUS; SUBCUTANEOUS at 06:38

## 2019-12-15 RX ADMIN — HYDROMORPHONE HYDROCHLORIDE 0.5 MILLIGRAM(S): 2 INJECTION INTRAMUSCULAR; INTRAVENOUS; SUBCUTANEOUS at 02:21

## 2019-12-15 RX ADMIN — HYDROMORPHONE HYDROCHLORIDE 0.5 MILLIGRAM(S): 2 INJECTION INTRAMUSCULAR; INTRAVENOUS; SUBCUTANEOUS at 17:53

## 2019-12-15 RX ADMIN — Medication 5 MILLIGRAM(S): at 13:39

## 2019-12-15 RX ADMIN — PANTOPRAZOLE SODIUM 40 MILLIGRAM(S): 20 TABLET, DELAYED RELEASE ORAL at 13:37

## 2019-12-15 RX ADMIN — QUETIAPINE FUMARATE 25 MILLIGRAM(S): 200 TABLET, FILM COATED ORAL at 22:21

## 2019-12-15 RX ADMIN — METHADONE HYDROCHLORIDE 2.5 MILLIGRAM(S): 40 TABLET ORAL at 13:39

## 2019-12-15 RX ADMIN — HYDROMORPHONE HYDROCHLORIDE 0.5 MILLIGRAM(S): 2 INJECTION INTRAMUSCULAR; INTRAVENOUS; SUBCUTANEOUS at 04:42

## 2019-12-15 RX ADMIN — HYDROMORPHONE HYDROCHLORIDE 0.5 MILLIGRAM(S): 2 INJECTION INTRAMUSCULAR; INTRAVENOUS; SUBCUTANEOUS at 06:23

## 2019-12-15 RX ADMIN — Medication 5 MILLILITER(S): at 13:39

## 2019-12-15 RX ADMIN — ENOXAPARIN SODIUM 80 MILLIGRAM(S): 100 INJECTION SUBCUTANEOUS at 13:38

## 2019-12-15 RX ADMIN — GABAPENTIN 300 MILLIGRAM(S): 400 CAPSULE ORAL at 13:40

## 2019-12-15 RX ADMIN — HYDROMORPHONE HYDROCHLORIDE 0.5 MILLIGRAM(S): 2 INJECTION INTRAMUSCULAR; INTRAVENOUS; SUBCUTANEOUS at 20:15

## 2019-12-15 RX ADMIN — Medication 4 MILLIGRAM(S): at 22:23

## 2019-12-15 RX ADMIN — GABAPENTIN 300 MILLIGRAM(S): 400 CAPSULE ORAL at 22:21

## 2019-12-15 RX ADMIN — SENNA PLUS 2 TABLET(S): 8.6 TABLET ORAL at 22:21

## 2019-12-15 RX ADMIN — CALAMINE AND ZINC OXIDE AND PHENOL 1 APPLICATION(S): 160; 10 LOTION TOPICAL at 06:07

## 2019-12-15 RX ADMIN — CALAMINE AND ZINC OXIDE AND PHENOL 1 APPLICATION(S): 160; 10 LOTION TOPICAL at 19:58

## 2019-12-15 RX ADMIN — Medication 4 MILLIGRAM(S): at 13:39

## 2019-12-15 RX ADMIN — Medication 5 MILLILITER(S): at 06:06

## 2019-12-15 RX ADMIN — Medication 4 MILLIGRAM(S): at 17:14

## 2019-12-15 RX ADMIN — HYDROMORPHONE HYDROCHLORIDE 0.5 MILLIGRAM(S): 2 INJECTION INTRAMUSCULAR; INTRAVENOUS; SUBCUTANEOUS at 22:37

## 2019-12-15 RX ADMIN — HYDROMORPHONE HYDROCHLORIDE 0.5 MILLIGRAM(S): 2 INJECTION INTRAMUSCULAR; INTRAVENOUS; SUBCUTANEOUS at 17:23

## 2019-12-15 RX ADMIN — HYDROMORPHONE HYDROCHLORIDE 0.5 MILLIGRAM(S): 2 INJECTION INTRAMUSCULAR; INTRAVENOUS; SUBCUTANEOUS at 15:21

## 2019-12-15 RX ADMIN — HYDROMORPHONE HYDROCHLORIDE 0.5 MILLIGRAM(S): 2 INJECTION INTRAMUSCULAR; INTRAVENOUS; SUBCUTANEOUS at 09:33

## 2019-12-15 NOTE — PROVIDER CONTACT NOTE (MEDICATION) - ASSESSMENT
Pt. with noticable grimace on face. Dilaudid being given for pain management with positive effect noted.

## 2019-12-15 NOTE — PROGRESS NOTE ADULT - ASSESSMENT
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 Per family the patient had MR at [facility] 3 weeks ago showing diffuse leptomeningeal spread (films unavail). Family is still shrouding patient from diagnosis/prognosis. Exam: oriented x3, FC, distal LE 4/5, proximal LE 2-3/5, UE 4/5. SILT. Endorses back pain.

## 2019-12-15 NOTE — PROGRESS NOTE ADULT - SUBJECTIVE AND OBJECTIVE BOX
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 Palliative has been following for symptom management    ===============================================================  12/15  - The patient was unable to have a meaningful interaction with me. He was obtunded and restless, but not agitated.  - I spoke with one of his daughters outside the room. She said that she has not spoken to her siblings yet about hospice. I expressed my worry that her father may not have time left (prognosis: days). I also spoke to her about DNR, DNI and she said that she has had these discussions in the past. I told her it was important for them to guide the medical teams in terms of telling us when "enough is enough". I acknowledged their strength and frustrations seeing their father decline like this, but in the end I recommended that we pursue a more comfort-centric approach to his care. She said she would discuss it with her family.    ===============================================================  DNR on chart: No   Allergies    No Known Allergies    Intolerances    MEDICATIONS  (STANDING):  Biotene Dry Mouth Oral Rinse 5 milliLiter(s) Swish and Spit every 6 hours  calamine Lotion 1 Application(s) Topical two times a day  dexAMETHasone     Tablet 4 milliGRAM(s) Oral every 6 hours  enoxaparin Injectable 80 milliGRAM(s) SubCutaneous daily  gabapentin 300 milliGRAM(s) Oral every 8 hours  HYDROmorphone  Injectable 0.5 milliGRAM(s) IV Push every 6 hours  levETIRAcetam 500 milliGRAM(s) Oral two times a day  lisinopril 10 milliGRAM(s) Oral daily  methadone    Tablet 2.5 milliGRAM(s) Oral <User Schedule>  pantoprazole  Injectable 40 milliGRAM(s) IV Push daily  QUEtiapine 25 milliGRAM(s) Oral at bedtime  senna 2 Tablet(s) Oral at bedtime  sodium chloride 0.9%. 1000 milliLiter(s) (75 mL/Hr) IV Continuous <Continuous>  sodium chloride 0.9%. 1000 milliLiter(s) (75 mL/Hr) IV Continuous <Continuous>    MEDICATIONS  (PRN):  acetaminophen   Tablet .. 650 milliGRAM(s) Oral every 6 hours PRN Mild Pain (1 - 3)  diphenhydrAMINE   Injectable 12.5 milliGRAM(s) IV Push every 4 hours PRN pruritus  HYDROmorphone  Injectable 0.5 milliGRAM(s) IV Push every 2 hours PRN Severe Pain (7 - 10)  naloxone Injectable 0.1 milliGRAM(s) IV Push every 3 minutes PRN sedation or respiratory depression due to opioids    ===========================================================  PHYSICAL EXAM  Vital Signs Last 24 Hrs  T(C): 36.5 (15 Dec 2019 04:19), Max: 36.8 (14 Dec 2019 21:56)  T(F): 97.7 (15 Dec 2019 04:19), Max: 98.2 (14 Dec 2019 21:56)  HR: 69 (15 Dec 2019 04:19) (67 - 76)  BP: 105/69 (15 Dec 2019 04:19) (88/61 - 105/69)  BP(mean): --  RR: 18 (15 Dec 2019 04:19) (17 - 18)  SpO2: 95% (15 Dec 2019 04:19) (92% - 96%)    GEN: +Not alert, +Restless, +No purposeful movement, NAD, lying in bed  HEENT: Grossly normal, no secretions, anicteric sclerae  CV: Regular rate and rhythm, no murmurs, regular pulse  PULM: Comfortable work of breathing. no cough  ABD: Soft, not distended, non-tender  EXT: No edema  NEURO: No tremors  PSYCH: Unable to assess  SKIN: No rashes or lesions on exposed skin

## 2019-12-15 NOTE — PROGRESS NOTE ADULT - PROBLEM SELECTOR PLAN 1
Seen on MRI, mets to spine   Neurosurg eval appreciated  Onc eval appreciated  possible systemic treatment, poor candidate  Rad Onc eval noted   Decadron 4mg Q6hrs, taper as per surg team   pain control, aggressive   Neuro check  Palliative eval appreciated, pain control   poor candidate for palliative rads  seen by Onc/Rad today   may benefits form chemo however family refusing and stating that he has no power to get chemo   discussed in detail with family at the bedside  pain control and muscle relaxant  possible morphin pump   second opinion Onc rafael appreciated  family to decide regarding DNR-DNI and hospice care  would like to discuss with family   palliative F/U appreciated

## 2019-12-15 NOTE — PROGRESS NOTE ADULT - PROBLEM SELECTOR PLAN 3
Rad ONC, Medical Onc, and Neuro Onc inputs noted. At this point his family seem to be focused on a symptoms driven approach; however, they are still having discussions among them in order to confirm that decision. As indicated above, symptoms Rx is a priority. Wife and daughters understand his poor prognosis.   His HCP and his family are also discussing about code status. For now he is full code.    12/15: Again, I spoke with the daughter at length about code status, hospice and comfort care measures. They will discuss this amongst themselves.

## 2019-12-15 NOTE — PROGRESS NOTE ADULT - PROBLEM SELECTOR PLAN 5
12/15: Total face to face time discussing goals of care, advance care planning, code status and hospice = 30 mins

## 2019-12-15 NOTE — PROGRESS NOTE ADULT - SUBJECTIVE AND OBJECTIVE BOX
[facility]   MD Bijal Chao D.O. Fatima Sheikh, D.O. Ruoru Wong, PA    From 7 AM - 5 PM:  OFFICE: [phone]  Dr. Welsh cell: [phone]  Dr. Chatman cell: [phone]  Dr. Velasquez cell: [phone]  INGRID Banuelos cell: [phone]    From 5 PM - 7 AM: Answering Service: [phone]        RENAL FOLLOW UP NOTE  --------------------------------------------------------------------------------  HPI: Pt seen and examined. Somnolent this AM but arousable. Has mild pain. No other complaints. Still has poor appetite.        PAST HISTORY  --------------------------------------------------------------------------------  No significant changes to PMH, PSH, FHx, SHx, unless otherwise noted    ALLERGIES & MEDICATIONS  --------------------------------------------------------------------------------  Allergies    No Known Allergies    Intolerances      Standing Inpatient Medications  Biotene Dry Mouth Oral Rinse 5 milliLiter(s) Swish and Spit every 6 hours  calamine Lotion 1 Application(s) Topical two times a day  dexAMETHasone     Tablet 4 milliGRAM(s) Oral every 6 hours  enoxaparin Injectable 80 milliGRAM(s) SubCutaneous daily  gabapentin 300 milliGRAM(s) Oral every 8 hours  HYDROmorphone  Injectable 0.5 milliGRAM(s) IV Push every 6 hours  levETIRAcetam 500 milliGRAM(s) Oral two times a day  lisinopril 10 milliGRAM(s) Oral daily  methadone    Tablet 2.5 milliGRAM(s) Oral <User Schedule>  pantoprazole  Injectable 40 milliGRAM(s) IV Push daily  QUEtiapine 25 milliGRAM(s) Oral at bedtime  senna 2 Tablet(s) Oral at bedtime  sodium chloride 0.9%. 1000 milliLiter(s) IV Continuous <Continuous>    PRN Inpatient Medications  acetaminophen   Tablet .. 650 milliGRAM(s) Oral every 6 hours PRN  diphenhydrAMINE   Injectable 12.5 milliGRAM(s) IV Push every 4 hours PRN  HYDROmorphone  Injectable 0.5 milliGRAM(s) IV Push every 2 hours PRN  naloxone Injectable 0.1 milliGRAM(s) IV Push every 3 minutes PRN      REVIEW OF SYSTEMS  --------------------------------------------------------------------------------  General: no fever  CVS: no chest pain  RESP: no sob, no cough  ABD: no abdominal pain  : no dysuria,  MSK: no edema     VITALS/PHYSICAL EXAM  --------------------------------------------------------------------------------  T(C): 36.5 (12-15-19 @ 04:19), Max: 36.8 (12-14-19 @ 21:56)  HR: 69 (12-15-19 @ 04:19) (67 - 76)  BP: 105/69 (12-15-19 @ 04:19) (88/61 - 105/69)  RR: 18 (12-15-19 @ 04:19) (17 - 18)  SpO2: 95% (12-15-19 @ 04:19) (92% - 96%)  Wt(kg): --        12-14-19 @ 07:01  -  12-15-19 @ 07:00  --------------------------------------------------------  IN: 1435 mL / OUT: 1275 mL / NET: 160 mL      Physical Exam:  	Gen: NAD  	HEENT: MMM  	Pulm: CTA B/L  	CV: S1S2  	Abd: Soft, +BS  	Ext: No LE edema B/L              Neuro: Awake   	Skin: Warm and Dry       LABS/STUDIES  --------------------------------------------------------------------------------              14.9   21.98 >-----------<  241      [12-15-19 @ 02:33]              45.4     136  |  100  |  67  ----------------------------<  115      [12-15-19 @ 02:33]  4.8   |  23  |  0.69        Ca     9.6     [12-15-19 @ 02:33]          Serum Osmolality 304      [12-15-19 @ 02:33]    Creatinine Trend:  SCr 0.69 [12-15 @ 02:33]  SCr 0.51 [12-14 @ 08:42]  SCr 0.54 [12-13 @ 08:34]  SCr 0.58 [12-13 @ 07:04]  SCr 0.50 [12-12 @ 08:59]    Urinalysis - [12-14-19 @ 18:47]      Color Yellow / Appearance Clear / SG 1.032 / pH 5.5      Gluc Negative / Ketone Negative  / Bili Negative / Urobili Negative       Blood Negative / Protein Trace / Leuk Est Negative / Nitrite Negative      RBC 2 / WBC 1 / Hyaline 0 / Gran  / Sq Epi  / Non Sq Epi 0 / Bacteria 0.0    Urine Sodium <35      [12-14-19 @ 14:47]  Urine Chloride <35      [12-14-19 @ 14:47]  Urine Osmolality 969      [12-14-19 @ 14:47]    TSH 0.33      [12-08-19 @ 09:35]

## 2019-12-15 NOTE — PROGRESS NOTE ADULT - ASSESSMENT
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 Has lung cancer w/ mets to spine.     Hyponatremia  initially euvolemic; hyponatremia is appropriate ADH response to pain and nausea    - may be becoming hypovolemic now w/ poor PO intake per family    - consider remeron to help w/ appetite    - check urine Na, urine Cl, urine osm today  - check SPEP, UPEP, free light chains, serum osm w/ next BMP  - please call me w/ results to decide next steps    - continue 1L fluid restricted diet  - liberalize salt intake in diet    - continue pain control, palliative care is following   - anti-emetics for control of nausea as needed    - UOP dropping, made only 925 mL yesterday compared to 1.5L the day prior  - continue to monitor I/O's    - repeat BMP Q8-12hrs    - Na should not change by more than 8 mEq in 24 hrs; please call nephrology if it does    Acidosis:  Non-AG; likely related to N/V previously  improved  continue to monitor    Increased BUN:  Sec to Steroids    Hematuria:  UA has RBC 5  Possible sec to france catheter  repeat UA  check renal US Pt is a 65 y/o male with pmhx Smoker 50+pk yrs, 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 Has lung cancer w/ mets to spine.     Hyponatremia  initially euvolemic; hyponatremia is appropriate ADH response to pain and nausea  now hypovolemic hyponatremia 2/2 poor PO intake    - consider remeron to help w/ appetite    - serum osm is elevated; atypical given hypovolemic hyponatremia    - recommend starting IV NS @ 50-75 mL/hr continuous given poor PO intake  - check SPEP, UPEP, free light chains, serum osm, serum triglycerides w/ next BMP    - continue 1L fluid restricted diet  - liberalize salt intake in diet    - continue pain control, palliative care is following   - anti-emetics for control of nausea as needed    - UOP 1.3L yesterday  - continue to monitor I/O's    - repeat BMP Q12hrs    - Na should not change by more than 8 mEq in 24 hrs; please call nephrology if it does    Acidosis:  Non-AG; likely related to N/V previously  improved  continue to monitor    Increased BUN:  Sec to Steroids    Hematuria:  initial UA had RBC 5  likely 2/2 fracne catheter  resolved on repeat UA

## 2019-12-15 NOTE — PROGRESS NOTE ADULT - SUBJECTIVE AND OBJECTIVE BOX
[facility]    Subjective: Patient seen and examined. No new events except as noted.   worsening symptoms with poor oral intake and motor weakness     REVIEW OF SYSTEMS:  + weakness   unable to provide due to weakness       MEDICATIONS:  MEDICATIONS  (STANDING):  Biotene Dry Mouth Oral Rinse 5 milliLiter(s) Swish and Spit every 6 hours  calamine Lotion 1 Application(s) Topical two times a day  dexAMETHasone     Tablet 4 milliGRAM(s) Oral every 6 hours  enoxaparin Injectable 80 milliGRAM(s) SubCutaneous daily  gabapentin 300 milliGRAM(s) Oral every 8 hours  HYDROmorphone  Injectable 0.5 milliGRAM(s) IV Push every 6 hours  levETIRAcetam 500 milliGRAM(s) Oral two times a day  lisinopril 10 milliGRAM(s) Oral daily  methadone    Tablet 2.5 milliGRAM(s) Oral <User Schedule>  methylphenidate 5 milliGRAM(s) Oral two times a day  pantoprazole  Injectable 40 milliGRAM(s) IV Push daily  QUEtiapine 25 milliGRAM(s) Oral at bedtime  senna 2 Tablet(s) Oral at bedtime  sodium chloride 0.9%. 1000 milliLiter(s) (75 mL/Hr) IV Continuous <Continuous>  sodium chloride 0.9%. 1000 milliLiter(s) (75 mL/Hr) IV Continuous <Continuous>      PHYSICAL EXAM:  T(C): 36.8 (12-15-19 @ 14:46), Max: 36.8 (19 @ 21:56)  HR: 65 (12-15-19 @ 14:46) (65 - 76)  BP: 118/79 (12-15-19 @ 14:46) (88/61 - 118/79)  RR: 19 (12-15-19 @ 14:46) (17 - 19)  SpO2: 96% (12-15-19 @ 14:46) (92% - 96%)  Wt(kg): --  I&O's Summary    14 Dec 2019 07:01  -  15 Dec 2019 07:00  --------------------------------------------------------  IN: 1435 mL / OUT: 1275 mL / NET: 160 mL    15 Dec 2019 07:01  -  15 Dec 2019 15:02  --------------------------------------------------------  IN: 600 mL / OUT: 600 mL / NET: 0 mL          Appearance: awake, frail sick looking   HEENT: dry OM 	  Lymphatic: No lymphadenopathy , no edema  Cardiovascular: Normal S1 S2  Respiratory: Lungs clear to auscultation, normal effort 	  Gastrointestinal:  Soft  Skin: warm to touch  Musculoskeletal: lower ext motor and sensory loss, UE moderate motor loss   Psychiatry: calm   Ext: No edema      All labs, Imaging and EKGs personally reviewed                           14. )-----------( 241      ( 15 Dec 2019 02:33 )             45.4               12-15    136  |  100  |  67<H>  ----------------------------<  115<H>  4.8   |  23  |  0.69    Ca    9.6      15 Dec 2019 02:33                         Urinalysis Basic - ( 14 Dec 2019 18:47 )    Color: Yellow / Appearance: Clear / S.032 / pH: x  Gluc: x / Ketone: Negative  / Bili: Negative / Urobili: Negative   Blood: x / Protein: Trace / Nitrite: Negative   Leuk Esterase: Negative / RBC: 2 /hpf / WBC 1 /HPF   Sq Epi: x / Non Sq Epi: 0 /hpf / Bacteria: 0.0

## 2019-12-16 LAB
% ALBUMIN: 59.1 % — SIGNIFICANT CHANGE UP
% ALPHA 1: 6.6 % — SIGNIFICANT CHANGE UP
% ALPHA 2: 10.4 % — SIGNIFICANT CHANGE UP
% BETA: 12.4 % — SIGNIFICANT CHANGE UP
% GAMMA: 11.5 % — SIGNIFICANT CHANGE UP
ALBUMIN SERPL ELPH-MCNC: 3.8 G/DL — SIGNIFICANT CHANGE UP (ref 3.6–5.5)
ALBUMIN/GLOB SERPL ELPH: 1.5 RATIO — SIGNIFICANT CHANGE UP
ALPHA1 GLOB SERPL ELPH-MCNC: 0.4 G/DL — SIGNIFICANT CHANGE UP (ref 0.1–0.4)
ALPHA2 GLOB SERPL ELPH-MCNC: 0.7 G/DL — SIGNIFICANT CHANGE UP (ref 0.5–1)
ANION GAP SERPL CALC-SCNC: 10 MMOL/L — SIGNIFICANT CHANGE UP (ref 5–17)
B-GLOBULIN SERPL ELPH-MCNC: 0.8 G/DL — SIGNIFICANT CHANGE UP (ref 0.5–1)
BASOPHILS # BLD AUTO: 0 K/UL — SIGNIFICANT CHANGE UP (ref 0–0.2)
BASOPHILS NFR BLD AUTO: 0 % — SIGNIFICANT CHANGE UP (ref 0–2)
BUN SERPL-MCNC: 39 MG/DL — HIGH (ref 7–23)
CALCIUM SERPL-MCNC: 9.8 MG/DL — SIGNIFICANT CHANGE UP (ref 8.4–10.5)
CHLORIDE SERPL-SCNC: 103 MMOL/L — SIGNIFICANT CHANGE UP (ref 96–108)
CO2 SERPL-SCNC: 23 MMOL/L — SIGNIFICANT CHANGE UP (ref 22–31)
CREAT SERPL-MCNC: 0.45 MG/DL — LOW (ref 0.5–1.3)
EOSINOPHIL # BLD AUTO: 0 K/UL — SIGNIFICANT CHANGE UP (ref 0–0.5)
EOSINOPHIL NFR BLD AUTO: 0 % — SIGNIFICANT CHANGE UP (ref 0–6)
GAMMA GLOBULIN: 0.7 G/DL — SIGNIFICANT CHANGE UP (ref 0.6–1.6)
GIANT PLATELETS BLD QL SMEAR: PRESENT — SIGNIFICANT CHANGE UP
GLUCOSE SERPL-MCNC: 83 MG/DL — SIGNIFICANT CHANGE UP (ref 70–99)
HCT VFR BLD CALC: 45.2 % — SIGNIFICANT CHANGE UP (ref 39–50)
HGB BLD-MCNC: 14.6 G/DL — SIGNIFICANT CHANGE UP (ref 13–17)
KAPPA LC SER QL IFE: 1.22 MG/DL — SIGNIFICANT CHANGE UP (ref 0.33–1.94)
KAPPA LC SER QL IFE: 1.22 MG/DL — SIGNIFICANT CHANGE UP (ref 0.33–1.94)
KAPPA/LAMBDA FREE LIGHT CHAIN RATIO, SERUM: 0.85 RATIO — SIGNIFICANT CHANGE UP (ref 0.26–1.65)
KAPPA/LAMBDA FREE LIGHT CHAIN RATIO, SERUM: 0.85 RATIO — SIGNIFICANT CHANGE UP (ref 0.26–1.65)
LAMBDA LC SER QL IFE: 1.43 MG/DL — SIGNIFICANT CHANGE UP (ref 0.57–2.63)
LAMBDA LC SER QL IFE: 1.43 MG/DL — SIGNIFICANT CHANGE UP (ref 0.57–2.63)
LYMPHOCYTES # BLD AUTO: 0.64 K/UL — LOW (ref 1–3.3)
LYMPHOCYTES # BLD AUTO: 3.5 % — LOW (ref 13–44)
MANUAL SMEAR VERIFICATION: SIGNIFICANT CHANGE UP
MCHC RBC-ENTMCNC: 31.4 PG — SIGNIFICANT CHANGE UP (ref 27–34)
MCHC RBC-ENTMCNC: 32.3 GM/DL — SIGNIFICANT CHANGE UP (ref 32–36)
MCV RBC AUTO: 97.2 FL — SIGNIFICANT CHANGE UP (ref 80–100)
MONOCYTES # BLD AUTO: 1.27 K/UL — HIGH (ref 0–0.9)
MONOCYTES NFR BLD AUTO: 6.9 % — SIGNIFICANT CHANGE UP (ref 2–14)
NEUTROPHILS # BLD AUTO: 16.45 K/UL — HIGH (ref 1.8–7.4)
NEUTROPHILS NFR BLD AUTO: 89.6 % — HIGH (ref 43–77)
PLAT MORPH BLD: ABNORMAL
PLATELET # BLD AUTO: 263 K/UL — SIGNIFICANT CHANGE UP (ref 150–400)
POTASSIUM SERPL-MCNC: 4.4 MMOL/L — SIGNIFICANT CHANGE UP (ref 3.5–5.3)
POTASSIUM SERPL-SCNC: 4.4 MMOL/L — SIGNIFICANT CHANGE UP (ref 3.5–5.3)
PROT PATTERN SERPL ELPH-IMP: SIGNIFICANT CHANGE UP
PROT SERPL-MCNC: 5.4 G/DL — LOW (ref 6–8.3)
PROT SERPL-MCNC: 5.4 G/DL — LOW (ref 6–8.3)
PROT SERPL-MCNC: 6.4 G/DL — SIGNIFICANT CHANGE UP (ref 6–8.3)
RBC # BLD: 4.65 M/UL — SIGNIFICANT CHANGE UP (ref 4.2–5.8)
RBC # FLD: 13.8 % — SIGNIFICANT CHANGE UP (ref 10.3–14.5)
RBC BLD AUTO: SIGNIFICANT CHANGE UP
SODIUM SERPL-SCNC: 136 MMOL/L — SIGNIFICANT CHANGE UP (ref 135–145)
WBC # BLD: 18.36 K/UL — HIGH (ref 3.8–10.5)
WBC # FLD AUTO: 18.36 K/UL — HIGH (ref 3.8–10.5)

## 2019-12-16 PROCEDURE — 99497 ADVNCD CARE PLAN 30 MIN: CPT | Mod: 25

## 2019-12-16 PROCEDURE — 99233 SBSQ HOSP IP/OBS HIGH 50: CPT

## 2019-12-16 RX ORDER — HYDROMORPHONE HYDROCHLORIDE 2 MG/ML
30 INJECTION INTRAMUSCULAR; INTRAVENOUS; SUBCUTANEOUS
Refills: 0 | Status: DISCONTINUED | OUTPATIENT
Start: 2019-12-16 | End: 2019-12-17

## 2019-12-16 RX ORDER — METHYLPHENIDATE HCL 5 MG
5 TABLET ORAL
Refills: 0 | Status: DISCONTINUED | OUTPATIENT
Start: 2019-12-16 | End: 2019-12-21

## 2019-12-16 RX ORDER — MAGNESIUM HYDROXIDE 400 MG/1
30 TABLET, CHEWABLE ORAL DAILY
Refills: 0 | Status: DISCONTINUED | OUTPATIENT
Start: 2019-12-16 | End: 2019-12-21

## 2019-12-16 RX ORDER — ONDANSETRON 8 MG/1
4 TABLET, FILM COATED ORAL EVERY 6 HOURS
Refills: 0 | Status: DISCONTINUED | OUTPATIENT
Start: 2019-12-16 | End: 2019-12-20

## 2019-12-16 RX ORDER — HYDROMORPHONE HYDROCHLORIDE 2 MG/ML
0.5 INJECTION INTRAMUSCULAR; INTRAVENOUS; SUBCUTANEOUS
Refills: 0 | Status: DISCONTINUED | OUTPATIENT
Start: 2019-12-16 | End: 2019-12-17

## 2019-12-16 RX ADMIN — Medication 12.5 MILLIGRAM(S): at 07:58

## 2019-12-16 RX ADMIN — Medication 5 MILLILITER(S): at 05:48

## 2019-12-16 RX ADMIN — GABAPENTIN 300 MILLIGRAM(S): 400 CAPSULE ORAL at 05:49

## 2019-12-16 RX ADMIN — Medication 4 MILLIGRAM(S): at 22:31

## 2019-12-16 RX ADMIN — HYDROMORPHONE HYDROCHLORIDE 0.5 MILLIGRAM(S): 2 INJECTION INTRAMUSCULAR; INTRAVENOUS; SUBCUTANEOUS at 07:57

## 2019-12-16 RX ADMIN — HYDROMORPHONE HYDROCHLORIDE 0.5 MILLIGRAM(S): 2 INJECTION INTRAMUSCULAR; INTRAVENOUS; SUBCUTANEOUS at 02:14

## 2019-12-16 RX ADMIN — LEVETIRACETAM 500 MILLIGRAM(S): 250 TABLET, FILM COATED ORAL at 05:49

## 2019-12-16 RX ADMIN — HYDROMORPHONE HYDROCHLORIDE 0.5 MILLIGRAM(S): 2 INJECTION INTRAMUSCULAR; INTRAVENOUS; SUBCUTANEOUS at 11:37

## 2019-12-16 RX ADMIN — HYDROMORPHONE HYDROCHLORIDE 0.5 MILLIGRAM(S): 2 INJECTION INTRAMUSCULAR; INTRAVENOUS; SUBCUTANEOUS at 14:22

## 2019-12-16 RX ADMIN — Medication 4 MILLIGRAM(S): at 05:48

## 2019-12-16 RX ADMIN — HYDROMORPHONE HYDROCHLORIDE 0.5 MILLIGRAM(S): 2 INJECTION INTRAMUSCULAR; INTRAVENOUS; SUBCUTANEOUS at 08:27

## 2019-12-16 RX ADMIN — LEVETIRACETAM 500 MILLIGRAM(S): 250 TABLET, FILM COATED ORAL at 17:18

## 2019-12-16 RX ADMIN — HYDROMORPHONE HYDROCHLORIDE 0.5 MILLIGRAM(S): 2 INJECTION INTRAMUSCULAR; INTRAVENOUS; SUBCUTANEOUS at 16:07

## 2019-12-16 RX ADMIN — SODIUM CHLORIDE 75 MILLILITER(S): 9 INJECTION INTRAMUSCULAR; INTRAVENOUS; SUBCUTANEOUS at 08:00

## 2019-12-16 RX ADMIN — HYDROMORPHONE HYDROCHLORIDE 0.5 MILLIGRAM(S): 2 INJECTION INTRAMUSCULAR; INTRAVENOUS; SUBCUTANEOUS at 04:00

## 2019-12-16 RX ADMIN — QUETIAPINE FUMARATE 25 MILLIGRAM(S): 200 TABLET, FILM COATED ORAL at 22:32

## 2019-12-16 RX ADMIN — METHADONE HYDROCHLORIDE 2.5 MILLIGRAM(S): 40 TABLET ORAL at 14:03

## 2019-12-16 RX ADMIN — HYDROMORPHONE HYDROCHLORIDE 0.5 MILLIGRAM(S): 2 INJECTION INTRAMUSCULAR; INTRAVENOUS; SUBCUTANEOUS at 14:52

## 2019-12-16 RX ADMIN — ENOXAPARIN SODIUM 80 MILLIGRAM(S): 100 INJECTION SUBCUTANEOUS at 14:06

## 2019-12-16 RX ADMIN — HYDROMORPHONE HYDROCHLORIDE 0.5 MILLIGRAM(S): 2 INJECTION INTRAMUSCULAR; INTRAVENOUS; SUBCUTANEOUS at 06:15

## 2019-12-16 RX ADMIN — PANTOPRAZOLE SODIUM 40 MILLIGRAM(S): 20 TABLET, DELAYED RELEASE ORAL at 14:04

## 2019-12-16 RX ADMIN — Medication 5 MILLILITER(S): at 17:18

## 2019-12-16 RX ADMIN — HYDROMORPHONE HYDROCHLORIDE 30 MILLILITER(S): 2 INJECTION INTRAMUSCULAR; INTRAVENOUS; SUBCUTANEOUS at 19:23

## 2019-12-16 RX ADMIN — HYDROMORPHONE HYDROCHLORIDE 0.5 MILLIGRAM(S): 2 INJECTION INTRAMUSCULAR; INTRAVENOUS; SUBCUTANEOUS at 00:00

## 2019-12-16 RX ADMIN — SENNA PLUS 2 TABLET(S): 8.6 TABLET ORAL at 22:32

## 2019-12-16 RX ADMIN — Medication 5 MILLILITER(S): at 00:01

## 2019-12-16 RX ADMIN — Medication 5 MILLIGRAM(S): at 05:48

## 2019-12-16 RX ADMIN — HYDROMORPHONE HYDROCHLORIDE 0.5 MILLIGRAM(S): 2 INJECTION INTRAMUSCULAR; INTRAVENOUS; SUBCUTANEOUS at 04:15

## 2019-12-16 RX ADMIN — CALAMINE AND ZINC OXIDE AND PHENOL 1 APPLICATION(S): 160; 10 LOTION TOPICAL at 17:17

## 2019-12-16 RX ADMIN — HYDROMORPHONE HYDROCHLORIDE 0.5 MILLIGRAM(S): 2 INJECTION INTRAMUSCULAR; INTRAVENOUS; SUBCUTANEOUS at 02:30

## 2019-12-16 RX ADMIN — HYDROMORPHONE HYDROCHLORIDE 0.5 MILLIGRAM(S): 2 INJECTION INTRAMUSCULAR; INTRAVENOUS; SUBCUTANEOUS at 06:00

## 2019-12-16 RX ADMIN — GABAPENTIN 300 MILLIGRAM(S): 400 CAPSULE ORAL at 14:05

## 2019-12-16 RX ADMIN — HYDROMORPHONE HYDROCHLORIDE 30 MILLILITER(S): 2 INJECTION INTRAMUSCULAR; INTRAVENOUS; SUBCUTANEOUS at 17:14

## 2019-12-16 RX ADMIN — GABAPENTIN 300 MILLIGRAM(S): 400 CAPSULE ORAL at 22:32

## 2019-12-16 RX ADMIN — HYDROMORPHONE HYDROCHLORIDE 0.5 MILLIGRAM(S): 2 INJECTION INTRAMUSCULAR; INTRAVENOUS; SUBCUTANEOUS at 00:15

## 2019-12-16 RX ADMIN — CALAMINE AND ZINC OXIDE AND PHENOL 1 APPLICATION(S): 160; 10 LOTION TOPICAL at 05:49

## 2019-12-16 RX ADMIN — Medication 4 MILLIGRAM(S): at 14:05

## 2019-12-16 RX ADMIN — METHADONE HYDROCHLORIDE 2.5 MILLIGRAM(S): 40 TABLET ORAL at 05:49

## 2019-12-16 NOTE — PROGRESS NOTE ADULT - PROBLEM SELECTOR PLAN 1
Seen on MRI, mets to spine   Neurosurg eval appreciated  Onc eval appreciated  possible systemic treatment, poor candidate  Rad Onc eval noted   Decadron 4mg Q6hrs, taper as per surg team   pain control, aggressive   Neuro check  Palliative eval appreciated, pain control   poor candidate for palliative rads  seen by Onc/Rad today   may benefits form chemo however family refusing and stating that he has no power to get chemo   discussed in detail with family at the bedside  pain control and muscle relaxant  possible morphin pump   second opinion Onc eval appreciated  palliative F/U appreciated  family decided o DNR-DNI  would like to take him home with home hospice

## 2019-12-16 NOTE — PROGRESS NOTE ADULT - ASSESSMENT
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 Per family the patient had MR at [facility] 3 weeks ago showing diffuse leptomeningeal spread (films unavail). Family is still shrouding patient from diagnosis/prognosis. Exam: oriented x3, FC, distal LE 4/5, proximal LE 2-3/5, UE 4/5. SILT. Endorses back pain.

## 2019-12-16 NOTE — PROGRESS NOTE ADULT - SUBJECTIVE AND OBJECTIVE BOX
Patient is a 64y old  Male who presents with a chief complaint of LE weakness (16 Dec 2019 18:02)      INTERVAL HISTORY: feels ok    MEDICATIONS:  lisinopril 10 milliGRAM(s) Oral daily        PHYSICAL EXAM:  T(C): 36.4 (12-16-19 @ 21:22), Max: 37.2 (12-16-19 @ 14:20)  HR: 72 (12-16-19 @ 21:22) (70 - 80)  BP: 124/82 (12-16-19 @ 21:22) (106/67 - 124/82)  RR: 18 (12-16-19 @ 21:22) (18 - 19)  SpO2: 96% (12-16-19 @ 21:22) (95% - 96%)  Wt(kg): --  I&O's Summary    15 Dec 2019 07:01  -  16 Dec 2019 07:00  --------------------------------------------------------  IN: 2125 mL / OUT: 1900 mL / NET: 225 mL    16 Dec 2019 07:01  -  16 Dec 2019 23:49  --------------------------------------------------------  IN: 1200 mL / OUT: 1475 mL / NET: -275 mL          Appearance: In no distress	  HEENT:    PERRL, EOMI	  Cardiovascular:  S1 S2, No JVD  Respiratory: Lungs clear to auscultation	  Gastrointestinal:  Soft, Non-tender, + BS	  Extremities:  No edema of LE                                14.6   18.36 )-----------( 263      ( 16 Dec 2019 07:19 )             45.2     12-16    136  |  103  |  39<H>  ----------------------------<  83  4.4   |  23  |  0.45<L>    Ca    9.8      16 Dec 2019 07:19    TPro  5.4<L>  /  Alb  x   /  TBili  x   /  DBili  x   /  AST  x   /  ALT  x   /  AlkPhos  x   12-15        Labs personally reviewed      Assessment and Plan:   Assessment:  · Assessment		  Pt is a 63 Y/O Male with PMHx smoker 50+yr 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 Per family the patient had MR at [facility] 3 weeks ago showing diffuse leptomeningeal spread (films unavail). Family is still shrouding patient from diagnosis/prognosis. Exam: oriented x3, FC, distal LE 4/5, proximal LE 2-3/5, UE 4/5. SILT. Endorses back pain.      Problem/Plan - 1:  ·  Problem: Cord compression.  Plan: Seen on MRI  Neurosurg eval appreciated  [facility] records reviewed likely with metastatic small cell lung ca  Onc and rad onc follow up noted    Problem/Plan - 2:  ·  Problem: Pulmonary embolism.  Plan: COnt lovenox   Monitor Hgb level   O 2 supplement.     Problem/Plan - 3:  ·  Problem: Chest pain, atypical.  Plan: does not appear to be cardiac in nature. ?2/2 PE   EKG with nsr and no ST changes    Discussed GOC with daughters at bedside 12/16, they are considering home hospice discharge tomorrow        Petey Roberson DO [facility]  Cardiovascular Medicine  [address], [address]  Office: [phone]  Cell: [phone]

## 2019-12-16 NOTE — CHART NOTE - NSCHARTNOTEFT_GEN_A_CORE
Source: Patient [ ]    Family [ ]     other [ x] RN  Patient seen for routine length of stay follow up. As per RN, Patient is declining and palliative care on board for [facility].  RN notes that Patient is is full code despite poor prognosis.  Family highly involved and as per chart, they are on board for comfort but have been shielding Patient from prognosis.  As per RN, patient is eating small amounts and bites.  Receiving IV fluids.  No aggressive nutrition at this time as risks would likely outweigh benefits.  Dxd with Neuroendocrine tumor with mets, pain management    Diet : Regular with 1000cc fluid restriction.   In view of poor oral intake and need for IV fluids, consider liberalizing 1000cc fluid restriction.  Na levels improved.          PO intake:  < 50% [x ] 50-75% [ ]   % [ ]  other :        Current Weight: 124 pounds on admit weight, no new weights  % Weight Change    Pertinent Medications: MEDICATIONS  (STANDING):  Biotene Dry Mouth Oral Rinse 5 milliLiter(s) Swish and Spit every 6 hours  calamine Lotion 1 Application(s) Topical two times a day  dexAMETHasone     Tablet 4 milliGRAM(s) Oral every 6 hours  enoxaparin Injectable 80 milliGRAM(s) SubCutaneous daily  gabapentin 300 milliGRAM(s) Oral every 8 hours  HYDROmorphone  Injectable 0.5 milliGRAM(s) IV Push every 6 hours  levETIRAcetam 500 milliGRAM(s) Oral two times a day  lisinopril 10 milliGRAM(s) Oral daily  methadone    Tablet 2.5 milliGRAM(s) Oral <User Schedule>  methylphenidate 5 milliGRAM(s) Oral two times a day  pantoprazole  Injectable 40 milliGRAM(s) IV Push daily  QUEtiapine 25 milliGRAM(s) Oral at bedtime  senna 2 Tablet(s) Oral at bedtime  sodium chloride 0.9%. 1000 milliLiter(s) (75 mL/Hr) IV Continuous <Continuous>  sodium chloride 0.9%. 1000 milliLiter(s) (75 mL/Hr) IV Continuous <Continuous>    MEDICATIONS  (PRN):  acetaminophen   Tablet .. 650 milliGRAM(s) Oral every 6 hours PRN Mild Pain (1 - 3)  diphenhydrAMINE   Injectable 12.5 milliGRAM(s) IV Push every 4 hours PRN pruritus  HYDROmorphone  Injectable 0.5 milliGRAM(s) IV Push every 2 hours PRN Severe Pain (7 - 10)  magnesium hydroxide Suspension 30 milliLiter(s) Oral daily PRN Constipation  naloxone Injectable 0.1 milliGRAM(s) IV Push every 3 minutes PRN sedation or respiratory depression due to opioids    Pertinent Labs:  12-16 Na136 mmol/L Glu 83 mg/dL K+ 4.4 mmol/L Cr  0.45 mg/dL<L> BUN 39 mg/dL<H> 12-15 Chol --    LDL --    HDL --    Trig 154 mg/dL<H>      Skin: intact except for some IAD and abrasion.    Estimated Needs:   x] no change since previous assessment  [ ] recalculated:       Previous Nutrition Diagnosis: Underweight status      Nutrition Diagnosis is [x ] ongoing  Chronic in view of end stage disease and very poor po intake          New Nutrition Diagnosis: [x ] not applicable      Recommend  Continue Regular diet. Provide food requests as able.  Suggest liberalize fluid restriction.  Monitor diet tolerance, po intake, GI tolerance, weight trends, labs, and skin integrity  RDN remains available for follow up     Lima Harvey MA,RD,CHES,CDN  Beeper [phone] Source: Patient [ ]    Family [ ]     other [ x] RN  Patient seen for routine length of stay follow up. As per RN, Patient is declining and palliative care on board for [facility].  RN notes that Patient is is full code despite poor prognosis.  Family highly involved and as per chart, they are on board for comfort but have been shielding Patient from prognosis.  As per RN, patient is eating small amounts and bites. Ensure offered. Unsure if rey accepting much.  Receiving IV fluids.  No aggressive nutrition at this time as risks would likely outweigh benefits.  Dxd with Neuroendocrine tumor with mets, pain management    Diet : Regular with 1000cc fluid restriction. Ensure Enlive 8 ounces x1  In view of poor oral intake and need for IV fluids, consider liberalizing 1000cc fluid restriction.  Na levels improved.          PO intake:  < 50% [x ] 50-75% [ ]   % [ ]  other :        Current Weight: 124 pounds on admit weight, no new weights  % Weight Change    Pertinent Medications: MEDICATIONS  (STANDING):  Biotene Dry Mouth Oral Rinse 5 milliLiter(s) Swish and Spit every 6 hours  calamine Lotion 1 Application(s) Topical two times a day  dexAMETHasone     Tablet 4 milliGRAM(s) Oral every 6 hours  enoxaparin Injectable 80 milliGRAM(s) SubCutaneous daily  gabapentin 300 milliGRAM(s) Oral every 8 hours  HYDROmorphone  Injectable 0.5 milliGRAM(s) IV Push every 6 hours  levETIRAcetam 500 milliGRAM(s) Oral two times a day  lisinopril 10 milliGRAM(s) Oral daily  methadone    Tablet 2.5 milliGRAM(s) Oral <User Schedule>  methylphenidate 5 milliGRAM(s) Oral two times a day  pantoprazole  Injectable 40 milliGRAM(s) IV Push daily  QUEtiapine 25 milliGRAM(s) Oral at bedtime  senna 2 Tablet(s) Oral at bedtime  sodium chloride 0.9%. 1000 milliLiter(s) (75 mL/Hr) IV Continuous <Continuous>  sodium chloride 0.9%. 1000 milliLiter(s) (75 mL/Hr) IV Continuous <Continuous>    MEDICATIONS  (PRN):  acetaminophen   Tablet .. 650 milliGRAM(s) Oral every 6 hours PRN Mild Pain (1 - 3)  diphenhydrAMINE   Injectable 12.5 milliGRAM(s) IV Push every 4 hours PRN pruritus  HYDROmorphone  Injectable 0.5 milliGRAM(s) IV Push every 2 hours PRN Severe Pain (7 - 10)  magnesium hydroxide Suspension 30 milliLiter(s) Oral daily PRN Constipation  naloxone Injectable 0.1 milliGRAM(s) IV Push every 3 minutes PRN sedation or respiratory depression due to opioids    Pertinent Labs:  12-16 Na136 mmol/L Glu 83 mg/dL K+ 4.4 mmol/L Cr  0.45 mg/dL<L> BUN 39 mg/dL<H> 12-15 Chol --    LDL --    HDL --    Trig 154 mg/dL<H>      Skin: intact except for some IAD and abrasion.    Estimated Needs:   x] no change since previous assessment  [ ] recalculated:       Previous Nutrition Diagnosis: Underweight status      Nutrition Diagnosis is [x ] ongoing  Chronic in view of end stage disease and very poor po intake          New Nutrition Diagnosis: [x ] not applicable      Recommend  Continue Regular diet. Provide food requests as able.  Suggest liberalize fluid restriction.  Ensure Enlive x1  Monitor diet tolerance, po intake, GI tolerance, weight trends, labs, and skin integrity  RDN remains available for follow up     Lima Harvey MA,RD,CHES,CDN  Beeper [phone]

## 2019-12-16 NOTE — PROGRESS NOTE ADULT - ASSESSMENT
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 Has lung cancer w/ mets to spine.     Hyponatremia  initially euvolemic; hyponatremia is appropriate ADH response to pain and nausea  now hypovolemic hyponatremia 2/2 poor PO intake    - consider remeron to help w/ appetite    - continue IV NS @ 50-75 mL/hr given poor PO intake    - continue 1L fluid restricted diet  - liberalize salt intake in diet    - continue pain control, palliative care is following   - anti-emetics for control of nausea as needed    - Na should not change by more than 8 mEq in 24 hrs; please call nephrology if it does    Acidosis:  Non-AG; likely related to N/V previously  improved  continue to monitor    Increased BUN:  Sec to Steroids + hypovolemia  improving    Hematuria:  initial UA had RBC 5  likely 2/2 france catheter  resolved on repeat UA

## 2019-12-16 NOTE — PROGRESS NOTE ADULT - SUBJECTIVE AND OBJECTIVE BOX
[facility]    Subjective: Patient seen and examined. No new events except as noted.   daughter at the bedside  patient cachectic  RUE loss motor     REVIEW OF SYSTEMS:  + weakness     MEDICATIONS:  MEDICATIONS  (STANDING):  Biotene Dry Mouth Oral Rinse 5 milliLiter(s) Swish and Spit every 6 hours  calamine Lotion 1 Application(s) Topical two times a day  dexAMETHasone     Tablet 4 milliGRAM(s) Oral every 6 hours  enoxaparin Injectable 80 milliGRAM(s) SubCutaneous daily  gabapentin 300 milliGRAM(s) Oral every 8 hours  HYDROmorphone PCA (1 mG/mL) 30 milliLiter(s) PCA Continuous PCA Continuous  levETIRAcetam 500 milliGRAM(s) Oral two times a day  lisinopril 10 milliGRAM(s) Oral daily  methylphenidate 5 milliGRAM(s) Oral <User Schedule>  pantoprazole  Injectable 40 milliGRAM(s) IV Push daily  QUEtiapine 25 milliGRAM(s) Oral at bedtime  senna 2 Tablet(s) Oral at bedtime  sodium chloride 0.9%. 1000 milliLiter(s) (75 mL/Hr) IV Continuous <Continuous>  sodium chloride 0.9%. 1000 milliLiter(s) (75 mL/Hr) IV Continuous <Continuous>      PHYSICAL EXAM:  T(C): 37.2 (19 @ 14:20), Max: 37.2 (19 @ 14:20)  HR: 80 (19 @ 14:20) (56 - 80)  BP: 106/67 (19 @ 14:20) (106/67 - 139/84)  RR: 19 (19 @ 14:20) (18 - 19)  SpO2: 95% (19 @ 14:20) (95% - 95%)  Wt(kg): --  I&O's Summary    15 Dec 2019 07:  -  16 Dec 2019 07:00  --------------------------------------------------------  IN: 2125 mL / OUT: 1900 mL / NET: 225 mL    16 Dec 2019 07:01  -  16 Dec 2019 18:03  --------------------------------------------------------  IN: 800 mL / OUT: 875 mL / NET: -75 mL          Appearance: frail, cachectic  HEENT: dry OM   Lymphatic: No lymphadenopathy , no edema  Cardiovascular: Normal S1 S2  Respiratory:  normal effort 	  Gastrointestinal:  Soft, Non-tender, + BS	  Skin:  warm to touch  Musculoskeletal: worsening motor and weakness   Psychiatry:  Mood & affect appropriate  Ext: No edema      All labs, Imaging and EKGs personally reviewed                           14.6   18.36 )-----------( 263      ( 16 Dec 2019 07:19 )             45.2               -    136  |  103  |  39<H>  ----------------------------<  83  4.4   |  23  |  0.45<L>    Ca    9.8      16 Dec 2019 07:19    TPro  5.4<L>  /  Alb  x   /  TBili  x   /  DBili  x   /  AST  x   /  ALT  x   /  AlkPhos  x   12-15                       Urinalysis Basic - ( 14 Dec 2019 18:47 )    Color: Yellow / Appearance: Clear / S.032 / pH: x  Gluc: x / Ketone: Negative  / Bili: Negative / Urobili: Negative   Blood: x / Protein: Trace / Nitrite: Negative   Leuk Esterase: Negative / RBC: 2 /hpf / WBC 1 /HPF   Sq Epi: x / Non Sq Epi: 0 /hpf / Bacteria: 0.0

## 2019-12-16 NOTE — PROGRESS NOTE ADULT - SUBJECTIVE AND OBJECTIVE BOX
[facility] NEPHROLOGY PRACTICE   MD Bijal Chao D.O. Fatima Sheikh, D.O. Ruoru Wong, PA    From 7 AM - 5 PM:  OFFICE: [phone]  Dr. Welsh cell: [phone]  Dr. Chatman cell: [phone]  Dr. Velasquez cell: [phone]  INGRID Banuelos cell: [phone]    From 5 PM - 7 AM: Answering Service: [phone]        RENAL FOLLOW UP NOTE  --------------------------------------------------------------------------------  HPI: Pt seen and examined. Somnolent this AM but arousable. Has mild pain. No other complaints. Still has poor appetite.        PAST HISTORY  --------------------------------------------------------------------------------  No significant changes to PMH, PSH, FHx, SHx, unless otherwise noted    ALLERGIES & MEDICATIONS  --------------------------------------------------------------------------------  Allergies    No Known Allergies    Intolerances      Standing Inpatient Medications  Biotene Dry Mouth Oral Rinse 5 milliLiter(s) Swish and Spit every 6 hours  calamine Lotion 1 Application(s) Topical two times a day  dexAMETHasone     Tablet 4 milliGRAM(s) Oral every 6 hours  enoxaparin Injectable 80 milliGRAM(s) SubCutaneous daily  gabapentin 300 milliGRAM(s) Oral every 8 hours  HYDROmorphone  Injectable 0.5 milliGRAM(s) IV Push every 6 hours  levETIRAcetam 500 milliGRAM(s) Oral two times a day  lisinopril 10 milliGRAM(s) Oral daily  methadone    Tablet 2.5 milliGRAM(s) Oral <User Schedule>  methylphenidate 5 milliGRAM(s) Oral two times a day  pantoprazole  Injectable 40 milliGRAM(s) IV Push daily  QUEtiapine 25 milliGRAM(s) Oral at bedtime  senna 2 Tablet(s) Oral at bedtime  sodium chloride 0.9%. 1000 milliLiter(s) IV Continuous <Continuous>  sodium chloride 0.9%. 1000 milliLiter(s) IV Continuous <Continuous>    PRN Inpatient Medications  acetaminophen   Tablet .. 650 milliGRAM(s) Oral every 6 hours PRN  diphenhydrAMINE   Injectable 12.5 milliGRAM(s) IV Push every 4 hours PRN  HYDROmorphone  Injectable 0.5 milliGRAM(s) IV Push every 2 hours PRN  magnesium hydroxide Suspension 30 milliLiter(s) Oral daily PRN  naloxone Injectable 0.1 milliGRAM(s) IV Push every 3 minutes PRN      REVIEW OF SYSTEMS  --------------------------------------------------------------------------------  General: no fever  CVS: no chest pain  RESP: no sob, no cough  ABD: no abdominal pain  : no dysuria,  MSK: no edema     VITALS/PHYSICAL EXAM  --------------------------------------------------------------------------------  T(C): 37.2 (12-16-19 @ 14:20), Max: 37.2 (12-16-19 @ 14:20)  HR: 80 (12-16-19 @ 14:20) (56 - 80)  BP: 106/67 (12-16-19 @ 14:20) (106/67 - 139/84)  RR: 19 (12-16-19 @ 14:20) (18 - 19)  SpO2: 95% (12-16-19 @ 14:20) (95% - 95%)  Wt(kg): --        12-15-19 @ 07:01  -  12-16-19 @ 07:00  --------------------------------------------------------  IN: 2125 mL / OUT: 1900 mL / NET: 225 mL    12-16-19 @ 07:01  -  12-16-19 @ 15:28  --------------------------------------------------------  IN: 800 mL / OUT: 875 mL / NET: -75 mL      Physical Exam:  	Gen: NAD  	HEENT: MMM  	Pulm: CTA B/L  	CV: S1S2  	Abd: Soft, +BS  	Ext: No LE edema B/L              Neuro: Awake   	Skin: Warm and Dry       LABS/STUDIES  --------------------------------------------------------------------------------              14.6   18.36 >-----------<  263      [12-16-19 @ 07:19]              45.2     136  |  103  |  39  ----------------------------<  83      [12-16-19 @ 07:19]  4.4   |  23  |  0.45        Ca     9.8     [12-16-19 @ 07:19]    TPro  5.4  /  Alb  x   /  TBili  x   /  DBili  x   /  AST  x   /  ALT  x   /  AlkPhos  x   [12-15-19 @ 23:13]        Serum Osmolality 299      [12-15-19 @ 19:17]    Creatinine Trend:  SCr 0.45 [12-16 @ 07:19]  SCr 0.69 [12-15 @ 02:33]  SCr 0.51 [12-14 @ 08:42]  SCr 0.54 [12-13 @ 08:34]  SCr 0.58 [12-13 @ 07:04]    Urinalysis - [12-14-19 @ 18:47]      Color Yellow / Appearance Clear / SG 1.032 / pH 5.5      Gluc Negative / Ketone Negative  / Bili Negative / Urobili Negative       Blood Negative / Protein Trace / Leuk Est Negative / Nitrite Negative      RBC 2 / WBC 1 / Hyaline 0 / Gran  / Sq Epi  / Non Sq Epi 0 / Bacteria 0.0    Urine Sodium <35      [12-14-19 @ 14:47]  Urine Chloride <35      [12-14-19 @ 14:47]  Urine Osmolality 969      [12-14-19 @ 14:47]    TSH 0.33      [12-08-19 @ 09:35]  Lipid: chol --, , HDL --, LDL --      [12-15-19 @ 19:17]      Free Light Chains: kappa 1.22, lambda 1.43, ratio = 0.85      [12-15 @ 23:13]

## 2019-12-16 NOTE — PROGRESS NOTE ADULT - PROBLEM SELECTOR PLAN 2
S/P Craniotomy  Neurosurg F/U appreciated   pain control  Palliative eval appreciated  worsening motor function, RUE loss motor

## 2019-12-17 ENCOUNTER — APPOINTMENT (OUTPATIENT)
Dept: NEUROSURGERY | Facility: CLINIC | Age: 64
End: 2019-12-17

## 2019-12-17 DIAGNOSIS — Z71.89 OTHER SPECIFIED COUNSELING: ICD-10-CM

## 2019-12-17 LAB
% ALBUMIN: 57.1 % — SIGNIFICANT CHANGE UP
% ALPHA 1: 7.2 % — SIGNIFICANT CHANGE UP
% ALPHA 2: 11.4 % — SIGNIFICANT CHANGE UP
% BETA: 12.1 % — SIGNIFICANT CHANGE UP
% GAMMA: 12.2 % — SIGNIFICANT CHANGE UP
ALBUMIN SERPL ELPH-MCNC: 3.1 G/DL — LOW (ref 3.6–5.5)
ALBUMIN/GLOB SERPL ELPH: 1.3 RATIO — SIGNIFICANT CHANGE UP
ALPHA1 GLOB SERPL ELPH-MCNC: 0.4 G/DL — SIGNIFICANT CHANGE UP (ref 0.1–0.4)
ALPHA2 GLOB SERPL ELPH-MCNC: 0.6 G/DL — SIGNIFICANT CHANGE UP (ref 0.5–1)
B-GLOBULIN SERPL ELPH-MCNC: 0.7 G/DL — SIGNIFICANT CHANGE UP (ref 0.5–1)
CREATININE, URINE RESULT: 49 MG/DL — SIGNIFICANT CHANGE UP
GAMMA GLOBULIN: 0.7 G/DL — SIGNIFICANT CHANGE UP (ref 0.6–1.6)
PROT ?TM UR-MCNC: 11 MG/DL — SIGNIFICANT CHANGE UP (ref 0–12)
PROT PATTERN SERPL ELPH-IMP: SIGNIFICANT CHANGE UP

## 2019-12-17 PROCEDURE — 99233 SBSQ HOSP IP/OBS HIGH 50: CPT

## 2019-12-17 RX ORDER — QUETIAPINE FUMARATE 200 MG/1
12.5 TABLET, FILM COATED ORAL AT BEDTIME
Refills: 0 | Status: DISCONTINUED | OUTPATIENT
Start: 2019-12-17 | End: 2019-12-21

## 2019-12-17 RX ORDER — HYDROMORPHONE HYDROCHLORIDE 2 MG/ML
30 INJECTION INTRAMUSCULAR; INTRAVENOUS; SUBCUTANEOUS
Refills: 0 | Status: DISCONTINUED | OUTPATIENT
Start: 2019-12-17 | End: 2019-12-20

## 2019-12-17 RX ADMIN — SENNA PLUS 2 TABLET(S): 8.6 TABLET ORAL at 23:02

## 2019-12-17 RX ADMIN — HYDROMORPHONE HYDROCHLORIDE 30 MILLILITER(S): 2 INJECTION INTRAMUSCULAR; INTRAVENOUS; SUBCUTANEOUS at 07:26

## 2019-12-17 RX ADMIN — PANTOPRAZOLE SODIUM 40 MILLIGRAM(S): 20 TABLET, DELAYED RELEASE ORAL at 11:57

## 2019-12-17 RX ADMIN — LEVETIRACETAM 500 MILLIGRAM(S): 250 TABLET, FILM COATED ORAL at 06:17

## 2019-12-17 RX ADMIN — ENOXAPARIN SODIUM 80 MILLIGRAM(S): 100 INJECTION SUBCUTANEOUS at 11:57

## 2019-12-17 RX ADMIN — GABAPENTIN 300 MILLIGRAM(S): 400 CAPSULE ORAL at 06:17

## 2019-12-17 RX ADMIN — Medication 4 MILLIGRAM(S): at 06:17

## 2019-12-17 RX ADMIN — GABAPENTIN 300 MILLIGRAM(S): 400 CAPSULE ORAL at 13:50

## 2019-12-17 RX ADMIN — Medication 5 MILLILITER(S): at 23:38

## 2019-12-17 RX ADMIN — CALAMINE AND ZINC OXIDE AND PHENOL 1 APPLICATION(S): 160; 10 LOTION TOPICAL at 06:18

## 2019-12-17 RX ADMIN — HYDROMORPHONE HYDROCHLORIDE 30 MILLILITER(S): 2 INJECTION INTRAMUSCULAR; INTRAVENOUS; SUBCUTANEOUS at 13:47

## 2019-12-17 RX ADMIN — GABAPENTIN 300 MILLIGRAM(S): 400 CAPSULE ORAL at 23:01

## 2019-12-17 RX ADMIN — HYDROMORPHONE HYDROCHLORIDE 30 MILLILITER(S): 2 INJECTION INTRAMUSCULAR; INTRAVENOUS; SUBCUTANEOUS at 19:26

## 2019-12-17 RX ADMIN — Medication 5 MILLIGRAM(S): at 11:57

## 2019-12-17 RX ADMIN — Medication 4 MILLIGRAM(S): at 18:25

## 2019-12-17 RX ADMIN — Medication 5 MILLILITER(S): at 06:17

## 2019-12-17 RX ADMIN — LEVETIRACETAM 500 MILLIGRAM(S): 250 TABLET, FILM COATED ORAL at 18:25

## 2019-12-17 RX ADMIN — LISINOPRIL 10 MILLIGRAM(S): 2.5 TABLET ORAL at 06:18

## 2019-12-17 RX ADMIN — Medication 4 MILLIGRAM(S): at 11:57

## 2019-12-17 RX ADMIN — Medication 4 MILLIGRAM(S): at 23:38

## 2019-12-17 RX ADMIN — QUETIAPINE FUMARATE 12.5 MILLIGRAM(S): 200 TABLET, FILM COATED ORAL at 23:01

## 2019-12-17 NOTE — PROGRESS NOTE ADULT - PROBLEM SELECTOR PLAN 5
The HCP understands about the patient's advanced illness and very poor prognosis (likely days). She indicated she had discussed with her siblings and that they agree on DNR/I, no feeding tube, Abx to be discussed if needed. A MOLST and a capacity form were completed and placed in the chart. 16' were spent in ACP and completion of MOLST form.   D/W Primary team.

## 2019-12-17 NOTE — PROGRESS NOTE ADULT - SUBJECTIVE AND OBJECTIVE BOX
Patient is a 64y old  Male who presents with a chief complaint of LE weakness (17 Dec 2019 18:14)      MEDICATIONS:  lisinopril 10 milliGRAM(s) Oral daily        PHYSICAL EXAM:  T(C): 36.3 (12-17-19 @ 20:54), Max: 36.7 (12-17-19 @ 05:53)  HR: 60 (12-17-19 @ 20:54) (60 - 64)  BP: 131/85 (12-17-19 @ 20:54) (131/85 - 149/91)  RR: 18 (12-17-19 @ 20:54) (18 - 18)  SpO2: 94% (12-17-19 @ 20:54) (94% - 96%)  Wt(kg): --  I&O's Summary    16 Dec 2019 07:01  -  17 Dec 2019 07:00  --------------------------------------------------------  IN: 2270 mL / OUT: 2225 mL / NET: 45 mL    17 Dec 2019 07:01  -  17 Dec 2019 23:19  --------------------------------------------------------  IN: 1000 mL / OUT: 1300 mL / NET: -300 mL          Appearance: In no distress	  HEENT:    PERRL, EOMI	  Cardiovascular:  S1 S2, No JVD  Respiratory: Lungs clear to auscultation	  Gastrointestinal:  Soft, Non-tender, + BS	  Extremities:  No edema of LE                                14.6   18.36 )-----------( 263      ( 16 Dec 2019 07:19 )             45.2     12-16    136  |  103  |  39<H>  ----------------------------<  83  4.4   |  23  |  0.45<L>    Ca    9.8      16 Dec 2019 07:19          Labs personally reviewed      Assessment and Plan:   Assessment:  · Assessment		  Pt is a 63 Y/O Male with PMHx smoker 50+yr 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 Per family the patient had MR at [facility] 3 weeks ago showing diffuse leptomeningeal spread (films unavail). Family is still shrouding patient from diagnosis/prognosis. Exam: oriented x3, FC, distal LE 4/5, proximal LE 2-3/5, UE 4/5. SILT. Endorses back pain.      Problem/Plan - 1:  ·  Problem: Cord compression.  Plan: Seen on MRI  Neurosurg eval appreciated  [facility] records reviewed likely with metastatic small cell lung ca  Onc and rad onc follow up noted    Problem/Plan - 2:  ·  Problem: Pulmonary embolism.  Plan: COnt lovenox   Monitor Hgb level   O 2 supplement.     Problem/Plan - 3:  ·  Problem: Chest pain, atypical.  Plan: does not appear to be cardiac in nature. ?2/2 PE   EKG with nsr and no ST changes    Discussed GOC with daughters at bedside 12/16, they are considering home hospice discharge         Petey Roberson DO [facility]  Cardiovascular Medicine  [address], [address]  Office: [phone]  Cell: [phone]

## 2019-12-17 NOTE — PROGRESS NOTE ADULT - PROBLEM SELECTOR PLAN 1
Seen on MRI, mets to spine   Neurosurg eval appreciated  Onc eval appreciated  possible systemic treatment, poor candidate  Rad Onc eval noted   Decadron 4mg Q6hrs, taper as per surg team   pain control, aggressive   Neuro check  Palliative eval appreciated, pain control   poor candidate for palliative rads  seen by Onc/Rad today   may benefits form chemo however family refusing and stating that he has no power to get chemo   discussed in detail with family at the bedside  pain control and muscle relaxant  possible morphin pump   second opinion Onc eval appreciated  palliative F/U appreciated  family decided o DNR-DNI  would like to take him home with home hospice  plan for PICC line, hold AC for PICC placement, home hospice eval

## 2019-12-17 NOTE — PROGRESS NOTE ADULT - SUBJECTIVE AND OBJECTIVE BOX
[facility]    Subjective: Patient seen and examined. No new events except as noted.   + weakness  lethargic  worsening motor UE   family at the bedside     REVIEW OF SYSTEMS:  + weakness and lethargic      MEDICATIONS:  MEDICATIONS  (STANDING):  Biotene Dry Mouth Oral Rinse 5 milliLiter(s) Swish and Spit every 6 hours  calamine Lotion 1 Application(s) Topical two times a day  dexAMETHasone     Tablet 4 milliGRAM(s) Oral every 6 hours  gabapentin 300 milliGRAM(s) Oral every 8 hours  HYDROmorphone PCA (1 mG/mL) 30 milliLiter(s) PCA Continuous PCA Continuous  levETIRAcetam 500 milliGRAM(s) Oral two times a day  lisinopril 10 milliGRAM(s) Oral daily  methylphenidate 5 milliGRAM(s) Oral <User Schedule>  pantoprazole  Injectable 40 milliGRAM(s) IV Push daily  QUEtiapine 12.5 milliGRAM(s) Oral at bedtime  senna 2 Tablet(s) Oral at bedtime  sodium chloride 0.9%. 1000 milliLiter(s) (75 mL/Hr) IV Continuous <Continuous>  sodium chloride 0.9%. 1000 milliLiter(s) (75 mL/Hr) IV Continuous <Continuous>      PHYSICAL EXAM:  T(C): 36.7 (12-17-19 @ 05:53), Max: 36.7 (12-17-19 @ 05:53)  HR: 64 (12-17-19 @ 05:53) (64 - 72)  BP: 149/91 (12-17-19 @ 05:53) (124/82 - 149/91)  RR: 18 (12-17-19 @ 05:53) (18 - 18)  SpO2: 96% (12-17-19 @ 05:53) (96% - 96%)  Wt(kg): --  I&O's Summary    16 Dec 2019 07:01  -  17 Dec 2019 07:00  --------------------------------------------------------  IN: 2270 mL / OUT: 2225 mL / NET: 45 mL    17 Dec 2019 07:01  -  17 Dec 2019 19:00  --------------------------------------------------------  IN: 0 mL / OUT: 200 mL / NET: -200 mL          Appearance: awake, cachectic,   HEENT:   dry OM   Cardiovascular: Normal S1 S2  Respiratory: Lungs clear to auscultation, normal effort 	  Gastrointestinal:  Soft, Non-tender, + BS	  Skin:  warm to touch, dry  Musculoskeletal: LE and UE weakness   Psychiatry:  Mood & affect appropriate  Ext: No edema      All labs, Imaging and EKGs personally reviewed                           14.6   18.36 )-----------( 263      ( 16 Dec 2019 07:19 )             45.2               12-16    136  |  103  |  39<H>  ----------------------------<  83  4.4   |  23  |  0.45<L>    Ca    9.8      16 Dec 2019 07:19    TPro  5.4<L>  /  Alb  3.1<L>  /  TBili  x   /  DBili  x   /  AST  x   /  ALT  x   /  AlkPhos  x   12-15

## 2019-12-17 NOTE — PROGRESS NOTE ADULT - PROBLEM SELECTOR PLAN 4
will hold abx now   monitor for respiratory worsening   O2 supplement  Nebs   negative procalcitonin Shaheen

## 2019-12-17 NOTE — PROGRESS NOTE ADULT - SUBJECTIVE AND OBJECTIVE BOX
[facility] [place] [place] NEPHROLOGY PRACTICE   MD Bijal Chao D.O. Fatima Sheikh, D.O. Ruoru Wong, PA    From 7 AM - 5 PM:  OFFICE: [phone]  Dr. Welsh cell: [phone]  Dr. Chatman cell: [phone]  Dr. Velasquez cell: [phone]  INGRID Banuelos cell: [phone]    From 5 PM - 7 AM: Answering Service: [phone]        RENAL FOLLOW UP NOTE  --------------------------------------------------------------------------------  HPI: Pt seen and examined. Reports pain is mostly controlled, only mild pain now. No other complaints. Still has poor appetite.          PAST HISTORY  --------------------------------------------------------------------------------  No significant changes to PMH, PSH, FHx, SHx, unless otherwise noted    ALLERGIES & MEDICATIONS  --------------------------------------------------------------------------------  Allergies    No Known Allergies    Intolerances      Standing Inpatient Medications  Biotene Dry Mouth Oral Rinse 5 milliLiter(s) Swish and Spit every 6 hours  calamine Lotion 1 Application(s) Topical two times a day  dexAMETHasone     Tablet 4 milliGRAM(s) Oral every 6 hours  enoxaparin Injectable 80 milliGRAM(s) SubCutaneous daily  gabapentin 300 milliGRAM(s) Oral every 8 hours  HYDROmorphone PCA (1 mG/mL) 30 milliLiter(s) PCA Continuous PCA Continuous  levETIRAcetam 500 milliGRAM(s) Oral two times a day  lisinopril 10 milliGRAM(s) Oral daily  methylphenidate 5 milliGRAM(s) Oral <User Schedule>  pantoprazole  Injectable 40 milliGRAM(s) IV Push daily  QUEtiapine 25 milliGRAM(s) Oral at bedtime  senna 2 Tablet(s) Oral at bedtime  sodium chloride 0.9%. 1000 milliLiter(s) IV Continuous <Continuous>  sodium chloride 0.9%. 1000 milliLiter(s) IV Continuous <Continuous>    PRN Inpatient Medications  acetaminophen   Tablet .. 650 milliGRAM(s) Oral every 6 hours PRN  diphenhydrAMINE   Injectable 12.5 milliGRAM(s) IV Push every 4 hours PRN  HYDROmorphone  Injectable 0.5 milliGRAM(s) IV Push every 2 hours PRN  magnesium hydroxide Suspension 30 milliLiter(s) Oral daily PRN  naloxone Injectable 0.1 milliGRAM(s) IV Push every 3 minutes PRN  ondansetron Injectable 4 milliGRAM(s) IV Push every 6 hours PRN      REVIEW OF SYSTEMS  --------------------------------------------------------------------------------  General: no fever  CVS: no chest pain  RESP: no sob, no cough  ABD: no abdominal pain  : no dysuria,  MSK: no edema     VITALS/PHYSICAL EXAM  --------------------------------------------------------------------------------  T(C): 36.7 (12-17-19 @ 05:53), Max: 37.2 (12-16-19 @ 14:20)  HR: 64 (12-17-19 @ 05:53) (64 - 80)  BP: 149/91 (12-17-19 @ 05:53) (106/67 - 149/91)  RR: 18 (12-17-19 @ 05:53) (18 - 19)  SpO2: 96% (12-17-19 @ 05:53) (95% - 96%)  Wt(kg): --        12-16-19 @ 07:01  -  12-17-19 @ 07:00  --------------------------------------------------------  IN: 2270 mL / OUT: 2225 mL / NET: 45 mL      Physical Exam:  	Gen: NAD  	HEENT: MMM  	Pulm: CTA B/L  	CV: S1S2  	Abd: Soft, +BS  	Ext: No LE edema B/L              Neuro: Awake   	Skin: Warm and Dry   	    LABS/STUDIES  --------------------------------------------------------------------------------              14.6   18.36 >-----------<  263      [12-16-19 @ 07:19]              45.2     136  |  103  |  39  ----------------------------<  83      [12-16-19 @ 07:19]  4.4   |  23  |  0.45        Ca     9.8     [12-16-19 @ 07:19]    TPro  5.4  /  Alb  x   /  TBili  x   /  DBili  x   /  AST  x   /  ALT  x   /  AlkPhos  x   [12-15-19 @ 23:13]        Serum Osmolality 299      [12-15-19 @ 19:17]    Creatinine Trend:  SCr 0.45 [12-16 @ 07:19]  SCr 0.69 [12-15 @ 02:33]  SCr 0.51 [12-14 @ 08:42]  SCr 0.54 [12-13 @ 08:34]  SCr 0.58 [12-13 @ 07:04]    Urinalysis - [12-14-19 @ 18:47]      Color Yellow / Appearance Clear / SG 1.032 / pH 5.5      Gluc Negative / Ketone Negative  / Bili Negative / Urobili Negative       Blood Negative / Protein Trace / Leuk Est Negative / Nitrite Negative      RBC 2 / WBC 1 / Hyaline 0 / Gran  / Sq Epi  / Non Sq Epi 0 / Bacteria 0.0    Urine Protein 11      [12-16-19 @ 17:44]  Urine Sodium <35      [12-14-19 @ 14:47]  Urine Chloride <35      [12-14-19 @ 14:47]  Urine Osmolality 969      [12-14-19 @ 14:47]    TSH 0.33      [12-08-19 @ 09:35]  Lipid: chol --, , HDL --, LDL --      [12-15-19 @ 19:17]      Free Light Chains: kappa 1.22, lambda 1.43, ratio = 0.85      [12-15 @ 23:13]  Immunofixation Serum:   No Monoclonal Band Identified    Reference Range: None Detected      [12-15-19 @ 05:49]  SPEP Interpretation: Normal Electrophoresis Pattern      [12-15-19 @ 05:49]

## 2019-12-17 NOTE — PROGRESS NOTE ADULT - PROBLEM SELECTOR PLAN 5
The HCP understands about the patient's advanced illness and very poor prognosis (likely days). She indicated she had discussed with her siblings and that they agree on DNR/I, no feeding tube, Abx to be discussed if needed. A MOLST and a capacity form were already completed and placed in the chart.   D/W Primary team.

## 2019-12-17 NOTE — PROGRESS NOTE ADULT - PROBLEM SELECTOR PLAN 1
2/2 extensive spinal metastasis. Pain appears both somatic and neuropathic  D/W HCP that indicate current goals are for improving symptoms Rx and taking him home with hospice.   Will DC Methadone.   Will start Dilaudid PCA 0.2 mg/hour CR, 0.5 mg q 60' DD,   Continue Gabapentin   Gabapentin was also decreased to 300 mg PO q 8 hours.   C/w Dilaudid 0.5 mg IV q 6 ATC  C/w Dilaudid PRN to 0.5 mg IV q 2 Hours  Continue Modafinil but only daily (10:00 am). 2/2 extensive spinal metastasis. Pain appears both somatic and neuropathic  D/W HCP that indicate current goals are for improving symptoms Rx and taking him home with hospice.   Will DC Methadone.   Will start Dilaudid PCA 0.2 mg/hour CR, 0.5 mg q 60' DD,   Continue Gabapentin   Gabapentin was also decreased to 300 mg PO q 8 hours.   C/w Dilaudid PRN to 0.5 mg IV q 2 Hours until PCA is started.   Continue Modafinil but only daily (10:00 am).

## 2019-12-17 NOTE — PROGRESS NOTE ADULT - PROBLEM SELECTOR PLAN 1
2/2 extensive spinal metastasis. Pain appears both somatic and neuropathic  D/W HCP that indicate current goals are for improving symptoms Rx and taking him home with hospice.   Dilaudid PCA 0.2 mg/hour CR, 0.5 mg q 20' DD,   Gabapentin 300 mg PO q 8 hours.   Continue Modafinil but only daily (10:00 am).  Will decrease Seroquel to 12.5 mg hs in order to avoid sedation.  Please place a PICC line.

## 2019-12-17 NOTE — PROGRESS NOTE ADULT - SUBJECTIVE AND OBJECTIVE BOX
LATE NOTE /16/19  SUBJECTIVE AND OBJECTIVE: Patient used Dilaudid 0.5 mg IV x 6 over 24 hours. He has now developed paresis of his RUE. He was still alert and able to c/o recurrent pain that improves with IV Dilaudid. He continues to have a poor appetite. He appears depressed and indicated he wanted to go home.     INTERVAL HPI/OVERNIGHT EVENTS: Persistent pain. As per his daughther he has become more alert but now not able to appropriately rest at night . He was answering questions appropriately when I saw him.     DNR on chart: No   Allergies    No Known Allergies    Intolerances    MEDICATIONS  (STANDING):  acetaminophen  IVPB .. 1000 milliGRAM(s) IV Intermittent once  Biotene Dry Mouth Oral Rinse 5 milliLiter(s) Swish and Spit every 6 hours  calamine Lotion 1 Application(s) Topical two times a day  dexAMETHasone     Tablet 4 milliGRAM(s) Oral every 6 hours  enoxaparin Injectable 80 milliGRAM(s) SubCutaneous daily  gabapentin 300 milliGRAM(s) Oral every 8 hours  HYDROmorphone  Injectable 0.5 milliGRAM(s) IV Push every 6 hours  levETIRAcetam 500 milliGRAM(s) Oral two times a day  lidocaine   Patch 3 Patch Transdermal daily  lisinopril 10 milliGRAM(s) Oral daily  methadone    Tablet 2.5 milliGRAM(s) Oral <User Schedule>  pantoprazole  Injectable 40 milliGRAM(s) IV Push daily  QUEtiapine 25 milliGRAM(s) Oral at bedtime  senna 2 Tablet(s) Oral at bedtime    MEDICATIONS  (PRN):  acetaminophen   Tablet .. 650 milliGRAM(s) Oral every 6 hours PRN Mild Pain (1 - 3)  diphenhydrAMINE   Injectable 12.5 milliGRAM(s) IV Push every 4 hours PRN pruritus  HYDROmorphone  Injectable 0.5 milliGRAM(s) IV Push every 2 hours PRN Severe Pain (7 - 10)  naloxone Injectable 0.1 milliGRAM(s) IV Push every 3 minutes PRN sedation or respiratory depression due to opioids      ITEMS UNCHECKED ARE NOT PRESENT    PRESENT SYMPTOMS: [ ]Unable to obtain due to poor mentation   Source if other than patient:  [ ]Family   [ ]Team     Pain:  [ x]yes [ ]no  QOL impact -   Location -   neck, chest                  Aggravating factors -   Quality - burning  Radiation - everywhere  Timing- constant  Severity (0-10 scale): Up to 8 but decreasing to  2-3 immediately after Dilaudid administration  Minimal acceptable level (0-10 scale):     Dyspnea:                           [ ]Mild [ ]Moderate [ ]Severe  Anxiety:                             [ ]Mild [x ]Moderate [ ]Severe  Fatigue:                             [ ]Mild [ ]Moderate [x ]Severe  Nausea:                             [ ]Mild [ ]Moderate [ ]Severe  Loss of appetite:              [ ]Mild [ ]Moderate [x ]Severe  Constipation:                    [ ]Mild [ ]Moderate [ ]Severe    PAIN AD Score:	  http://geriatrictoolkit.[email or web address]/painad.pdf (Ctrl + left click to view)    Other Symptoms:  [x ]All other review of systems negative but depressed mood and episodic anxiety/existential suffering.      Palliative Performance Status Version 2:    20     %      [email or web address]/[email or web address]_[email or web address]  PHYSICAL EXAM:  Vital Signs Last 24 Hrs  T(C): 37.2  T(F): 98.9   HR: 80  BP:106/67  BP(mean): --  RR:  19  SpO2: 95  GENERAL:  [x ]Alert  [ x]Oriented x 1-2  [ ]Lethargic  [x ]Cachexia  [ ]Unarousable  [ x]Verbal  [ ]Non-Verbal  Behavioral:   [ ]Anxiety  [ ]Delirium [ ]Agitation [ ]Other  HEENT:  [x ]Normal   [ ]Dry mouth   [ ]ET Tube/Trach  [ ]Oral lesions  [x] No pinpoint pupils   PULMONARY:   x[ ]Clear [ ]Tachypnea  [ ]Audible excessive secretions   [ ]Rhonchi        [ ]Right [ ]Left [ ]Bilateral  [ ]Crackles        [ ]Right [ ]Left [ ]Bilateral  [ ]Wheezing     [ ]Right [ ]Left [ ]Bilateral  [ ]Diminished BS [ ] Right [ ]Left [ ]Bilateral  CARDIOVASCULAR:    [x ]Regular [ ]Irregular [ ]Tachy  [ ]Dusty [ ]Murmur [ ]Other  GASTROINTESTINAL:  [x ]Soft  [ ]Distended   [ ]+BS  [x ]Non tender [ ]Tender  [ ]PEG [ ]OGT/ NGT   Last BM: 12/11   GENITOURINARY:  [ ]Normal [ ]Incontinent   [ ]Oliguria/Anuria   [x ]Alvarez  MUSCULOSKELETAL:   [ ]Normal   [x ]Weakness  [x ]Bed/Wheelchair bound [ ]Edema  NEUROLOGIC:   [ ]No focal deficits  [x ] Cognitive impairment  [ ] Dysphagia [ ]Dysarthria [x ] Paraplegia. RUE paresis  [ ]Other   SKIN:   [ ]Normal  [ ]Rash   [ ]Pressure ulcer(s) [ ]y [ ]n present on admission  [x] excoriations 2/2 itching due to pruritus    CRITICAL CARE:  [ ]Shock Present  [ ]Septic [ ]Cardiogenic [ ]Neurologic [ ]Hypovolemic  [ ]Vasopressors [ ]Inotropes  [ ]Respiratory failure present [ ]Mechanical Ventilation [ ]Non-invasive ventilatory support [ ]High-Flow  [ ]Acute  [ ]Chronic [ ]Hypoxic  [ ]Hypercarbic [ ]Other  [ ]Other organ failure     LABS:                          15.6   16.91 )-----------( 230      ( 13 Dec 2019 07:05 )             47.9   12-13    130<L>  |  94<L>  |  33<H>  ----------------------------<  98  4.7   |  25  |  0.54    Ca    9.9      13 Dec 2019 08:34            RADIOLOGY & ADDITIONAL STUDIES: reviewed    Protein Calorie Malnutrition Present: [ ]mild [ ]moderate [x ]severe [ ]underweight [ ]morbid obesity  https://www.andeal.org/vault/2440/web/files/ONC/Table_Clinical%20Characteristics%20to%20Document%20Malnutrition-White%20JV%20et%20al%168011.pdf    Height (cm): 170.18 (12-07-19 @ 09:03), 170.2 (09-24-19 @ 13:00), 170.18 (09-05-19 @ 11:47)  Weight (kg): 54.4 (12-07-19 @ 09:03), 54.7 (09-24-19 @ 13:00), 52.2 (09-05-19 @ 11:47)  BMI (kg/m2): 18.8 (12-07-19 @ 09:03), 18.9 (09-24-19 @ 13:00), 18 (09-05-19 @ 11:47)    [ ]PPSV2 < or = 30%  [x ]significant weight loss [ ]poor nutritional intake [ ]anasarca   Albumin, Serum: 3.8 g/dL (12-08-19 @ 09:07)   [ ]Artificial Nutrition    REFERRALS:   [x ]Chaplaincy  [ ]Hospice  [ ]Child Life  [ ]Social Work  [ ]Case management [ ]Holistic Therapy     Goals of Care Document:  pending

## 2019-12-17 NOTE — PROGRESS NOTE ADULT - PROBLEM SELECTOR PLAN 6
Will continue to f/u for symptoms Rx.   Hospice referral was done.   Plan is likely for home with hospice + PCA.

## 2019-12-17 NOTE — PROGRESS NOTE ADULT - SUBJECTIVE AND OBJECTIVE BOX
SUBJECTIVE AND OBJECTIVE: Patient used Dilaudid 0.5 mg IV x 6 over 14 hours. As per his nurse, the patient was having intermittent pain and not able to get any further DD due to current PCA settings. The patient was alert and able to still press the PCA demand button if needed. He indicated that the PCA was actually helping with his pain but as indicated DD were too apart in between.     INTERVAL HPI/OVERNIGHT EVENTS: . He was answering questions appropriately when I saw him. 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     DNR on chart: No   Allergies    No Known Allergies    Intolerances    MEDICATIONS  (STANDING):  Biotene Dry Mouth Oral Rinse 5 milliLiter(s) Swish and Spit every 6 hours  calamine Lotion 1 Application(s) Topical two times a day  dexAMETHasone     Tablet 4 milliGRAM(s) Oral every 6 hours  gabapentin 300 milliGRAM(s) Oral every 8 hours  HYDROmorphone PCA (1 mG/mL) 30 milliLiter(s) PCA Continuous PCA Continuous  levETIRAcetam 500 milliGRAM(s) Oral two times a day  lisinopril 10 milliGRAM(s) Oral daily  methylphenidate 5 milliGRAM(s) Oral <User Schedule>  pantoprazole  Injectable 40 milliGRAM(s) IV Push daily  QUEtiapine 25 milliGRAM(s) Oral at bedtime  senna 2 Tablet(s) Oral at bedtime  sodium chloride 0.9%. 1000 milliLiter(s) (75 mL/Hr) IV Continuous <Continuous>  sodium chloride 0.9%. 1000 milliLiter(s) (75 mL/Hr) IV Continuous <Continuous>    MEDICATIONS  (PRN):  acetaminophen   Tablet .. 650 milliGRAM(s) Oral every 6 hours PRN Mild Pain (1 - 3)  diphenhydrAMINE   Injectable 12.5 milliGRAM(s) IV Push every 4 hours PRN pruritus  magnesium hydroxide Suspension 30 milliLiter(s) Oral daily PRN Constipation  naloxone Injectable 0.1 milliGRAM(s) IV Push every 3 minutes PRN sedation or respiratory depression due to opioids  ondansetron Injectable 4 milliGRAM(s) IV Push every 6 hours PRN Nausea      ITEMS UNCHECKED ARE NOT PRESENT    PRESENT SYMPTOMS: [ ]Unable to obtain due to poor mentation   Source if other than patient:  [ ]Family   [ ]Team     Pain:  [ x]yes [ ]no  QOL impact -   Location -   neck, chest                  Aggravating factors -   Quality - burning  Radiation - everywhere  Timing- constant  Severity (0-10 scale): Up to 8 but decreasing to  2-3 immediately after Dilaudid administration  Minimal acceptable level (0-10 scale):     Dyspnea:                           [ ]Mild [ ]Moderate [ ]Severe  Anxiety:                             [ ]Mild [x ]Moderate [ ]Severe  Fatigue:                             [ ]Mild [ ]Moderate [x ]Severe  Nausea:                             [ ]Mild [ ]Moderate [ ]Severe  Loss of appetite:              [ ]Mild [ ]Moderate [x ]Severe  Constipation:                    [ ]Mild [ ]Moderate [ ]Severe    PAIN AD Score:	  http://geriatrictoolkit.[facility]/cog/painad.pdf (Ctrl + left click to view)    Other Symptoms:  [x ]All other review of systems negative but depressed mood and episodic anxiety/existential suffering. Weakness.       Palliative Performance Status Version 2:    20     %      http://npcrc.org/files/news/palliative_performance_scale_ppsv2.pdf  PHYSICAL EXAM:  Vital Signs Last 24 Hrs  T(C): 36.7 (17 Dec 2019 05:53), Max: 36.7 (17 Dec 2019 05:53)  T(F): 98.1 (17 Dec 2019 05:53), Max: 98.1 (17 Dec 2019 05:53)  HR: 64 (17 Dec 2019 05:53) (64 - 72)  BP: 149/91 (17 Dec 2019 05:53) (124/82 - 149/91)  BP(mean): --  RR: 18 (17 Dec 2019 05:53) (18 - 18)  SpO2: 96% (17 Dec 2019 05:53) (96% - 96%)  GENERAL:  [x ]Alert  [ x]Oriented x 1-2  [ ]Lethargic  [x ]Cachexia  [ ]Unarousable  [ x]Verbal  [ ]Non-Verbal  Behavioral:   [ ]Anxiety  [ ]Delirium [ ]Agitation [ ]Other  HEENT:  [x ]Normal   [ ]Dry mouth   [ ]ET Tube/Trach  [ ]Oral lesions  [x] No pinpoint pupils   PULMONARY:   x[ ]Clear [ ]Tachypnea  [ ]Audible excessive secretions   [ ]Rhonchi        [ ]Right [ ]Left [ ]Bilateral  [ ]Crackles        [ ]Right [ ]Left [ ]Bilateral  [ ]Wheezing     [ ]Right [ ]Left [ ]Bilateral  [ ]Diminished BS [ ] Right [ ]Left [ ]Bilateral  CARDIOVASCULAR:    [x ]Regular [ ]Irregular [ ]Tachy  [ ]Dusty [ ]Murmur [ ]Other  GASTROINTESTINAL:  [x ]Soft  [ ]Distended   [ ]+BS  [x ]Non tender [ ]Tender  [ ]PEG [ ]OGT/ NGT   Last BM: 12/13   GENITOURINARY:  [ ]Normal [ ]Incontinent   [ ]Oliguria/Anuria   [x ]Alvarez  MUSCULOSKELETAL:   [ ]Normal   [x ]Weakness  [x ]Bed/Wheelchair bound [ ]Edema  NEUROLOGIC:   [ ]No focal deficits  [x ] Cognitive impairment  [ ] Dysphagia [ ]Dysarthria [x ] Paraplegia. RUE paresis  [ ]Other   SKIN:   [ ]Normal  [ ]Rash   [ ]Pressure ulcer(s) [ ]y [ ]n present on admission  [x] excoriations 2/2 itching due to pruritus    CRITICAL CARE:  [ ]Shock Present  [ ]Septic [ ]Cardiogenic [ ]Neurologic [ ]Hypovolemic  [ ]Vasopressors [ ]Inotropes  [ ]Respiratory failure present [ ]Mechanical Ventilation [ ]Non-invasive ventilatory support [ ]High-Flow  [ ]Acute  [ ]Chronic [ ]Hypoxic  [ ]Hypercarbic [ ]Other  [ ]Other organ failure     LABS:                            14.6   18.36 )-----------( 263      ( 16 Dec 2019 07:19 )             45.2   12-16    136  |  103  |  39<H>  ----------------------------<  83  4.4   |  23  |  0.45<L>    Ca    9.8      16 Dec 2019 07:19    TPro  5.4<L>  /  Alb  3.1<L>  /  TBili  x   /  DBili  x   /  AST  x   /  ALT  x   /  AlkPhos  x   12-15            RADIOLOGY & ADDITIONAL STUDIES: reviewed    Protein Calorie Malnutrition Present: [ ]mild [ ]moderate [x ]severe [ ]underweight [ ]morbid obesity  https://www.andeal.org/vault/0179/web/files/ONC/Table_Clinical%20Characteristics%20to%20Document%20Malnutrition-White%20JV%20et%20al%202012.pdf    Height (cm): 170.18 (12-07-19 @ 09:03), 170.2 (09-24-19 @ 13:00), 170.18 (09-05-19 @ 11:47)  Weight (kg): 54.4 (12-07-19 @ 09:03), 54.7 (09-24-19 @ 13:00), 52.2 (09-05-19 @ 11:47)  BMI (kg/m2): 18.8 (12-07-19 @ 09:03), 18.9 (09-24-19 @ 13:00), 18 (09-05-19 @ 11:47)    [ ]PPSV2 < or = 30%  [x ]significant weight loss [ ]poor nutritional intake [ ]anasarca   Albumin, Serum: 3.8 g/dL (12-08-19 @ 09:07)   [ ]Artificial Nutrition    REFERRALS:   [x ]Chaplaincy  [ ]Hospice  [ ]Child Life  [ ]Social Work  [ ]Case management [ ]Holistic Therapy     Goals of Care Document:  pending

## 2019-12-17 NOTE — PROGRESS NOTE ADULT - ASSESSMENT
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 Per family the patient had MR at [facility] 3 weeks ago showing diffuse leptomeningeal spread (films unavail). Family is still shrouding patient from diagnosis/prognosis. Exam: oriented x3, FC, distal LE 4/5, proximal LE 2-3/5, UE 4/5. SILT. Endorses back pain.

## 2019-12-17 NOTE — PROGRESS NOTE ADULT - ASSESSMENT
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 Has lung cancer w/ mets to spine.     Hyponatremia  initially euvolemic; hyponatremia is appropriate ADH response to pain and nausea  now hypovolemic hyponatremia 2/2 poor PO intake    - consider remeron to help w/ appetite    - repeat BMP is pending for today    - continue IV NS @ 50-75 mL/hr given poor PO intake    - continue 1L fluid restricted diet  - liberalize salt intake in diet    - continue pain control, palliative care is following   - anti-emetics for control of nausea as needed    - Na should not change by more than 8 mEq in 24 hrs; please call nephrology if it does    Acidosis:  Non-AG; likely related to N/V previously  improved  continue to monitor    Increased BUN:  Sec to Steroids + hypovolemia  improving    Hematuria:  initial UA had RBC 5  likely 2/2 france catheter  resolved on repeat UA

## 2019-12-17 NOTE — PROGRESS NOTE ADULT - PROBLEM SELECTOR PLAN 3
Rad ONC, Medical Onc, and Neuro Onc inputs noted.   d/w HCP that agree on GOC toward symptoms Rx only.   A Hospice referral was made for home hospice.  With paraplegia. Continue Dexa 4 mg IV q 6 ATC

## 2019-12-17 NOTE — PROGRESS NOTE ADULT - PROBLEM SELECTOR PLAN 6
Will continue to f/u for symptoms Rx.   Hospice referral was done.   Plan is likely for home with hospice + PCA.  Patient's son was by his bedside and the plan of care was d/w him.

## 2019-12-18 PROCEDURE — 99233 SBSQ HOSP IP/OBS HIGH 50: CPT

## 2019-12-18 PROCEDURE — 71045 X-RAY EXAM CHEST 1 VIEW: CPT | Mod: 26

## 2019-12-18 RX ORDER — ENOXAPARIN SODIUM 100 MG/ML
80 INJECTION SUBCUTANEOUS DAILY
Refills: 0 | Status: DISCONTINUED | OUTPATIENT
Start: 2019-12-18 | End: 2019-12-20

## 2019-12-18 RX ADMIN — Medication 4 MILLIGRAM(S): at 06:48

## 2019-12-18 RX ADMIN — Medication 5 MILLILITER(S): at 23:16

## 2019-12-18 RX ADMIN — GABAPENTIN 300 MILLIGRAM(S): 400 CAPSULE ORAL at 14:46

## 2019-12-18 RX ADMIN — HYDROMORPHONE HYDROCHLORIDE 30 MILLILITER(S): 2 INJECTION INTRAMUSCULAR; INTRAVENOUS; SUBCUTANEOUS at 23:23

## 2019-12-18 RX ADMIN — Medication 5 MILLILITER(S): at 12:00

## 2019-12-18 RX ADMIN — Medication 4 MILLIGRAM(S): at 12:00

## 2019-12-18 RX ADMIN — Medication 5 MILLIGRAM(S): at 12:00

## 2019-12-18 RX ADMIN — QUETIAPINE FUMARATE 12.5 MILLIGRAM(S): 200 TABLET, FILM COATED ORAL at 23:15

## 2019-12-18 RX ADMIN — PANTOPRAZOLE SODIUM 40 MILLIGRAM(S): 20 TABLET, DELAYED RELEASE ORAL at 12:00

## 2019-12-18 RX ADMIN — LEVETIRACETAM 500 MILLIGRAM(S): 250 TABLET, FILM COATED ORAL at 06:48

## 2019-12-18 RX ADMIN — HYDROMORPHONE HYDROCHLORIDE 30 MILLILITER(S): 2 INJECTION INTRAMUSCULAR; INTRAVENOUS; SUBCUTANEOUS at 07:52

## 2019-12-18 RX ADMIN — Medication 10 MILLIGRAM(S): at 14:44

## 2019-12-18 RX ADMIN — Medication 5 MILLILITER(S): at 06:48

## 2019-12-18 RX ADMIN — Medication 5 MILLILITER(S): at 17:33

## 2019-12-18 RX ADMIN — GABAPENTIN 300 MILLIGRAM(S): 400 CAPSULE ORAL at 06:48

## 2019-12-18 RX ADMIN — Medication 4 MILLIGRAM(S): at 23:15

## 2019-12-18 RX ADMIN — GABAPENTIN 300 MILLIGRAM(S): 400 CAPSULE ORAL at 23:15

## 2019-12-18 RX ADMIN — SODIUM CHLORIDE 75 MILLILITER(S): 9 INJECTION INTRAMUSCULAR; INTRAVENOUS; SUBCUTANEOUS at 14:46

## 2019-12-18 RX ADMIN — Medication 4 MILLIGRAM(S): at 17:33

## 2019-12-18 RX ADMIN — SENNA PLUS 2 TABLET(S): 8.6 TABLET ORAL at 23:15

## 2019-12-18 RX ADMIN — LEVETIRACETAM 500 MILLIGRAM(S): 250 TABLET, FILM COATED ORAL at 17:33

## 2019-12-18 RX ADMIN — HYDROMORPHONE HYDROCHLORIDE 30 MILLILITER(S): 2 INJECTION INTRAMUSCULAR; INTRAVENOUS; SUBCUTANEOUS at 19:29

## 2019-12-18 NOTE — PROGRESS NOTE ADULT - PROBLEM SELECTOR PLAN 6
Will continue to f/u for symptoms Rx.   Hospice referral was done.   Plan is likely for home with hospice + PCA.  Patient's son was by his bedside and the plan of care was d/w him. Will continue to f/u for symptoms Rx.   Hospice referral was done.   Plan is likely for home with hospice + PCA.  Plan of care d/w the HCP.

## 2019-12-18 NOTE — PROGRESS NOTE ADULT - PROBLEM SELECTOR PLAN 1
2/2 extensive spinal metastasis. Pain appears both somatic and neuropathic  D/W HCP that indicate current goals are for improving symptoms Rx and taking him home with hospice.   Dilaudid PCA 0.2 mg/hour CR, 0.5 mg q 20' DD,   Gabapentin 300 mg PO q 8 hours.   Continue Modafinil but only daily (10:00 am).  Will decrease Seroquel to 12.5 mg hs in order to avoid sedation.  Please place a PICC line. 2/2 extensive spinal metastasis. Pain appears both somatic and neuropathic  D/W HCP that indicate current goals are for improving symptoms Rx and taking him home with hospice.   Dilaudid PCA 0.2 mg/hour CR, 0.5 mg q 20' DD,   Gabapentin 300 mg PO q 8 hours.   Continue Modafinil but only daily (10:00 am).  Seroquel 12.5 mg hs   Please place a PICC line.

## 2019-12-18 NOTE — PROGRESS NOTE ADULT - SUBJECTIVE AND OBJECTIVE BOX
Patient is a 64y old  Male who presents with a chief complaint of LE weakness (18 Dec 2019 16:49)      INTERVAL HISTORY: sleeping comfortably     	  MEDICATIONS:  lisinopril 10 milliGRAM(s) Oral daily        PHYSICAL EXAM:  T(C): 36.7 (12-18-19 @ 21:18), Max: 36.9 (12-18-19 @ 10:25)  HR: 80 (12-18-19 @ 21:18) (62 - 80)  BP: 114/75 (12-18-19 @ 21:18) (105/72 - 119/79)  RR: 18 (12-18-19 @ 21:18) (18 - 18)  SpO2: 94% (12-18-19 @ 21:18) (94% - 96%)  Wt(kg): --  I&O's Summary    17 Dec 2019 07:01  -  18 Dec 2019 07:00  --------------------------------------------------------  IN: 1900 mL / OUT: 2575 mL / NET: -675 mL    18 Dec 2019 07:01  -  18 Dec 2019 22:31  --------------------------------------------------------  IN: 975 mL / OUT: 1150 mL / NET: -175 mL          Appearance: In no distress	  Breathing comfortable  Asleep  Cardiovascular:  S1 S2  Extremities:  No edema of LE        Labs personally reviewed      Assessment and Plan:   Assessment:  · Assessment		  Pt is a 63 Y/O Male with PMHx smoker 50+yr 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 Per family the patient had MR at [facility] 3 weeks ago showing diffuse leptomeningeal spread (films unavail). Family is still shrouding patient from diagnosis/prognosis. Exam: oriented x3, FC, distal LE 4/5, proximal LE 2-3/5, UE 4/5. SILT. Endorses back pain.      Problem/Plan - 1:  ·  Problem: Cord compression.  Plan: Seen on MRI  Neurosurg eval appreciated  [facility] records reviewed likely with metastatic small cell lung ca  Onc and rad onc follow up noted    Problem/Plan - 2:  ·  Problem: Pulmonary embolism.  Plan: COnt lovenox   Monitor Hgb level   O 2 supplement.     Problem/Plan - 3:  ·  Problem: Chest pain, atypical.  Plan: does not appear to be cardiac in nature. ?2/2 PE   EKG with nsr and no ST changes    Awaiting  home hospice discharge         Petey Roberson, DO [facility]  Cardiovascular Medicine  [address], [address]  Office: [phone]  Cell: [phone]          Petey Roberson DO [facility]  Cardiovascular Medicine  [address], [address]  Office: [phone]  Cell: [phone]

## 2019-12-18 NOTE — PROGRESS NOTE ADULT - SUBJECTIVE AND OBJECTIVE BOX
[facility]    Subjective: Patient seen and examined. No new events except as noted.   feeling okay  pain better controlled  daughter at the bedside     REVIEW OF SYSTEMS:  + weakness    MEDICATIONS:  MEDICATIONS  (STANDING):  Biotene Dry Mouth Oral Rinse 5 milliLiter(s) Swish and Spit every 6 hours  calamine Lotion 1 Application(s) Topical two times a day  dexAMETHasone     Tablet 4 milliGRAM(s) Oral every 6 hours  gabapentin 300 milliGRAM(s) Oral every 8 hours  HYDROmorphone PCA (1 mG/mL) 30 milliLiter(s) PCA Continuous PCA Continuous  levETIRAcetam 500 milliGRAM(s) Oral two times a day  lisinopril 10 milliGRAM(s) Oral daily  methylphenidate 5 milliGRAM(s) Oral <User Schedule>  pantoprazole  Injectable 40 milliGRAM(s) IV Push daily  QUEtiapine 12.5 milliGRAM(s) Oral at bedtime  senna 2 Tablet(s) Oral at bedtime  sodium chloride 0.9%. 1000 milliLiter(s) (75 mL/Hr) IV Continuous <Continuous>  sodium chloride 0.9%. 1000 milliLiter(s) (75 mL/Hr) IV Continuous <Continuous>      PHYSICAL EXAM:  T(C): 36.5 (12-18-19 @ 13:40), Max: 36.9 (12-18-19 @ 10:25)  HR: 69 (12-18-19 @ 13:40) (60 - 69)  BP: 105/72 (12-18-19 @ 13:40) (105/72 - 131/85)  RR: 18 (12-18-19 @ 13:40) (18 - 18)  SpO2: 94% (12-18-19 @ 13:40) (94% - 96%)  Wt(kg): --  I&O's Summary    17 Dec 2019 07:01  -  18 Dec 2019 07:00  --------------------------------------------------------  IN: 1900 mL / OUT: 2575 mL / NET: -675 mL    18 Dec 2019 07:01  -  18 Dec 2019 18:05  --------------------------------------------------------  IN: 875 mL / OUT: 550 mL / NET: 325 mL          Appearance: awake, cachectic	  HEENT:   dry OM 	  Lymphatic: No lymphadenopathy , no edema  Cardiovascular: Normal S1 S2  Respiratory: normal effort 	  Gastrointestinal:  + BS	  Skin:  warm to touch  Musculoskeletal: motor loss LE and UE   Psychiatry:  Mood & affect appropriate  Ext: No edema      All labs, Imaging and EKGs personally reviewed

## 2019-12-18 NOTE — PROGRESS NOTE ADULT - SUBJECTIVE AND OBJECTIVE BOX
SUBJECTIVE AND OBJECTIVE: Patient used Dilaudid 0.5 mg IV x 6 over 14 hours. As per his nurse, the patient was having intermittent pain and not able to get any further DD due to current PCA settings. The patient was alert and able to still press the PCA demand button if needed. He indicated that the PCA was actually helping with his pain but as indicated DD were too apart in between.     INTERVAL HPI/OVERNIGHT EVENTS: . He was answering questions appropriately when I saw him. 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     DNR on chart: No   Allergies    No Known Allergies    Intolerances    MEDICATIONS  (STANDING):  Biotene Dry Mouth Oral Rinse 5 milliLiter(s) Swish and Spit every 6 hours  calamine Lotion 1 Application(s) Topical two times a day  dexAMETHasone     Tablet 4 milliGRAM(s) Oral every 6 hours  gabapentin 300 milliGRAM(s) Oral every 8 hours  HYDROmorphone PCA (1 mG/mL) 30 milliLiter(s) PCA Continuous PCA Continuous  levETIRAcetam 500 milliGRAM(s) Oral two times a day  lisinopril 10 milliGRAM(s) Oral daily  methylphenidate 5 milliGRAM(s) Oral <User Schedule>  pantoprazole  Injectable 40 milliGRAM(s) IV Push daily  QUEtiapine 12.5 milliGRAM(s) Oral at bedtime  senna 2 Tablet(s) Oral at bedtime  sodium chloride 0.9%. 1000 milliLiter(s) (75 mL/Hr) IV Continuous <Continuous>  sodium chloride 0.9%. 1000 milliLiter(s) (75 mL/Hr) IV Continuous <Continuous>    MEDICATIONS  (PRN):  acetaminophen   Tablet .. 650 milliGRAM(s) Oral every 6 hours PRN Mild Pain (1 - 3)  bisacodyl Suppository 10 milliGRAM(s) Rectal daily PRN Constipation  diphenhydrAMINE   Injectable 12.5 milliGRAM(s) IV Push every 4 hours PRN pruritus  magnesium hydroxide Suspension 30 milliLiter(s) Oral daily PRN Constipation  naloxone Injectable 0.1 milliGRAM(s) IV Push every 3 minutes PRN sedation or respiratory depression due to opioids  ondansetron Injectable 4 milliGRAM(s) IV Push every 6 hours PRN Nausea        ITEMS UNCHECKED ARE NOT PRESENT    PRESENT SYMPTOMS: [ ]Unable to obtain due to poor mentation   Source if other than patient:  [ ]Family   [ ]Team     Pain:  [ x]yes [ ]no  QOL impact -   Location -   neck, chest                  Aggravating factors -   Quality - burning  Radiation - everywhere  Timing- constant  Severity (0-10 scale): Up to 8 but decreasing to  2-3 immediately after Dilaudid administration  Minimal acceptable level (0-10 scale):     Dyspnea:                           [ ]Mild [ ]Moderate [ ]Severe  Anxiety:                             [ ]Mild [x ]Moderate [ ]Severe  Fatigue:                             [ ]Mild [ ]Moderate [x ]Severe  Nausea:                             [ ]Mild [ ]Moderate [ ]Severe  Loss of appetite:              [ ]Mild [ ]Moderate [x ]Severe  Constipation:                    [ ]Mild [ ]Moderate [ ]Severe    PAIN AD Score:	  [email or web address] (Ctrl + left click to view)    Other Symptoms:  [x ]All other review of systems negative but depressed mood and episodic anxiety/existential suffering. Weakness.       Palliative Performance Status Version 2:    20     %      [email or web address]  PHYSICAL EXAM:  Vital Signs Last 24 Hrs  T(C): 36.5 (18 Dec 2019 13:40), Max: 36.9 (18 Dec 2019 10:25)  T(F): 97.7 (18 Dec 2019 13:40), Max: 98.5 (18 Dec 2019 10:25)  HR: 69 (18 Dec 2019 13:40) (60 - 69)  BP: 105/72 (18 Dec 2019 13:40) (105/72 - 131/85)  BP(mean): --  RR: 18 (18 Dec 2019 13:40) (18 - 18)  SpO2: 94% (18 Dec 2019 13:40) (94% - 96%)  GENERAL:  [x ]Alert  [ x]Oriented x 1-2  [ ]Lethargic  [x ]Cachexia  [ ]Unarousable  [ x]Verbal  [ ]Non-Verbal  Behavioral:   [ ]Anxiety  [ ]Delirium [ ]Agitation [ ]Other  HEENT:  [x ]Normal   [ ]Dry mouth   [ ]ET Tube/Trach  [ ]Oral lesions  [x] No pinpoint pupils   PULMONARY:   x[ ]Clear [ ]Tachypnea  [ ]Audible excessive secretions   [ ]Rhonchi        [ ]Right [ ]Left [ ]Bilateral  [ ]Crackles        [ ]Right [ ]Left [ ]Bilateral  [ ]Wheezing     [ ]Right [ ]Left [ ]Bilateral  [ ]Diminished BS [ ] Right [ ]Left [ ]Bilateral  CARDIOVASCULAR:    [x ]Regular [ ]Irregular [ ]Tachy  [ ]Dusty [ ]Murmur [ ]Other  GASTROINTESTINAL:  [x ]Soft  [ ]Distended   [ ]+BS  [x ]Non tender [ ]Tender  [ ]PEG [ ]OGT/ NGT   Last BM: 12/13   GENITOURINARY:  [ ]Normal [ ]Incontinent   [ ]Oliguria/Anuria   [x ]Alvarez  MUSCULOSKELETAL:   [ ]Normal   [x ]Weakness  [x ]Bed/Wheelchair bound [ ]Edema  NEUROLOGIC:   [ ]No focal deficits  [x ] Cognitive impairment  [ ] Dysphagia [ ]Dysarthria [x ] Paraplegia. RUE paresis  [ ]Other   SKIN:   [ ]Normal  [ ]Rash   [ ]Pressure ulcer(s) [ ]y [ ]n present on admission  [x] excoriations 2/2 itching due to pruritus    CRITICAL CARE:  [ ]Shock Present  [ ]Septic [ ]Cardiogenic [ ]Neurologic [ ]Hypovolemic  [ ]Vasopressors [ ]Inotropes  [ ]Respiratory failure present [ ]Mechanical Ventilation [ ]Non-invasive ventilatory support [ ]High-Flow  [ ]Acute  [ ]Chronic [ ]Hypoxic  [ ]Hypercarbic [ ]Other  [ ]Other organ failure     LABS:                         No new labs           RADIOLOGY & ADDITIONAL STUDIES: reviewed    Protein Calorie Malnutrition Present: [ ]mild [ ]moderate [x ]severe [ ]underweight [ ]morbid obesity  https://www.andeal.org/vault/2440/web/files/ONC/Table_Clinical%20Characteristics%20to%20Document%20Malnutrition-White%20JV%20et%20al%834653.pdf    Height (cm): 170.18 (12-07-19 @ 09:03), 170.2 (09-24-19 @ 13:00), 170.18 (09-05-19 @ 11:47)  Weight (kg): 54.4 (12-07-19 @ 09:03), 54.7 (09-24-19 @ 13:00), 52.2 (09-05-19 @ 11:47)  BMI (kg/m2): 18.8 (12-07-19 @ 09:03), 18.9 (09-24-19 @ 13:00), 18 (09-05-19 @ 11:47)    [ ]PPSV2 < or = 30%  [x ]significant weight loss [ ]poor nutritional intake [ ]anasarca   Albumin, Serum: 3.8 g/dL (12-08-19 @ 09:07)   [ ]Artificial Nutrition    REFERRALS:   [x ]Chaplaincy  [ ]Hospice  [ ]Child Life  [ ]Social Work  [ ]Case management [ ]Holistic Therapy     Goals of Care Document:  pending SUBJECTIVE AND OBJECTIVE: Patient used Dilaudid 0.5 mg IV x 12 over 24 hours. As per his HCP, by his bedside, the patient has been more comfortable and still able to intermittently interact with his family. The patient denied any complaints.     INTERVAL HPI/OVERNIGHT EVENTS: . He was answering questions appropriately when I saw him. 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     DNR on chart: No   Allergies    No Known Allergies    Intolerances    MEDICATIONS  (STANDING):  Biotene Dry Mouth Oral Rinse 5 milliLiter(s) Swish and Spit every 6 hours  calamine Lotion 1 Application(s) Topical two times a day  dexAMETHasone     Tablet 4 milliGRAM(s) Oral every 6 hours  gabapentin 300 milliGRAM(s) Oral every 8 hours  HYDROmorphone PCA (1 mG/mL) 30 milliLiter(s) PCA Continuous PCA Continuous  levETIRAcetam 500 milliGRAM(s) Oral two times a day  lisinopril 10 milliGRAM(s) Oral daily  methylphenidate 5 milliGRAM(s) Oral <User Schedule>  pantoprazole  Injectable 40 milliGRAM(s) IV Push daily  QUEtiapine 12.5 milliGRAM(s) Oral at bedtime  senna 2 Tablet(s) Oral at bedtime  sodium chloride 0.9%. 1000 milliLiter(s) (75 mL/Hr) IV Continuous <Continuous>  sodium chloride 0.9%. 1000 milliLiter(s) (75 mL/Hr) IV Continuous <Continuous>    MEDICATIONS  (PRN):  acetaminophen   Tablet .. 650 milliGRAM(s) Oral every 6 hours PRN Mild Pain (1 - 3)  bisacodyl Suppository 10 milliGRAM(s) Rectal daily PRN Constipation  diphenhydrAMINE   Injectable 12.5 milliGRAM(s) IV Push every 4 hours PRN pruritus  magnesium hydroxide Suspension 30 milliLiter(s) Oral daily PRN Constipation  naloxone Injectable 0.1 milliGRAM(s) IV Push every 3 minutes PRN sedation or respiratory depression due to opioids  ondansetron Injectable 4 milliGRAM(s) IV Push every 6 hours PRN Nausea        ITEMS UNCHECKED ARE NOT PRESENT    PRESENT SYMPTOMS: [ ]Unable to obtain due to poor mentation   Source if other than patient:  [ ]Family   [ ]Team     Pain:  [ x]yes [ ]no  QOL impact -   Location -   neck, chest                  Aggravating factors -   Quality - burning  Radiation - everywhere  Timing- constant  Severity (0-10 scale): Up to 8 but decreasing to  2-3 immediately after Dilaudid administration  Minimal acceptable level (0-10 scale):     Dyspnea:                           [ ]Mild [ ]Moderate [ ]Severe  Anxiety:                             [ ]Mild [x ]Moderate [ ]Severe  Fatigue:                             [ ]Mild [ ]Moderate [x ]Severe  Nausea:                             [ ]Mild [ ]Moderate [ ]Severe  Loss of appetite:              [ ]Mild [ ]Moderate [x ]Severe  Constipation:                    [ ]Mild [ ]Moderate [ ]Severe    PAIN AD Score:	  [email or web address] (Ctrl + left click to view)    Other Symptoms:  [x ]All other review of systems negative but depressed mood and episodic anxiety/existential suffering. Weakness.       Palliative Performance Status Version 2:    20     %      http://npcrc.org/files/news/palliative_performance_scale_[email or web address].pdf  PHYSICAL EXAM:  Vital Signs Last 24 Hrs  T(C): 36.5 (18 Dec 2019 13:40), Max: 36.9 (18 Dec 2019 10:25)  T(F): 97.7 (18 Dec 2019 13:40), Max: 98.5 (18 Dec 2019 10:25)  HR: 69 (18 Dec 2019 13:40) (60 - 69)  BP: 105/72 (18 Dec 2019 13:40) (105/72 - 131/85)  BP(mean): --  RR: 18 (18 Dec 2019 13:40) (18 - 18)  SpO2: 94% (18 Dec 2019 13:40) (94% - 96%)  GENERAL:  [x ]Alert  [ x]Oriented x 1-2  [ ]Lethargic  [x ]Cachexia  [ ]Unarousable  [ x]Verbal  [ ]Non-Verbal  Behavioral:   [ ]Anxiety  [ ]Delirium [ ]Agitation [ ]Other  HEENT:  [x ]Normal   [ ]Dry mouth   [ ]ET Tube/Trach  [ ]Oral lesions  [x] No pinpoint pupils   PULMONARY:   x[ ]Clear [ ]Tachypnea  [ ]Audible excessive secretions   [ ]Rhonchi        [ ]Right [ ]Left [ ]Bilateral  [ ]Crackles        [ ]Right [ ]Left [ ]Bilateral  [ ]Wheezing     [ ]Right [ ]Left [ ]Bilateral  [ ]Diminished BS [ ] Right [ ]Left [ ]Bilateral  CARDIOVASCULAR:    [x ]Regular [ ]Irregular [ ]Tachy  [ ]Dusty [ ]Murmur [ ]Other  GASTROINTESTINAL:  [x ]Soft  [ ]Distended   [ ]+BS  [x ]Non tender [ ]Tender  [ ]PEG [ ]OGT/ NGT   Last BM: 12/13   GENITOURINARY:  [ ]Normal [ ]Incontinent   [ ]Oliguria/Anuria   [x ]Alvarez  MUSCULOSKELETAL:   [ ]Normal   [x ]Weakness  [x ]Bed/Wheelchair bound [ ]Edema  NEUROLOGIC:   [ ]No focal deficits  [x ] Cognitive impairment  [ ] Dysphagia [ ]Dysarthria [x ] Paraplegia. RUE paresis  [ ]Other   SKIN:   [ ]Normal  [ ]Rash   [ ]Pressure ulcer(s) [ ]y [ ]n present on admission  [x] excoriations 2/2 itching due to pruritus    CRITICAL CARE:  [ ]Shock Present  [ ]Septic [ ]Cardiogenic [ ]Neurologic [ ]Hypovolemic  [ ]Vasopressors [ ]Inotropes  [ ]Respiratory failure present [ ]Mechanical Ventilation [ ]Non-invasive ventilatory support [ ]High-Flow  [ ]Acute  [ ]Chronic [ ]Hypoxic  [ ]Hypercarbic [ ]Other  [ ]Other organ failure     LABS:                         No new labs           RADIOLOGY & ADDITIONAL STUDIES: reviewed    Protein Calorie Malnutrition Present: [ ]mild [ ]moderate [x ]severe [ ]underweight [ ]morbid obesity  https://www.andeal.org/vault/2440/web/files/ONC/Table_Clinical%20Characteristics%20to%20Document%20Malnutrition-White%20JV%20et%20al%250858.pdf    Height (cm): 170.18 (12-07-19 @ 09:03), 170.2 (09-24-19 @ 13:00), 170.18 (09-05-19 @ 11:47)  Weight (kg): 54.4 (12-07-19 @ 09:03), 54.7 (09-24-19 @ 13:00), 52.2 (09-05-19 @ 11:47)  BMI (kg/m2): 18.8 (12-07-19 @ 09:03), 18.9 (09-24-19 @ 13:00), 18 (09-05-19 @ 11:47)    [ ]PPSV2 < or = 30%  [x ]significant weight loss [ ]poor nutritional intake [ ]anasarca   Albumin, Serum: 3.8 g/dL (12-08-19 @ 09:07)   [ ]Artificial Nutrition    REFERRALS:   [x ]Chaplaincy  [ ]Hospice  [ ]Child Life  [ ]Social Work  [ ]Case management [ ]Holistic Therapy     Goals of Care Document:  pending

## 2019-12-18 NOTE — PROGRESS NOTE ADULT - ASSESSMENT
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 Per family the patient had MR at [facility] 3 weeks ago showing diffuse leptomeningeal spread (films unavail). Family is still shrouding patient from diagnosis/prognosis. Exam: oriented x3, FC, distal LE 4/5, proximal LE 2-3/5, UE 4/5. SILT. Endorses back pain.

## 2019-12-18 NOTE — PROGRESS NOTE ADULT - PROBLEM SELECTOR PLAN 1
Seen on MRI, mets to spine   Neurosurg eval appreciated  Onc eval appreciated  possible systemic treatment, poor candidate  Rad Onc eval noted   Decadron 4mg Q6hrs, taper as per surg team   pain control, aggressive   Neuro check  Palliative eval appreciated, pain control   poor candidate for palliative rads  seen by Onc/Rad today   may benefits form chemo however family refusing and stating that he has no power to get chemo   discussed in detail with family at the bedside  pain control and muscle relaxant  possible morphin pump   second opinion Onc rafael appreciated  palliative F/U appreciated  family decided o DNR-DNI  would like to take him home with home hospice  S/P PICC line transfer to home with home hospice on Saturday

## 2019-12-19 ENCOUNTER — APPOINTMENT (OUTPATIENT)
Dept: OPHTHALMOLOGY | Facility: CLINIC | Age: 64
End: 2019-12-19

## 2019-12-19 PROCEDURE — 99232 SBSQ HOSP IP/OBS MODERATE 35: CPT

## 2019-12-19 RX ADMIN — Medication 4 MILLIGRAM(S): at 06:10

## 2019-12-19 RX ADMIN — Medication 4 MILLIGRAM(S): at 18:12

## 2019-12-19 RX ADMIN — ENOXAPARIN SODIUM 80 MILLIGRAM(S): 100 INJECTION SUBCUTANEOUS at 13:44

## 2019-12-19 RX ADMIN — SENNA PLUS 2 TABLET(S): 8.6 TABLET ORAL at 23:04

## 2019-12-19 RX ADMIN — GABAPENTIN 300 MILLIGRAM(S): 400 CAPSULE ORAL at 13:48

## 2019-12-19 RX ADMIN — HYDROMORPHONE HYDROCHLORIDE 30 MILLILITER(S): 2 INJECTION INTRAMUSCULAR; INTRAVENOUS; SUBCUTANEOUS at 21:13

## 2019-12-19 RX ADMIN — Medication 4 MILLIGRAM(S): at 13:48

## 2019-12-19 RX ADMIN — LEVETIRACETAM 500 MILLIGRAM(S): 250 TABLET, FILM COATED ORAL at 06:10

## 2019-12-19 RX ADMIN — Medication 5 MILLILITER(S): at 06:10

## 2019-12-19 RX ADMIN — GABAPENTIN 300 MILLIGRAM(S): 400 CAPSULE ORAL at 23:04

## 2019-12-19 RX ADMIN — Medication 5 MILLIGRAM(S): at 09:50

## 2019-12-19 RX ADMIN — GABAPENTIN 300 MILLIGRAM(S): 400 CAPSULE ORAL at 06:10

## 2019-12-19 RX ADMIN — LEVETIRACETAM 500 MILLIGRAM(S): 250 TABLET, FILM COATED ORAL at 18:12

## 2019-12-19 RX ADMIN — PANTOPRAZOLE SODIUM 40 MILLIGRAM(S): 20 TABLET, DELAYED RELEASE ORAL at 13:44

## 2019-12-19 RX ADMIN — HYDROMORPHONE HYDROCHLORIDE 30 MILLILITER(S): 2 INJECTION INTRAMUSCULAR; INTRAVENOUS; SUBCUTANEOUS at 08:11

## 2019-12-19 NOTE — PROGRESS NOTE ADULT - ASSESSMENT
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 Per family the patient had MR at [facility] 3 weeks ago showing diffuse leptomeningeal spread (films unavail). Family is still shrouding patient from diagnosis/prognosis. Exam: oriented x3, FC, distal LE 4/5, proximal LE 2-3/5, UE 4/5. SILT. Endorses back pain.

## 2019-12-19 NOTE — PROGRESS NOTE ADULT - PROBLEM SELECTOR PLAN 5
The HCP understands about the patient's advanced illness and very poor prognosis (likely days). She indicated she had discussed with her siblings and that they agree on DNR/I, no feeding tube, Abx to be discussed if needed. A MOLST and a capacity form were already completed and placed in the chart.   D/W Primary team. On Senna hs and Dulcolax PRN.

## 2019-12-19 NOTE — PROGRESS NOTE ADULT - PROBLEM SELECTOR PLAN 1
2/2 extensive spinal metastasis. Pain appears both somatic and neuropathic  D/W HCP that indicate current goals are for improving symptoms Rx and taking him home with hospice.   Dilaudid PCA 0.2 mg/hour CR, 0.5 mg q 20' DD,   Gabapentin 300 mg PO q 8 hours.   Continue Modafinil but only daily (10:00 am).  Seroquel 12.5 mg hs   Please place a PICC line. 2/2 extensive spinal metastasis. Pain appears both somatic and neuropathic  D/W HCP that indicate current goals are for improving symptoms Rx and taking him home with hospice.   Dilaudid PCA 0.2 mg/hour CR, 0.5 mg q 20' DD,   Gabapentin 300 mg PO q 8 hours.   Continue Modafinil but only daily (10:00 am).  Seroquel 12.5 mg hs   PICC line in place.

## 2019-12-19 NOTE — PROGRESS NOTE ADULT - SUBJECTIVE AND OBJECTIVE BOX
SUBJECTIVE AND OBJECTIVE: Patient used Dilaudid 0.5 mg IV x 12 over 24 hours. As per his HCP, by his bedside, the patient has been more comfortable and still able to intermittently interact with his family. The patient denied any complaints.     INTERVAL HPI/OVERNIGHT EVENTS: . He was answering questions appropriately when I saw him. 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     DNR on chart: No   Allergies    No Known Allergies    Intolerances    MEDICATIONS  (STANDING):  Biotene Dry Mouth Oral Rinse 5 milliLiter(s) Swish and Spit every 6 hours  calamine Lotion 1 Application(s) Topical two times a day  dexAMETHasone     Tablet 4 milliGRAM(s) Oral every 6 hours  enoxaparin Injectable 80 milliGRAM(s) SubCutaneous daily  gabapentin 300 milliGRAM(s) Oral every 8 hours  HYDROmorphone PCA (1 mG/mL) 30 milliLiter(s) PCA Continuous PCA Continuous  levETIRAcetam 500 milliGRAM(s) Oral two times a day  lisinopril 10 milliGRAM(s) Oral daily  methylphenidate 5 milliGRAM(s) Oral <User Schedule>  pantoprazole  Injectable 40 milliGRAM(s) IV Push daily  QUEtiapine 12.5 milliGRAM(s) Oral at bedtime  senna 2 Tablet(s) Oral at bedtime  sodium chloride 0.9%. 1000 milliLiter(s) (75 mL/Hr) IV Continuous <Continuous>  sodium chloride 0.9%. 1000 milliLiter(s) (75 mL/Hr) IV Continuous <Continuous>    MEDICATIONS  (PRN):  acetaminophen   Tablet .. 650 milliGRAM(s) Oral every 6 hours PRN Mild Pain (1 - 3)  bisacodyl Suppository 10 milliGRAM(s) Rectal daily PRN Constipation  diphenhydrAMINE   Injectable 12.5 milliGRAM(s) IV Push every 4 hours PRN pruritus  magnesium hydroxide Suspension 30 milliLiter(s) Oral daily PRN Constipation  naloxone Injectable 0.1 milliGRAM(s) IV Push every 3 minutes PRN sedation or respiratory depression due to opioids  ondansetron Injectable 4 milliGRAM(s) IV Push every 6 hours PRN Nausea        ITEMS UNCHECKED ARE NOT PRESENT    PRESENT SYMPTOMS: [ ]Unable to obtain due to poor mentation   Source if other than patient:  [ ]Family   [ ]Team     Pain:  [ x]yes [ ]no  QOL impact -   Location -   neck, chest                  Aggravating factors -   Quality - burning  Radiation - everywhere  Timing- constant  Severity (0-10 scale): Up to 8 but decreasing to  2-3 immediately after Dilaudid administration  Minimal acceptable level (0-10 scale):     Dyspnea:                           [ ]Mild [ ]Moderate [ ]Severe  Anxiety:                             [ ]Mild [x ]Moderate [ ]Severe  Fatigue:                             [ ]Mild [ ]Moderate [x ]Severe  Nausea:                             [ ]Mild [ ]Moderate [ ]Severe  Loss of appetite:              [ ]Mild [ ]Moderate [x ]Severe  Constipation:                    [ ]Mild [ ]Moderate [ ]Severe    PAIN AD Score:	  http://geriatrictoolkit.[facility]/cog/painad.pdf (Ctrl + left click to view)    Other Symptoms:  [x ]All other review of systems negative but depressed mood and episodic anxiety/existential suffering. Weakness.       Palliative Performance Status Version 2:    20     %      http://npcrc.org/files/news/palliative_performance_scale_ppsv2.pdf  PHYSICAL EXAM:  Vital Signs Last 24 Hrs  T(C): 36.7 (19 Dec 2019 17:00), Max: 37.1 (19 Dec 2019 06:08)  T(F): 98 (19 Dec 2019 17:00), Max: 98.7 (19 Dec 2019 06:08)  HR: 82 (19 Dec 2019 17:00) (64 - 82)  BP: 100/68 (19 Dec 2019 17:00) (100/68 - 119/79)  BP(mean): --  RR: 17 (19 Dec 2019 17:00) (16 - 18)  SpO2: 93% (19 Dec 2019 17:00) (92% - 95%)  GENERAL:  [x ]Alert  [ x]Oriented x 1-2  [ ]Lethargic  [x ]Cachexia  [ ]Unarousable  [ x]Verbal  [ ]Non-Verbal  Behavioral:   [ ]Anxiety  [ ]Delirium [ ]Agitation [ ]Other  HEENT:  [x ]Normal   [ ]Dry mouth   [ ]ET Tube/Trach  [ ]Oral lesions  [x] No pinpoint pupils   PULMONARY:   x[ ]Clear [ ]Tachypnea  [ ]Audible excessive secretions   [ ]Rhonchi        [ ]Right [ ]Left [ ]Bilateral  [ ]Crackles        [ ]Right [ ]Left [ ]Bilateral  [ ]Wheezing     [ ]Right [ ]Left [ ]Bilateral  [ ]Diminished BS [ ] Right [ ]Left [ ]Bilateral  CARDIOVASCULAR:    [x ]Regular [ ]Irregular [ ]Tachy  [ ]Dusty [ ]Murmur [ ]Other  GASTROINTESTINAL:  [x ]Soft  [ ]Distended   [ ]+BS  [x ]Non tender [ ]Tender  [ ]PEG [ ]OGT/ NGT   Last BM: 12/13   GENITOURINARY:  [ ]Normal [ ]Incontinent   [ ]Oliguria/Anuria   [x ]Alvarez  MUSCULOSKELETAL:   [ ]Normal   [x ]Weakness  [x ]Bed/Wheelchair bound [ ]Edema  NEUROLOGIC:   [ ]No focal deficits  [x ] Cognitive impairment  [ ] Dysphagia [ ]Dysarthria [x ] Paraplegia. RUE paresis  [ ]Other   SKIN:   [ ]Normal  [ ]Rash   [ ]Pressure ulcer(s) [ ]y [ ]n present on admission  [x] excoriations 2/2 itching due to pruritus    CRITICAL CARE:  [ ]Shock Present  [ ]Septic [ ]Cardiogenic [ ]Neurologic [ ]Hypovolemic  [ ]Vasopressors [ ]Inotropes  [ ]Respiratory failure present [ ]Mechanical Ventilation [ ]Non-invasive ventilatory support [ ]High-Flow  [ ]Acute  [ ]Chronic [ ]Hypoxic  [ ]Hypercarbic [ ]Other  [ ]Other organ failure     LABS:                         No new labs           RADIOLOGY & ADDITIONAL STUDIES: reviewed    Protein Calorie Malnutrition Present: [ ]mild [ ]moderate [x ]severe [ ]underweight [ ]morbid obesity  https://www.andeal.org/vault/2440/web/files/ONC/Table_Clinical%20Characteristics%20to%20Document%20Malnutrition-White%20JV%20et%20al%985650.pdf    Height (cm): 170.18 (12-07-19 @ 09:03), 170.2 (09-24-19 @ 13:00), 170.18 (09-05-19 @ 11:47)  Weight (kg): 54.4 (12-07-19 @ 09:03), 54.7 (09-24-19 @ 13:00), 52.2 (09-05-19 @ 11:47)  BMI (kg/m2): 18.8 (12-07-19 @ 09:03), 18.9 (09-24-19 @ 13:00), 18 (09-05-19 @ 11:47)    [ ]PPSV2 < or = 30%  [x ]significant weight loss [ ]poor nutritional intake [ ]anasarca   Albumin, Serum: 3.8 g/dL (12-08-19 @ 09:07)   [ ]Artificial Nutrition    REFERRALS:   [x ]Chaplaincy  [ ]Hospice  [ ]Child Life  [ ]Social Work  [ ]Case management [ ]Holistic Therapy     Goals of Care Document:  pending SUBJECTIVE AND OBJECTIVE: Patient used Dilaudid 0.5 mg IV x 13 over 24 hours. His pain appeared to be controlled.    INTERVAL HPI/OVERNIGHT EVENTS: . He was answering questions appropriately when I saw him. 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     DNR on chart: No   Allergies    No Known Allergies    Intolerances    MEDICATIONS  (STANDING):  Biotene Dry Mouth Oral Rinse 5 milliLiter(s) Swish and Spit every 6 hours  calamine Lotion 1 Application(s) Topical two times a day  dexAMETHasone     Tablet 4 milliGRAM(s) Oral every 6 hours  enoxaparin Injectable 80 milliGRAM(s) SubCutaneous daily  gabapentin 300 milliGRAM(s) Oral every 8 hours  HYDROmorphone PCA (1 mG/mL) 30 milliLiter(s) PCA Continuous PCA Continuous  levETIRAcetam 500 milliGRAM(s) Oral two times a day  lisinopril 10 milliGRAM(s) Oral daily  methylphenidate 5 milliGRAM(s) Oral <User Schedule>  pantoprazole  Injectable 40 milliGRAM(s) IV Push daily  QUEtiapine 12.5 milliGRAM(s) Oral at bedtime  senna 2 Tablet(s) Oral at bedtime  sodium chloride 0.9%. 1000 milliLiter(s) (75 mL/Hr) IV Continuous <Continuous>  sodium chloride 0.9%. 1000 milliLiter(s) (75 mL/Hr) IV Continuous <Continuous>    MEDICATIONS  (PRN):  acetaminophen   Tablet .. 650 milliGRAM(s) Oral every 6 hours PRN Mild Pain (1 - 3)  bisacodyl Suppository 10 milliGRAM(s) Rectal daily PRN Constipation  diphenhydrAMINE   Injectable 12.5 milliGRAM(s) IV Push every 4 hours PRN pruritus  magnesium hydroxide Suspension 30 milliLiter(s) Oral daily PRN Constipation  naloxone Injectable 0.1 milliGRAM(s) IV Push every 3 minutes PRN sedation or respiratory depression due to opioids  ondansetron Injectable 4 milliGRAM(s) IV Push every 6 hours PRN Nausea        ITEMS UNCHECKED ARE NOT PRESENT    PRESENT SYMPTOMS: [ ]Unable to obtain due to poor mentation   Source if other than patient:  [ ]Family   [ ]Team     Pain:  [ x]yes [ ]no  QOL impact -   Location -   neck, chest                  Aggravating factors -   Quality - burning  Radiation - everywhere  Timing- constant  Severity (0-10 scale): Up to 8 but decreasing to  2-3 immediately after Dilaudid administration  Minimal acceptable level (0-10 scale):     Dyspnea:                           [ ]Mild [ ]Moderate [ ]Severe  Anxiety:                             [ ]Mild [x ]Moderate [ ]Severe  Fatigue:                             [ ]Mild [ ]Moderate [x ]Severe  Nausea:                             [ ]Mild [ ]Moderate [ ]Severe  Loss of appetite:              [ ]Mild [ ]Moderate [x ]Severe  Constipation:                    [ ]Mild [ ]Moderate [ ]Severe    PAIN AD Score:	  http://geriatrictoolkit.[facility]/cog/painad.pdf (Ctrl + left click to view)    Other Symptoms:  [x ]All other review of systems negative but depressed mood and episodic anxiety/existential suffering. Weakness.       Palliative Performance Status Version 2:    20     %      http://npcrc.org/files/news/palliative_performance_scale_ppsv2.pdf  PHYSICAL EXAM:  Vital Signs Last 24 Hrs  T(C): 36.7 (19 Dec 2019 17:00), Max: 37.1 (19 Dec 2019 06:08)  T(F): 98 (19 Dec 2019 17:00), Max: 98.7 (19 Dec 2019 06:08)  HR: 82 (19 Dec 2019 17:00) (64 - 82)  BP: 100/68 (19 Dec 2019 17:00) (100/68 - 119/79)  BP(mean): --  RR: 17 (19 Dec 2019 17:00) (16 - 18)  SpO2: 93% (19 Dec 2019 17:00) (92% - 95%)  GENERAL:  [x ]Alert  [ x]Oriented x 1-2  [x ] Intermittently Lethargic  [x ]Cachexia  [ ]Unarousable  [ x]Verbal  [ ]Non-Verbal  Behavioral:   [ ]Anxiety  [ ]Delirium [ ]Agitation [ ]Other  HEENT:  [x ]Normal   [ ]Dry mouth   [ ]ET Tube/Trach  [ ]Oral lesions  [x] No pinpoint pupils   PULMONARY:   x[ ]Clear [ ]Tachypnea  [ ]Audible excessive secretions   [ ]Rhonchi        [ ]Right [ ]Left [ ]Bilateral  [ ]Crackles        [ ]Right [ ]Left [ ]Bilateral  [ ]Wheezing     [ ]Right [ ]Left [ ]Bilateral  [ ]Diminished BS [ ] Right [ ]Left [ ]Bilateral  CARDIOVASCULAR:    [x ]Regular [ ]Irregular [ ]Tachy  [ ]Dusty [ ]Murmur [ ]Other  GASTROINTESTINAL:  [x ]Soft  [ ]Distended   [ ]+BS  [x ]Non tender [ ]Tender  [ ]PEG [ ]OGT/ NGT   Last BM: 12/13   GENITOURINARY:  [ ]Normal [ ]Incontinent   [ ]Oliguria/Anuria   [x ]Alvarez  MUSCULOSKELETAL:   [ ]Normal   [x ]Weakness  [x ]Bed/Wheelchair bound [ ]Edema  NEUROLOGIC:   [ ]No focal deficits  [x ] Cognitive impairment  [ ] Dysphagia [ ]Dysarthria [x ] Paraplegia. RUE paresis  [ ]Other   SKIN:   [ ]Normal  [ ]Rash   [ ]Pressure ulcer(s) [ ]y [ ]n present on admission  [x] excoriations 2/2 itching due to pruritus    CRITICAL CARE:  [ ]Shock Present  [ ]Septic [ ]Cardiogenic [ ]Neurologic [ ]Hypovolemic  [ ]Vasopressors [ ]Inotropes  [ ]Respiratory failure present [ ]Mechanical Ventilation [ ]Non-invasive ventilatory support [ ]High-Flow  [ ]Acute  [ ]Chronic [ ]Hypoxic  [ ]Hypercarbic [ ]Other  [ ]Other organ failure     LABS:                         No new labs           RADIOLOGY & ADDITIONAL STUDIES: reviewed    Protein Calorie Malnutrition Present: [ ]mild [ ]moderate [x ]severe [ ]underweight [ ]morbid obesity  https://www.andeal.org/vault/2440/web/files/ONC/Table_Clinical%20Characteristics%20to%20Document%20Malnutrition-White%20JV%20et%20al%460690.pdf    Height (cm): 170.18 (12-07-19 @ 09:03), 170.2 (09-24-19 @ 13:00), 170.18 (09-05-19 @ 11:47)  Weight (kg): 54.4 (12-07-19 @ 09:03), 54.7 (09-24-19 @ 13:00), 52.2 (09-05-19 @ 11:47)  BMI (kg/m2): 18.8 (12-07-19 @ 09:03), 18.9 (09-24-19 @ 13:00), 18 (09-05-19 @ 11:47)    [ ]PPSV2 < or = 30%  [x ]significant weight loss [ ]poor nutritional intake [ ]anasarca   Albumin, Serum: 3.8 g/dL (12-08-19 @ 09:07)   [ ]Artificial Nutrition    REFERRALS:   [x ]Chaplaincy  [ ]Hospice  [ ]Child Life  [ ]Social Work  [ ]Case management [ ]Holistic Therapy     Goals of Care Document:  pending

## 2019-12-19 NOTE — PROGRESS NOTE ADULT - PROBLEM SELECTOR PLAN 6
Will continue to f/u for symptoms Rx.   Hospice referral was done.   Plan is likely for home with hospice + PCA.  Plan of care d/w the HCP. The HCP understands about the patient's advanced illness and very poor prognosis (likely days). She indicated she had discussed with her siblings and that they agree on DNR/I, no feeding tube, Abx to be discussed if needed. A MOLST and a capacity form were already completed and placed in the chart.   D/W Primary team.

## 2019-12-19 NOTE — PROGRESS NOTE ADULT - SUBJECTIVE AND OBJECTIVE BOX
[facility]    Subjective: Patient seen and examined. No new events except as noted.   family at the bedside  home hospice on Saturday     REVIEW OF SYSTEMS:  + weakness, + Pain     MEDICATIONS:  MEDICATIONS  (STANDING):  Biotene Dry Mouth Oral Rinse 5 milliLiter(s) Swish and Spit every 6 hours  calamine Lotion 1 Application(s) Topical two times a day  dexAMETHasone     Tablet 4 milliGRAM(s) Oral every 6 hours  enoxaparin Injectable 80 milliGRAM(s) SubCutaneous daily  gabapentin 300 milliGRAM(s) Oral every 8 hours  HYDROmorphone PCA (1 mG/mL) 30 milliLiter(s) PCA Continuous PCA Continuous  levETIRAcetam 500 milliGRAM(s) Oral two times a day  lisinopril 10 milliGRAM(s) Oral daily  methylphenidate 5 milliGRAM(s) Oral <User Schedule>  pantoprazole  Injectable 40 milliGRAM(s) IV Push daily  QUEtiapine 12.5 milliGRAM(s) Oral at bedtime  senna 2 Tablet(s) Oral at bedtime  sodium chloride 0.9%. 1000 milliLiter(s) (75 mL/Hr) IV Continuous <Continuous>  sodium chloride 0.9%. 1000 milliLiter(s) (75 mL/Hr) IV Continuous <Continuous>      PHYSICAL EXAM:  T(C): 36.7 (12-19-19 @ 17:00), Max: 37.1 (12-19-19 @ 06:08)  HR: 82 (12-19-19 @ 17:00) (64 - 82)  BP: 100/68 (12-19-19 @ 17:00) (100/68 - 119/79)  RR: 17 (12-19-19 @ 17:00) (16 - 18)  SpO2: 93% (12-19-19 @ 17:00) (92% - 95%)  Wt(kg): --  I&O's Summary    18 Dec 2019 07:01  -  19 Dec 2019 07:00  --------------------------------------------------------  IN: 2035 mL / OUT: 1650 mL / NET: 385 mL    19 Dec 2019 07:01  -  19 Dec 2019 17:37  --------------------------------------------------------  IN: 825 mL / OUT: 1100 mL / NET: -275 mL          Appearance: awake, cachectic   HEENT: dry OM   Lymphatic: no edema  Cardiovascular: Normal S1 S2  Respiratory: normal effort 	  Gastrointestinal:  + BS	  Skin: warm to touch  Musculoskeletal: LE motor loss, UE motor loss worsning   Psychiatry:  Mood & affect appropriate  Ext: No edema      All labs, Imaging and EKGs personally reviewed

## 2019-12-20 ENCOUNTER — TRANSCRIPTION ENCOUNTER (OUTPATIENT)
Age: 64
End: 2019-12-20

## 2019-12-20 DIAGNOSIS — K59.00 CONSTIPATION, UNSPECIFIED: ICD-10-CM

## 2019-12-20 DIAGNOSIS — K62.5 HEMORRHAGE OF ANUS AND RECTUM: ICD-10-CM

## 2019-12-20 PROCEDURE — 99233 SBSQ HOSP IP/OBS HIGH 50: CPT

## 2019-12-20 RX ORDER — ONDANSETRON 8 MG/1
1 TABLET, FILM COATED ORAL
Qty: 0 | Refills: 0 | DISCHARGE

## 2019-12-20 RX ORDER — SALIVA SUBSTITUTE COMB NO.11 351 MG
5 POWDER IN PACKET (EA) MUCOUS MEMBRANE
Qty: 150 | Refills: 0
Start: 2019-12-20 | End: 2020-01-18

## 2019-12-20 RX ORDER — ACETAMINOPHEN 500 MG
2 TABLET ORAL
Qty: 0 | Refills: 0 | DISCHARGE
Start: 2019-12-20

## 2019-12-20 RX ORDER — GABAPENTIN 400 MG/1
6 CAPSULE ORAL
Qty: 540 | Refills: 0
Start: 2019-12-20 | End: 2020-01-18

## 2019-12-20 RX ORDER — MAGNESIUM HYDROXIDE 400 MG/1
30 TABLET, CHEWABLE ORAL
Qty: 0 | Refills: 0 | DISCHARGE
Start: 2019-12-20

## 2019-12-20 RX ORDER — CALAMINE AND ZINC OXIDE AND PHENOL 160; 10 MG/ML; MG/ML
1 LOTION TOPICAL
Qty: 1 | Refills: 0
Start: 2019-12-20 | End: 2020-01-18

## 2019-12-20 RX ORDER — LIDOCAINE 4 G/100G
1 CREAM TOPICAL
Qty: 0 | Refills: 0 | DISCHARGE

## 2019-12-20 RX ORDER — LEVETIRACETAM 250 MG/1
1 TABLET, FILM COATED ORAL
Qty: 0 | Refills: 0 | DISCHARGE
Start: 2019-12-20

## 2019-12-20 RX ORDER — LISINOPRIL 2.5 MG/1
1 TABLET ORAL
Qty: 30 | Refills: 0
Start: 2019-12-20 | End: 2020-01-18

## 2019-12-20 RX ORDER — DIPHENHYDRAMINE HCL 50 MG
12.5 CAPSULE ORAL
Qty: 1500 | Refills: 0
Start: 2019-12-20 | End: 2020-01-18

## 2019-12-20 RX ORDER — GABAPENTIN 400 MG/1
300 CAPSULE ORAL EVERY 8 HOURS
Refills: 0 | Status: DISCONTINUED | OUTPATIENT
Start: 2019-12-20 | End: 2019-12-21

## 2019-12-20 RX ORDER — GABAPENTIN 400 MG/1
1 CAPSULE ORAL
Qty: 0 | Refills: 0 | DISCHARGE

## 2019-12-20 RX ORDER — ENOXAPARIN SODIUM 100 MG/ML
80 INJECTION SUBCUTANEOUS
Qty: 2400 | Refills: 0
Start: 2019-12-20 | End: 2020-01-18

## 2019-12-20 RX ORDER — LISINOPRIL 2.5 MG/1
1 TABLET ORAL
Qty: 0 | Refills: 0 | DISCHARGE
Start: 2019-12-20

## 2019-12-20 RX ORDER — LEVETIRACETAM 250 MG/1
1 TABLET, FILM COATED ORAL
Qty: 60 | Refills: 0
Start: 2019-12-20 | End: 2020-01-18

## 2019-12-20 RX ORDER — OXYCODONE HYDROCHLORIDE 5 MG/1
1 TABLET ORAL
Qty: 0 | Refills: 0 | DISCHARGE

## 2019-12-20 RX ORDER — PROCHLORPERAZINE MALEATE 5 MG
5 TABLET ORAL
Qty: 600 | Refills: 0
Start: 2019-12-20 | End: 2020-01-18

## 2019-12-20 RX ORDER — SENNA PLUS 8.6 MG/1
2 TABLET ORAL
Qty: 60 | Refills: 0
Start: 2019-12-20 | End: 2020-01-18

## 2019-12-20 RX ORDER — FAMOTIDINE 10 MG/ML
1 INJECTION INTRAVENOUS
Qty: 0 | Refills: 0 | DISCHARGE

## 2019-12-20 RX ORDER — ENOXAPARIN SODIUM 100 MG/ML
1 INJECTION SUBCUTANEOUS
Qty: 0 | Refills: 0 | DISCHARGE

## 2019-12-20 RX ORDER — ACETAMINOPHEN 500 MG
650 TABLET ORAL ONCE
Refills: 0 | Status: COMPLETED | OUTPATIENT
Start: 2019-12-20 | End: 2019-12-20

## 2019-12-20 RX ORDER — FENTANYL CITRATE 50 UG/ML
1 INJECTION INTRAVENOUS
Qty: 0 | Refills: 0 | DISCHARGE

## 2019-12-20 RX ORDER — GABAPENTIN 400 MG/1
6 CAPSULE ORAL
Qty: 0 | Refills: 0 | DISCHARGE
Start: 2019-12-20

## 2019-12-20 RX ORDER — PROCHLORPERAZINE MALEATE 5 MG
5 TABLET ORAL EVERY 6 HOURS
Refills: 0 | Status: DISCONTINUED | OUTPATIENT
Start: 2019-12-20 | End: 2019-12-21

## 2019-12-20 RX ORDER — DEXAMETHASONE 0.5 MG/5ML
1 ELIXIR ORAL
Qty: 120 | Refills: 0
Start: 2019-12-20 | End: 2020-01-18

## 2019-12-20 RX ORDER — DEXAMETHASONE 0.5 MG/5ML
1 ELIXIR ORAL
Qty: 0 | Refills: 0 | DISCHARGE

## 2019-12-20 RX ORDER — HYDROMORPHONE HYDROCHLORIDE 2 MG/ML
0.2 INJECTION INTRAMUSCULAR; INTRAVENOUS; SUBCUTANEOUS
Qty: 5 | Refills: 0
Start: 2019-12-20 | End: 2020-01-18

## 2019-12-20 RX ORDER — ENOXAPARIN SODIUM 100 MG/ML
80 INJECTION SUBCUTANEOUS
Qty: 0 | Refills: 0 | DISCHARGE
Start: 2019-12-20

## 2019-12-20 RX ORDER — HYDROMORPHONE HYDROCHLORIDE 2 MG/ML
30 INJECTION INTRAMUSCULAR; INTRAVENOUS; SUBCUTANEOUS
Refills: 0 | Status: DISCONTINUED | OUTPATIENT
Start: 2019-12-20 | End: 2019-12-21

## 2019-12-20 RX ORDER — DEXAMETHASONE 0.5 MG/5ML
1 ELIXIR ORAL
Qty: 0 | Refills: 0 | DISCHARGE
Start: 2019-12-20

## 2019-12-20 RX ORDER — MAGNESIUM HYDROXIDE 400 MG/1
30 TABLET, CHEWABLE ORAL
Qty: 900 | Refills: 0
Start: 2019-12-20 | End: 2020-01-18

## 2019-12-20 RX ADMIN — GABAPENTIN 300 MILLIGRAM(S): 400 CAPSULE ORAL at 14:05

## 2019-12-20 RX ADMIN — Medication 4 MILLIGRAM(S): at 23:02

## 2019-12-20 RX ADMIN — QUETIAPINE FUMARATE 12.5 MILLIGRAM(S): 200 TABLET, FILM COATED ORAL at 23:01

## 2019-12-20 RX ADMIN — HYDROMORPHONE HYDROCHLORIDE 30 MILLILITER(S): 2 INJECTION INTRAMUSCULAR; INTRAVENOUS; SUBCUTANEOUS at 19:49

## 2019-12-20 RX ADMIN — LEVETIRACETAM 500 MILLIGRAM(S): 250 TABLET, FILM COATED ORAL at 18:57

## 2019-12-20 RX ADMIN — Medication 4 MILLIGRAM(S): at 18:57

## 2019-12-20 RX ADMIN — Medication 650 MILLIGRAM(S): at 10:46

## 2019-12-20 RX ADMIN — Medication 5 MILLILITER(S): at 18:57

## 2019-12-20 RX ADMIN — HYDROMORPHONE HYDROCHLORIDE 30 MILLILITER(S): 2 INJECTION INTRAMUSCULAR; INTRAVENOUS; SUBCUTANEOUS at 14:03

## 2019-12-20 RX ADMIN — SENNA PLUS 2 TABLET(S): 8.6 TABLET ORAL at 23:02

## 2019-12-20 RX ADMIN — Medication 5 MILLILITER(S): at 11:39

## 2019-12-20 RX ADMIN — GABAPENTIN 300 MILLIGRAM(S): 400 CAPSULE ORAL at 23:02

## 2019-12-20 RX ADMIN — Medication 12.5 MILLIGRAM(S): at 10:10

## 2019-12-20 RX ADMIN — HYDROMORPHONE HYDROCHLORIDE 30 MILLILITER(S): 2 INJECTION INTRAMUSCULAR; INTRAVENOUS; SUBCUTANEOUS at 08:05

## 2019-12-20 RX ADMIN — Medication 5 MILLIGRAM(S): at 10:13

## 2019-12-20 RX ADMIN — Medication 5 MILLILITER(S): at 23:02

## 2019-12-20 RX ADMIN — Medication 650 MILLIGRAM(S): at 23:02

## 2019-12-20 RX ADMIN — Medication 4 MILLIGRAM(S): at 11:39

## 2019-12-20 RX ADMIN — ENOXAPARIN SODIUM 80 MILLIGRAM(S): 100 INJECTION SUBCUTANEOUS at 11:40

## 2019-12-20 RX ADMIN — PANTOPRAZOLE SODIUM 40 MILLIGRAM(S): 20 TABLET, DELAYED RELEASE ORAL at 11:40

## 2019-12-20 NOTE — PROVIDER CONTACT NOTE (OTHER) - ACTION/TREATMENT ORDERED:
Mike Babin NP notified. Provider recommended holding off on dilaudid for now. Administer toradol and assess BP in 1 hr.
as per INGRID Santos, no further intervention necessary. Will re-attempt to obtain vital signs & give pt his AM meds at a later time. will continue to monitor.
will discuss w attending and notify if intervention is required. Continue to monitor.

## 2019-12-20 NOTE — GOALS OF CARE CONVERSATION - ADVANCED CARE PLANNING - CONVERSATION DETAILS
TC call to daughter Basilia. State  that she is not the HCP and that she will give Romulo this writer's # to call writer.   No call received from Romulo.  Dr. Winn called to F/U re the referral. Informed him of attempt made to contact Romulo Dan called Romulo directly at [phone]. She answered and reported that she was busy and did not get a chance to call back. She will be staying with patient tonight and will be available on 12/18/19 to go over hospice services.
Pt was referred by the Palliative Care Team. Met w/ the pt's dtr/HCP Romulo Andrews, hospice services discussed at length. She is in agreement w/ home hospice services when the pt is medically cleared for d/c. Hospice consents signed, DME (bed, air mattress, 02 and suction) to be deliver Friday 12/20. Thank you.
Spoke w/ the pt's dtr Romulo, remains in agreement w/ d/c home w/ hospice services on 12/21/19. PCA hook up confirmed for 12/21 between 1-3p by [facility] at [facility]. DME is scheduled to to delivered today. Pls call [facility] to confirm the d/c at [phone] and fax the d/c orders to [phone]. Thank you.

## 2019-12-20 NOTE — PROVIDER CONTACT NOTE (OTHER) - ASSESSMENT
pt is A&Ox4, hypophonic and lethargic. pt on PCA dilaudid pump. france c/d/i w/ adequate output noted overnight. IVF maintained. family at bedside refusing for vitals and AM meds to be given at this time. re-educated family on importance of obtaining & monitoring vital signs as part of plan of care. pt's family reiterated that they want to maintain the patient comfortable, and they do not want to disturb him by taking his vitals or giving him medications.

## 2019-12-20 NOTE — GOALS OF CARE CONVERSATION - ADVANCED CARE PLANNING - NS PRO AD ANY ON CHART
family instructed to bring HCP in on 12/8/No

## 2019-12-20 NOTE — CHART NOTE - NSCHARTNOTEFT_GEN_A_CORE
CC: Fever    HPI: Asked by RN to evaluate patient for neutropenic fever with oral temperature of . Patient seen and examined at the bedside. Denies rigors, diaphoresis, headaches, dizziness, syncope, visual changes, chest pain, cough, palpitations, SOB, abdominal pain, N/V/D.     Vital Signs Last 24 Hrs  T(C): 38.5 (20 Dec 2019 22:06), Max: 38.5 (20 Dec 2019 22:06)  T(F): 101.3 (20 Dec 2019 22:06), Max: 101.3 (20 Dec 2019 22:06)  HR: 79 (20 Dec 2019 22:06) (78 - 84)  BP: 93/64 (20 Dec 2019 22:06) (93/64 - 109/74)  RR: 17 (20 Dec 2019 22:06) (16 - 17)  SpO2: 93% (20 Dec 2019 22:06) (91% - 93%)    PHYSICAL EXAM:  General: NAD, A&Ox3  Respiratory: Lungs clear to auscultation bilaterally. No wheezes, rales, rhonchi. Normal respiratory effort.   Cardiovascular: S1, S2 present. Regular rate and rhythm. No murmurs, rubs, or gallops  Gastrointestinal: BS x4 normoactive. Soft, non-tender, non-distended.   Extremities: 2+ peripheral pulses. No edema, cyanosis.              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 taking meds at home with no relief. no fever or chills. unable to get up and move around as he had recently done in the past. no cough. no shortness of breath. no new numbness. no headache. daughter / son in law and wife at the bedside. (07 Dec 2019 20:21)    Now, patient with neutropenic fever with an oral temperature of . Patient is currently asymptomatic.     #Fever  -Repeat vitals were stable  -Blood cultures sent /: No growth to date   -CXR on /: Clear lungs   -Continue antipyretics/cooling measures    -Continue current antimicrobials  -Continue IVF for hydration  -Discussed with RN  -Will continue to monitor  -Will endorse to AM team      Shanel Santos PA-C  # CC: Fever    HPI: Asked by RN to evaluate patient for neutropenic fever with oral temperature of 101.3. Patient seen and examined at the bedside. Patient was sleeping, and resting comfortably. Patient reports feeling weak, but is otherwise asymptomatic. Denies rigors, diaphoresis, headaches, dizziness, syncope, visual changes, chest pain, cough, palpitations, SOB, abdominal pain, N/V/D.     Vital Signs Last 24 Hrs  T(C): 38.5 (20 Dec 2019 22:06), Max: 38.5 (20 Dec 2019 22:06)  T(F): 101.3 (20 Dec 2019 22:06), Max: 101.3 (20 Dec 2019 22:06)  HR: 79 (20 Dec 2019 22:06) (78 - 84)  BP: 93/64 (20 Dec 2019 22:06) (93/64 - 109/74)  RR: 17 (20 Dec 2019 22:06) (16 - 17)  SpO2: 93% (20 Dec 2019 22:06) (91% - 93%)    PHYSICAL EXAM:  General: NAD, A&Ox3  Respiratory: Lungs clear to auscultation bilaterally. No wheezes, rales, rhonchi. Normal respiratory effort.   Cardiovascular: S1, S2 present. Regular rate and rhythm. No murmurs, rubs, or gallops  Gastrointestinal: BS x4 normoactive. Soft, non-tender, non-distended.   Extremities: 2+ peripheral pulses. No edema, cyanosis.    A/P  65 Y/O Male with PMHx smoker 50+yr 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 Per family the patient had MR at [facility] 3 weeks ago showing diffuse leptomeningeal spread (films unavail). Family is still shrouding patient from diagnosis/prognosis.  Now, patient with fever with an oral temperature of 101.3. Patient reports feeling weak, but is otherwise asymptomatic.     #Fever  -Rest of repeat vitals were stable  -F/u CBC, BMP  -F/u BCx, UA  -F/u URGENT CXR  -Tylenol 650 mg PO x1 STAT given  -Continue IVF for hydration  -Hold off on abx   -Repeat vitals at ~01:00  -Discussed with RN  -Will continue to monitor  -Will endorse to AM team      Shanel Santos PA-C  #[id number]        **ADDENDUM @ 01:38**  Vital Signs Last 24 Hrs  T(C): 36.4 (21 Dec 2019 01:37), Max: 38.5 (20 Dec 2019 22:06)  T(F): 97.6 (21 Dec 2019 01:37), Max: 101.3 (20 Dec 2019 22:06)  HR: 77 (21 Dec 2019 01:37) (77 - 84)  BP: 104/66 (21 Dec 2019 01:37) (93/64 - 109/74)  RR: 17 (21 Dec 2019 01:37) (16 - 17)  SpO2: 92% (21 Dec 2019 01:37) (91% - 93%)                          13.1   23.50 )-----------( 138      ( 21 Dec 2019 00:25 )             39.6       12-21    130<L>  |  96  |  26<H>  ----------------------------<  136<H>  4.3   |  24  |  0.39<L>    Ca    8.5      21 Dec 2019 00:25      < from: Xray Chest 1 View- PORTABLE-Urgent (12.21.19 @ 01:00) >  INTERPRETATION:  No focal consolidation.  Follow up final report.  < end of copied text >      -Patient still sleeping, resting comfortably  -Will continue to monitor      Shanel Santos PA-C  #[id number]

## 2019-12-20 NOTE — PROGRESS NOTE ADULT - PROBLEM SELECTOR PLAN 2
previously on lovenox however will hold in view of recta bleeding   Monitor Hgb level if family okay with it   O 2 supplement

## 2019-12-20 NOTE — DISCHARGE NOTE PROVIDER - HOSPITAL COURSE
Pt is a 65 y/o Male with PMHx of smoker 50+yr 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 Per family, patient had MRI at [facility] 3 weeks ago showing diffuse leptomeningeal spread (films unavail). Family is still shrouding patient from diagnosis/prognosis.     Pt with cord compression seen on MRI 2/2 mets to spine, seen by NeuroSx and NeuroOnc: no Sx intervention at this time, started on decadron, keppra and pain control. Pt evaluated by palliative for GOC and pain control: placed on PCA pump. Back pain  2/2 extensive spinal metastasis. Pain appears both somatic and neuropathic. D/W HCP that indicate current goals are for improving symptoms Rx and taking him home with hospice. Pt s/p PICC line to be discharged home with PCA pump and hospice services.         Neuroendocrine tumor.  Plan: Rad ONC, Medical Onc, and Neuro Onc inputs noted.     d/w HCP that agree on GOC toward symptoms Rx only.     A Hospice referral was made for home hospice.    With paraplegia. Continue Dexa 4 mg IV q 6 ATC.                                      may benefits form chemo however family refusing and stating that he has no power to get chemo     discussed in detail with family at the bedside    pain control and muscle relaxant    possible morphin pump     second opinion Onc eval appreciated    palliative F/U appreciated    family decided o DNR-DNI    would like to take him home with home hospice    S/P PICC line transfer to home with home hospice on Saturday.          Problem/Plan - 2:    ·  Problem: Brain metastasis.  Plan: S/P Craniotomy    Neurosurg F/U appreciated     pain control    Palliative eval appreciated    worsening motor function, RUE loss motor.          Problem/Plan - 3:    ·  Problem: Pulmonary embolism.  Plan: COnt lovenox     Monitor Hgb level     O 2 supplement.          Problem/Plan - 4:    ·  Problem: Pneumonia.  Plan: will hold abx now     monitor for respiratory worsening     O2 supplement    Nebs     negative procalcitonin.          Problem/Plan - 5:    ·  Problem: Chest pain, atypical.  Plan: most likley due to chronic pain     tele monitoring     EKG    Card eval.          Problem/Plan - 6:    Problem: Prophylactic measure. Plan: DVT and gI PPX.        Attending Attestation:     Advanced care planning was discussed with patient and family.  Advanced care planning forms were reviewed and discussed.    Differential diagnosis and plan of care discussed with patient after the evaluation.     Pain assessed and judicious use of narcotics when appropriate was discussed.    Importance of Fall prevention discussed.    Counseling on Smoking and Alcohol cessation was offered when appropriate.    Counseling on Diet, exercise, and medication compliance was done.         I was physically present for the key portions of the evaluation and management (E/M) service provided.  I agree with the above history, physical, and plan which I have reviewed and edited where appropriate.         Plan discussed with family at the bedside, Palliative and house staff. Pt is a 65 y/o Male with PMHx of smoker 50+yr 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 Per family, patient had MRI at [facility] 3 weeks ago showing diffuse leptomeningeal spread (films unavail). Family is still shrouding patient from diagnosis/prognosis. Neuroendocrine tumor.  Plan: Rad ONC, Medical Onc, and Neuro Onc inputs noted. may benefits form chemo however family refusing and stating that he has no power to get chemo  Pt with cord compression seen on MRI 2/2 mets to spine, seen by NeuroSx and NeuroOnc: no Sx intervention at this time, started on decadron, keppra and pain control. Pt evaluated by palliative for GOC and pain control: placed on PCA pump. Back pain  2/2 extensive spinal metastasis appears both somatic and neuropathic. D/W HCP that indicate current goals are for improving symptoms Rx and taking him home with hospice. Pt s/p PICC line to be discharged home with PCA pump and hospice services.

## 2019-12-20 NOTE — DISCHARGE NOTE PROVIDER - CARE PROVIDER_API CALL
Marques Cortez ()  Medicine  [address] [address]  Phone: [phone]  Fax: [phone]  Follow Up Time:

## 2019-12-20 NOTE — PROGRESS NOTE ADULT - SUBJECTIVE AND OBJECTIVE BOX
[facility]    Subjective: Patient seen and examined. No new events except as noted.   pain with movement and while washing care     REVIEW OF SYSTEMS:  + pain     MEDICATIONS:  MEDICATIONS  (STANDING):  Biotene Dry Mouth Oral Rinse 5 milliLiter(s) Swish and Spit every 6 hours  calamine Lotion 1 Application(s) Topical two times a day  dexAMETHasone     Tablet 4 milliGRAM(s) Oral every 6 hours  gabapentin   Solution 300 milliGRAM(s) Oral every 8 hours  HYDROmorphone PCA (1 mG/mL) 30 milliLiter(s) PCA Continuous PCA Continuous  levETIRAcetam 500 milliGRAM(s) Oral two times a day  lisinopril 10 milliGRAM(s) Oral daily  methylphenidate 5 milliGRAM(s) Oral <User Schedule>  pantoprazole  Injectable 40 milliGRAM(s) IV Push daily  QUEtiapine 12.5 milliGRAM(s) Oral at bedtime  senna 2 Tablet(s) Oral at bedtime  sodium chloride 0.9%. 1000 milliLiter(s) (75 mL/Hr) IV Continuous <Continuous>  sodium chloride 0.9%. 1000 milliLiter(s) (75 mL/Hr) IV Continuous <Continuous>      PHYSICAL EXAM:  T(C): 36.9 (12-20-19 @ 13:29), Max: 36.9 (12-20-19 @ 13:29)  HR: 78 (12-20-19 @ 13:29) (77 - 84)  BP: 109/74 (12-20-19 @ 13:29) (100/68 - 109/74)  RR: 16 (12-20-19 @ 13:29) (16 - 17)  SpO2: 92% (12-20-19 @ 13:29) (91% - 93%)  Wt(kg): --  I&O's Summary    19 Dec 2019 07:01  -  20 Dec 2019 07:00  --------------------------------------------------------  IN: 2110 mL / OUT: 1850 mL / NET: 260 mL    20 Dec 2019 07:01  -  20 Dec 2019 15:07  --------------------------------------------------------  IN: 645 mL / OUT: 600 mL / NET: 45 mL          Appearance: + awake, cachectic, flair   HEENT:   dry OM 	  Lymphatic: no edema  Cardiovascular:  Peripheral pulses palpable 2+ bilaterally  Respiratory: Lungs clear to auscultation, normal effort 	  Gastrointestinal: + BS	  Skin: warm to touch  Musculoskeletal: LE and UE motor loss   Psychiatry:  Mood & affect appropriate  Ext: No edema      All labs, Imaging and EKGs personally reviewed

## 2019-12-20 NOTE — PROGRESS NOTE ADULT - PROBLEM SELECTOR PLAN 1
2/2 extensive spinal metastasis. Pain appears both somatic and neuropathic  D/W HCP that indicate current goals are for improving symptoms Rx and taking him home with hospice.   Dilaudid PCA 0.2 mg/hour CR, 0.5 mg q 20' DD,   Gabapentin 300 mg PO q 8 hours.   Continue Modafinil but only daily (10:00 am).  Seroquel 12.5 mg hs   PICC line in place. 2/2 extensive spinal metastasis. Pain appears both somatic and neuropathic  D/W HCP that indicate current goals are for improving symptoms Rx and taking him home with hospice. Patient is still experiencing intermittent pain that is being controlled with PCA.   Dilaudid PCA 0.2 mg/hour CR, 0.5 mg q 20' DD,   Gabapentin 300 mg PO q 8 hours.   Continue Modafinil but only daily (10:00 am).  Seroquel 12.5 mg hs   PICC line in place.  PCA prescription was sent to [facility].

## 2019-12-20 NOTE — PROVIDER CONTACT NOTE (OTHER) - BACKGROUND
Pt admitted on 12/7 with bilateral lower extremity weakness and pain. spinal cord compression found on MRI. pt has neuroendocrine tumor w/ mets to brain s/p resection in September of 2019.

## 2019-12-20 NOTE — PROGRESS NOTE ADULT - SUBJECTIVE AND OBJECTIVE BOX
SUBJECTIVE AND OBJECTIVE: Patient used Dilaudid 0.5 mg IV x 12 over 24 hours. His pain appeared to be controlled.    INTERVAL HPI/OVERNIGHT EVENTS: . He was answering questions appropriately when I saw him. 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     DNR on chart: No   Allergies    No Known Allergies    Intolerances    MEDICATIONS  (STANDING):  Biotene Dry Mouth Oral Rinse 5 milliLiter(s) Swish and Spit every 6 hours  calamine Lotion 1 Application(s) Topical two times a day  dexAMETHasone     Tablet 4 milliGRAM(s) Oral every 6 hours  gabapentin   Solution 300 milliGRAM(s) Oral every 8 hours  HYDROmorphone PCA (1 mG/mL) 30 milliLiter(s) PCA Continuous PCA Continuous  levETIRAcetam 500 milliGRAM(s) Oral two times a day  lisinopril 10 milliGRAM(s) Oral daily  methylphenidate 5 milliGRAM(s) Oral <User Schedule>  pantoprazole  Injectable 40 milliGRAM(s) IV Push daily  QUEtiapine 12.5 milliGRAM(s) Oral at bedtime  senna 2 Tablet(s) Oral at bedtime  sodium chloride 0.9%. 1000 milliLiter(s) (75 mL/Hr) IV Continuous <Continuous>  sodium chloride 0.9%. 1000 milliLiter(s) (75 mL/Hr) IV Continuous <Continuous>    MEDICATIONS  (PRN):  acetaminophen   Tablet .. 650 milliGRAM(s) Oral every 6 hours PRN Mild Pain (1 - 3)  bisacodyl Suppository 10 milliGRAM(s) Rectal daily PRN Constipation  diphenhydrAMINE   Injectable 12.5 milliGRAM(s) IV Push every 4 hours PRN pruritus  magnesium hydroxide Suspension 30 milliLiter(s) Oral daily PRN Constipation  naloxone Injectable 0.1 milliGRAM(s) IV Push every 3 minutes PRN sedation or respiratory depression due to opioids  prochlorperazine   Injectable 5 milliGRAM(s) IV Push every 6 hours PRN Nausea/Vomiting          ITEMS UNCHECKED ARE NOT PRESENT    PRESENT SYMPTOMS: [ ]Unable to obtain due to poor mentation   Source if other than patient:  [ ]Family   [ ]Team     Pain:  [ x]yes [ ]no  QOL impact -   Location -   neck, chest                  Aggravating factors -   Quality - burning  Radiation - everywhere  Timing- constant  Severity (0-10 scale): Up to 8 but decreasing to  2-3 immediately after Dilaudid administration  Minimal acceptable level (0-10 scale):     Dyspnea:                           [ ]Mild [ ]Moderate [ ]Severe  Anxiety:                             [ ]Mild [x ]Moderate [ ]Severe  Fatigue:                             [ ]Mild [ ]Moderate [x ]Severe  Nausea:                             [ ]Mild [ ]Moderate [ ]Severe  Loss of appetite:              [ ]Mild [ ]Moderate [x ]Severe  Constipation:                    [ ]Mild [ ]Moderate [ ]Severe    PAIN AD Score:	  [email or web address]/painad.pdf (Ctrl + left click to view)    Other Symptoms:  [x ]All other review of systems negative but depressed mood and episodic anxiety/existential suffering. Weakness.       Palliative Performance Status Version 2:    20     %      http://npcrc.org/files/news/palliative_performance_scale_ppsv2.pdf  PHYSICAL EXAM:  Vital Signs Last 24 Hrs  T(C): 36.9 (20 Dec 2019 13:29), Max: 36.9 (20 Dec 2019 13:29)  T(F): 98.5 (20 Dec 2019 13:29), Max: 98.5 (20 Dec 2019 13:29)  HR: 78 (20 Dec 2019 13:29) (77 - 84)  BP: 109/74 (20 Dec 2019 13:29) (100/68 - 109/74)  BP(mean): --  RR: 16 (20 Dec 2019 13:29) (16 - 17)  SpO2: 92% (20 Dec 2019 13:29) (91% - 93%)  GENERAL:  [x ]Alert  [ x]Oriented x 1-2  [x ] Intermittently Lethargic  [x ]Cachexia  [ ]Unarousable  [ x]Verbal  [ ]Non-Verbal  Behavioral:   [ ]Anxiety  [ ]Delirium [ ]Agitation [ ]Other  HEENT:  [x ]Normal   [ ]Dry mouth   [ ]ET Tube/Trach  [ ]Oral lesions  [x] No pinpoint pupils   PULMONARY:   x[ ]Clear [ ]Tachypnea  [ ]Audible excessive secretions   [ ]Rhonchi        [ ]Right [ ]Left [ ]Bilateral  [ ]Crackles        [ ]Right [ ]Left [ ]Bilateral  [ ]Wheezing     [ ]Right [ ]Left [ ]Bilateral  [ ]Diminished BS [ ] Right [ ]Left [ ]Bilateral  CARDIOVASCULAR:    [x ]Regular [ ]Irregular [ ]Tachy  [ ]Dusty [ ]Murmur [ ]Other  GASTROINTESTINAL:  [x ]Soft  [ ]Distended   [ ]+BS  [x ]Non tender [ ]Tender  [ ]PEG [ ]OGT/ NGT   Last BM: 12/13   GENITOURINARY:  [ ]Normal [ ]Incontinent   [ ]Oliguria/Anuria   [x ]Alvarez  MUSCULOSKELETAL:   [ ]Normal   [x ]Weakness  [x ]Bed/Wheelchair bound [ ]Edema  NEUROLOGIC:   [ ]No focal deficits  [x ] Cognitive impairment  [ ] Dysphagia [ ]Dysarthria [x ] Paraplegia. RUE paresis  [ ]Other   SKIN:   [ ]Normal  [ ]Rash   [ ]Pressure ulcer(s) [ ]y [ ]n present on admission  [x] excoriations 2/2 itching due to pruritus    CRITICAL CARE:  [ ]Shock Present  [ ]Septic [ ]Cardiogenic [ ]Neurologic [ ]Hypovolemic  [ ]Vasopressors [ ]Inotropes  [ ]Respiratory failure present [ ]Mechanical Ventilation [ ]Non-invasive ventilatory support [ ]High-Flow  [ ]Acute  [ ]Chronic [ ]Hypoxic  [ ]Hypercarbic [ ]Other  [ ]Other organ failure     LABS:                         No new labs           RADIOLOGY & ADDITIONAL STUDIES: reviewed    Protein Calorie Malnutrition Present: [ ]mild [ ]moderate [x ]severe [ ]underweight [ ]morbid obesity  https://www.andeal.org/vault/2440/web/files/ONC/Table_Clinical%20Characteristics%20to%20Document%20Malnutrition-White%20JV%20et%20al%824019.pdf    Height (cm): 170.18 (12-07-19 @ 09:03), 170.2 (09-24-19 @ 13:00), 170.18 (09-05-19 @ 11:47)  Weight (kg): 54.4 (12-07-19 @ 09:03), 54.7 (09-24-19 @ 13:00), 52.2 (09-05-19 @ 11:47)  BMI (kg/m2): 18.8 (12-07-19 @ 09:03), 18.9 (09-24-19 @ 13:00), 18 (09-05-19 @ 11:47)    [ ]PPSV2 < or = 30%  [x ]significant weight loss [ ]poor nutritional intake [ ]anasarca   Albumin, Serum: 3.8 g/dL (12-08-19 @ 09:07)   [ ]Artificial Nutrition    REFERRALS:   [x ]Chaplaincy  [ ]Hospice  [ ]Child Life  [ ]Social Work  [ ]Case management [ ]Holistic Therapy     Goals of Care Document:  pending SUBJECTIVE AND OBJECTIVE: Patient used Dilaudid 0.5 mg IV x 12 over 24 hours. His pain appeared to be controlled.    INTERVAL HPI/OVERNIGHT EVENTS: . He was answering questions appropriately when I saw him. 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 He has become weaker. He is looking forward to going home.     DNR on chart: No   Allergies    No Known Allergies    Intolerances    MEDICATIONS  (STANDING):  Biotene Dry Mouth Oral Rinse 5 milliLiter(s) Swish and Spit every 6 hours  calamine Lotion 1 Application(s) Topical two times a day  dexAMETHasone     Tablet 4 milliGRAM(s) Oral every 6 hours  gabapentin   Solution 300 milliGRAM(s) Oral every 8 hours  HYDROmorphone PCA (1 mG/mL) 30 milliLiter(s) PCA Continuous PCA Continuous  levETIRAcetam 500 milliGRAM(s) Oral two times a day  lisinopril 10 milliGRAM(s) Oral daily  methylphenidate 5 milliGRAM(s) Oral <User Schedule>  pantoprazole  Injectable 40 milliGRAM(s) IV Push daily  QUEtiapine 12.5 milliGRAM(s) Oral at bedtime  senna 2 Tablet(s) Oral at bedtime  sodium chloride 0.9%. 1000 milliLiter(s) (75 mL/Hr) IV Continuous <Continuous>  sodium chloride 0.9%. 1000 milliLiter(s) (75 mL/Hr) IV Continuous <Continuous>    MEDICATIONS  (PRN):  acetaminophen   Tablet .. 650 milliGRAM(s) Oral every 6 hours PRN Mild Pain (1 - 3)  bisacodyl Suppository 10 milliGRAM(s) Rectal daily PRN Constipation  diphenhydrAMINE   Injectable 12.5 milliGRAM(s) IV Push every 4 hours PRN pruritus  magnesium hydroxide Suspension 30 milliLiter(s) Oral daily PRN Constipation  naloxone Injectable 0.1 milliGRAM(s) IV Push every 3 minutes PRN sedation or respiratory depression due to opioids  prochlorperazine   Injectable 5 milliGRAM(s) IV Push every 6 hours PRN Nausea/Vomiting          ITEMS UNCHECKED ARE NOT PRESENT    PRESENT SYMPTOMS: [ ]Unable to obtain due to poor mentation   Source if other than patient:  [ ]Family   [ ]Team     Pain:  [ x]yes [ ]no  QOL impact -   Location -   neck, chest                  Aggravating factors -   Quality - burning  Radiation - everywhere  Timing- constant  Severity (0-10 scale): Up to 8 but decreasing to  2-3 immediately after Dilaudid administration  Minimal acceptable level (0-10 scale):     Dyspnea:                           [ ]Mild [ ]Moderate [ ]Severe  Anxiety:                             [ ]Mild [x ]Moderate [ ]Severe  Fatigue:                             [ ]Mild [ ]Moderate [x ]Severe  Nausea:                             [ ]Mild [ ]Moderate [ ]Severe  Loss of appetite:              [ ]Mild [ ]Moderate [x ]Severe  Constipation:                    [ ]Mild [ ]Moderate [ ]Severe    PAIN AD Score:	  http://geriatrictoolkit.[email or web address]/cog/painad.pdf (Ctrl + left click to view)    Other Symptoms:  [x ]All other review of systems negative but depressed mood and episodic anxiety/existential suffering. Weakness.       Palliative Performance Status Version 2:    20     %      http://npcrc.org/files/news/palliative_performance_scale_ppsv2.pdf  PHYSICAL EXAM:  Vital Signs Last 24 Hrs  T(C): 36.9 (20 Dec 2019 13:29), Max: 36.9 (20 Dec 2019 13:29)  T(F): 98.5 (20 Dec 2019 13:29), Max: 98.5 (20 Dec 2019 13:29)  HR: 78 (20 Dec 2019 13:29) (77 - 84)  BP: 109/74 (20 Dec 2019 13:29) (100/68 - 109/74)  BP(mean): --  RR: 16 (20 Dec 2019 13:29) (16 - 17)  SpO2: 92% (20 Dec 2019 13:29) (91% - 93%)  GENERAL:  [x ]Alert  [ x]Oriented x 1-2  [x ] Intermittently Lethargic but arousable and able to let his need known  [x ]Cachexia  [ ]Unarousable  [ x]Verbal  [ ]Non-Verbal  Behavioral:   [ ]Anxiety  [ ]Delirium [ ]Agitation [ ]Other  HEENT:  [x ]Normal   [ ]Dry mouth   [ ]ET Tube/Trach  [ ]Oral lesions  [x] No pinpoint pupils   PULMONARY:   x[ ]Clear [ ]Tachypnea  [ ]Audible excessive secretions   [ ]Rhonchi        [ ]Right [ ]Left [ ]Bilateral  [ ]Crackles        [ ]Right [ ]Left [ ]Bilateral  [ ]Wheezing     [ ]Right [ ]Left [ ]Bilateral  [ ]Diminished BS [ ] Right [ ]Left [ ]Bilateral  CARDIOVASCULAR:    [x ]Regular [ ]Irregular [ ]Tachy  [ ]Dusty [ ]Murmur [ ]Other  GASTROINTESTINAL:  [x ]Soft  [ ]Distended   [ ]+BS  [x ]Non tender [ ]Tender  [ ]PEG [ ]OGT/ NGT   Last BM: 12/19   GENITOURINARY:  [ ]Normal [ ]Incontinent   [ ]Oliguria/Anuria   [x ]Alvarez  MUSCULOSKELETAL:   [ ]Normal   [x ]Weakness  [x ]Bed/Wheelchair bound [ ]Edema  NEUROLOGIC:   [ ]No focal deficits  [x ] Cognitive impairment  [ ] Dysphagia [ ]Dysarthria [x ] Paraplegia. RUE paresis  [ ]Other   SKIN:   [ ]Normal  [ ]Rash   [ ]Pressure ulcer(s) [ ]y [ ]n present on admission  [x] excoriations 2/2 itching due to pruritus    CRITICAL CARE:  [ ]Shock Present  [ ]Septic [ ]Cardiogenic [ ]Neurologic [ ]Hypovolemic  [ ]Vasopressors [ ]Inotropes  [ ]Respiratory failure present [ ]Mechanical Ventilation [ ]Non-invasive ventilatory support [ ]High-Flow  [ ]Acute  [ ]Chronic [ ]Hypoxic  [ ]Hypercarbic [ ]Other  [ ]Other organ failure     LABS:                         No new labs           RADIOLOGY & ADDITIONAL STUDIES: reviewed    Protein Calorie Malnutrition Present: [ ]mild [ ]moderate [x ]severe [ ]underweight [ ]morbid obesity  https://www.andeal.org/vault/2440/web/files/ONC/Table_Clinical%20Characteristics%20to%20Document%20Malnutrition-White%20JV%20et%20al%219918.pdf    Height (cm): 170.18 (12-07-19 @ 09:03), 170.2 (09-24-19 @ 13:00), 170.18 (09-05-19 @ 11:47)  Weight (kg): 54.4 (12-07-19 @ 09:03), 54.7 (09-24-19 @ 13:00), 52.2 (09-05-19 @ 11:47)  BMI (kg/m2): 18.8 (12-07-19 @ 09:03), 18.9 (09-24-19 @ 13:00), 18 (09-05-19 @ 11:47)    [ ]PPSV2 < or = 30%  [x ]significant weight loss [ ]poor nutritional intake [ ]anasarca   Albumin, Serum: 3.8 g/dL (12-08-19 @ 09:07)   [ ]Artificial Nutrition    REFERRALS:   [x ]Chaplaincy  [ ]Hospice  [ ]Child Life  [ ]Social Work  [ ]Case management [ ]Holistic Therapy     Goals of Care Document:  pending

## 2019-12-20 NOTE — DISCHARGE NOTE PROVIDER - NSDCMRMEDTOKEN_GEN_ALL_CORE_FT
acetaminophen 325 mg oral tablet: 2 tab(s) orally every 6 hours, As needed, Mild Pain (1 - 3)  HYDROmorphone 0.2 mg/mL-NaCl 0.9% intravenous solution: Dilaudid 0.2 mg/ml PCA pump. 0.2 mg/hr basal, 0.5 mg q 20&#x27; demand dose (DD) PRN. Max 3 DD/hr. Dispense 5 bags x 1 Liter. MDD 40.8 mg. acetaminophen 325 mg oral tablet: 2 tab(s) orally every 6 hours, As needed, Mild Pain (1 - 3)  bisacodyl 10 mg rectal suppository: 1 suppository(ies) rectal once a day, As needed, Constipation  calamine topical lotion: 1 application topically 2 times a day  dexamethasone 4 mg oral tablet: 1 tab(s) orally every 6 hours  diphenhydrAMINE 50 mg/mL injectable solution: 12.5 milligram(s) intravenously every 4 hours, As Needed for pruritus  gabapentin 250 mg/5 mL oral solution: 6 milliliter(s) orally every 8 hours  HYDROmorphone 0.2 mg/mL-NaCl 0.9% intravenous solution: Dilaudid 0.2 mg/ml PCA pump. 0.2 mg/hr basal, 0.5 mg q 20&#x27; demand dose (DD) PRN. Max 3 DD/hr. Dispense 5 bags x 1 Liter. MDD 40.8 mg.   levETIRAcetam 500 mg oral tablet: 1 tab(s) orally 2 times a day  lisinopril 10 mg oral tablet: 1 tab(s) orally once a day  magnesium hydroxide 8% oral suspension: 30 milliliter(s) orally once a day, As needed, Constipation  methylphenidate 5 mg oral tablet: 1 tab(s) orally 2 times a day  at 0700 and 1200  prochlorperazine 5 mg/mL injectable solution: 5 milligram(s) intravenous every 6 hours, As Needed   QUEtiapine: 12.5 milligram(s) orally once a day (at bedtime)  saliva substitutes oral solution: 5 milliliter(s) orally every 6 hours   Swish and Spit  senna oral tablet: 2 tab(s) orally once a day (at bedtime)

## 2019-12-20 NOTE — PROGRESS NOTE ADULT - PROBLEM SELECTOR PLAN 1
noted while changing patient   family refusing Blood draws  hospice care with [facility]   plan for transfer tomorrow  hold lovenox

## 2019-12-20 NOTE — PROGRESS NOTE ADULT - PROBLEM SELECTOR PLAN 7
Will continue to f/u for symptoms Rx.   Plan is for home with hospice + PCA likely on Saturday. Will continue to f/u for symptoms Rx.   Plan is for home with hospice + PCA likely on Saturday.  Today, I called the HCP but she did not answer so I left her a message.

## 2019-12-20 NOTE — PROGRESS NOTE ADULT - ASSESSMENT
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 Per family the patient had MR at [facility] 3 weeks ago showing diffuse leptomeningeal spread (films unavail). Family is still shrouding patient from diagnosis/prognosis. Exam: oriented x3, FC, distal LE 4/5, proximal LE 2-3/5, UE 4/5. SILT. Endorses back pain.

## 2019-12-20 NOTE — PROVIDER CONTACT NOTE (OTHER) - ASSESSMENT
small amount of blood per rectum, no  bowl movement noted. Pt planned to d/c home w hospice care. CBC not monitored since 12/16

## 2019-12-21 ENCOUNTER — TRANSCRIPTION ENCOUNTER (OUTPATIENT)
Age: 64
End: 2019-12-21

## 2019-12-21 VITALS
SYSTOLIC BLOOD PRESSURE: 93 MMHG | TEMPERATURE: 98 F | OXYGEN SATURATION: 91 % | HEART RATE: 87 BPM | DIASTOLIC BLOOD PRESSURE: 64 MMHG | RESPIRATION RATE: 17 BRPM

## 2019-12-21 LAB
ANION GAP SERPL CALC-SCNC: 10 MMOL/L — SIGNIFICANT CHANGE UP (ref 5–17)
BUN SERPL-MCNC: 26 MG/DL — HIGH (ref 7–23)
CALCIUM SERPL-MCNC: 8.5 MG/DL — SIGNIFICANT CHANGE UP (ref 8.4–10.5)
CHLORIDE SERPL-SCNC: 96 MMOL/L — SIGNIFICANT CHANGE UP (ref 96–108)
CO2 SERPL-SCNC: 24 MMOL/L — SIGNIFICANT CHANGE UP (ref 22–31)
CREAT SERPL-MCNC: 0.39 MG/DL — LOW (ref 0.5–1.3)
GLUCOSE SERPL-MCNC: 136 MG/DL — HIGH (ref 70–99)
HCT VFR BLD CALC: 39.6 % — SIGNIFICANT CHANGE UP (ref 39–50)
HGB BLD-MCNC: 13.1 G/DL — SIGNIFICANT CHANGE UP (ref 13–17)
MCHC RBC-ENTMCNC: 31.6 PG — SIGNIFICANT CHANGE UP (ref 27–34)
MCHC RBC-ENTMCNC: 33.1 GM/DL — SIGNIFICANT CHANGE UP (ref 32–36)
MCV RBC AUTO: 95.7 FL — SIGNIFICANT CHANGE UP (ref 80–100)
NRBC # BLD: 0 /100 WBCS — SIGNIFICANT CHANGE UP (ref 0–0)
PLATELET # BLD AUTO: 138 K/UL — LOW (ref 150–400)
POTASSIUM SERPL-MCNC: 4.3 MMOL/L — SIGNIFICANT CHANGE UP (ref 3.5–5.3)
POTASSIUM SERPL-SCNC: 4.3 MMOL/L — SIGNIFICANT CHANGE UP (ref 3.5–5.3)
RBC # BLD: 4.14 M/UL — LOW (ref 4.2–5.8)
RBC # FLD: 13.5 % — SIGNIFICANT CHANGE UP (ref 10.3–14.5)
SODIUM SERPL-SCNC: 130 MMOL/L — LOW (ref 135–145)
WBC # BLD: 23.5 K/UL — HIGH (ref 3.8–10.5)
WBC # FLD AUTO: 23.5 K/UL — HIGH (ref 3.8–10.5)

## 2019-12-21 PROCEDURE — 85027 COMPLETE CBC AUTOMATED: CPT

## 2019-12-21 PROCEDURE — 84560 ASSAY OF URINE/URIC ACID: CPT

## 2019-12-21 PROCEDURE — 85014 HEMATOCRIT: CPT

## 2019-12-21 PROCEDURE — 83935 ASSAY OF URINE OSMOLALITY: CPT

## 2019-12-21 PROCEDURE — 72157 MRI CHEST SPINE W/O & W/DYE: CPT

## 2019-12-21 PROCEDURE — 83930 ASSAY OF BLOOD OSMOLALITY: CPT

## 2019-12-21 PROCEDURE — 82330 ASSAY OF CALCIUM: CPT

## 2019-12-21 PROCEDURE — 85730 THROMBOPLASTIN TIME PARTIAL: CPT

## 2019-12-21 PROCEDURE — 82947 ASSAY GLUCOSE BLOOD QUANT: CPT

## 2019-12-21 PROCEDURE — 99285 EMERGENCY DEPT VISIT HI MDM: CPT | Mod: 25

## 2019-12-21 PROCEDURE — 83605 ASSAY OF LACTIC ACID: CPT

## 2019-12-21 PROCEDURE — 82435 ASSAY OF BLOOD CHLORIDE: CPT

## 2019-12-21 PROCEDURE — 72156 MRI NECK SPINE W/O & W/DYE: CPT

## 2019-12-21 PROCEDURE — 71045 X-RAY EXAM CHEST 1 VIEW: CPT | Mod: 26

## 2019-12-21 PROCEDURE — 81001 URINALYSIS AUTO W/SCOPE: CPT

## 2019-12-21 PROCEDURE — A9585: CPT

## 2019-12-21 PROCEDURE — 84165 PROTEIN E-PHORESIS SERUM: CPT

## 2019-12-21 PROCEDURE — 36569 INSJ PICC 5 YR+ W/O IMAGING: CPT

## 2019-12-21 PROCEDURE — 85610 PROTHROMBIN TIME: CPT

## 2019-12-21 PROCEDURE — 84100 ASSAY OF PHOSPHORUS: CPT

## 2019-12-21 PROCEDURE — 82962 GLUCOSE BLOOD TEST: CPT

## 2019-12-21 PROCEDURE — 96376 TX/PRO/DX INJ SAME DRUG ADON: CPT | Mod: XU

## 2019-12-21 PROCEDURE — 72158 MRI LUMBAR SPINE W/O & W/DYE: CPT

## 2019-12-21 PROCEDURE — 96374 THER/PROPH/DIAG INJ IV PUSH: CPT | Mod: XU

## 2019-12-21 PROCEDURE — 84295 ASSAY OF SERUM SODIUM: CPT

## 2019-12-21 PROCEDURE — 84300 ASSAY OF URINE SODIUM: CPT

## 2019-12-21 PROCEDURE — 96375 TX/PRO/DX INJ NEW DRUG ADDON: CPT | Mod: XU

## 2019-12-21 PROCEDURE — 84155 ASSAY OF PROTEIN SERUM: CPT

## 2019-12-21 PROCEDURE — 83735 ASSAY OF MAGNESIUM: CPT

## 2019-12-21 PROCEDURE — C1751: CPT

## 2019-12-21 PROCEDURE — 84145 PROCALCITONIN (PCT): CPT

## 2019-12-21 PROCEDURE — 80053 COMPREHEN METABOLIC PANEL: CPT

## 2019-12-21 PROCEDURE — 87040 BLOOD CULTURE FOR BACTERIA: CPT

## 2019-12-21 PROCEDURE — 84478 ASSAY OF TRIGLYCERIDES: CPT

## 2019-12-21 PROCEDURE — 71045 X-RAY EXAM CHEST 1 VIEW: CPT

## 2019-12-21 PROCEDURE — 84132 ASSAY OF SERUM POTASSIUM: CPT

## 2019-12-21 PROCEDURE — 83521 IG LIGHT CHAINS FREE EACH: CPT

## 2019-12-21 PROCEDURE — 86334 IMMUNOFIX E-PHORESIS SERUM: CPT

## 2019-12-21 PROCEDURE — 84443 ASSAY THYROID STIM HORMONE: CPT

## 2019-12-21 PROCEDURE — 93005 ELECTROCARDIOGRAM TRACING: CPT

## 2019-12-21 PROCEDURE — 80048 BASIC METABOLIC PNL TOTAL CA: CPT

## 2019-12-21 PROCEDURE — 84166 PROTEIN E-PHORESIS/URINE/CSF: CPT

## 2019-12-21 PROCEDURE — 82803 BLOOD GASES ANY COMBINATION: CPT

## 2019-12-21 PROCEDURE — 82436 ASSAY OF URINE CHLORIDE: CPT

## 2019-12-21 RX ORDER — GABAPENTIN 400 MG/1
6 CAPSULE ORAL
Qty: 540 | Refills: 0
Start: 2019-12-21 | End: 2020-01-19

## 2019-12-21 RX ORDER — METHYLPHENIDATE HCL 5 MG
1 TABLET ORAL
Qty: 0 | Refills: 0 | DISCHARGE
Start: 2019-12-21

## 2019-12-21 RX ORDER — LEVETIRACETAM 250 MG/1
1 TABLET, FILM COATED ORAL
Qty: 60 | Refills: 0
Start: 2019-12-21 | End: 2020-01-19

## 2019-12-21 RX ORDER — SENNA PLUS 8.6 MG/1
2 TABLET ORAL
Qty: 60 | Refills: 0
Start: 2019-12-21 | End: 2020-01-19

## 2019-12-21 RX ORDER — QUETIAPINE FUMARATE 200 MG/1
12.5 TABLET, FILM COATED ORAL
Qty: 0 | Refills: 0 | DISCHARGE
Start: 2019-12-21

## 2019-12-21 RX ORDER — DIPHENHYDRAMINE HCL 50 MG
12.5 CAPSULE ORAL
Qty: 1500 | Refills: 0
Start: 2019-12-21 | End: 2020-01-19

## 2019-12-21 RX ORDER — CALAMINE AND ZINC OXIDE AND PHENOL 160; 10 MG/ML; MG/ML
1 LOTION TOPICAL
Qty: 1 | Refills: 0
Start: 2019-12-21 | End: 2020-01-19

## 2019-12-21 RX ORDER — MAGNESIUM HYDROXIDE 400 MG/1
30 TABLET, CHEWABLE ORAL
Qty: 900 | Refills: 0
Start: 2019-12-21 | End: 2020-01-19

## 2019-12-21 RX ORDER — PROCHLORPERAZINE MALEATE 5 MG
5 TABLET ORAL
Qty: 600 | Refills: 0
Start: 2019-12-21 | End: 2020-01-19

## 2019-12-21 RX ORDER — DEXAMETHASONE 0.5 MG/5ML
1 ELIXIR ORAL
Qty: 120 | Refills: 0
Start: 2019-12-21 | End: 2020-01-19

## 2019-12-21 RX ORDER — LISINOPRIL 2.5 MG/1
1 TABLET ORAL
Qty: 30 | Refills: 0
Start: 2019-12-21 | End: 2020-01-19

## 2019-12-21 RX ORDER — SALIVA SUBSTITUTE COMB NO.11 351 MG
5 POWDER IN PACKET (EA) MUCOUS MEMBRANE
Qty: 150 | Refills: 0
Start: 2019-12-21 | End: 2020-01-19

## 2019-12-21 RX ADMIN — Medication 5 MILLIGRAM(S): at 09:53

## 2019-12-21 RX ADMIN — Medication 4 MILLIGRAM(S): at 15:07

## 2019-12-21 RX ADMIN — Medication 5 MILLILITER(S): at 09:50

## 2019-12-21 RX ADMIN — GABAPENTIN 300 MILLIGRAM(S): 400 CAPSULE ORAL at 09:54

## 2019-12-21 RX ADMIN — Medication 5 MILLILITER(S): at 15:07

## 2019-12-21 RX ADMIN — PANTOPRAZOLE SODIUM 40 MILLIGRAM(S): 20 TABLET, DELAYED RELEASE ORAL at 09:53

## 2019-12-21 RX ADMIN — HYDROMORPHONE HYDROCHLORIDE 30 MILLILITER(S): 2 INJECTION INTRAMUSCULAR; INTRAVENOUS; SUBCUTANEOUS at 07:35

## 2019-12-21 RX ADMIN — Medication 4 MILLIGRAM(S): at 09:53

## 2019-12-21 RX ADMIN — LEVETIRACETAM 500 MILLIGRAM(S): 250 TABLET, FILM COATED ORAL at 09:53

## 2019-12-21 RX ADMIN — LISINOPRIL 10 MILLIGRAM(S): 2.5 TABLET ORAL at 09:50

## 2019-12-21 RX ADMIN — Medication 12.5 MILLIGRAM(S): at 10:01

## 2019-12-21 NOTE — PROGRESS NOTE ADULT - REASON FOR ADMISSION
LE weakness

## 2019-12-21 NOTE — PROGRESS NOTE ADULT - NSHPATTENDINGPLANDISCUSS_GEN_ALL_CORE
primary
patient's daughter
family at the bedside
family at the bedside, Palliative and house staff

## 2019-12-21 NOTE — DISCHARGE NOTE NURSING/CASE MANAGEMENT/SOCIAL WORK - NSSCCONTNUM_GEN_ALL_CORE
[facility]     scheduled for start of care  12/21/19  pm  visit.   Agency will contact you/family to set  up time.  IF  you have  any questions  for  either  agency  please  free  to contact  agency [facility]     scheduled for start of care  12/21/19  pm  visit.   RN  visit.    Agency will contact you/family to set  up time.  IF  you have  any questions  for  either  agency  please  free  to contact  agency

## 2019-12-21 NOTE — PROGRESS NOTE ADULT - ASSESSMENT
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 Has lung cancer w/ mets to spine.     Hyponatremia   hypovolemic hyponatremia 2/2 poor PO intake    - continue IV NS @ 50-75 mL/hr given poor PO intake    - liberalize salt intake in diet    - continue pain control, palliative care is following   - anti-emetics for control of nausea as needed    - Na should not change by more than 8 mEq in 24 hrs; please call nephrology if it does  - Monitor Na level daily    Acidosis:  Non-AG; likely related to N/V previously  improved  continue to monitor    Increased BUN:  Sec to Steroids + hypovolemia  improving    Hematuria:  initial UA had RBC 5  likely 2/2 france catheter  resolved on repeat UA

## 2019-12-21 NOTE — DISCHARGE NOTE NURSING/CASE MANAGEMENT/SOCIAL WORK - NSDCPEWEB_GEN_ALL_CORE
[facility] for Tobacco Control website --- [email or web address] --- [email or web address]

## 2019-12-21 NOTE — PROGRESS NOTE ADULT - SUBJECTIVE AND OBJECTIVE BOX
[facility]    Subjective: Patient seen and examined. No new events except as noted.   febrile last night  infectious W/U done      REVIEW OF SYSTEMS:    CONSTITUTIONAL: + weakness, fever  EYES/ENT: No visual changes;  No vertigo or throat pain   NECK: No pain or stiffness  RESPIRATORY: No cough, wheezing, hemoptysis; No shortness of breath  CARDIOVASCULAR: No chest pain or palpitations  GASTROINTESTINAL: No abdominal or epigastric pain.  GENITOURINARY: No dysuria, frequency or hematuria  NEUROLOGICAL: + weakness  SKIN: No itching, burning, rashes, or lesions   All other review of systems is negative unless indicated above.    MEDICATIONS:  MEDICATIONS  (STANDING):  Biotene Dry Mouth Oral Rinse 5 milliLiter(s) Swish and Spit every 6 hours  calamine Lotion 1 Application(s) Topical two times a day  dexAMETHasone     Tablet 4 milliGRAM(s) Oral every 6 hours  gabapentin   Solution 300 milliGRAM(s) Oral every 8 hours  HYDROmorphone PCA (1 mG/mL) 30 milliLiter(s) PCA Continuous PCA Continuous  levETIRAcetam 500 milliGRAM(s) Oral two times a day  lisinopril 10 milliGRAM(s) Oral daily  methylphenidate 5 milliGRAM(s) Oral <User Schedule>  pantoprazole  Injectable 40 milliGRAM(s) IV Push daily  QUEtiapine 12.5 milliGRAM(s) Oral at bedtime  senna 2 Tablet(s) Oral at bedtime  sodium chloride 0.9%. 1000 milliLiter(s) (75 mL/Hr) IV Continuous <Continuous>  sodium chloride 0.9%. 1000 milliLiter(s) (75 mL/Hr) IV Continuous <Continuous>      PHYSICAL EXAM:  T(C): 36.8 (12-21-19 @ 14:30), Max: 38.5 (12-20-19 @ 22:06)  HR: 87 (12-21-19 @ 14:30) (77 - 87)  BP: 93/64 (12-21-19 @ 14:30) (93/64 - 104/66)  RR: 17 (12-21-19 @ 14:30) (17 - 17)  SpO2: 91% (12-21-19 @ 14:30) (91% - 93%)  Wt(kg): --  I&O's Summary    20 Dec 2019 07:01  -  21 Dec 2019 07:00  --------------------------------------------------------  IN: 1620 mL / OUT: 1050 mL / NET: 570 mL    21 Dec 2019 07:01  -  21 Dec 2019 16:15  --------------------------------------------------------  IN: 525 mL / OUT: 750 mL / NET: -225 mL          Appearance: awake, lethargic, cachectic   HEENT: dry om   Lymphatic: no edema  Cardiovascular: Normal S1 S2  Respiratory: Lungs clear to auscultation, normal effort 	  Gastrointestinal:  Soft, Non-tender, + BS	  Skin: No rashes, No ecchymoses, No cyanosis, warm to touch  Musculoskeletal: LE and UE motor weakness  Psychiatry:  Mood & affect appropriate  Ext: No edema      All labs, Imaging and EKGs personally reviewed                           13.1   23.50 )-----------( 138      ( 21 Dec 2019 00:25 )             39.6               12-21    130<L>  |  96  |  26<H>  ----------------------------<  136<H>  4.3   |  24  |  0.39<L>    Ca    8.5      21 Dec 2019 00:25

## 2019-12-21 NOTE — PROGRESS NOTE ADULT - PROBLEM SELECTOR PLAN 1
noted while changing patient   family refusing Blood draws  hospice care with home   hold lovenox  fever overnight  discussed with daughter over the phone, would like to take him home  may start IV ABx through PICC by hospice care if recurrent fever   villalobos not want infectious W/U and aggressive treatment

## 2019-12-21 NOTE — DISCHARGE NOTE NURSING/CASE MANAGEMENT/SOCIAL WORK - PATIENT PORTAL LINK FT
You can access the FollowMyHealth Patient Portal offered by [facility] by registering at the following website: [email or web address]. By joining [employer]’s FollowMyHealth portal, you will also be able to view your health information using other applications (apps) compatible with our system.

## 2019-12-21 NOTE — PROGRESS NOTE ADULT - ASSESSMENT
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 Per family the patient had MR at [facility] 3 weeks ago showing diffuse leptomeningeal spread (films unavail). Family is still shrouding patient from diagnosis/prognosis. Exam: oriented x3, FC, distal LE 4/5, proximal LE 2-3/5, UE 4/5. SILT. Endorses back pain.

## 2019-12-21 NOTE — DISCHARGE NOTE NURSING/CASE MANAGEMENT/SOCIAL WORK - NSDCPEEMAIL_GEN_ALL_CORE
[facility] for Tobacco Control email [email or web address]

## 2019-12-21 NOTE — PROGRESS NOTE ADULT - ATTENDING COMMENTS
Advanced care planning was discussed with patient and family.  Advanced care planning forms were reviewed and discussed.  Differential diagnosis and plan of care discussed with patient after the evaluation.   Pain assessed and judicious use of narcotics when appropriate was discussed.  Importance of Fall prevention discussed.  Counseling on Smoking and Alcohol cessation was offered when appropriate.  Counseling on Diet, exercise, and medication compliance was done.
I was physically present for the key portions of the evaluation and management (E/M) service provided.  I agree with the above history, physical, and plan which I have reviewed and edited where appropriate. Plan discussed with team.
Advanced care planning was discussed with patient and family.  Advanced care planning forms were reviewed and discussed.        D/C planing to home with hospice
Advanced care planning was discussed with patient and family.  Advanced care planning forms were reviewed and discussed.  Differential diagnosis and plan of care discussed with patient after the evaluation.   Pain assessed and judicious use of narcotics when appropriate was discussed.  Importance of Fall prevention discussed.  Counseling on Smoking and Alcohol cessation was offered when appropriate.  Counseling on Diet, exercise, and medication compliance was done.
I was physically present for the key portions of the evaluation and management (E/M) service provided.  I agree with the above history, physical, and plan which I have reviewed and edited where appropriate. Plan discussed with team.
Met with the patient and discussed with him about his pain. He indicated his pain was also due to existential suffering due to his functional impairment. He appeared to be using Dilaudid not only for pain but for Medical copying in order to sleep so he does not need to deal with reality.   Will start Cymbalta 30 mg q daily. Will ask for chaplaincy eval.   Will f/u Neuro Onc input in order to better define GOC and ACP.

## 2019-12-21 NOTE — PROGRESS NOTE ADULT - SUBJECTIVE AND OBJECTIVE BOX
Dr. Welsh  Office [phone]  Cell [phone]  Gladys QUINTERO  Cell [phone]      Patient is a 64y old  Male who presents with a chief complaint of LE weakness (20 Dec 2019 17:41)      Patient seen and examined at bedside. No apparent distress    VITALS:  T(F): 97.6 (12-21-19 @ 01:37), Max: 101.3 (12-20-19 @ 22:06)  HR: 77 (12-21-19 @ 01:37)  BP: 104/66 (12-21-19 @ 01:37)  RR: 17 (12-21-19 @ 01:37)  SpO2: 92% (12-21-19 @ 01:37)  Wt(kg): --    12-20 @ 07:01  -  12-21 @ 07:00  --------------------------------------------------------  IN: 1620 mL / OUT: 1050 mL / NET: 570 mL          PHYSICAL EXAM:  Constitutional: NAD  Neck: No JVD  Respiratory: CTAB, no wheezes, rales or rhonchi  Cardiovascular: S1, S2, RRR  Gastrointestinal: BS+, soft, NT/ND  Extremities: No peripheral edema    Hospital Medications:   MEDICATIONS  (STANDING):  Biotene Dry Mouth Oral Rinse 5 milliLiter(s) Swish and Spit every 6 hours  calamine Lotion 1 Application(s) Topical two times a day  dexAMETHasone     Tablet 4 milliGRAM(s) Oral every 6 hours  gabapentin   Solution 300 milliGRAM(s) Oral every 8 hours  HYDROmorphone PCA (1 mG/mL) 30 milliLiter(s) PCA Continuous PCA Continuous  levETIRAcetam 500 milliGRAM(s) Oral two times a day  lisinopril 10 milliGRAM(s) Oral daily  methylphenidate 5 milliGRAM(s) Oral <User Schedule>  pantoprazole  Injectable 40 milliGRAM(s) IV Push daily  QUEtiapine 12.5 milliGRAM(s) Oral at bedtime  senna 2 Tablet(s) Oral at bedtime  sodium chloride 0.9%. 1000 milliLiter(s) (75 mL/Hr) IV Continuous <Continuous>  sodium chloride 0.9%. 1000 milliLiter(s) (75 mL/Hr) IV Continuous <Continuous>      LABS:  12-21    130<L>  |  96  |  26<H>  ----------------------------<  136<H>  4.3   |  24  |  0.39<L>    Ca    8.5      21 Dec 2019 00:25      Creatinine Trend: 0.39 <--, 0.45 <--, 0.69 <--                                13.1   23.50 )-----------( 138      ( 21 Dec 2019 00:25 )             39.6     Urine Studies:  Urinalysis - [12-14-19 @ 18:47]      Color Yellow / Appearance Clear / SG 1.032 / pH 5.5      Gluc Negative / Ketone Negative  / Bili Negative / Urobili Negative       Blood Negative / Protein Trace / Leuk Est Negative / Nitrite Negative      RBC 2 / WBC 1 / Hyaline 0 / Gran  / Sq Epi  / Non Sq Epi 0 / Bacteria 0.0    Urine Protein 11      [12-16-19 @ 17:44]  Urine Sodium <35      [12-14-19 @ 14:47]  Urine Chloride <35      [12-14-19 @ 14:47]  Urine Osmolality 969      [12-14-19 @ 14:47]    TSH 0.33      [12-08-19 @ 09:35]  Lipid: chol --, , HDL --, LDL --      [12-15-19 @ 19:17]      Free Light Chains: kappa 1.22, lambda 1.43, ratio = 0.85      [12-15 @ 23:13]  Immunofixation Serum:   No Monoclonal Band Identified    Reference Range: None Detected      [12-15-19 @ 05:49]  SPEP Interpretation: Normal Electrophoresis Pattern      [12-15-19 @ 23:13]    RADIOLOGY & ADDITIONAL STUDIES:

## 2019-12-21 NOTE — CHART NOTE - NSCHARTNOTEFT_GEN_A_CORE
Spoke with Dr Cortez, asked to facilitate patient discharge home. Medication reconciliation reviewed, revised and resolved with Dr Cortez who has medically cleared patient for discharge with follow up advised

## 2019-12-21 NOTE — DISCHARGE NOTE NURSING/CASE MANAGEMENT/SOCIAL WORK - NSSCNAMETXT_GEN_ALL_CORE
[facility]     scheduled for  home  start of care   12/21/19  for  PCA  infusion care  pm  visit.  Agency will contact you/family to  set up  time  of visit. [facility]  [phone]    scheduled for  home  start of care   12/21/19  pm visit   RN   for  PCA  infusion care.  Agency will contact you/family to  set up  time  of visit.

## 2019-12-21 NOTE — PROGRESS NOTE ADULT - PROBLEM SELECTOR PROBLEM 7
Palliative care encounter
Prophylactic measure
Prophylactic measure
Palliative care encounter
Palliative care encounter

## 2019-12-24 LAB
% GAMMA, URINE: 9.5 % — SIGNIFICANT CHANGE UP
ALBUMIN 24H MFR UR ELPH: 20.8 % — SIGNIFICANT CHANGE UP
ALPHA1 GLOB 24H MFR UR ELPH: 39 % — SIGNIFICANT CHANGE UP
ALPHA2 GLOB 24H MFR UR ELPH: 22.2 % — SIGNIFICANT CHANGE UP
B-GLOBULIN 24H MFR UR ELPH: 8.5 % — SIGNIFICANT CHANGE UP
COLLECT DURATION TIME UR: 24 HR — SIGNIFICANT CHANGE UP
INTERPRETATION 24H UR IFE-IMP: SIGNIFICANT CHANGE UP
INTERPRETATION 24H UR IFE-IMP: SIGNIFICANT CHANGE UP
M PROTEIN 24H UR ELPH-MRATE: 0 MG/24HR — SIGNIFICANT CHANGE UP (ref 0–0)
M PROTEIN 24H UR ELPH-MRATE: 0 MG/DL — SIGNIFICANT CHANGE UP
PROT ?TM UR-MCNC: 11 MG/DL — SIGNIFICANT CHANGE UP (ref 0–12)
PROT PATTERN 24H UR ELPH-IMP: SIGNIFICANT CHANGE UP
PROTEIN QUANT CALC, URINE: 187 MG/24 H — HIGH (ref 50–100)
TOTAL VOLUME - 24 HOUR: 1700 ML — SIGNIFICANT CHANGE UP
URINE CREATININE CALCULATION: 0.8 G/24 H — LOW (ref 1–2)

## 2021-03-05 NOTE — ED ADULT NURSE NOTE - RELIEVING FACTORS
Hemigard Postcare Instructions: The HEMIGARD strips are to remain completely dry for at least 5-7 days. meds

## 2021-03-31 NOTE — PATIENT PROFILE ADULT - NSTRANSFERBELONGINGSDISPO_GEN_A_NUR
Patient states she has had four episodes once every 10 days that wakes her from sleep with tightness in chest.  It varies in time that it lasts, with last night being the longer.  She currently is not experiencing it.    She would like to be seen.    with patient

## 2021-04-23 NOTE — PHYSICAL THERAPY INITIAL EVALUATION ADULT - ADDITIONAL COMMENTS
Pt lives alone in a basement apt, +elevator with no steps to negotiate PT was (I) with all ADLs and functional mobility without use of an AD No

## 2021-05-24 NOTE — DISCHARGE NOTE PROVIDER - NSCORESITESY/N_GEN_A_CORE_RD
Worse. Patient has OA changes in PIPs and DIPs. Recommend voltaren gel. Discussed OT, but patient wants to focus on neck and back problems now.    No

## 2021-09-29 NOTE — PROGRESS NOTE ADULT - SUBJECTIVE AND OBJECTIVE BOX
Healed incision and normal function  No changed of programming  Hold remote  F/u in 3 months.   Patient seen and examined at bedside.    Vital Signs Last 24 Hrs  T(C): 37.4 (08 Sep 2019 03:00), Max: 38 (07 Sep 2019 13:07)  T(F): 99.3 (08 Sep 2019 03:00), Max: 100.4 (07 Sep 2019 13:07)  HR: 70 (08 Sep 2019 04:29) (62 - 120)  BP: 124/76 (08 Sep 2019 04:00) (118/76 - 171/90)  BP(mean): 90 (08 Sep 2019 04:00) (86 - 114)  RR: 10 (08 Sep 2019 04:00) (9 - 24)  SpO2: 100% (08 Sep 2019 04:29) (93% - 100%)    Exam:  intubated, no EO, pupils 3mm R b/l, BUE LC, BLE WD

## 2021-11-04 NOTE — PROGRESS NOTE ADULT - PROBLEM SELECTOR PLAN 5
"     Follow Up Note     Date: 2021   Patient Name: Laura Thomson  MRN: [id number]  : 1985     Primary Care Provider: Megha Fregoso APRN     Chief Complaint   Patient presents with   • Follow-up     History of present illness:   2021  Laura Thomson is a 36 y.o. female who is here today for follow up for epigastric pain.     Epigastric pain and nausea unchanged. No history of melena. Reflux and difficulty swallowing unchanged. Diarrhea controlled with Colestid. No rectal bleeding.    Interval History:  10/7/2021  She has been having epigastric pain for the past few months that has not improved. Eating does not affect the pain. She has occasional nausea associated with the pain. No vomiting. She has been taking Pepcid 20 mg twice a day as needed for acid reflux, she may take 1 every other day or so. She has intermittent difficulty swallowing.that occurs with every meal. She has difficulty swallowing solids, no significant problem with liquids.      She has a history of diarrhea for several years. She has 3-4 episodes of diarrhea every time she eats a meal. No history of rectal bleeding.      2019  There is a long standing history of epigastric pain. The pain occurs daily. The pain is moderate and described as cramping and a sensation of fullness. She constantly feels bloated and \"swollen\". Eating makes the pain worse. Nothing improves the pain.      There is a long standing history of nausea. The patient has nausea daily. Eating makes nausea worse. Nausea is described as mild. She is not taking anything for nausea. The patient denies vomiting.      There is a long standing history of heartburn. The patient may have heartburn 1-2 times per week. Heartburn is mild to moderate. Reflux is worse at night. The patient is not taking anything for heartburn. Of interest, the patient has a history of Nissen wrap in the past.      There is a history of difficulty swallowing for the " "past 6 months. The patient has difficulty swallowing solids and liquids several times per day. She frequently \"gets choked\" when swallowing saliva. There is no history of weight loss.      There is a long standing history of diarrhea. The patient may have diarrhea several times per day. Eating makes diarrhea worse. The patient is not taking anything for diarrhea at this time. The patient had colonoscopy in 2017 and biopsies were negative for inflammatory bowel disease. She was recommended to take Imodium in the past, but she stopped taking it as it caused constipation. There is no history of bright red blood per rectum or melena.     Subjective      Past Medical History:   Diagnosis Date   • Abdominal pain, periumbilical    • Abdominal pain, RLQ (right lower quadrant)    • Acute pharyngitis    • Anxiety    • Appetite lost    • Chest pain    • Chest pain, atypical 2020   • Colon polyp 2017   • Depression     patient denies   • Diarrhea     Secondary to IBS   • Difficulty swallowing    • Dysphagia     Patient reported she is noticing that she gets choked easily   • Fatigue    • Fracture     RIGHT WRIST TWICE, LEFT HAND   • GERD (gastroesophageal reflux disease)    • H/O foot surgery 10/2020   • Hearing loss     No use of hearing aids   • History of bronchitis    • History of colonoscopy    • History of left salpingo-oophorectomy  2018   • History of ovarian cyst    • History of pneumonia    • Hyperthyroidism     hyperactive taken off medicine when pregnant.  Reported she was medication at one time, but none since pregnancy.    • Irritable bowel syndrome    • Ovarian cyst, bilateral    • Seasonal allergies    • RITU (stress urinary incontinence, female) 2020   • TMJ (dislocation of temporomandibular joint)    • Upper respiratory infection    • Vomiting      Past Surgical History:   Procedure Laterality Date   •  SECTION  2008    DR NAVARRETE   •  SECTION  10/30/2015 "    DR VASQUEZ   •  SECTION  2010    DR ESPINOZA   • CHOLECYSTECTOMY     • COLONOSCOPY N/A 2017     COLONOSCOPY WITH COLD BIOPSY POLYPECTOMY; BIOPSIES , QUICK CLIP;  Surgeon: Perfecto Mcknight MD;  Location: [facility] ENDOSCOPY;  Service:    • DIAGNOSTIC LAPAROSCOPY  2005    DR JONES NAVA/cystectomy   • DIAGNOSTIC LAPAROSCOPY  2012    DR ESPINOZA/ELIJAH   • DIAGNOSTIC LAPAROSCOPY N/A 8/10/2017     DIAGNOSTIC LAPAROSCOPY, LEFT S&O;  Keren Espinoza MD;  Location: [facility] OR;  Service:    • ENDOSCOPY N/A 10/15/2019    Procedure: ESOPHAGOGASTRODUODENOSCOPY WITH BIOPSY AND ESOPHAGEAL DILATATION;  Surgeon: Perfecto Mcknight MD;  Location: [facility] ENDOSCOPY;  Service: Gastroenterology   • ENDOSCOPY N/A 10/11/2021    Procedure: ESOPHAGOGASTRODUODENOSCOPY with dilatation and biopsies;  Surgeon: Hieu Shultz MD;  Location: [facility] ENDOSCOPY;  Service: Gastroenterology;  Laterality: N/A;   • FOOT SURGERY Left     ligament and tendon repair   • HERNIA REPAIR  2019    abd   • MID-URETHRAL SLING WITH CYSTOSCOPY N/A 2021     MID-URETHRAL SLING WITH CYSTOSCOPY TVT EXACT;  Luc Alicea MD;  Location:  [facility] OR;  Service: Obstetrics/Gynecology;  Laterality: N/A;   • NISSEN FUNDOPLICATION  2013   • UPPER GASTROINTESTINAL ENDOSCOPY     • UPPER GASTROINTESTINAL ENDOSCOPY  2020   • VAGINAL HYSTERECTOMY N/A 2018    VAGINAL HYSTERECTOMY, BILATERAL SALPINGECTOMY;  Luc Alicea MD;  Location:  [facility] OR;  Service: Gynecology   • WISDOM TOOTH EXTRACTION       Family History   Problem Relation Age of Onset   • Coronary artery disease Mother    • Diabetes Mother    • Hypertension Mother    • Kidney disease Father    • Skin cancer Father    • Stroke Maternal Grandfather    • Hypertension Maternal Grandfather    • Diabetes Maternal Grandfather    • Colon cancer Maternal Great-Grandmother    • Osteoporosis Neg Hx      Social History     Socioeconomic History   • Marital status:    Tobacco Use    • Smoking status: Never Smoker   • Smokeless tobacco: Never Used   Vaping Use   • Vaping Use: Never used   Substance and Sexual Activity   • Alcohol use: No   • Drug use: No   • Sexual activity: Yes     Partners: Male     Birth control/protection: Surgical       Current Outpatient Medications:   •  acetaminophen (TYLENOL) 325 MG tablet, Take 2 tablets by mouth Every 6 (Six) Hours. For 3 to 5 days postoperatively, Disp: , Rfl:   •  buPROPion XL (Wellbutrin XL) 150 MG 24 hr tablet, Take 1 tablet by mouth Daily., Disp: 90 tablet, Rfl: 1  •  buPROPion XL (Wellbutrin XL) 300 MG 24 hr tablet, Take 1 tablet by mouth Every Morning., Disp: 90 tablet, Rfl: 1  •  colestipol (COLESTID) 1 g tablet, Take 1 tablet by mouth 2 (Two) Times a Day., Disp: 60 tablet, Rfl: 2  •  escitalopram (LEXAPRO) 10 MG tablet, Take 1 tablet by mouth Daily., Disp: 90 tablet, Rfl: 1  •  hydrOXYzine (ATARAX) 25 MG tablet, Take 1 tablet by mouth Every 8 (Eight) Hours As Needed for Anxiety., Disp: 90 tablet, Rfl: 1  •  ibuprofen (ADVIL,MOTRIN) 600 MG tablet, , Disp: , Rfl:   •  lidocaine (XYLOCAINE) 5 % ointment, Apply  topically to the appropriate area as directed Every Night., Disp: 35 g, Rfl: 1  •  loratadine (Claritin) 10 MG tablet, Take 1 tablet by mouth Daily. (Patient taking differently: Take 10 mg by mouth As Needed.), Disp: 30 tablet, Rfl: 1  •  norgestimate-ethinyl estradiol (ORTHO-CYCLEN) 0.25-35 MG-MCG per tablet, Take 1 tablet by mouth Daily., Disp: 90 tablet, Rfl: 5  •  ondansetron (ZOFRAN) 4 MG tablet, Take 1 tablet by mouth Every 8 (Eight) Hours As Needed for Nausea or Vomiting., Disp: 30 tablet, Rfl: 1  •  pantoprazole (PROTONIX) 40 MG EC tablet, 1 po daily in the am 30 minutes before breakfast, Disp: 30 tablet, Rfl: 3  •  promethazine (PHENERGAN) 25 MG tablet, Take 1 tablet by mouth Every 6 (Six) Hours As Needed for Nausea or Vomiting., Disp: 30 tablet, Rfl: 0  •  bisacodyl (DULCOLAX) 5 MG EC tablet, Take as directed for colon prep,  "Disp: 4 tablet, Rfl: 0  •  polyethylene glycol (MiraLax) 17 GM/SCOOP powder, Take as directed for colonoscopy prep, Disp: 238 g, Rfl: 0     No Known Allergies     The following portions of the patient's history were reviewed and updated as appropriate: allergies, current medications, past family history, past medical history, past social history, past surgical history and problem list.  Objective     Physical Exam  Vitals and nursing note reviewed.   Constitutional:       General: She is not in acute distress.     Appearance: Normal appearance. She is well-developed.   HENT:      Head: Normocephalic and atraumatic.      Right Ear: Hearing normal.      Left Ear: Hearing normal.      Mouth/Throat:      Mouth: Mucous membranes are not pale, not dry and not cyanotic.   Eyes:      General: Lids are normal.      Conjunctiva/sclera: Conjunctivae normal.   Neck:      Trachea: Trachea normal.   Cardiovascular:      Rate and Rhythm: Normal rate and regular rhythm.      Heart sounds: Normal heart sounds.   Pulmonary:      Effort: Pulmonary effort is normal. No respiratory distress.      Breath sounds: Normal breath sounds.   Abdominal:      General: Bowel sounds are normal.      Palpations: Abdomen is soft. There is no mass.      Tenderness: There is no abdominal tenderness.      Hernia: No hernia is present.   Skin:     General: Skin is warm and dry.   Neurological:      Mental Status: She is alert and oriented to person, place, and time.   Psychiatric:         Mood and Affect: Mood normal.         Speech: Speech normal.         Behavior: Behavior normal. Behavior is cooperative.       Vitals:    11/04/21 1107   BP: 114/71   Pulse: 72   Resp: 16   Temp: 97.7 °F (36.5 °C)   Weight: 87.1 kg (192 lb)   Height: 162.6 cm (64\")     Body mass index is 32.96 kg/m².     Results Review:   I reviewed the patient's new clinical results.    Admission on 10/11/2021, Discharged on 10/11/2021   Component Date Value Ref Range Status   • Case " Report 10/11/2021    Final                    Value:Surgical Pathology Report                         Case: [id number]                                  Authorizing Provider:  Hieu Shultz MD  Collected:           10/11/2021 07:56 AM          Ordering Location:     [facility]    Received:            10/11/2021 09:39 AM                                 [facility]                                                                    Pathologist:           Alpesh Kellogg MD                                                            Specimens:   1) - Small Intestine, Duodenum, for chronic diarrhea                                                2) - Gastric, Antrum, antrum and body                                                               3) - Esophagus, Distal, distal, mid, prox                                                 • Final Diagnosis 10/11/2021    Final                    Value:This result contains rich text formatting which cannot be displayed here.   Lab on 10/08/2021   Component Date Value Ref Range Status   • SARS-CoV-2 ZARINA 10/08/2021 Not Detected  Not Detected Final   Office Visit on 10/07/2021   Component Date Value Ref Range Status   • Color 10/07/2021 Yellow  Yellow, Straw, Dark Yellow, Pushpa Final   • Clarity, UA 10/07/2021 Clear  Clear Final   • Specific Gravity  10/07/2021 1.030  1.005 - 1.030 Final   • pH, Urine 10/07/2021 5.0  5.0 - 8.0 Final   • Leukocytes 10/07/2021 Negative  Negative Final   • Nitrite, UA 10/07/2021 Negative  Negative Final   • Protein, POC 10/07/2021 Negative  Negative mg/dL Final   • Glucose, UA 10/07/2021 Negative  Negative, 1000 mg/dL (3+) mg/dL Final   • Ketones, UA 10/07/2021 Negative  Negative Final   • Urobilinogen, UA 10/07/2021 Normal  Normal Final   • Bilirubin 10/07/2021 Small (1+)* Negative Final   • Blood, UA 10/07/2021 Negative  Negative Final   Office Visit on 09/21/2021   Component Date Value Ref Range Status   • Atopobium Vaginae  09/21/2021 Low - 0  Score Final   • BVAB 2 09/21/2021 Low - 0  Score Final   • Megasphaera 1 09/21/2021 Low - 0  Score Final    Comment: Calculate total score by adding the 3 individual bacterial  vaginosis (BV) marker scores together.  Total score is  interpreted as follows:  Total score 0-1: Indicates the absence of BV.  Total score   2: Indeterminate for BV. Additional clinical                   data should be evaluated to establish a                   diagnosis.  Total score 3-6: Indicates the presence of BV.  This test was developed and its performance characteristics  determined by [facility].  It has not been cleared or approved  by the Food and Drug Administration.     • Minoo Albicans, ZARINA 09/21/2021 Negative  Negative Final   • Minoo Glabrata, ZARINA 09/21/2021 Negative  Negative Final   • Trichomonas vaginosis 09/21/2021 Negative  Negative Final   • Chlamydia trachomatis, ZARINA 09/21/2021 Negative  Negative Final   • Neisseria gonorrhoeae, ZARINA 09/21/2021 Negative  Negative Final   Lab on 09/02/2021   Component Date Value Ref Range Status   • SARS-CoV-2 ZARINA 09/02/2021 Detected* Not Detected Final   Office Visit on 08/26/2021   Component Date Value Ref Range Status   • Color 08/26/2021 Yellow  Yellow, Straw, Dark Yellow, Pushpa Final   • Clarity, UA 08/26/2021 Clear  Clear Final   • Specific Gravity  08/26/2021 1.025  1.005 - 1.030 Final   • pH, Urine 08/26/2021 5.5  5.0 - 8.0 Final   • Leukocytes 08/26/2021 Negative  Negative Final   • Nitrite, UA 08/26/2021 Negative  Negative Final   • Protein, POC 08/26/2021 Negative  Negative mg/dL Final   • Glucose, UA 08/26/2021 Negative  Negative, 1000 mg/dL (3+) mg/dL Final   • Ketones, UA 08/26/2021 Negative  Negative Final   • Urobilinogen, UA 08/26/2021 Normal  Normal Final   • Bilirubin 08/26/2021 Negative  Negative Final   • Blood, UA 08/26/2021 1+* Negative Final   Office Visit on 08/18/2021   Component Date Value Ref Range Status   • Lipase 08/18/2021 29  13 - 60  U/L Final   • Glucose 08/18/2021 96  65 - 99 mg/dL Final   • BUN 08/18/2021 10  6 - 20 mg/dL Final   • Creatinine 08/18/2021 0.76  0.57 - 1.00 mg/dL Final   • Sodium 08/18/2021 139  136 - 145 mmol/L Final   • Potassium 08/18/2021 4.7  3.5 - 5.2 mmol/L Final   • Chloride 08/18/2021 103  98 - 107 mmol/L Final   • CO2 08/18/2021 26.5  22.0 - 29.0 mmol/L Final   • Calcium 08/18/2021 9.6  8.6 - 10.5 mg/dL Final   • Total Protein 08/18/2021 7.7  6.0 - 8.5 g/dL Final   • Albumin 08/18/2021 4.60  3.50 - 5.20 g/dL Final   • ALT (SGPT) 08/18/2021 10  1 - 33 U/L Final   • AST (SGOT) 08/18/2021 17  1 - 32 U/L Final   • Alkaline Phosphatase 08/18/2021 91  39 - 117 U/L Final   • Total Bilirubin 08/18/2021 0.3  0.0 - 1.2 mg/dL Final   • eGFR Non  Amer 08/18/2021 86  >60 mL/min/1.73 Final   • Globulin 08/18/2021 3.1  gm/dL Final   • A/G Ratio 08/18/2021 1.5  g/dL Final   • BUN/Creatinine Ratio 08/18/2021 13.2  7.0 - 25.0 Final   • Anion Gap 08/18/2021 9.5  5.0 - 15.0 mmol/L Final   • WBC 08/18/2021 10.11  3.40 - 10.80 10*3/mm3 Final   • RBC 08/18/2021 5.13  3.77 - 5.28 10*6/mm3 Final   • Hemoglobin 08/18/2021 14.2  12.0 - 15.9 g/dL Final   • Hematocrit 08/18/2021 43.7  34.0 - 46.6 % Final   • MCV 08/18/2021 85.2  79.0 - 97.0 fL Final   • MCH 08/18/2021 27.7  26.6 - 33.0 pg Final   • MCHC 08/18/2021 32.5  31.5 - 35.7 g/dL Final   • RDW 08/18/2021 13.0  12.3 - 15.4 % Final   • RDW-SD 08/18/2021 40.3  37.0 - 54.0 fl Final   • MPV 08/18/2021 10.3  6.0 - 12.0 fL Final   • Platelets 08/18/2021 279  140 - 450 10*3/mm3 Final   • Neutrophil % 08/18/2021 69.7  42.7 - 76.0 % Final   • Lymphocyte % 08/18/2021 20.1  19.6 - 45.3 % Final   • Monocyte % 08/18/2021 7.9  5.0 - 12.0 % Final   • Eosinophil % 08/18/2021 1.1  0.3 - 6.2 % Final   • Basophil % 08/18/2021 0.9  0.0 - 1.5 % Final   • Immature Grans % 08/18/2021 0.3  0.0 - 0.5 % Final   • Neutrophils, Absolute 08/18/2021 7.05* 1.70 - 7.00 10*3/mm3 Final   • Lymphocytes, Absolute  08/18/2021 2.03  0.70 - 3.10 10*3/mm3 Final   • Monocytes, Absolute 08/18/2021 0.80  0.10 - 0.90 10*3/mm3 Final   • Eosinophils, Absolute 08/18/2021 0.11  0.00 - 0.40 10*3/mm3 Final   • Basophils, Absolute 08/18/2021 0.09  0.00 - 0.20 10*3/mm3 Final   • Immature Grans, Absolute 08/18/2021 0.03  0.00 - 0.05 10*3/mm3 Final   • nRBC 08/18/2021 0.0  0.0 - 0.2 /100 WBC Final   • H. pylori, IgA ABS 08/18/2021 <9.0  0.0 - 8.9 units Final                                    Negative          <9.0                                  Equivocal   9.0 - 11.0                                  Positive         >11.0   • H. Pylori, IgM 08/18/2021 <9.0  0.0 - 8.9 units Final                                    Negative          <9.0                                  Equivocal   9.0 - 11.0                                  Positive         >11.0  This test was developed and its performance characteristics  determined by [facility]. It has not been cleared or approved  by the Food and Drug Administration.   Orders Only on 08/17/2021   Component Date Value Ref Range Status   • Campylobacter 08/17/2021 Not Detected  Not Detected Final   • C.difficile toxin A/B 08/17/2021 Not Detected  Not Detected Final   • Plesiomonas shigelloides 08/17/2021 Not Detected  Not Detected Final   • Salmonella 08/17/2021 Not Detected  Not Detected Final   • Vibrio 08/17/2021 Not Detected  Not Detected Final   • Vibrio cholerae 08/17/2021 Not Detected  Not Detected Final   • Yersinia enterocolitica 08/17/2021 Not Detected  Not Detected Final   • Enteroaggregative E. coli 08/17/2021 Not Detected  Not Detected Final   • Enteropathogenic E. coli 08/17/2021 Not Detected  Not Detected Final   • Enterotoxigenic E. coli 08/17/2021 Not Detected  Not Detected Final   • Shiga-like toxin-producing E. coli  08/17/2021 Not Detected  Not Detected Final   • E. coli O157 08/17/2021 Not applicable  Not Detected Final   • Shigella enteroinvasive E. coli 08/17/2021 Not Detected  Not  Detected Final   • Cryptosporidium 08/17/2021 Not Detected  Not Detected Final   • Cyclospora cayetanensis 08/17/2021 Not Detected  Not Detected Final   • Entamoeba Histolytica Ag 08/17/2021 Not Detected  Not Detected Final   • Giardia lamblia 08/17/2021 Not Detected  Not Detected Final   • Adenovirus 40/41 Ag 08/17/2021 Not Detected  Not Detected Final   • Astrovirus 08/17/2021 Not Detected  Not Detected Final   • Norovirus GI/GII 08/17/2021 Not Detected  Not Detected Final   • Rotavirus A 08/17/2021 Not Detected  Not Detected Final   • Sapovirus 08/17/2021 Not Detected  Not Detected Final   • C difficile Toxin Gene NAAT 08/17/2021 Negative  Negative Final   Office Visit on 08/17/2021   Component Date Value Ref Range Status   • WBC 08/17/2021 10.49  3.40 - 10.80 10*3/mm3 Final   • RBC 08/17/2021 4.99  3.77 - 5.28 10*6/mm3 Final   • Hemoglobin 08/17/2021 13.8  12.0 - 15.9 g/dL Final   • Hematocrit 08/17/2021 42.2  34.0 - 46.6 % Final   • MCV 08/17/2021 84.6  79.0 - 97.0 fL Final   • MCH 08/17/2021 27.7  26.6 - 33.0 pg Final   • MCHC 08/17/2021 32.7  31.5 - 35.7 g/dL Final   • RDW 08/17/2021 12.8  12.3 - 15.4 % Final   • Platelets 08/17/2021 286  140 - 450 10*3/mm3 Final   • Neutrophil Rel % 08/17/2021 69.3  42.7 - 76.0 % Final   • Lymphocyte Rel % 08/17/2021 20.4  19.6 - 45.3 % Final   • Monocyte Rel % 08/17/2021 7.5  5.0 - 12.0 % Final   • Eosinophil Rel % 08/17/2021 1.4  0.3 - 6.2 % Final   • Basophil Rel % 08/17/2021 1.0  0.0 - 1.5 % Final   • Neutrophils Absolute 08/17/2021 7.26* 1.70 - 7.00 10*3/mm3 Final   • Lymphocytes Absolute 08/17/2021 2.14  0.70 - 3.10 10*3/mm3 Final   • Monocytes Absolute 08/17/2021 0.79  0.10 - 0.90 10*3/mm3 Final   • Eosinophils Absolute 08/17/2021 0.15  0.00 - 0.40 10*3/mm3 Final   • Basophils Absolute 08/17/2021 0.11  0.00 - 0.20 10*3/mm3 Final   • Immature Granulocyte Rel % 08/17/2021 0.4  0.0 - 0.5 % Final   • Immature Grans Absolute 08/17/2021 0.04  0.00 - 0.05 10*3/mm3 Final   •  nRBC 08/17/2021 0.0  0.0 - 0.2 /100 WBC Final   • Glucose 08/17/2021 83  65 - 99 mg/dL Final   • BUN 08/17/2021 8  6 - 20 mg/dL Final   • Creatinine 08/17/2021 0.78  0.57 - 1.00 mg/dL Final   • eGFR Non  Am 08/17/2021 84  >60 mL/min/1.73 Final    Comment: GFR Normal >60  Chronic Kidney Disease <60  Kidney Failure <15     • eGFR  Am 08/17/2021 101  >60 mL/min/1.73 Final   • BUN/Creatinine Ratio 08/17/2021 10.3  7.0 - 25.0 Final   • Sodium 08/17/2021 137  136 - 145 mmol/L Final   • Potassium 08/17/2021 4.8  3.5 - 5.2 mmol/L Final   • Chloride 08/17/2021 103  98 - 107 mmol/L Final   • Total CO2 08/17/2021 26.5  22.0 - 29.0 mmol/L Final   • Calcium 08/17/2021 9.6  8.6 - 10.5 mg/dL Final   • Total Protein 08/17/2021 7.0  6.0 - 8.5 g/dL Final   • Albumin 08/17/2021 4.50  3.50 - 5.20 g/dL Final   • Globulin 08/17/2021 2.5  gm/dL Final   • A/G Ratio 08/17/2021 1.8  g/dL Final   • Total Bilirubin 08/17/2021 0.2  0.0 - 1.2 mg/dL Final   • Alkaline Phosphatase 08/17/2021 84  39 - 117 U/L Final   • AST (SGOT) 08/17/2021 17  1 - 32 U/L Final   • ALT (SGPT) 08/17/2021 11  1 - 33 U/L Final   Admission on 08/15/2021, Discharged on 08/15/2021   Component Date Value Ref Range Status   • Glucose 08/15/2021 101* 65 - 99 mg/dL Final   • BUN 08/15/2021 8  6 - 20 mg/dL Final   • Creatinine 08/15/2021 0.74  0.57 - 1.00 mg/dL Final   • Sodium 08/15/2021 141  136 - 145 mmol/L Final   • Potassium 08/15/2021 4.1  3.5 - 5.2 mmol/L Final   • Chloride 08/15/2021 103  98 - 107 mmol/L Final   • CO2 08/15/2021 25.1  22.0 - 29.0 mmol/L Final   • Calcium 08/15/2021 9.1  8.6 - 10.5 mg/dL Final   • Total Protein 08/15/2021 6.9  6.0 - 8.5 g/dL Final   • Albumin 08/15/2021 4.30  3.50 - 5.20 g/dL Final   • ALT (SGPT) 08/15/2021 10  1 - 33 U/L Final   • AST (SGOT) 08/15/2021 15  1 - 32 U/L Final   • Alkaline Phosphatase 08/15/2021 78  39 - 117 U/L Final   • Total Bilirubin 08/15/2021 0.2  0.0 - 1.2 mg/dL Final   • eGFR Non African Amer  08/15/2021 89  >60 mL/min/1.73 Final   • Globulin 08/15/2021 2.6  gm/dL Final   • A/G Ratio 08/15/2021 1.7  g/dL Final   • BUN/Creatinine Ratio 08/15/2021 10.8  7.0 - 25.0 Final   • Anion Gap 08/15/2021 12.9  5.0 - 15.0 mmol/L Final   • Troponin T 08/15/2021 <0.010  0.000 - 0.030 ng/mL Final   • Magnesium 08/15/2021 1.9  1.6 - 2.6 mg/dL Final   • Color, UA 08/15/2021 Yellow  Yellow, Straw Final   • Appearance, UA 08/15/2021 Clear  Clear Final   • pH, UA 08/15/2021 8.0  5.0 - 8.0 Final   • Specific Gravity, UA 08/15/2021 1.012  1.005 - 1.030 Final   • Glucose, UA 08/15/2021 Negative  Negative Final   • Ketones, UA 08/15/2021 Negative  Negative Final   • Bilirubin, UA 08/15/2021 Negative  Negative Final   • Blood, UA 08/15/2021 Negative  Negative Final   • Protein, UA 08/15/2021 Negative  Negative Final   • Leuk Esterase, UA 08/15/2021 Negative  Negative Final   • Nitrite, UA 08/15/2021 Negative  Negative Final   • Urobilinogen, UA 08/15/2021 0.2 E.U./dL  0.2 - 1.0 E.U./dL Final   • Extra Tube 08/15/2021 Hold for add-ons.   Final    Auto resulted.   • Extra Tube 08/15/2021 hold for add-on   Final    Auto resulted   • Extra Tube 08/15/2021 Hold for add-ons.   Final    Auto resulted.   • WBC 08/15/2021 8.27  3.40 - 10.80 10*3/mm3 Final   • RBC 08/15/2021 4.83  3.77 - 5.28 10*6/mm3 Final   • Hemoglobin 08/15/2021 13.5  12.0 - 15.9 g/dL Final   • Hematocrit 08/15/2021 41.6  34.0 - 46.6 % Final   • MCV 08/15/2021 86.1  79.0 - 97.0 fL Final   • MCH 08/15/2021 28.0  26.6 - 33.0 pg Final   • MCHC 08/15/2021 32.5  31.5 - 35.7 g/dL Final   • RDW 08/15/2021 12.9  12.3 - 15.4 % Final   • RDW-SD 08/15/2021 40.5  37.0 - 54.0 fl Final   • MPV 08/15/2021 10.4  6.0 - 12.0 fL Final   • Platelets 08/15/2021 234  140 - 450 10*3/mm3 Final   • Neutrophil % 08/15/2021 69.2  42.7 - 76.0 % Final   • Lymphocyte % 08/15/2021 20.3  19.6 - 45.3 % Final   • Monocyte % 08/15/2021 8.1  5.0 - 12.0 % Final   • Eosinophil % 08/15/2021 1.2  0.3 - 6.2 %  Final   • Basophil % 08/15/2021 0.8  0.0 - 1.5 % Final   • Immature Grans % 08/15/2021 0.4  0.0 - 0.5 % Final   • Neutrophils, Absolute 08/15/2021 5.72  1.70 - 7.00 10*3/mm3 Final   • Lymphocytes, Absolute 08/15/2021 1.68  0.70 - 3.10 10*3/mm3 Final   • Monocytes, Absolute 08/15/2021 0.67  0.10 - 0.90 10*3/mm3 Final   • Eosinophils, Absolute 08/15/2021 0.10  0.00 - 0.40 10*3/mm3 Final   • Basophils, Absolute 08/15/2021 0.07  0.00 - 0.20 10*3/mm3 Final   • Immature Grans, Absolute 08/15/2021 0.03  0.00 - 0.05 10*3/mm3 Final   • nRBC 08/15/2021 0.0  0.0 - 0.2 /100 WBC Final   • Acetaminophen 08/15/2021 <5.0  0.0 - 30.0 mcg/mL Final   • Ethanol 08/15/2021 <10  0 - 10 mg/dL Final   • Ethanol % 08/15/2021 <0.010  % Final   • THC, Screen, Urine 08/15/2021 Negative  Negative Final   • Phencyclidine (PCP), Urine 08/15/2021 Negative  Negative Final   • Cocaine Screen, Urine 08/15/2021 Negative  Negative Final   • Methamphetamine, Ur 08/15/2021 Negative  Negative Final   • Opiate Screen 08/15/2021 Negative  Negative Final   • Amphetamine Screen, Urine 08/15/2021 Negative  Negative Final   • Benzodiazepine Screen, Urine 08/15/2021 Negative  Negative Final   • Tricyclic Antidepressants Screen 08/15/2021 Negative  Negative Final   • Methadone Screen, Urine 08/15/2021 Negative  Negative Final   • Barbiturates Screen, Urine 08/15/2021 Negative  Negative Final   • Oxycodone Screen, Urine 08/15/2021 Negative  Negative Final   • Propoxyphene Screen 08/15/2021 Negative  Negative Final   • Buprenorphine, Screen, Urine 08/15/2021 Negative  Negative Final   • Salicylate 08/15/2021 <0.3  <=30.0 mg/dL Final   There may be more visits with results that are not included.      CT Head Without Contrast     Result Date: 8/15/2021  No acute intracranial process.    This report was finalized on 8/15/2021 2:31 PM by Nimisha Wright MD.    CT Abdomen Pelvis With Contrast     Result Date: 8/15/2021  Moderate wall thickening of the partially filled  bladder, cystitis not excluded.  Multiple small mesenteric lymph nodes, consider mesenteric adenitis  This report was finalized on 8/15/2021 2:37 PM by Nimisha Wright MD.    XR Chest 1 View     Result Date: 8/15/2021  No acute cardiopulmonary process.  .  This report was finalized on 8/15/2021 4:21 PM by Nimisha Wright MD.    XR Abdomen KUB     Result Date: 8/27/2021  Gaseous distention of the stomach.  This report was finalized on 8/27/2021 11:41 AM by Nahid Parks M.D..     EGD dated 10/11/2021 per Dr. Shultz  - Normal oropharynx.  - Z-line regular, 38 cm from the incisors.  - No gross lesions in esophagus.  - No endoscopic esophageal abnormality to explain patient's dysphagia. Esophagus dilated. Dilated. Biopsied.  - Patient likely had a placebo effect from prior dilation  - Erythematous mucosa in the antrum and prepyloric region of the stomach. Biopsied.  - Normal duodenal bulb, first portion of the duodenum, second portion of the duodenum and third portion of the duodenum. Biopsied.  - Bilious gastric fluid.  - Patient likely has functional GI disorder, IBS strongly suspected for her symptoms. Reflux disease, antianxiety depression medication could contribute to her symptoms as well due to dysmotility.  -None biopsy with nonspecific chronic duodenitis.  Antrum and body biopsy unremarkable.  Esophagus biopsy distal mid and proximal unremarkable.    Assessment / Plan      1. Esophageal dysphagia  2. Gastroesophageal reflux disease without esophagitis  She has been taking Pantoprazole 40 mg daily but it has not helped with difficulty swallowing or reflux. EGD with no endoscopic abnormality to explain patient's dysphagia. IBS suspected for her symptoms. Reflux disease, antianxiety/depression medications could contribute to her symptoms as well as dysmotility.  Anti-reflux measures.  Pantoprazole 40 mg daily for now.  Esophageal manometry - referral placed.    - Ambulatory Referral to  Gastroenterology    10/7/2021  Patient has been having difficulty swallowing solids every time she tries to eat, no significant problem swallowing liquids.  She has a history of reflux for several years and has been taking Pepcid 20 mg twice a day as needed with reasonable control.  No history of weight loss.  She had EGD in 2019 with subtle concentric rings involving the proximal mid and distal esophagus noted.    3. Epigastric pain  4. Nausea  Epigastric pain and nausea unchanged. She is taking Pantoprazole daily but she has not noticed any change in her symptoms. Eating makes symptoms worse. EGD with bilious gastric fluids, biopsies unremarkable. Likely functional disorder, suspect IBS-D.  Continue Pantoprazole 40 mg 1 po daily for now.  Zofran 4 mg 1 po every 8 hours as needed for nausea.   Advised to eat a softer diet with 4-5 very small meals throughout the day, avoid large meals.    10/7/2021  She has been having epigastric pain and nausea for the past few months that has not improved.  Eating does not affect her pain.  No history of vomiting.  She has been taking Pepcid 20 mg twice a day as needed but it has not improved the pain.  No history of melena.  CTAP in August 2021 with multiple small mesenteric lymph nodes noted, possible mesenteric adenitis, and cystitis.  Basic labs unremarkable.  Lipase normal.    5. Functional diarrhea  vs  6. Irritable bowel syndrome with diarrhea  Diarrhea reasonably controlled with Colestid 1 gm twice a day, occasionally she has constipation. No rectal bleeding.   Low FODMAP diet - avoid dairy.   Colestid 1 gm 1 po twice a day, decrease to once a day if constipated.   Patient is due for colonoscopy for surveillance, will obtain biopsies to rule out IBD.    10/7/2021  The patient has a history of diarrhea for several years since having cholecystectomy.  She has 3-4 episodes of loose to watery stool every time she eats a meal.  No history of rectal bleeding.  No history of  lower abdominal pain.  Basic labs unremarkable.  Previous TSH normal.  Stool studies with negative gastrointestinal panel and negative C. difficile toxin.  CTAP with cystitis, mesenteric adenitis, otherwise unremarkable GI tract.      7. Personal history of colonic polyps  Her last colonoscopy was in 2017 with polyps removed, unknown pathology. There is a family history of colon cancer in maternal great grandmother, no first or second degree relative.  Colonoscopy for surveillance due to unknown pathology of polyps.    - Case Request; Standing  - Case Request  - polyethylene glycol (MiraLax) 17 GM/SCOOP powder; Take as directed for colonoscopy prep  Dispense: 238 g; Refill: 0  - bisacodyl (DULCOLAX) 5 MG EC tablet; Take as directed for colon prep  Dispense: 4 tablet; Refill: 0    Patient Instructions   Antireflux measures: Avoid fried, fatty foods, alcohol, chocolate, coffee, tea,  soft drinks, peppermint and spearmint, spicy foods, tomatoes and tomato based foods, onions, peppers, and smoking.   Other antireflux measures include weight reduction if overweight, avoiding tight clothing around the abdomen, elevating the head of the bed 6 inches with blocks under the head board, and don't drink or eat before going to bed and avoid lying down immediately after meals.  Pantoprazole 40 mg 1 by mouth in the am 30 minutes before breakfast.  Zofran 4 mg 1 po every 8 hours as needed for nausea.   The patient should eat 4-5 very small meals throughout the day. Avoid large meals.   It is recommended to eat a softer diet. Meats are best consumed ground. Fruits and vegetables are best consumed cooked or steamed and then mashed.   Low fiber, low fat diet with liberal water intake.   Colestid 1 gm 1 po twice a day. Decrease to once a day if constipated.   Low FODMAP diet - avoid dairy.   Esophageal manometry.   Colonoscopy: The indications, technique, alternatives and potential risk and complications were discussed with the patient  including but not limited to bleeding, perforations, missing lesions and anesthetic complications. The patient understands and wishes to proceed with the procedure and has given their verbal consent. Written patient education information was given to the patient.   The patient will call if they have further questions before procedure.     Low-FODMAP Eating Plan    FODMAP stands for fermentable oligosaccharides, disaccharides, monosaccharides, and polyols. These are sugars that are hard for some people to digest. A low-FODMAP eating plan may help some people who have irritable bowel syndrome (IBS) and certain other bowel (intestinal) diseases to manage their symptoms.  This meal plan can be complicated to follow. Work with a diet and nutrition specialist (dietitian) to make a low-FODMAP eating plan that is right for you. A dietitian can help make sure that you get enough nutrition from this diet.  What are tips for following this plan?  Reading food labels  Check labels for hidden FODMAPs such as:  High-fructose syrup.  Honey.  Agave.  Natural fruit flavors.  Onion or garlic powder.  Choose low-FODMAP foods that contain 3-4 grams of fiber per serving.  Check food labels for serving sizes. Eat only one serving at a time to make sure FODMAP levels stay low.  Shopping  Shop with a list of foods that are recommended on this diet and make a meal plan.  Meal planning  Follow a low-FODMAP eating plan for up to 6 weeks, or as told by your health care provider or dietitian.  To follow the eating plan:  Eliminate high-FODMAP foods from your diet completely. Choose only low-FODMAP foods to eat. You will do this for 2-6 weeks.  Gradually reintroduce high-FODMAP foods into your diet one at a time. Most people should wait a few days before introducing the next new high-FODMAP food into their meal plan. Your dietitian can recommend how quickly you may reintroduce foods.  Keep a daily record of what and how much you eat and drink.  "Make note of any symptoms that you have after eating.  Review your daily record with a dietitian regularly to identify which foods you can eat and which foods you should avoid.  General tips  Drink enough fluid each day to keep your urine pale yellow.  Avoid processed foods. These often have added sugar and may be high in FODMAPs.  Avoid most dairy products, whole grains, and sweeteners.  Work with a dietitian to make sure you get enough fiber in your diet.  Avoid high FODMAP foods at meals to manage symptoms.  Recommended foods  Fruits  Bananas, oranges, tangerines, stefan, limes, blueberries, raspberries, strawberries, grapes, cantaloupe, honeydew melon, kiwi, papaya, passion fruit, and pineapple. Limited amounts of dried cranberries, banana chips, and shredded coconut.  Vegetables  Eggplant, zucchini, cucumber, peppers, green beans, bean sprouts, lettuce, arugula, kale, Swiss chard, spinach, jeny greens, bok fernanda, summer squash, potato, and tomato. Limited amounts of corn, carrot, and sweet potato. Green parts of scallions.  Grains  Gluten-free grains, such as rice, oats, buckwheat, quinoa, corn, polenta, and millet. Gluten-free pasta, bread, or cereal. Rice noodles. Corn tortillas.  Meats and other proteins  Unseasoned beef, pork, poultry, or fish. Eggs. Lema. Tofu (firm) and tempeh. Limited amounts of nuts and seeds, such as almonds, walnuts, brazil nuts, pecans, peanuts, nut butters, pumpkin seeds, maría elena seeds, and sunflower seeds.  Dairy  Lactose-free milk, yogurt, and kefir. Lactose-free cottage cheese and ice cream. Non-dairy milks, such as almond, coconut, hemp, and rice milk. Non-dairy yogurt. Limited amounts of goat cheese, brie, mozzarella, parmesan, swiss, and other hard cheeses.  Fats and oils  Butter-free spreads. Vegetable oils, such as olive, canola, and sunflower oil.  Seasoning and other foods  Artificial sweeteners with names that do not end in \"ol,\" such as aspartame, saccharine, and stevia. " Pt's daughter Romulo is HCP. Daughter Basilia frequently at bedside. Family (four siblings, wife) makes decisions together. He is currently lethargic, confused, AAOX1, although it appears he may also have some word finding difficulty related to his brain mets. Spoke today with Basilia who understands that prognosis is poor and he would likely not tolerate chemo. She wants to focus on father's comfort, but will speak to the rest of her siblings to make sure they are on the same page. She asked about hospice and we spoke about what that entails and that he may be a candidate. [facility] conversation ongoing. Informed that we are available for a family meeting with her siblings if they would be interested in that.  - Basilia will ask Romulo to bring HCP documentation Maple syrup, white table sugar, raw sugar, brown sugar, and molasses. Mayonnaise, soy sauce, and tamari. Fresh basil, coriander, parsley, rosemary, and thyme.  Beverages  Water and mineral water. Sugar-sweetened soft drinks. Small amounts of orange juice or cranberry juice. Black and green tea. Most dry shayla. Coffee.  The items listed above may not be a complete list of foods and beverages you can eat. Contact a dietitian for more information.  Foods to avoid  Fruits  Fresh, dried, and juiced forms of apple, pear, watermelon, peach, plum, cherries, apricots, blackberries, boysenberries, figs, nectarines, and candice. Avocado.  Vegetables  Chicory root, artichoke, asparagus, cabbage, snow peas, [place] sprouts, broccoli, sugar snap peas, mushrooms, celery, and cauliflower. Onions, garlic, leeks, and the white part of scallions.  Grains  Wheat, including kamut, durum, and semolina. Barley and bulgur. Couscous. Wheat-based cereals. Wheat noodles, bread, crackers, and pastries.  Meats and other proteins  Fried or fatty meat. Sausage. Cashews and pistachios. Soybeans, baked beans, black beans, chickpeas, kidney beans, franklin beans, navy beans, lentils, black-eyed peas, and split peas.  Dairy  Milk, yogurt, ice cream, and soft cheese. Cream and sour cream. Milk-based sauces. Custard. Buttermilk. Soy milk.  Seasoning and other foods  Any sugar-free gum or candy. Foods that contain artificial sweeteners such as sorbitol, mannitol, isomalt, or xylitol. Foods that contain honey, high-fructose corn syrup, or agave. Bouillon, vegetable stock, beef stock, and chicken stock. Garlic and onion powder. Condiments made with onion, such as hummus, chutney, pickles, relish, salad dressing, and salsa. Tomato paste.  Beverages  Chicory-based drinks. Coffee substitutes. Chamomile tea. Fennel tea. Sweet or fortified shayla such as port or sadia. Diet soft drinks made with isomalt, mannitol, maltitol, sorbitol, or xylitol. Apple, pear, and  candice juice. Juices with high-fructose corn syrup.  The items listed above may not be a complete list of foods and beverages you should avoid. Contact a dietitian for more information.  Summary  FODMAP stands for fermentable oligosaccharides, disaccharides, monosaccharides, and polyols. These are sugars that are hard for some people to digest.  A low-FODMAP eating plan is a short-term diet that helps to ease symptoms of certain bowel diseases.  The eating plan usually lasts up to 6 weeks. After that, high-FODMAP foods are reintroduced gradually and one at a time. This can help you find out which foods may be causing symptoms.  A low-FODMAP eating plan can be complicated. It is best to work with a dietitian who has experience with this type of plan.  This information is not intended to replace advice given to you by your health care provider. Make sure you discuss any questions you have with your health care provider.  Document Revised: 05/06/2021 Document Reviewed: 05/06/2021  [employer] Patient Education © 2021 [employer]    Magdalena Richardson, TRISTEN  11/4/2021    Please note that portions of this note may have been completed with a voice recognition program. Efforts were made to edit the dictations, but occasionally words are mistranscribed.    Pt's daughter Romulo is HCP. Daughter Basilia frequently at bedside. Family (four siblings, wife) makes decisions together. He is currently lethargic, confused, AAOX1, although it appears he may also have some word finding difficulty related to his brain mets. Spoke today with Basilia who understands that prognosis is poor and that he would likely not tolerate chemo. She wants to focus on father's comfort, but will speak to the rest of her siblings to make sure they are on the same page. She asked about hospice and we spoke about what that entails and that he may be a candidate. [facility] conversation ongoing. Informed that we are available for a family meeting with her siblings if they would be interested in that.  - Basilia will ask Romulo to bring HCP documentation

## 2022-02-28 NOTE — DISCHARGE NOTE PROVIDER - NSDCCAREPROVSEEN_GEN_ALL_CORE_FT
[facility] Telephone Triage Note    The following symptoms were reported:   Typical  Description:            Onset:  Last 2 days   Location: lower back  Character: Sharp           Intensity: 9/10    Accompanying Signs & Symptoms:  no extremity swelling, numbness, tingling, discoloration    Chest Pain:  denies     Shortness of Breath:  denies     Fever:  denies     Chills:  denies   Cough/sore throat:  denies  Other:  Needs transportation, Has appts today at 10am.     Therapies Tried and outcome: MS contin and Oxycodone taken around 6am.     Improved by: still requires intermittentIVF/Pain to supplement with home pain plan.     The following provider was consulted:  Meets therapy plan protocol     The following advice/orders were given, and/or interventions recommended:  pending   This writer gave reminder about current appts 2/28 that pt needs to keep, pt does have transportation for scheduled appts.       Patient instructions and/or follow up:   Appt available at 1:30pm today in BMT clinic    Pt in agreement, will take patient shuttle from [address] appts to [facility] IVF/Pain appt at 1:30pm    Scheduling request sent    If you do not hear from the infusion center by 2pm then you will not be able to get in for an infusion today. If symptoms worsen while waiting for call back, and/or you experience fever, chills, SOB, chest pain, cough, n/v, dizziness, numbness, swelling, discoloration of extremities, then seek emergency evaluation in Emergency Department.     
George May

## 2022-09-26 NOTE — PROVIDER CONTACT NOTE (MEDICATION) - NAME OF MD/NP/PA/DO NOTIFIED:
Sutured Wound Care LEFT CHEST    [facility]: [phone]    [facility]: [phone]          No strenuous activity for 48 hours. Resume moderate activity in 48 hours. No heavy exercising until you are seen for follow up in one week.     Take Tylenol as needed for discomfort.                         Do not drink alcoholic beverages for 48 hours.     Keep the pressure bandage in place for 24 hours. If the bandage becomes blood tinged or loose, reinforce it with gauze and tape.        (Refer to the reverse side of this page for management of bleeding).    Remove pressure bandage in 24 hours     Leave the flat bandage in place until your follow up appointment.    Keep the bandage dry. Wash around it carefully.    If the tape becomes soiled or starts to come off, reinforce it with additional paper tape.    Do not smoke for 3 weeks; smoking is detrimental to wound healing.    It is normal to have swelling and bruising around the surgical site. The bruising will fade in approximately 10-14 days. Elevate the area to reduce swelling.    Numbness, itchiness and sensitivity to temperature changes can occur after surgery and may take up to 18 months to normalize.      POSSIBLE COMPLICATIONS    BLEEDING:    Leave the bandage in place.  Use tightly rolled up gauze or a cloth to apply direct pressure over the bandage for 20   minutes.  Reapply pressure for an additional 20 minutes if necessary  Call the office or go to the nearest emergency room if pressure fails to stop the bleeding.  Use additional gauze and tape to maintain pressure once the bleeding has stopped.        PAIN:    Post operative pain should slowly get better, never worse.  A severe increase in pain may indicate a problem. Call the office if this occurs.    In case of emergency phone:Dr Overton [phone]                  Wound Care Instructions RIGHT CHEST     FOR SUPERFICIAL WOUNDS     [facility] [phone]    [place]  ERIN Delgadillo [facility] [phone]                       AFTER 24 HOURS YOU SHOULD REMOVE THE BANDAGE AND BEGIN DAILY DRESSING CHANGES AS FOLLOWS:     1) Remove Dressing.     2) Clean and dry the area with tap water using a Q-tip or sterile gauze pad.     3) Apply Vaseline, Aquaphor, Polysporin ointment or Bacitracin ointment over entire wound.  Do NOT use Neosporin ointment.     4) Cover the wound with a band-aid, or a sterile non-stick gauze pad and micropore paper tape      REPEAT THESE INSTRUCTIONS AT LEAST ONCE A DAY UNTIL THE WOUND HAS COMPLETELY HEALED.    It is an old wives tale that a wound heals better when it is exposed to air and allowed to dry out. The wound will heal faster with a better cosmetic result if it is kept moist with ointment and covered with a bandage.    **Do not let the wound dry out.**      Supplies Needed:      *Cotton tipped applicators (Q-tips)    *Polysporin Ointment or Bacitracin Ointment (NOT NEOSPORIN)    *Band-aids or non-stick gauze pads and micropore paper tape.      PATIENT INFORMATION:    During the healing process you will notice a number of changes. All wounds develop a small halo of redness surrounding the wound.  This means healing is occurring. Severe itching with extensive redness usually indicates sensitivity to the ointment or bandage tape used to dress the wound.  You should call our office if this develops.      Swelling  and/or discoloration around your surgical site is common, particularly when performed around the eye.    All wounds normally drain.  The larger the wound the more drainage there will be.  After 7-10 days, you will notice the wound beginning to shrink and new skin will begin to grow.  The wound is healed when you can see skin has formed over the entire area.  A healed wound has a healthy, shiny look to the surface and is red to dark pink in color to normalize.  Wounds may take approximately 4-6 weeks to heal.  Larger wounds may take 6-8 weeks.  After the  wound is healed you may discontinue dressing changes.    You may experience a sensation of tightness as your wound heals. This is normal and will gradually subside.    Your healed wound may be sensitive to temperature changes. This sensitivity improves with time, but if you re having a lot of discomfort, try to avoid temperature extremes.    Patients frequently experience itching after their wound appears to have healed because of the continue healing under the skin.  Plain Vaseline will help relieve the itching.        POSSIBLE COMPLICATIONS    BLEEDING:    Leave the bandage in place.  Use tightly rolled up gauze or a cloth to apply direct pressure over the bandage for 30  minutes.  Reapply pressure for an additional 30 minutes if necessary  Use additional gauze and tape to maintain pressure once the bleeding has stopped.

## 2022-10-24 NOTE — DISCHARGE NOTE NURSING/CASE MANAGEMENT/SOCIAL WORK - NSDCPETBCESMAN_GEN_ALL_CORE
From: Celeste Bhat  To: Sujit Hernandez  Sent: 10/24/2022 2:18 PM EDT  Subject: PT    I have had IT band problems that have caused knee pain 2 times this month. Once in [facility], when I walked 6 miles and this weekend when I walked 12,ooo steps in [place]. It is on right side where I have always had piriformis/ It band issues. There are so many knots on the outside of my right thigh and the tightness causes right sided knee pain. The only correlation is I was walking on concrete in a city. Could I get a PT eval to see if I have muscles that need strengthening or gait issues. Im not terribly deconditioned. I have been exercising 3-4 times week since 8/31 without these issues.  DO I need to be seen in person first?
If you are a smoker, it is important for your health to stop smoking. Please be aware that second hand smoke is also harmful.

## 2022-11-11 NOTE — PROGRESS NOTE ADULT - PROBLEM SELECTOR PLAN 1
Seen on MRI, mets to spine   Neurosurg eval appreciated  Onc eval appreciated  possible systemic treatment, poor candidate  Rad Onc eval noted   Decadron 4mg Q6hrs, taper as per surg team   pain control, aggressive   Neuro check  Palliative eval appreciated, pain control   poor candidate for palliative rads  seen by Onc/Rad today   may benefits form chemo however family refusing and stating that he has no power to get chemo   discussed in detail with family at the bedside  pain control and muscle relaxant  second opinion Onc eval appreciated negative...

## 2023-05-15 NOTE — DISCHARGE NOTE NURSING/CASE MANAGEMENT/SOCIAL WORK - NURSING SECTION COMPLETE
Patient received s/p soft tissue injection in MPU bay 7. See VS flowsheet. Patient to recover for 1 hour and discharge home. Fall precautions reviewed. Bed in lowest, locked position. Call light within reach.     Patient/Caregiver provided printed discharge information.

## 2023-11-09 NOTE — PROGRESS NOTE ADULT - PROBLEM SELECTOR PLAN 3
Rad ONC, Medical Onc, and Neuro Onc inputs noted. At this point his family seem to be focused on a symptoms driven approach; however, they are still having discussions among them in order to confirm that decision. As indicated above, symptoms Rx is a priority. Wife and daughters understand his poor prognosis.   His HCP and his family are also discussing about code status. For now he is full code. anxiety

## 2024-01-10 NOTE — PROGRESS NOTE ADULT - PROBLEM SELECTOR PLAN 1
Care Management Follow Up    Length of Stay (days): 4    Expected Discharge Date:  TBD     Concerns to be Addressed: discharge planning     Patient plan of care discussed at interdisciplinary rounds: Yes    Anticipated Discharge Disposition: Transitional Care, Home Care, LTC     Anticipated Discharge Services:  TBD  Anticipated Discharge DME:  TBD    Patient/family educated on Medicare website which has current facility and service quality ratings: yes  Education Provided on the Discharge Plan: Yes  Patient/Family in Agreement with the Plan: yes    Referrals Placed by CM/SW: External Care Coordination  Private pay costs discussed: Not applicable    Additional Information:  RNCC received call from [facility] nurse Lacy requesting an update on pt's status. Pt is currently open to [facility] for home nursing. Update provided. Pt not medically ready for discharge at this time. Please update [facility] with updated discharge date when applicable. Care management will continue to follow.       [facility]  Ph: [phone]  Fax: [phone]      Sandra Barton RN   Nurse Coordinator    Covering for: 5 M/S  Phone: *[phone]    Social Work and Care Management Department       SEARCHABLE in [facility] - search CARE COORDINATOR       [place] [facility] [place] ([id number]) Saturday & Sunday; ([phone]) [facility] Recognized Holidays     Units: 5A, 5B & 5C  Pager: [phone]    Units: 6B, 6C & 6D    Pager: [phone]    Units: 7A, 7B, 7C & 7D    Pager: [phone]    Units: 6A & ICU   Pager: [phone]    Units: 5 Ortho, 5MS & WB ED Pager: [phone]    Units: 6MS, 8A & 10 ICU  Pager [phone]       .  Current meds:  Gabapentin 300mg PO q8h  Methadone 2.5g PO TID (decreased 12/13)  Dilaudid 0.5mg IV q6h ATC  Dilaudid 0.5mg IV q2h PRN  Dexamethasone 4mg PO q6h    OME:  12/14-15: 80mg (4 PRN's) + methadone 2.5 TID    > The patient's mental status has worsened and additionally, BP's have been soft and labile that giving opioids has now raised some concerns of worsening his BP  > As mentioned previously, the family is still discussing if they want to pursue a comfort-centric approach to the patient's care  > With the potential side effects of opioids (somnolence, hypotension), would recommend a trial of Methylphenidate to start at 5mg PO in AM and 5mg PO at noon. We do not have robust data, but Ritalin seems to have better evidence in cancer patients with these side effects.

## 2024-03-11 NOTE — PATIENT PROFILE ADULT - NSPRESCRALCFREQ_GEN_A_NUR
Render In Strict Bullet Format?: No
Detail Level: Zone
Initiate Treatment: doxycycline hyclate 100 mg capsule \\nTake bid with food and water for 10 days\\n\\ntriamcinolone acetonide 0.1 % topical cream BID\\nApply to rash on body BID for 2-3 weeks
Never

## 2024-05-28 NOTE — DISCHARGE NOTE PROVIDER - HOSPITAL COURSE
Patient responded via e-advice regarding smoking history, this has been updated.     64 yr old recently diagnosed with metastatic brain tumor; awaiting RT ad chemotherapy   discharge from the hospital  and was to go to rehab.     presents with fever, cough , sputum, malaise     chest xray with   infiltrate and rvp positve for rhinovirus    probable rhinovirus with secondary pna  recent hospitalization;    seen by neuro surgery/pulm/ID; CT chest with LLpna. CT head with residual tumor; per neuro no signs of CNS infection;    changed Depakote to Keppra secondary to elevated LFT; was given IVF/    treatment 64 yr old recently diagnosed with metastatic brain tumor; awaiting RT ad chemotherapy   discharge from the hospital  and was to go to rehab.     presents with fever, cough , sputum, malaise     chest xray with   infiltrate and rvp positve for rhinovirus    probable rhinovirus with secondary pna  recent hospitalization;    seen by neuro surgery/pulm/ID; CT chest with LLpna. CT head with residual tumor; per neuro no signs of CNS infection;    changed Depakote to Keppra secondary to elevated LFT; was given IVF/ IV cefepime.    follow up with oncology/radiation treatment; follow up with Md in 1 week.    pt is cleared by MD for discharge home on ceftin for 3 days.

## 2024-09-06 NOTE — PROGRESS NOTE ADULT - ASSESSMENT
63yo male was admitted for episodes of AMS.  CT's and MRI noted. CT chest and abdomen noted  Patient is on steroids and Keppra.  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  Patient reports that he has been feeling weak and malaised with intermittent lightheadedness over the last 2 to 3 days.  Also notes that he has not had a bowel movement in 4 weeks, which he attributes to methadone use.  Yesterday took a extra dose of his prescribed methadone because he thought this might make him feel better, but it did not seem to help.  Did not take methadone today.  Is tolerating p.o., denies nausea/vomiting, abdominal pain or distention, urinary symptoms, fever/chills, chest pain or shortness of breath.

## 2024-09-25 NOTE — ED ADULT NURSE NOTE - ED STAT RN HANDOFF DETAILS
"Acute metabolic encephalopathy that is likely multifactorial due to underlying sepsis/infection with multiple decubitus ulcers, constipation versus fecal impaction, polypharmacy, and underlying Alzheimer's dementia with possible progression  -CT scan of head with Negative for acute intracranial process. Negative for hemorrhage, or skull fracture. Marked cerebral atrophy noted.  Intracranial atherosclerotic disease noted.  Small vessel ischemic changes noted. Debris within both ear canals."  -CT scan of abdomen and pelvis with constipation versus impaction and otherwise negative  -white blood cell count 18.39, lactic acid level 3.3, procalcitonin level 0.04, blood cultures pending, urinalysis negative, troponin negative, creatinine 1.1, LFTs unremarkable, and currently afebrile  -IV fluids, broad-spectrum IV antibiotics  -check B12, folate, TSH, free T4, ammonia level, and UDS - review  -check influenza a/B and COVID-19 - negative    " Report given to TAMARA Xiong

## 2024-11-26 NOTE — CONSULT NOTE ADULT - SUBJECTIVE AND OBJECTIVE BOX
Admitting Diagnosis:  Pneumonia (J18.9): PNEUMONIA, UNSPECIFIED ORGANISM      HPI:  This is a 64y year old Male with the below past medical history who presents with the chief complaint of fever up to 103 at home, recent discharge from NS service here s/p left frontal brain mass resected ? lung primary metastatic to brain tumor was supposed to get PET scan study done today in [place]. cough few days and fevere + rapRVP today in ER. possibly pneumonia, admitted for treatment and furhter workup. patient denies seizure, maintained on depakote since discharge although LFTS now 160s from normal last week in patient. denies focal weakness or sensory chagnes, denies falls or gait issues. denies vision changes or speech changes or confusion. no drainage from surgical site.    Past Medical History:  No pertinent past medical history ([id number])      Past Surgical History:  Status post craniotomy (Z98.890)  No significant past surgical history ([id number])      Social History:  No toxic habits    Family History:  FAMILY HISTORY:  No pertinent family history in first degree relatives      Allergies:  No Known Allergies      ROS:  Constitutional: Patient offers no complaints of fevers or significant weight loss  Ears, Nose, Mouth and Throat: The patient presents with no abnormalities of the head, ears, eyes, nose or throat  Skin: Patient offers no concerns of new rashes or lesions  Respiratory: The patient presents with no abnormalities of the respiratory tract  Cardiovascular: The patient presents with no cardiac abnormalities  Gastrointestinal: The patient presents with no abnormalities of the GI system  Genitourinary: The patient presents with no dysuria, hematuria or frequent urination  Neurological: See HPI  Endocrine: Patient offers no complaints of excessive thirst, urination, or heat/cold intolerance    Advanced care planning reviewed and noted in the chart.    Medications:  cefepime   IVPB      cefepime   IVPB 1000 milliGRAM(s) IV Intermittent once  cefepime   IVPB 1000 milliGRAM(s) IV Intermittent every 8 hours  dexamethasone     Tablet 2 milliGRAM(s) Oral daily  diVALproex  milliGRAM(s) Oral every 12 hours  famotidine    Tablet 20 milliGRAM(s) Oral daily  melatonin 3 milliGRAM(s) Oral at bedtime  QUEtiapine 25 milliGRAM(s) Oral at bedtime  sodium chloride 0.9%. 1000 milliLiter(s) IV Continuous <Continuous>      Labs:  CBC Full  -  ( 24 Sep 2019 01:52 )  WBC Count : 14.1 K/uL  RBC Count : 4.12 M/uL  Hemoglobin : 12.9 g/dL  Hematocrit : 40.2 %  Platelet Count - Automated : 143 K/uL  Mean Cell Volume : 97.6 fl  Mean Cell Hemoglobin : 31.2 pg  Mean Cell Hemoglobin Concentration : 32.0 gm/dL  Auto Neutrophil # : 9.8 K/uL  Auto Lymphocyte # : 1.6 K/uL  Auto Monocyte # : 2.4 K/uL  Auto Eosinophil # : 0.1 K/uL  Auto Basophil # : 0.1 K/uL  Auto Neutrophil % : 70.0 %  Auto Lymphocyte % : 10.0 %  Auto Monocyte % : 14.0 %  Auto Eosinophil % : x  Auto Basophil % : x        137  |  100  |  24<H>  ----------------------------<  98  4.0   |  23  |  0.70    Ca    9.5      24 Sep 2019 01:52    TPro  6.2  /  Alb  3.2<L>  /  TBili  0.4  /  DBili  x   /  AST  160<H>  /  ALT  160<H>  /  AlkPhos  149<H>      CAPILLARY BLOOD GLUCOSE        LIVER FUNCTIONS - ( 24 Sep 2019 01:52 )  Alb: 3.2 g/dL / Pro: 6.2 g/dL / ALK PHOS: 149 U/L / ALT: 160 U/L / AST: 160 U/L / GGT: x           PT/INR - ( 24 Sep 2019 01:52 )   PT: 11.1 sec;   INR: 0.97 ratio         PTT - ( 24 Sep 2019 01:52 )  PTT:24.2 sec  Urinalysis Basic - ( 24 Sep 2019 04:12 )    Color: Yellow / Appearance: Clear / S.029 / pH: x  Gluc: x / Ketone: Small  / Bili: Negative / Urobili: Negative   Blood: x / Protein: 30 mg/dL / Nitrite: Negative   Leuk Esterase: Negative / RBC: 3 /hpf / WBC 6 /HPF   Sq Epi: x / Non Sq Epi: 1 /hpf / Bacteria: Negative      Male    Vitals:  Vital Signs Last 24 Hrs  T(C): 36.7 (24 Sep 2019 09:06), Max: 38 (24 Sep 2019 01:16)  T(F): 98 (24 Sep 2019 09:06), Max: 100.4 (24 Sep 2019 01:16)  HR: 65 (24 Sep 2019 09:06) (65 - 124)  BP: 94/57 (24 Sep 2019 09:06) (94/57 - 115/66)  BP(mean): --  RR: 17 (24 Sep 2019 09:06) (17 - 22)  SpO2: 97% (24 Sep 2019 09:06) (95% - 97%)    NEUROLOGICAL EXAM:    Mental status: Awake, alert, and in no apparent distress. observed walking from bathroom to ER hallway bed. Oriented to person, place and time. Language function is normal. Recent memory, digit span and concentration were normal.  minimal verbal output, flat affect, depressed.     Cranial Nerves: Pupils were equal, round, reactive to light. Extraocular movements were intact. Visual field were full. Fundoscopic exam was deferred. Facial sensation was intact to light touch. There was no facial asymmetry. The palate was upgoing symmetrically and tongue was midline. Hearing acuity was intact to finger rub AU. Shoulder shrug was full bilaterally    Motor exam: Bulk and tone were normal. Strength was 5/5 in all four extremities. Fine finger movements were symmetric and normal. There was no pronator drift    Reflexes: 2+ in the bilateral upper extremities. 2+ in the bilateral lower extremities. Toes were downgoing bilaterally.     Sensation: Intact to light touch, temperature, vibration and proprioception.     Coordination: Finger-nose-finger and heel-to-shin was without dysmetria.     Gait: Narrow base and steady.
Statement Selected